# Patient Record
Sex: FEMALE | Race: WHITE | NOT HISPANIC OR LATINO
[De-identification: names, ages, dates, MRNs, and addresses within clinical notes are randomized per-mention and may not be internally consistent; named-entity substitution may affect disease eponyms.]

---

## 2018-01-15 ENCOUNTER — APPOINTMENT (OUTPATIENT)
Dept: CARDIOLOGY | Facility: CLINIC | Age: 83
End: 2018-01-15
Payer: MEDICARE

## 2018-01-15 PROCEDURE — 99214 OFFICE O/P EST MOD 30 MIN: CPT

## 2018-01-15 PROCEDURE — 93280 PM DEVICE PROGR EVAL DUAL: CPT

## 2018-01-15 PROCEDURE — ZZZZZ: CPT

## 2018-01-15 PROCEDURE — 93000 ELECTROCARDIOGRAM COMPLETE: CPT | Mod: 59

## 2018-03-09 ENCOUNTER — APPOINTMENT (OUTPATIENT)
Dept: CARDIOLOGY | Facility: CLINIC | Age: 83
End: 2018-03-09
Payer: MEDICARE

## 2018-03-09 PROCEDURE — 93880 EXTRACRANIAL BILAT STUDY: CPT

## 2018-06-18 ENCOUNTER — APPOINTMENT (OUTPATIENT)
Dept: CARDIOLOGY | Facility: CLINIC | Age: 83
End: 2018-06-18

## 2018-06-26 ENCOUNTER — APPOINTMENT (OUTPATIENT)
Dept: CARDIOLOGY | Facility: CLINIC | Age: 83
End: 2018-06-26
Payer: MEDICARE

## 2018-06-26 PROCEDURE — 93280 PM DEVICE PROGR EVAL DUAL: CPT

## 2018-07-05 ENCOUNTER — RECORD ABSTRACTING (OUTPATIENT)
Age: 83
End: 2018-07-05

## 2018-07-17 ENCOUNTER — APPOINTMENT (OUTPATIENT)
Dept: CARDIOLOGY | Facility: CLINIC | Age: 83
End: 2018-07-17
Payer: MEDICARE

## 2018-07-17 VITALS
RESPIRATION RATE: 14 BRPM | DIASTOLIC BLOOD PRESSURE: 80 MMHG | HEART RATE: 63 BPM | BODY MASS INDEX: 25.11 KG/M2 | WEIGHT: 133 LBS | HEIGHT: 61 IN | SYSTOLIC BLOOD PRESSURE: 154 MMHG

## 2018-07-17 VITALS
HEART RATE: 63 BPM | DIASTOLIC BLOOD PRESSURE: 80 MMHG | WEIGHT: 133 LBS | SYSTOLIC BLOOD PRESSURE: 154 MMHG | BODY MASS INDEX: 25.11 KG/M2 | HEIGHT: 61 IN | RESPIRATION RATE: 12 BRPM

## 2018-07-17 DIAGNOSIS — Z78.9 OTHER SPECIFIED HEALTH STATUS: ICD-10-CM

## 2018-07-17 PROCEDURE — 99214 OFFICE O/P EST MOD 30 MIN: CPT

## 2018-07-17 PROCEDURE — 93000 ELECTROCARDIOGRAM COMPLETE: CPT

## 2018-09-21 ENCOUNTER — APPOINTMENT (OUTPATIENT)
Dept: CARDIOLOGY | Facility: CLINIC | Age: 83
End: 2018-09-21
Payer: MEDICARE

## 2018-09-21 PROCEDURE — 93306 TTE W/DOPPLER COMPLETE: CPT

## 2018-10-30 ENCOUNTER — APPOINTMENT (OUTPATIENT)
Dept: CARDIOLOGY | Facility: CLINIC | Age: 83
End: 2018-10-30
Payer: MEDICARE

## 2018-10-30 VITALS
DIASTOLIC BLOOD PRESSURE: 80 MMHG | HEIGHT: 61 IN | BODY MASS INDEX: 25.68 KG/M2 | HEART RATE: 62 BPM | OXYGEN SATURATION: 96 % | WEIGHT: 136 LBS | SYSTOLIC BLOOD PRESSURE: 142 MMHG | RESPIRATION RATE: 14 BRPM

## 2018-10-30 VITALS
DIASTOLIC BLOOD PRESSURE: 80 MMHG | SYSTOLIC BLOOD PRESSURE: 142 MMHG | WEIGHT: 136 LBS | RESPIRATION RATE: 12 BRPM | HEART RATE: 62 BPM | HEIGHT: 61 IN | BODY MASS INDEX: 25.68 KG/M2 | OXYGEN SATURATION: 96 %

## 2018-10-30 PROCEDURE — 93000 ELECTROCARDIOGRAM COMPLETE: CPT

## 2018-10-30 PROCEDURE — 99214 OFFICE O/P EST MOD 30 MIN: CPT

## 2018-10-30 RX ORDER — ATORVASTATIN CALCIUM 80 MG/1
TABLET, FILM COATED ORAL DAILY
Refills: 0 | Status: DISCONTINUED | COMMUNITY
End: 2018-10-30

## 2018-12-11 ENCOUNTER — APPOINTMENT (OUTPATIENT)
Dept: CARDIOLOGY | Facility: CLINIC | Age: 83
End: 2018-12-11
Payer: MEDICARE

## 2018-12-11 PROCEDURE — 93280 PM DEVICE PROGR EVAL DUAL: CPT

## 2019-06-06 ENCOUNTER — MEDICATION RENEWAL (OUTPATIENT)
Age: 84
End: 2019-06-06

## 2019-06-06 ENCOUNTER — RX RENEWAL (OUTPATIENT)
Age: 84
End: 2019-06-06

## 2019-06-11 ENCOUNTER — APPOINTMENT (OUTPATIENT)
Dept: CARDIOLOGY | Facility: CLINIC | Age: 84
End: 2019-06-11
Payer: MEDICARE

## 2019-06-11 PROCEDURE — 93288 INTERROG EVL PM/LDLS PM IP: CPT

## 2019-07-18 ENCOUNTER — APPOINTMENT (OUTPATIENT)
Dept: CARDIOLOGY | Facility: CLINIC | Age: 84
End: 2019-07-18
Payer: MEDICARE

## 2019-07-18 ENCOUNTER — NON-APPOINTMENT (OUTPATIENT)
Age: 84
End: 2019-07-18

## 2019-07-18 VITALS
HEIGHT: 61 IN | WEIGHT: 136 LBS | OXYGEN SATURATION: 97 % | HEART RATE: 68 BPM | BODY MASS INDEX: 25.68 KG/M2 | DIASTOLIC BLOOD PRESSURE: 89 MMHG | SYSTOLIC BLOOD PRESSURE: 173 MMHG | RESPIRATION RATE: 14 BRPM

## 2019-07-18 PROCEDURE — 99214 OFFICE O/P EST MOD 30 MIN: CPT

## 2019-07-18 PROCEDURE — 93000 ELECTROCARDIOGRAM COMPLETE: CPT

## 2019-07-18 RX ORDER — LITHIUM CARBONATE 300 MG/1
300 CAPSULE ORAL
Qty: 28 | Refills: 0 | Status: DISCONTINUED | COMMUNITY
Start: 2017-10-06 | End: 2019-07-18

## 2019-07-18 RX ORDER — POTASSIUM 75 MG
TABLET ORAL
Refills: 0 | Status: DISCONTINUED | COMMUNITY
End: 2019-07-18

## 2019-07-18 RX ORDER — DOCUSATE SODIUM 100 MG/1
CAPSULE ORAL
Refills: 0 | Status: DISCONTINUED | COMMUNITY
End: 2019-07-18

## 2019-07-18 RX ORDER — QUETIAPINE FUMARATE 50 MG/1
50 TABLET ORAL
Qty: 28 | Refills: 0 | Status: DISCONTINUED | COMMUNITY
Start: 2018-03-15 | End: 2019-07-18

## 2019-07-18 RX ORDER — DIVALPROEX SODIUM 125 MG/1
125 TABLET, DELAYED RELEASE ORAL
Qty: 168 | Refills: 0 | Status: DISCONTINUED | COMMUNITY
Start: 2018-05-22 | End: 2019-07-18

## 2019-07-18 RX ORDER — LEVOTHYROXINE SODIUM 0.1 MG/1
100 TABLET ORAL
Refills: 0 | Status: DISCONTINUED | COMMUNITY
End: 2019-07-18

## 2019-07-18 RX ORDER — DIVALPROEX SODIUM 125 MG/1
125 CAPSULE, COATED PELLETS ORAL
Qty: 224 | Refills: 0 | Status: DISCONTINUED | COMMUNITY
Start: 2017-12-26 | End: 2019-07-18

## 2019-07-18 RX ORDER — CARBIDOPA AND LEVODOPA 25; 100 MG/1; MG/1
TABLET ORAL
Refills: 0 | Status: DISCONTINUED | COMMUNITY
End: 2019-07-18

## 2019-07-18 NOTE — ASSESSMENT
[FreeTextEntry1] : ECG: Atrial paced, v-sensed, borderline LVH\par \par PPM INTERROGATION 6/2019: 71% atrial paced, episode brief PAT normal function\par \par CAROTID DUPLEX 3/2018:\par 1. All arteries were clearly visualized.\par 2. There is 1-49% stenosis of the left proximal ICA.\par 3. There is 1-49% stenosis of the right proximal ICA.\par 4. The left and right carotid arteries are tortuous.\par 5. There is antegrade flow in the right and left vertebral arteries.\par 6. Recommend clinical correlation with the above findings.\par \par ECHO 9/2018:\par 1. Left ventricular ejection fraction, by visual estimation, is 65 to 70%.\par 2. Normal global left ventricular systolic function.\par 3. Impaired relaxation pattern of LV diastolic filling.\par 4. Normal left ventricular internal cavity size.\par 5. Mild concentric left ventricular hypertrophy.\par 6. Normal right ventricular size and systolic function.\par 7. Moderately dilated left atrium.\par 8. Mildly dilated right atrium.\par 9. Moderate mitral annular calcification.\par 10. Moderate thickening and calcification of the anterior and posterior mitral valve leaflets.\par 11. Mild mitral valve regurgitation.\par 12. Mild to moderate aortic valve stenosis.\par 13. Mild aortic regurgitation.\par 14. Mild tricuspid regurgitation.\par 15. There is moderate to severe dilatation of the ascending aorta (5cm).\par 16. Severely dilated pulmonary artery.\par 17. There is no evidence of pericardial effusion.\par 18. In comparison to the previous echocardiogram mitral and aortic regurgitation appear less severe.\par 19. Recommend clinical correlation with the above findings.

## 2019-07-18 NOTE — PHYSICAL EXAM
[Normal Conjunctiva] : the conjunctiva exhibited no abnormalities [Normal Jugular Venous V Waves Present] : normal jugular venous V waves present [] : no respiratory distress [Respiration, Rhythm And Depth] : normal respiratory rhythm and effort [Exaggerated Use Of Accessory Muscles For Inspiration] : no accessory muscle use [Heart Rate And Rhythm] : heart rate and rhythm were normal [Heart Sounds] : normal S1 and S2 [Bowel Sounds] : normal bowel sounds [Abdomen Soft] : soft [Abdomen Tenderness] : non-tender [Nail Clubbing] : no clubbing of the fingernails [Cyanosis, Localized] : no localized cyanosis [Skin Color & Pigmentation] : normal skin color and pigmentation [Affect] : the affect was normal [FreeTextEntry1] : oriented to person and place

## 2019-07-18 NOTE — HISTORY OF PRESENT ILLNESS
[FreeTextEntry1] : Patient is a 89yo F with HTN, TAA, PPM, dementia AS/AI, moderate MR, HLD, DM here for cardiac follow up. No CP/SOB, no pnd/orthopnea/palps/sycnope/edema. No complaints today. Spends a lot of time coloring and drawing at home. Per son, no recent complaints and she has been well. \par \par ROS: GI and  negative

## 2019-07-18 NOTE — DISCUSSION/SUMMARY
[FreeTextEntry1] : Patient is a 88yo F with HTN, TAA, PPM, dementia AS/AI, moderate MR, HLD, DM here for cardiac follow up. Doing fairly well without anginal symptoms and no signs CHF.    Also remains poor surgical candidate for TAA repair and family has not wanted done either. Echo last year with stable findings, no significant progression of AS, LV function remains preserved. 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 \par Brief PAT on recent PPM check, not clinically significant. BP high, ? white coat and advised to monitor at home bid for next week\par \par 1. Continue antihypertensives, family to monitor BP bid for next week and call with results. May need to increase hydralazine \par 2. ASA and statin \par 3. ACtivity as tolerated\par 4. PPM check in 6 months\par 5. Follow up 6 months \par 6. Surveillance of aortic and mitral valve, consider repeat echo 1 year\par 7. Follow up 6 months\par \par

## 2019-08-28 ENCOUNTER — RX RENEWAL (OUTPATIENT)
Age: 84
End: 2019-08-28

## 2019-11-14 ENCOUNTER — APPOINTMENT (OUTPATIENT)
Dept: INTERNAL MEDICINE | Facility: CLINIC | Age: 84
End: 2019-11-14
Payer: MEDICARE

## 2019-11-14 VITALS
OXYGEN SATURATION: 97 % | TEMPERATURE: 98.6 F | HEART RATE: 71 BPM | WEIGHT: 137.25 LBS | DIASTOLIC BLOOD PRESSURE: 84 MMHG | SYSTOLIC BLOOD PRESSURE: 160 MMHG | BODY MASS INDEX: 25.91 KG/M2 | HEIGHT: 61 IN

## 2019-11-14 DIAGNOSIS — Z00.00 ENCOUNTER FOR GENERAL ADULT MEDICAL EXAMINATION W/OUT ABNORMAL FINDINGS: ICD-10-CM

## 2019-11-14 PROCEDURE — G0439: CPT

## 2019-11-14 PROCEDURE — G0442 ANNUAL ALCOHOL SCREEN 15 MIN: CPT | Mod: 59

## 2019-11-14 PROCEDURE — 36415 COLL VENOUS BLD VENIPUNCTURE: CPT

## 2019-11-14 NOTE — PHYSICAL EXAM
[No Acute Distress] : no acute distress [Well Nourished] : well nourished [Well Developed] : well developed [Well-Appearing] : well-appearing [Normal Sclera/Conjunctiva] : normal sclera/conjunctiva [PERRL] : pupils equal round and reactive to light [EOMI] : extraocular movements intact [Normal Outer Ear/Nose] : the outer ears and nose were normal in appearance [No JVD] : no jugular venous distention [Normal Oropharynx] : the oropharynx was normal [Supple] : supple [No Lymphadenopathy] : no lymphadenopathy [No Respiratory Distress] : no respiratory distress  [Thyroid Normal, No Nodules] : the thyroid was normal and there were no nodules present [No Accessory Muscle Use] : no accessory muscle use [Clear to Auscultation] : lungs were clear to auscultation bilaterally [Normal Rate] : normal rate  [Regular Rhythm] : with a regular rhythm [Normal S1, S2] : normal S1 and S2 [No Murmur] : no murmur heard [No Carotid Bruits] : no carotid bruits [No Varicosities] : no varicosities [No Abdominal Bruit] : a ~M bruit was not heard ~T in the abdomen [Pedal Pulses Present] : the pedal pulses are present [No Edema] : there was no peripheral edema [No Palpable Aorta] : no palpable aorta [No Extremity Clubbing/Cyanosis] : no extremity clubbing/cyanosis [Soft] : abdomen soft [Non Tender] : non-tender [Non-distended] : non-distended [No Masses] : no abdominal mass palpated [No HSM] : no HSM [Normal Bowel Sounds] : normal bowel sounds [Normal Posterior Cervical Nodes] : no posterior cervical lymphadenopathy [Normal Anterior Cervical Nodes] : no anterior cervical lymphadenopathy [No Spinal Tenderness] : no spinal tenderness [No CVA Tenderness] : no CVA  tenderness [No Joint Swelling] : no joint swelling [Grossly Normal Strength/Tone] : grossly normal strength/tone [No Rash] : no rash [No Focal Deficits] : no focal deficits [Coordination Grossly Intact] : coordination grossly intact [Deep Tendon Reflexes (DTR)] : deep tendon reflexes were 2+ and symmetric [Normal Affect] : the affect was normal [Normal Insight/Judgement] : insight and judgment were intact [de-identified] : burning pain in fingers and toes

## 2019-11-14 NOTE — HISTORY OF PRESENT ILLNESS
[FreeTextEntry1] : np est [de-identified] : Ms. RAGHAVENDRA WALLACE is a 89 year female with a PMH of Glaucoma,  Bipolar 2, HTN, Dementia,  comes to the office for physical exam.

## 2019-11-14 NOTE — HEALTH RISK ASSESSMENT
[Good] : ~his/her~  mood as  good [] : No [1 or 2 (0 pts)] : 1 or 2 (0 points) [Never (0 pts)] : Never (0 points) [No] : In the past 12 months have you used drugs other than those required for medical reasons? No [No falls in past year] : Patient reported no falls in the past year [0] : 2) Feeling down, depressed, or hopeless: Not at all (0) [Audit-CScore] : 0 [de-identified] : Psychiatrist, Opthalmology.  [[id number]] : 0 [Patient declined mammogram] : Patient declined mammogram [Patient declined PAP Smear] : Patient declined PAP Smear [Patient declined bone density test] : Patient declined bone density test [Patient declined colonoscopy] : Patient declined colonoscopy [HIV test declined] : HIV test declined [Hepatitis C test declined] : Hepatitis C test declined

## 2019-11-15 LAB
ALBUMIN SERPL ELPH-MCNC: 4.6 G/DL
ALP BLD-CCNC: 74 U/L
ALT SERPL-CCNC: 10 U/L
ANION GAP SERPL CALC-SCNC: 16 MMOL/L
AST SERPL-CCNC: 16 U/L
BASOPHILS # BLD AUTO: 0.01 K/UL
BASOPHILS NFR BLD AUTO: 0.2 %
BILIRUB SERPL-MCNC: 0.2 MG/DL
BUN SERPL-MCNC: 23 MG/DL
CALCIUM SERPL-MCNC: 9.9 MG/DL
CHLORIDE SERPL-SCNC: 104 MMOL/L
CHOLEST SERPL-MCNC: 217 MG/DL
CHOLEST/HDLC SERPL: 4.2 RATIO
CO2 SERPL-SCNC: 23 MMOL/L
CREAT SERPL-MCNC: 1.1 MG/DL
EOSINOPHIL # BLD AUTO: 0.13 K/UL
EOSINOPHIL NFR BLD AUTO: 2.9 %
ESTIMATED AVERAGE GLUCOSE: 131 MG/DL
GLUCOSE SERPL-MCNC: 110 MG/DL
HBA1C MFR BLD HPLC: 6.2 %
HCT VFR BLD CALC: 37.4 %
HDLC SERPL-MCNC: 52 MG/DL
HGB BLD-MCNC: 11.6 G/DL
IMM GRANULOCYTES NFR BLD AUTO: 0.7 %
LDLC SERPL CALC-MCNC: 119 MG/DL
LYMPHOCYTES # BLD AUTO: 0.96 K/UL
LYMPHOCYTES NFR BLD AUTO: 21.2 %
MAN DIFF?: NORMAL
MCHC RBC-ENTMCNC: 31 GM/DL
MCHC RBC-ENTMCNC: 31.5 PG
MCV RBC AUTO: 101.6 FL
MONOCYTES # BLD AUTO: 0.65 K/UL
MONOCYTES NFR BLD AUTO: 14.4 %
NEUTROPHILS # BLD AUTO: 2.74 K/UL
NEUTROPHILS NFR BLD AUTO: 60.6 %
PLATELET # BLD AUTO: 154 K/UL
POTASSIUM SERPL-SCNC: 5.1 MMOL/L
PROT SERPL-MCNC: 8.3 G/DL
RBC # BLD: 3.68 M/UL
RBC # FLD: 13.3 %
SODIUM SERPL-SCNC: 143 MMOL/L
TRIGL SERPL-MCNC: 231 MG/DL
TSH SERPL-ACNC: 0.41 UIU/ML
WBC # FLD AUTO: 4.52 K/UL

## 2019-12-10 ENCOUNTER — APPOINTMENT (OUTPATIENT)
Dept: CARDIOLOGY | Facility: CLINIC | Age: 84
End: 2019-12-10
Payer: MEDICARE

## 2019-12-10 ENCOUNTER — NON-APPOINTMENT (OUTPATIENT)
Age: 84
End: 2019-12-10

## 2019-12-10 VITALS
DIASTOLIC BLOOD PRESSURE: 83 MMHG | SYSTOLIC BLOOD PRESSURE: 184 MMHG | BODY MASS INDEX: 25.49 KG/M2 | HEIGHT: 61 IN | RESPIRATION RATE: 14 BRPM | HEART RATE: 70 BPM | WEIGHT: 135 LBS

## 2019-12-10 PROCEDURE — 93000 ELECTROCARDIOGRAM COMPLETE: CPT | Mod: 59

## 2019-12-10 PROCEDURE — 93288 INTERROG EVL PM/LDLS PM IP: CPT

## 2019-12-10 PROCEDURE — 99214 OFFICE O/P EST MOD 30 MIN: CPT

## 2019-12-10 RX ORDER — ATORVASTATIN CALCIUM 20 MG/1
20 TABLET, FILM COATED ORAL
Qty: 28 | Refills: 0 | Status: DISCONTINUED | COMMUNITY
Start: 2017-12-26 | End: 2019-12-10

## 2019-12-10 NOTE — ASSESSMENT
[FreeTextEntry1] : ECG: Atrial paced, v-sensed, leftward axis, NSST \par \par PPM INTERROGATION 12/2019: 70% atrial paced, no events\par PPM INTERROGATION 6/2019: 71% atrial paced, episode brief PAT normal function\par \par CAROTID DUPLEX 3/2018:\par 1. All arteries were clearly visualized.\par 2. There is 1-49% stenosis of the left proximal ICA.\par 3. There is 1-49% stenosis of the right proximal ICA.\par 4. The left and right carotid arteries are tortuous.\par 5. There is antegrade flow in the right and left vertebral arteries.\par 6. Recommend clinical correlation with the above findings.\par \par ECHO 9/2018:\par 1. Left ventricular ejection fraction, by visual estimation, is 65 to 70%.\par 2. Normal global left ventricular systolic function.\par 3. Impaired relaxation pattern of LV diastolic filling.\par 4. Normal left ventricular internal cavity size.\par 5. Mild concentric left ventricular hypertrophy.\par 6. Normal right ventricular size and systolic function.\par 7. Moderately dilated left atrium.\par 8. Mildly dilated right atrium.\par 9. Moderate mitral annular calcification.\par 10. Moderate thickening and calcification of the anterior and posterior mitral valve leaflets.\par 11. Mild mitral valve regurgitation.\par 12. Mild to moderate aortic valve stenosis.\par 13. Mild aortic regurgitation.\par 14. Mild tricuspid regurgitation.\par 15. There is moderate to severe dilatation of the ascending aorta (5cm).\par 16. Severely dilated pulmonary artery.\par 17. There is no evidence of pericardial effusion.\par 18. In comparison to the previous echocardiogram mitral and aortic regurgitation appear less severe.\par 19. Recommend clinical correlation with the above findings.

## 2019-12-10 NOTE — PHYSICAL EXAM
[Normal Conjunctiva] : the conjunctiva exhibited no abnormalities [] : no respiratory distress [Normal Jugular Venous V Waves Present] : normal jugular venous V waves present [Respiration, Rhythm And Depth] : normal respiratory rhythm and effort [Heart Rate And Rhythm] : heart rate and rhythm were normal [Exaggerated Use Of Accessory Muscles For Inspiration] : no accessory muscle use [Bowel Sounds] : normal bowel sounds [Heart Sounds] : normal S1 and S2 [Abdomen Soft] : soft [Abdomen Tenderness] : non-tender [Nail Clubbing] : no clubbing of the fingernails [Cyanosis, Localized] : no localized cyanosis [Affect] : the affect was normal [Skin Color & Pigmentation] : normal skin color and pigmentation [FreeTextEntry1] : no edema

## 2019-12-10 NOTE — HISTORY OF PRESENT ILLNESS
[FreeTextEntry1] : Patient is a 89yo F with HTN, TAA, PPM, dementia AS/AI, moderate MR, HLD, DM here for cardiac follow up. No CP/SOB, no pnd/orthopnea/palps/sycnope/edema. Uncomfortable today, son states more confused today which tends to fluctuate.  Spends a lot of time coloring and drawing at home, limited recently from hand pain. Per son, no recent cardiac complaints and she has been well. BP remains in 140-150s systolic. \par \par ROS: GI and  negative

## 2019-12-10 NOTE — DISCUSSION/SUMMARY
[FreeTextEntry1] : Patient is a 89yo F with HTN, TAA, PPM, dementia AS/AI, moderate MR, HLD, DM here for cardiac follow up. Doing fairly well without anginal symptoms and no signs CHF.    Also remains poor surgical candidate for TAA repair and family has not wanted done either. Echo last year with stable findings, no significant progression of AS, LV function remains preserved. 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 \par BP high but agitated, home BP well enough controlled. Will avoid pushin gBP too low to avoid falling/orthostasis and dizziness. Will also dc statin, limited benefit at this time given her age. Will help reduce polypharmacy\par \par 1. Continue antihypertensives, son to continue to monitor at home\par 2. ASA to be continued, discontinue statin \par 3. ACtivity as tolerated\par 4. PPM check shows normal function and no events, recheck in 6 months\par 5. Follow up 6 months with echo same day to evaluate AS/MR\par \par \par

## 2020-01-21 ENCOUNTER — APPOINTMENT (OUTPATIENT)
Dept: INTERNAL MEDICINE | Facility: CLINIC | Age: 85
End: 2020-01-21
Payer: MEDICARE

## 2020-01-21 VITALS
HEART RATE: 75 BPM | HEIGHT: 61 IN | DIASTOLIC BLOOD PRESSURE: 94 MMHG | BODY MASS INDEX: 25.49 KG/M2 | SYSTOLIC BLOOD PRESSURE: 172 MMHG | WEIGHT: 135 LBS

## 2020-01-21 DIAGNOSIS — H61.22 IMPACTED CERUMEN, LEFT EAR: ICD-10-CM

## 2020-01-21 DIAGNOSIS — R21 RASH AND OTHER NONSPECIFIC SKIN ERUPTION: ICD-10-CM

## 2020-01-21 PROCEDURE — 99214 OFFICE O/P EST MOD 30 MIN: CPT | Mod: 25

## 2020-01-21 PROCEDURE — 69210 REMOVE IMPACTED EAR WAX UNI: CPT

## 2020-01-21 NOTE — PLAN
[FreeTextEntry1] : Patient cerumen removed from left ear with irrigation and manual removal of wax with plastic ear curette when visible from outside. Patient able to hear more clearly after procedure. \par \par Patient likely with candida infection secondary to having frequent wet depends and the abrasion on buttock is from patient scratching the area. \par \par In regards to had pain, patient was referred to occupational therapy.\par \par Counseling included abnormal lab results, differential diagnoses, treatment options, risks and benefits, lifestyle changes, prognosis, current condition, medications, and dose adjustments. \par The patient was interactive, attentive, asked questions, and verbalized understanding

## 2020-01-21 NOTE — HISTORY OF PRESENT ILLNESS
[de-identified] : Ms. RAGHAVENDRA WALLACE is a 89 year female with a PMH of Glaucoma,  Bipolar 2, HTN, Dementia,  comes to the office c/o left ear discomfort, and sore on lower back/ buttock area x 1 week. Patient is accompanied by her son. No other complaints at this time.  [FreeTextEntry1] : ear pain / sore on lower back

## 2020-01-21 NOTE — PHYSICAL EXAM
[No Acute Distress] : no acute distress [Well Developed] : well developed [Well Nourished] : well nourished [Well-Appearing] : well-appearing [Normal Sclera/Conjunctiva] : normal sclera/conjunctiva [EOMI] : extraocular movements intact [PERRL] : pupils equal round and reactive to light [Normal Outer Ear/Nose] : the outer ears and nose were normal in appearance [Normal Oropharynx] : the oropharynx was normal [No Lymphadenopathy] : no lymphadenopathy [No JVD] : no jugular venous distention [Supple] : supple [No Respiratory Distress] : no respiratory distress  [Thyroid Normal, No Nodules] : the thyroid was normal and there were no nodules present [No Accessory Muscle Use] : no accessory muscle use [Normal Rate] : normal rate  [Clear to Auscultation] : lungs were clear to auscultation bilaterally [Regular Rhythm] : with a regular rhythm [Normal S1, S2] : normal S1 and S2 [No Carotid Bruits] : no carotid bruits [No Murmur] : no murmur heard [No Varicosities] : no varicosities [No Abdominal Bruit] : a ~M bruit was not heard ~T in the abdomen [Pedal Pulses Present] : the pedal pulses are present [No Edema] : there was no peripheral edema [No Palpable Aorta] : no palpable aorta [Soft] : abdomen soft [No Extremity Clubbing/Cyanosis] : no extremity clubbing/cyanosis [Non-distended] : non-distended [Non Tender] : non-tender [No Masses] : no abdominal mass palpated [Normal Posterior Cervical Nodes] : no posterior cervical lymphadenopathy [Normal Bowel Sounds] : normal bowel sounds [No HSM] : no HSM [Normal Anterior Cervical Nodes] : no anterior cervical lymphadenopathy [No Spinal Tenderness] : no spinal tenderness [No CVA Tenderness] : no CVA  tenderness [Grossly Normal Strength/Tone] : grossly normal strength/tone [No Joint Swelling] : no joint swelling [Coordination Grossly Intact] : coordination grossly intact [Deep Tendon Reflexes (DTR)] : deep tendon reflexes were 2+ and symmetric [Normal Gait] : normal gait [No Focal Deficits] : no focal deficits [Normal Affect] : the affect was normal [de-identified] : cerumen impaction in left ear, TM was WNL after removal of cerumen. [Normal Insight/Judgement] : insight and judgment were intact [de-identified] : 2 cm x 2 cm abrasion noted on right buttock, no active bleeding or pustular discharge. Erythematous rash noted in groin bilaterally.

## 2020-02-05 ENCOUNTER — APPOINTMENT (OUTPATIENT)
Dept: INTERNAL MEDICINE | Facility: CLINIC | Age: 85
End: 2020-02-05
Payer: MEDICARE

## 2020-02-05 VITALS
HEIGHT: 61 IN | SYSTOLIC BLOOD PRESSURE: 158 MMHG | BODY MASS INDEX: 24.07 KG/M2 | DIASTOLIC BLOOD PRESSURE: 80 MMHG | WEIGHT: 127.5 LBS | TEMPERATURE: 100.5 F

## 2020-02-05 DIAGNOSIS — J31.0 CHRONIC RHINITIS: ICD-10-CM

## 2020-02-05 DIAGNOSIS — M25.542 PAIN IN JOINTS OF LEFT HAND: ICD-10-CM

## 2020-02-05 DIAGNOSIS — M25.541 PAIN IN JOINTS OF RIGHT HAND: ICD-10-CM

## 2020-02-05 DIAGNOSIS — J06.9 ACUTE UPPER RESPIRATORY INFECTION, UNSPECIFIED: ICD-10-CM

## 2020-02-05 PROCEDURE — 99214 OFFICE O/P EST MOD 30 MIN: CPT

## 2020-02-05 NOTE — PHYSICAL EXAM
[No Acute Distress] : no acute distress [Well Nourished] : well nourished [Well Developed] : well developed [Well-Appearing] : well-appearing [Normal Sclera/Conjunctiva] : normal sclera/conjunctiva [PERRL] : pupils equal round and reactive to light [EOMI] : extraocular movements intact [Normal Outer Ear/Nose] : the outer ears and nose were normal in appearance [No JVD] : no jugular venous distention [No Lymphadenopathy] : no lymphadenopathy [Supple] : supple [Thyroid Normal, No Nodules] : the thyroid was normal and there were no nodules present [No Respiratory Distress] : no respiratory distress  [No Accessory Muscle Use] : no accessory muscle use [Normal Rate] : normal rate  [Regular Rhythm] : with a regular rhythm [Normal S1, S2] : normal S1 and S2 [No Murmur] : no murmur heard [No Carotid Bruits] : no carotid bruits [No Abdominal Bruit] : a ~M bruit was not heard ~T in the abdomen [No Varicosities] : no varicosities [Pedal Pulses Present] : the pedal pulses are present [No Edema] : there was no peripheral edema [No Palpable Aorta] : no palpable aorta [No Extremity Clubbing/Cyanosis] : no extremity clubbing/cyanosis [Soft] : abdomen soft [Non Tender] : non-tender [Non-distended] : non-distended [No Masses] : no abdominal mass palpated [No HSM] : no HSM [Normal Bowel Sounds] : normal bowel sounds [Normal Posterior Cervical Nodes] : no posterior cervical lymphadenopathy [Normal Anterior Cervical Nodes] : no anterior cervical lymphadenopathy [No CVA Tenderness] : no CVA  tenderness [No Spinal Tenderness] : no spinal tenderness [No Joint Swelling] : no joint swelling [Grossly Normal Strength/Tone] : grossly normal strength/tone [No Rash] : no rash [Coordination Grossly Intact] : coordination grossly intact [No Focal Deficits] : no focal deficits [Normal Gait] : normal gait [Deep Tendon Reflexes (DTR)] : deep tendon reflexes were 2+ and symmetric [Normal Affect] : the affect was normal [Normal Insight/Judgement] : insight and judgment were intact [de-identified] : fullness of TM bilaterally, cobblestoning of pharynx

## 2020-02-05 NOTE — HISTORY OF PRESENT ILLNESS
[Family Member] : family member [FreeTextEntry1] : cough [de-identified] : Ms. RAGHAVENDRA WALLACE is a 89 year female with a PMH of Glaucoma,  Bipolar 2, HTN, Dementia,  comes to the office  with her son c/o dry cough and post nasal drip x 3 days. No other complaints at this time.

## 2020-02-05 NOTE — PLAN
[FreeTextEntry1] : Patient's signs and symptoms consistent with URI. Patient educated about signs and symptoms of URI, they typically last up to 14 days but may last longer. Patient advised to continue OTC medication for symptomatic relief. \par \par Patient was referred to Occupational therapy for further evaluation and management.\par \par Counseling included abnormal lab results, differential diagnoses, treatment options, risks and benefits, lifestyle changes, prognosis, current condition, medications, and dose adjustments. \par The patient was interactive, attentive, asked questions, and verbalized understanding

## 2020-02-13 ENCOUNTER — APPOINTMENT (OUTPATIENT)
Dept: INTERNAL MEDICINE | Facility: CLINIC | Age: 85
End: 2020-02-13
Payer: MEDICARE

## 2020-02-13 VITALS
HEART RATE: 82 BPM | SYSTOLIC BLOOD PRESSURE: 132 MMHG | OXYGEN SATURATION: 97 % | TEMPERATURE: 97.8 F | WEIGHT: 127 LBS | DIASTOLIC BLOOD PRESSURE: 83 MMHG | BODY MASS INDEX: 23.98 KG/M2 | HEIGHT: 61 IN

## 2020-02-13 DIAGNOSIS — Z87.09 PERSONAL HISTORY OF OTHER DISEASES OF THE RESPIRATORY SYSTEM: ICD-10-CM

## 2020-02-13 DIAGNOSIS — H66.91 OTITIS MEDIA, UNSPECIFIED, RIGHT EAR: ICD-10-CM

## 2020-02-13 PROCEDURE — 99214 OFFICE O/P EST MOD 30 MIN: CPT

## 2020-02-13 NOTE — REVIEW OF SYSTEMS
[Sore Throat] : sore throat [Earache] : earache [Postnasal Drip] : postnasal drip [Negative] : Heme/Lymph

## 2020-02-13 NOTE — HISTORY OF PRESENT ILLNESS
[Family Member] : family member [FreeTextEntry1] : cough [de-identified] : Ms. RAGHAVENDRA WALLACE is a 89 year female with a PMH of Glaucoma,  Bipolar 2, HTN, Dementia,  comes to the office  with her son c/o sore throat and ear pain x 3 days.

## 2020-02-13 NOTE — PLAN
[FreeTextEntry1] : In regards to patients otitis media, patient started on antibiotics.\par \par Patient was referred to Occupational therapy for further evaluation and management.\par \par patient's BP well controlled with current medication. will continue current  regimen \par \par Counseling included abnormal lab results, differential diagnoses, treatment options, risks and benefits, lifestyle changes, prognosis, current condition, medications, and dose adjustments. \par The patient was interactive, attentive, asked questions, and verbalized understanding

## 2020-02-13 NOTE — PHYSICAL EXAM
[Well Developed] : well developed [Well Nourished] : well nourished [No Acute Distress] : no acute distress [Normal Sclera/Conjunctiva] : normal sclera/conjunctiva [Well-Appearing] : well-appearing [PERRL] : pupils equal round and reactive to light [Normal Outer Ear/Nose] : the outer ears and nose were normal in appearance [EOMI] : extraocular movements intact [No Lymphadenopathy] : no lymphadenopathy [Supple] : supple [No JVD] : no jugular venous distention [Thyroid Normal, No Nodules] : the thyroid was normal and there were no nodules present [No Accessory Muscle Use] : no accessory muscle use [No Respiratory Distress] : no respiratory distress  [Normal S1, S2] : normal S1 and S2 [Regular Rhythm] : with a regular rhythm [Normal Rate] : normal rate  [No Murmur] : no murmur heard [No Carotid Bruits] : no carotid bruits [No Varicosities] : no varicosities [Pedal Pulses Present] : the pedal pulses are present [No Abdominal Bruit] : a ~M bruit was not heard ~T in the abdomen [No Edema] : there was no peripheral edema [No Extremity Clubbing/Cyanosis] : no extremity clubbing/cyanosis [No Palpable Aorta] : no palpable aorta [Non Tender] : non-tender [Soft] : abdomen soft [Non-distended] : non-distended [No HSM] : no HSM [No Masses] : no abdominal mass palpated [Normal Bowel Sounds] : normal bowel sounds [No CVA Tenderness] : no CVA  tenderness [Normal Anterior Cervical Nodes] : no anterior cervical lymphadenopathy [Normal Posterior Cervical Nodes] : no posterior cervical lymphadenopathy [Grossly Normal Strength/Tone] : grossly normal strength/tone [No Joint Swelling] : no joint swelling [No Spinal Tenderness] : no spinal tenderness [No Focal Deficits] : no focal deficits [No Rash] : no rash [Coordination Grossly Intact] : coordination grossly intact [Normal Gait] : normal gait [Deep Tendon Reflexes (DTR)] : deep tendon reflexes were 2+ and symmetric [Normal Affect] : the affect was normal [de-identified] : right TM red, swollen, pain with palpation of tragus, cobblestoning of pharynx  [Normal Insight/Judgement] : insight and judgment were intact

## 2020-02-14 LAB — RAPID RVP RESULT: NOT DETECTED

## 2020-02-17 LAB — BACTERIA THROAT CULT: NORMAL

## 2020-03-04 DIAGNOSIS — Z00.00 ENCOUNTER FOR GENERAL ADULT MEDICAL EXAMINATION W/OUT ABNORMAL FINDINGS: ICD-10-CM

## 2020-04-13 ENCOUNTER — RX RENEWAL (OUTPATIENT)
Age: 85
End: 2020-04-13

## 2020-06-11 ENCOUNTER — APPOINTMENT (OUTPATIENT)
Dept: CARDIOLOGY | Facility: CLINIC | Age: 85
End: 2020-06-11
Payer: MEDICARE

## 2020-06-11 ENCOUNTER — NON-APPOINTMENT (OUTPATIENT)
Age: 85
End: 2020-06-11

## 2020-06-11 VITALS — SYSTOLIC BLOOD PRESSURE: 144 MMHG | DIASTOLIC BLOOD PRESSURE: 84 MMHG

## 2020-06-11 VITALS
BODY MASS INDEX: 19.46 KG/M2 | SYSTOLIC BLOOD PRESSURE: 160 MMHG | WEIGHT: 114 LBS | HEIGHT: 64 IN | HEART RATE: 64 BPM | RESPIRATION RATE: 14 BRPM | DIASTOLIC BLOOD PRESSURE: 88 MMHG | OXYGEN SATURATION: 99 %

## 2020-06-11 PROCEDURE — 93000 ELECTROCARDIOGRAM COMPLETE: CPT | Mod: 59

## 2020-06-11 PROCEDURE — 99214 OFFICE O/P EST MOD 30 MIN: CPT

## 2020-06-11 PROCEDURE — 93288 INTERROG EVL PM/LDLS PM IP: CPT

## 2020-06-11 NOTE — DISCUSSION/SUMMARY
[FreeTextEntry1] : Patient is a 89yo F with HTN, TAA, PPM, dementia AS/AI, moderate MR, HLD, DM here for cardiac follow up. Doing fairly well without anginal symptoms and no signs CHF.  Has advanced dementia.    Also remains poor surgical candidate for TAA repair and family has not wanted done either. Poor candidate for A/C as well, PAF burden low and risk benefit favors no A/C at this time. \par \par Echo 2018 with stable findings, no significant progression of AS, LV function remains preserved. 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 \par \par \par \par 1. Continue antihypertensives, son to continue to monitor at home. BP well enough controlled and wouldn’t push lower in 89yo frail patient \par 2. ASA to be continued, no A/C at this time with low PAF burden\par 3. ACtivity as tolerated\par 4. PPM check shows normal function and no events, recheck in 6 months\par 5. Keep off statin\par 6. Follow up 6 months \par \par \par

## 2020-06-11 NOTE — ASSESSMENT
[FreeTextEntry1] : ECG: Atrial paced, v-sensed, leftward axis, NSST \par \par PPM INTERROGATION 6/2020: 67% atrial paced, 16 episodes PAF, longest for 2 minutes 45 seconds\par PPM INTERROGATION 12/2019: 70% atrial paced, no events\par PPM INTERROGATION 6/2019: 71% atrial paced, episode brief PAT normal function\par \par CAROTID DUPLEX 3/2018:\par 1. All arteries were clearly visualized.\par 2. There is 1-49% stenosis of the left proximal ICA.\par 3. There is 1-49% stenosis of the right proximal ICA.\par 4. The left and right carotid arteries are tortuous.\par 5. There is antegrade flow in the right and left vertebral arteries.\par 6. Recommend clinical correlation with the above findings.\par \par ECHO 9/2018:\par 1. Left ventricular ejection fraction, by visual estimation, is 65 to 70%.\par 2. Normal global left ventricular systolic function.\par 3. Impaired relaxation pattern of LV diastolic filling.\par 4. Normal left ventricular internal cavity size.\par 5. Mild concentric left ventricular hypertrophy.\par 6. Normal right ventricular size and systolic function.\par 7. Moderately dilated left atrium.\par 8. Mildly dilated right atrium.\par 9. Moderate mitral annular calcification.\par 10. Moderate thickening and calcification of the anterior and posterior mitral valve leaflets.\par 11. Mild mitral valve regurgitation.\par 12. Mild to moderate aortic valve stenosis.\par 13. Mild aortic regurgitation.\par 14. Mild tricuspid regurgitation.\par 15. There is moderate to severe dilatation of the ascending aorta (5cm).\par 16. Severely dilated pulmonary artery.\par 17. There is no evidence of pericardial effusion.\par 18. In comparison to the previous echocardiogram mitral and aortic regurgitation appear less severe.\par 19. Recommend clinical correlation with the above findings.

## 2020-06-11 NOTE — HISTORY OF PRESENT ILLNESS
[FreeTextEntry1] : Patient is a 89yo F with HTN, TAA, PPM, dementia AS/AI, moderate MR, HLD, DM here for cardiac follow up. No CP/SOB, no pnd/orthopnea/palps/sycnope/edema.  Very limited activity, watches TV and sleeps during day. Contiunes with chronic pain in hands, limites her as she used to color more.  Per son, no recent cardiac complaints. Dementia is fairly advanced and memory worse. Statin discontinued at last OV. \par \par ROS: GI and  negative

## 2020-08-07 ENCOUNTER — RX RENEWAL (OUTPATIENT)
Age: 85
End: 2020-08-07

## 2020-09-01 ENCOUNTER — LABORATORY RESULT (OUTPATIENT)
Age: 85
End: 2020-09-01

## 2020-09-01 ENCOUNTER — APPOINTMENT (OUTPATIENT)
Dept: INTERNAL MEDICINE | Facility: CLINIC | Age: 85
End: 2020-09-01
Payer: MEDICARE

## 2020-09-01 VITALS
BODY MASS INDEX: 19.46 KG/M2 | WEIGHT: 114 LBS | SYSTOLIC BLOOD PRESSURE: 156 MMHG | TEMPERATURE: 98.1 F | HEIGHT: 64 IN | HEART RATE: 71 BPM | OXYGEN SATURATION: 98 % | DIASTOLIC BLOOD PRESSURE: 88 MMHG

## 2020-09-01 DIAGNOSIS — Z20.828 CONTACT WITH AND (SUSPECTED) EXPOSURE TO OTHER VIRAL COMMUNICABLE DISEASES: ICD-10-CM

## 2020-09-01 DIAGNOSIS — Z86.79 PERSONAL HISTORY OF OTHER DISEASES OF THE CIRCULATORY SYSTEM: ICD-10-CM

## 2020-09-01 DIAGNOSIS — Z23 ENCOUNTER FOR IMMUNIZATION: ICD-10-CM

## 2020-09-01 DIAGNOSIS — Z02.89 ENCOUNTER FOR OTHER ADMINISTRATIVE EXAMINATIONS: ICD-10-CM

## 2020-09-01 PROCEDURE — 99214 OFFICE O/P EST MOD 30 MIN: CPT | Mod: CS,25

## 2020-09-01 PROCEDURE — 36415 COLL VENOUS BLD VENIPUNCTURE: CPT | Mod: CS

## 2020-09-01 PROCEDURE — 90662 IIV NO PRSV INCREASED AG IM: CPT

## 2020-09-01 PROCEDURE — G0008: CPT

## 2020-09-01 NOTE — PLAN
[FreeTextEntry1] : In regards to chronic medical conditions, will test blood work today.\par \par patient's BP well controlled with current medication. will continue current  regimen \par \par Patient received flu vaccine today. Patient advised of adverse effects of medication including but not limited to muscle soreness at site of injection and general malaise \par \par Counseling included abnormal lab results, differential diagnoses, treatment options, risks and benefits, lifestyle changes, prognosis, current condition, medications, and dose adjustments. \par The patient was interactive, attentive, asked questions, and verbalized understanding

## 2020-09-01 NOTE — HISTORY OF PRESENT ILLNESS
[FreeTextEntry1] : check up / paper work filled put [de-identified] : Ms. RAGHAVENDRA WALLACE is a 89 year female with a PMH of Glaucoma,  Bipolar 2, HTN, Dementia,  comes to the office  with her son for follow up of chronic medical conditions.

## 2020-09-01 NOTE — PHYSICAL EXAM
[No Acute Distress] : no acute distress [Well Nourished] : well nourished [Well Developed] : well developed [Well-Appearing] : well-appearing [Normal Sclera/Conjunctiva] : normal sclera/conjunctiva [PERRL] : pupils equal round and reactive to light [EOMI] : extraocular movements intact [Normal Outer Ear/Nose] : the outer ears and nose were normal in appearance [No JVD] : no jugular venous distention [No Lymphadenopathy] : no lymphadenopathy [Supple] : supple [Thyroid Normal, No Nodules] : the thyroid was normal and there were no nodules present [No Respiratory Distress] : no respiratory distress  [No Accessory Muscle Use] : no accessory muscle use [Normal Rate] : normal rate  [Regular Rhythm] : with a regular rhythm [Normal S1, S2] : normal S1 and S2 [No Murmur] : no murmur heard [No Carotid Bruits] : no carotid bruits [No Abdominal Bruit] : a ~M bruit was not heard ~T in the abdomen [No Varicosities] : no varicosities [Pedal Pulses Present] : the pedal pulses are present [No Edema] : there was no peripheral edema [No Palpable Aorta] : no palpable aorta [No Extremity Clubbing/Cyanosis] : no extremity clubbing/cyanosis [Soft] : abdomen soft [Non Tender] : non-tender [Non-distended] : non-distended [No Masses] : no abdominal mass palpated [No HSM] : no HSM [Normal Bowel Sounds] : normal bowel sounds [Normal Posterior Cervical Nodes] : no posterior cervical lymphadenopathy [Normal Anterior Cervical Nodes] : no anterior cervical lymphadenopathy [No CVA Tenderness] : no CVA  tenderness [No Spinal Tenderness] : no spinal tenderness [No Joint Swelling] : no joint swelling [Grossly Normal Strength/Tone] : grossly normal strength/tone [No Rash] : no rash [Coordination Grossly Intact] : coordination grossly intact [No Focal Deficits] : no focal deficits [Normal Affect] : the affect was normal [Normal Insight/Judgement] : insight and judgment were intact [de-identified] : sheree ambulates with walker.

## 2020-09-02 LAB
ALBUMIN SERPL ELPH-MCNC: 4.9 G/DL
ALP BLD-CCNC: 83 U/L
ALT SERPL-CCNC: 12 U/L
ANION GAP SERPL CALC-SCNC: 14 MMOL/L
AST SERPL-CCNC: 22 U/L
BASOPHILS # BLD AUTO: 0.02 K/UL
BASOPHILS NFR BLD AUTO: 0.4 %
BILIRUB SERPL-MCNC: 0.4 MG/DL
BUN SERPL-MCNC: 27 MG/DL
CALCIUM SERPL-MCNC: 10.1 MG/DL
CHLORIDE SERPL-SCNC: 104 MMOL/L
CHOLEST SERPL-MCNC: 230 MG/DL
CHOLEST/HDLC SERPL: 4.1 RATIO
CO2 SERPL-SCNC: 24 MMOL/L
CREAT SERPL-MCNC: 1.17 MG/DL
EOSINOPHIL # BLD AUTO: 0.12 K/UL
EOSINOPHIL NFR BLD AUTO: 2.3 %
ESTIMATED AVERAGE GLUCOSE: 123 MG/DL
GLUCOSE SERPL-MCNC: 76 MG/DL
HBA1C MFR BLD HPLC: 5.9 %
HCT VFR BLD CALC: 36.6 %
HDLC SERPL-MCNC: 56 MG/DL
HGB BLD-MCNC: 11.3 G/DL
IMM GRANULOCYTES NFR BLD AUTO: 0.2 %
LDLC SERPL CALC-MCNC: 142 MG/DL
LYMPHOCYTES # BLD AUTO: 1.52 K/UL
LYMPHOCYTES NFR BLD AUTO: 28.9 %
MAN DIFF?: NORMAL
MCHC RBC-ENTMCNC: 30.5 PG
MCHC RBC-ENTMCNC: 30.9 GM/DL
MCV RBC AUTO: 98.7 FL
MONOCYTES # BLD AUTO: 0.67 K/UL
MONOCYTES NFR BLD AUTO: 12.7 %
NEUTROPHILS # BLD AUTO: 2.92 K/UL
NEUTROPHILS NFR BLD AUTO: 55.5 %
PLATELET # BLD AUTO: 158 K/UL
POTASSIUM SERPL-SCNC: 5 MMOL/L
PROT SERPL-MCNC: 8 G/DL
RBC # BLD: 3.71 M/UL
RBC # FLD: 12.9 %
SARS-COV-2 IGG SERPL IA-ACNC: 0.12 INDEX
SARS-COV-2 IGG SERPL QL IA: NEGATIVE
SODIUM SERPL-SCNC: 142 MMOL/L
TRIGL SERPL-MCNC: 159 MG/DL
TSH SERPL-ACNC: 0.08 UIU/ML
WBC # FLD AUTO: 5.26 K/UL

## 2020-11-06 ENCOUNTER — RX RENEWAL (OUTPATIENT)
Age: 85
End: 2020-11-06

## 2020-11-19 ENCOUNTER — APPOINTMENT (OUTPATIENT)
Dept: INTERNAL MEDICINE | Facility: CLINIC | Age: 85
End: 2020-11-19
Payer: MEDICARE

## 2020-11-19 VITALS
BODY MASS INDEX: 19.46 KG/M2 | OXYGEN SATURATION: 98 % | WEIGHT: 114 LBS | SYSTOLIC BLOOD PRESSURE: 146 MMHG | TEMPERATURE: 97.2 F | HEIGHT: 64 IN | DIASTOLIC BLOOD PRESSURE: 87 MMHG | HEART RATE: 59 BPM

## 2020-11-19 DIAGNOSIS — N39.0 URINARY TRACT INFECTION, SITE NOT SPECIFIED: ICD-10-CM

## 2020-11-19 DIAGNOSIS — Z86.19 PERSONAL HISTORY OF OTHER INFECTIOUS AND PARASITIC DISEASES: ICD-10-CM

## 2020-11-19 PROCEDURE — 99214 OFFICE O/P EST MOD 30 MIN: CPT

## 2020-11-19 NOTE — PLAN
[FreeTextEntry1] : During conversation with patient extensive medical records were reviewed including but not limited to, Hospital records records, out patient records, laboratory data and microbiology data \par In addition extensive time was also spent in reviewing diagnostic studies.\par \par Total encounter total time 25 mins\par >50% of time spent counseling/coordinating care \par \par patient likely has UTI, will start antibiotics prophylactically and will adjust as needed when urine culture results return \par \par patient's BP well controlled with current medication. will continue current  regimen \par \par \par Counseling included abnormal lab results, differential diagnoses, treatment options, risks and benefits, lifestyle changes, prognosis, current condition, medications, and dose adjustments. \par The patient was interactive, attentive, asked questions, and verbalized understanding

## 2020-11-19 NOTE — HISTORY OF PRESENT ILLNESS
[FreeTextEntry1] : hospital discharge follow up, Burning with urination. [de-identified] : Ms. RAGHAVENDRA WALLACE is a 89 year female with a PMH of Glaucoma,  Bipolar 2, HTN, Dementia,  comes to the office  with her son for follow up of chronic medical conditions, hospital discharge follow up and burning with urination x 2 days. Patient was seen at [facility] with fecal impaction, patient was disimpacted ans discharge same day. Patient denies fever, cough SOB. No other complaints at this time.

## 2020-11-20 LAB
APPEARANCE: CLEAR
BACTERIA: NEGATIVE
BILIRUBIN URINE: NEGATIVE
BLOOD URINE: NEGATIVE
COLOR: NORMAL
GLUCOSE QUALITATIVE U: NEGATIVE
HYALINE CASTS: 1 /LPF
KETONES URINE: NEGATIVE
LEUKOCYTE ESTERASE URINE: NEGATIVE
MICROSCOPIC-UA: NORMAL
NITRITE URINE: NEGATIVE
PH URINE: 6.5
PROTEIN URINE: NORMAL
RED BLOOD CELLS URINE: 11 /HPF
SPECIFIC GRAVITY URINE: 1.01
SQUAMOUS EPITHELIAL CELLS: 1 /HPF
UROBILINOGEN URINE: NORMAL
WHITE BLOOD CELLS URINE: 1 /HPF

## 2020-11-23 LAB — BACTERIA UR CULT: NORMAL

## 2020-12-04 ENCOUNTER — RX RENEWAL (OUTPATIENT)
Age: 85
End: 2020-12-04

## 2020-12-23 PROBLEM — Z86.19 HISTORY OF CANDIDIASIS OF VAGINA: Status: RESOLVED | Noted: 2020-11-19 | Resolved: 2020-12-23

## 2020-12-23 PROBLEM — J06.9 ACUTE URI: Status: RESOLVED | Noted: 2020-02-05 | Resolved: 2020-12-23

## 2020-12-23 PROBLEM — H66.91 OTITIS MEDIA, RIGHT: Status: RESOLVED | Noted: 2020-02-13 | Resolved: 2020-12-23

## 2020-12-23 PROBLEM — Z87.09 HISTORY OF ACUTE PHARYNGITIS: Status: RESOLVED | Noted: 2020-02-05 | Resolved: 2020-12-23

## 2020-12-23 PROBLEM — N39.0 ACUTE UTI (URINARY TRACT INFECTION): Status: RESOLVED | Noted: 2020-11-19 | Resolved: 2020-12-23

## 2020-12-29 ENCOUNTER — APPOINTMENT (OUTPATIENT)
Dept: CARDIOLOGY | Facility: CLINIC | Age: 85
End: 2020-12-29
Payer: MEDICARE

## 2020-12-29 ENCOUNTER — NON-APPOINTMENT (OUTPATIENT)
Age: 85
End: 2020-12-29

## 2020-12-29 VITALS
SYSTOLIC BLOOD PRESSURE: 132 MMHG | RESPIRATION RATE: 16 BRPM | BODY MASS INDEX: 20.83 KG/M2 | OXYGEN SATURATION: 97 % | HEIGHT: 64 IN | DIASTOLIC BLOOD PRESSURE: 76 MMHG | HEART RATE: 64 BPM | WEIGHT: 122 LBS | TEMPERATURE: 97.6 F

## 2020-12-29 DIAGNOSIS — I34.0 NONRHEUMATIC MITRAL (VALVE) INSUFFICIENCY: ICD-10-CM

## 2020-12-29 PROCEDURE — 93288 INTERROG EVL PM/LDLS PM IP: CPT

## 2020-12-29 PROCEDURE — 93000 ELECTROCARDIOGRAM COMPLETE: CPT | Mod: 59

## 2020-12-29 PROCEDURE — 99214 OFFICE O/P EST MOD 30 MIN: CPT

## 2020-12-29 NOTE — HISTORY OF PRESENT ILLNESS
[FreeTextEntry1] : Patient is a 89yo F with HTN, TAA, PPM, dementia AS/AI, moderate MR, HLD, DM here for cardiac follow up. No CP/SOB, no pnd/orthopnea/palps/sycnope/edema.  Very limited activity, watches TV and sleeps during day. Contiunes with chronic pain in hands, limites her as she used to color more.  Per son, no recent cardiac complaints. Dementia is fairly advanced and memory worse. Statin discontinued at last OV. \par \par ROS: GI and  negative

## 2020-12-29 NOTE — PHYSICAL EXAM
[Heart Sounds] : normal S1 and S2 [Normal Conjunctiva] : the conjunctiva exhibited no abnormalities [Normal Jugular Venous V Waves Present] : normal jugular venous V waves present [] : no respiratory distress [Respiration, Rhythm And Depth] : normal respiratory rhythm and effort [Exaggerated Use Of Accessory Muscles For Inspiration] : no accessory muscle use [Heart Rate And Rhythm] : heart rate and rhythm were normal [Bowel Sounds] : normal bowel sounds [Abdomen Soft] : soft [Abdomen Tenderness] : non-tender [Nail Clubbing] : no clubbing of the fingernails [Cyanosis, Localized] : no localized cyanosis [Skin Color & Pigmentation] : normal skin color and pigmentation [Affect] : the affect was normal [FreeTextEntry1] : oriented to person and place

## 2020-12-29 NOTE — DISCUSSION/SUMMARY
[FreeTextEntry1] : Patient is a 89yo F with HTN, TAA, PPM, dementia AS/AI, moderate MR, HLD, DM here for cardiac follow up. Doing fairly well without anginal symptoms and no signs CHF.  Has advanced dementia.    Also remains poor surgical candidate for TAA repair and family has not wanted done either. Poor candidate for A/C as well, PAF burden low and risk benefit favors no A/C at this time. \par \par Echo 2018 with stable findings, no significant progression of AS, LV function remains preserved. 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 \par \par \par \par 1. Continue antihypertensives, son to continue to monitor at home. BP well enough controlled and wouldn’t push lower in 89yo frail patient \par 2. ASA to be continued, no A/C at this time with low PAF burden\par 3. ACtivity as tolerated\par 4. PPM check shows normal function and no events, recheck in 6 months\par 5. Keep off statin\par 6. Follow up 6 months \par \par \par

## 2021-01-29 ENCOUNTER — RX RENEWAL (OUTPATIENT)
Age: 86
End: 2021-01-29

## 2021-02-05 ENCOUNTER — LABORATORY RESULT (OUTPATIENT)
Age: 86
End: 2021-02-05

## 2021-02-05 ENCOUNTER — APPOINTMENT (OUTPATIENT)
Dept: INTERNAL MEDICINE | Facility: CLINIC | Age: 86
End: 2021-02-05
Payer: MEDICAID

## 2021-02-05 VITALS
DIASTOLIC BLOOD PRESSURE: 86 MMHG | SYSTOLIC BLOOD PRESSURE: 128 MMHG | BODY MASS INDEX: 20.83 KG/M2 | TEMPERATURE: 97.6 F | HEIGHT: 64 IN | HEART RATE: 79 BPM | WEIGHT: 122 LBS

## 2021-02-05 DIAGNOSIS — E03.9 HYPOTHYROIDISM, UNSPECIFIED: ICD-10-CM

## 2021-02-05 DIAGNOSIS — Z71.89 OTHER SPECIFIED COUNSELING: ICD-10-CM

## 2021-02-05 PROCEDURE — 36415 COLL VENOUS BLD VENIPUNCTURE: CPT

## 2021-02-05 PROCEDURE — 99497 ADVNCD CARE PLAN 30 MIN: CPT

## 2021-02-05 PROCEDURE — 99214 OFFICE O/P EST MOD 30 MIN: CPT | Mod: 25

## 2021-02-05 RX ORDER — ASPIRIN ENTERIC COATED TABLETS 81 MG 81 MG/1
81 TABLET, DELAYED RELEASE ORAL
Qty: 90 | Refills: 1 | Status: COMPLETED | COMMUNITY
End: 2021-02-05

## 2021-02-05 RX ORDER — PROPRANOLOL HYDROCHLORIDE 40 MG/1
40 TABLET ORAL
Refills: 0 | Status: DISCONTINUED | COMMUNITY
End: 2021-02-05

## 2021-02-05 RX ORDER — BENZOCAINE/MENTH/CETYLPYRD CL 15 MG-2 MG
10-2.1 LOZENGE MUCOUS MEMBRANE
Qty: 30 | Refills: 0 | Status: COMPLETED | COMMUNITY
Start: 2020-02-05 | End: 2021-02-05

## 2021-02-05 RX ORDER — FLUTICASONE PROPIONATE 50 UG/1
50 SPRAY, METERED NASAL TWICE DAILY
Qty: 16 | Refills: 4 | Status: COMPLETED | COMMUNITY
Start: 2020-01-21 | End: 2021-02-05

## 2021-02-05 RX ORDER — PHENAZOPYRIDINE HYDROCHLORIDE 100 MG/1
100 TABLET ORAL 3 TIMES DAILY
Qty: 6 | Refills: 1 | Status: COMPLETED | COMMUNITY
Start: 2020-11-19 | End: 2021-02-05

## 2021-02-05 RX ORDER — FLUTICASONE PROPIONATE 50 UG/1
50 SPRAY, METERED NASAL TWICE DAILY
Qty: 1 | Refills: 0 | Status: COMPLETED | COMMUNITY
Start: 2017-10-17 | End: 2021-02-05

## 2021-02-05 RX ORDER — NITROFURANTOIN (MONOHYDRATE/MACROCRYSTALS) 25; 75 MG/1; MG/1
100 CAPSULE ORAL TWICE DAILY
Qty: 10 | Refills: 0 | Status: DISCONTINUED | COMMUNITY
Start: 2020-11-19 | End: 2021-02-05

## 2021-02-05 RX ORDER — AMOXICILLIN AND CLAVULANATE POTASSIUM 250; 62.5 MG/5ML; MG/5ML
250-62.5 FOR SUSPENSION ORAL TWICE DAILY
Qty: 3 | Refills: 0 | Status: DISCONTINUED | COMMUNITY
Start: 2020-02-13 | End: 2021-02-05

## 2021-02-05 NOTE — HISTORY OF PRESENT ILLNESS
[FreeTextEntry1] : check up / paper work filled put [de-identified] : Ms. RAGHAVENDRA WALLACE is a 89 year female with a PMH of Glaucoma,  Bipolar 2, HTN, Dementia,  comes to the office  with her son for follow up of chronic medical conditions.

## 2021-02-05 NOTE — PHYSICAL EXAM
[Well Nourished] : well nourished [Well Developed] : well developed [Well-Appearing] : well-appearing [Normal Sclera/Conjunctiva] : normal sclera/conjunctiva [PERRL] : pupils equal round and reactive to light [EOMI] : extraocular movements intact [Normal Outer Ear/Nose] : the outer ears and nose were normal in appearance [No JVD] : no jugular venous distention [No Lymphadenopathy] : no lymphadenopathy [Supple] : supple [Thyroid Normal, No Nodules] : the thyroid was normal and there were no nodules present [No Respiratory Distress] : no respiratory distress  [No Accessory Muscle Use] : no accessory muscle use [Normal Rate] : normal rate  [Regular Rhythm] : with a regular rhythm [Normal S1, S2] : normal S1 and S2 [No Murmur] : no murmur heard [No Carotid Bruits] : no carotid bruits [No Abdominal Bruit] : a ~M bruit was not heard ~T in the abdomen [No Varicosities] : no varicosities [Pedal Pulses Present] : the pedal pulses are present [No Edema] : there was no peripheral edema [No Palpable Aorta] : no palpable aorta [No Extremity Clubbing/Cyanosis] : no extremity clubbing/cyanosis [Soft] : abdomen soft [Non Tender] : non-tender [Non-distended] : non-distended [No Masses] : no abdominal mass palpated [No HSM] : no HSM [Normal Bowel Sounds] : normal bowel sounds [Normal Posterior Cervical Nodes] : no posterior cervical lymphadenopathy [Normal Anterior Cervical Nodes] : no anterior cervical lymphadenopathy [No CVA Tenderness] : no CVA  tenderness [No Spinal Tenderness] : no spinal tenderness [No Joint Swelling] : no joint swelling [Grossly Normal Strength/Tone] : grossly normal strength/tone [No Rash] : no rash [Coordination Grossly Intact] : coordination grossly intact [No Focal Deficits] : no focal deficits [Normal Affect] : the affect was normal [Normal Insight/Judgement] : insight and judgment were intact [de-identified] : sheree ambulates with walker.

## 2021-02-05 NOTE — COUNSELING
[Discussed at today's visit] : Advance directives discussed at today's visit [Advanced Directives discussed: ____] : Advanced directives discussed: [unfilled] [Patient's Preferences Updated] : Patient's preferences updated [DNR] : Code Status: DNR [[facility]: _____[facility]/last [facility] date: [unfilled] [Designated Health Care Proxy] : Patient has a designated Health Care Proxy [Name: ___] : Health Care Proxy's Name: [unfilled] [Relationship: ___] : Relationship: [unfilled]

## 2021-02-05 NOTE — PLAN
[FreeTextEntry1] : In regards to chronic medical conditions, will test blood work today.\par \par patient's BP well controlled with current medication. will continue current  regimen \par \par Alzheimer's- patient will continue to take medication as prescribed and follow Dr. Bales Neurologist\par \par Bipolar disorder- patient will continue to take medication as prescribed and follow with psychiatrist-  Dr. Morales\par \par \par Peripheral neuropathy- will continue gabapentin 100 mg po BID\par \par Discussed Advanced care planning, including the explanation and discussion of advance directives with patient\par MOLST form completed at time of visit with Her HCP, her son Theo\par Total time 17 minutes \par \par During conversation with patient extensive medical records were reviewed including but not limited to, Hospital records records, out patient records, laboratory data and microbiology data \par In addition extensive time was also spent in reviewing diagnostic studies.\par \par Total encounter total time 30 mins\par >50% of time spent counseling/coordinating care \par \par Counseling included abnormal lab results, differential diagnoses, treatment options, risks and benefits, lifestyle changes, prognosis, current condition, medications, and dose adjustments. \par The patient was interactive, attentive, asked questions, and verbalized understanding

## 2021-02-08 LAB
25(OH)D3 SERPL-MCNC: 55.6 NG/ML
ALBUMIN SERPL ELPH-MCNC: 4.2 G/DL
ALP BLD-CCNC: 100 U/L
ALT SERPL-CCNC: 10 U/L
ANION GAP SERPL CALC-SCNC: 17 MMOL/L
AST SERPL-CCNC: 16 U/L
BASOPHILS # BLD AUTO: 0.02 K/UL
BASOPHILS NFR BLD AUTO: 0.2 %
BILIRUB SERPL-MCNC: 0.2 MG/DL
BUN SERPL-MCNC: 47 MG/DL
CALCIUM SERPL-MCNC: 10.1 MG/DL
CHLORIDE SERPL-SCNC: 101 MMOL/L
CHOLEST SERPL-MCNC: 199 MG/DL
CO2 SERPL-SCNC: 22 MMOL/L
CREAT SERPL-MCNC: 1.49 MG/DL
EOSINOPHIL # BLD AUTO: 0.22 K/UL
EOSINOPHIL NFR BLD AUTO: 2.2 %
ESTIMATED AVERAGE GLUCOSE: 128 MG/DL
GLUCOSE SERPL-MCNC: 161 MG/DL
HBA1C MFR BLD HPLC: 6.1 %
HCT VFR BLD CALC: 36.3 %
HDLC SERPL-MCNC: 52 MG/DL
HGB BLD-MCNC: 11.2 G/DL
IMM GRANULOCYTES NFR BLD AUTO: 0.8 %
LDLC SERPL CALC-MCNC: 116 MG/DL
LYMPHOCYTES # BLD AUTO: 1.19 K/UL
LYMPHOCYTES NFR BLD AUTO: 11.7 %
MAN DIFF?: NORMAL
MCHC RBC-ENTMCNC: 29.9 PG
MCHC RBC-ENTMCNC: 30.9 GM/DL
MCV RBC AUTO: 97.1 FL
MONOCYTES # BLD AUTO: 0.89 K/UL
MONOCYTES NFR BLD AUTO: 8.7 %
NEUTROPHILS # BLD AUTO: 7.78 K/UL
NEUTROPHILS NFR BLD AUTO: 76.4 %
NONHDLC SERPL-MCNC: 147 MG/DL
PLATELET # BLD AUTO: 207 K/UL
POTASSIUM SERPL-SCNC: 4.7 MMOL/L
PROT SERPL-MCNC: 8 G/DL
RBC # BLD: 3.74 M/UL
RBC # FLD: 13.2 %
SODIUM SERPL-SCNC: 140 MMOL/L
TRIGL SERPL-MCNC: 153 MG/DL
TSH SERPL-ACNC: 0.07 UIU/ML
VIT B12 SERPL-MCNC: 864 PG/ML
WBC # FLD AUTO: 10.18 K/UL

## 2021-03-05 ENCOUNTER — RX RENEWAL (OUTPATIENT)
Age: 86
End: 2021-03-05

## 2021-03-23 ENCOUNTER — APPOINTMENT (OUTPATIENT)
Dept: NEPHROLOGY | Facility: CLINIC | Age: 86
End: 2021-03-23
Payer: MEDICARE

## 2021-03-23 VITALS
TEMPERATURE: 97.6 F | HEIGHT: 64 IN | DIASTOLIC BLOOD PRESSURE: 74 MMHG | HEART RATE: 66 BPM | BODY MASS INDEX: 21.34 KG/M2 | WEIGHT: 125 LBS | SYSTOLIC BLOOD PRESSURE: 136 MMHG

## 2021-03-23 PROCEDURE — 99205 OFFICE O/P NEW HI 60 MIN: CPT

## 2021-04-15 RX ORDER — OMEPRAZOLE 20 MG/1
20 CAPSULE, DELAYED RELEASE ORAL
Qty: 28 | Refills: 4 | Status: DISCONTINUED | COMMUNITY
Start: 2020-11-06 | End: 2021-04-15

## 2021-04-21 ENCOUNTER — APPOINTMENT (OUTPATIENT)
Dept: INTERNAL MEDICINE | Facility: CLINIC | Age: 86
End: 2021-04-21
Payer: MEDICARE

## 2021-04-21 DIAGNOSIS — A08.4 VIRAL INTESTINAL INFECTION, UNSPECIFIED: ICD-10-CM

## 2021-04-21 PROCEDURE — 99442: CPT | Mod: 95

## 2021-04-23 ENCOUNTER — APPOINTMENT (OUTPATIENT)
Dept: ULTRASOUND IMAGING | Facility: CLINIC | Age: 86
End: 2021-04-23
Payer: MEDICARE

## 2021-04-23 ENCOUNTER — OUTPATIENT (OUTPATIENT)
Dept: OUTPATIENT SERVICES | Facility: HOSPITAL | Age: 86
LOS: 1 days | End: 2021-04-23

## 2021-04-23 DIAGNOSIS — I10 ESSENTIAL (PRIMARY) HYPERTENSION: ICD-10-CM

## 2021-04-23 PROCEDURE — 76775 US EXAM ABDO BACK WALL LIM: CPT | Mod: 26

## 2021-05-03 ENCOUNTER — APPOINTMENT (OUTPATIENT)
Dept: INTERNAL MEDICINE | Facility: CLINIC | Age: 86
End: 2021-05-03
Payer: MEDICARE

## 2021-05-03 VITALS
HEART RATE: 61 BPM | TEMPERATURE: 97 F | BODY MASS INDEX: 21.34 KG/M2 | WEIGHT: 125 LBS | SYSTOLIC BLOOD PRESSURE: 145 MMHG | DIASTOLIC BLOOD PRESSURE: 88 MMHG | HEIGHT: 64 IN

## 2021-05-03 DIAGNOSIS — K25.9 GASTRIC ULCER, UNSPECIFIED AS ACUTE OR CHRONIC, W/OUT HEMORRHAGE OR PERFORATION: ICD-10-CM

## 2021-05-03 PROCEDURE — 99215 OFFICE O/P EST HI 40 MIN: CPT

## 2021-05-03 RX ORDER — QUETIAPINE FUMARATE 100 MG/1
100 TABLET ORAL
Qty: 28 | Refills: 0 | Status: COMPLETED | COMMUNITY
Start: 2018-06-21 | End: 2021-05-03

## 2021-05-03 NOTE — PHYSICAL EXAM
[Well Nourished] : well nourished [Well Developed] : well developed [Well-Appearing] : well-appearing [Normal Sclera/Conjunctiva] : normal sclera/conjunctiva [PERRL] : pupils equal round and reactive to light [EOMI] : extraocular movements intact [Normal Outer Ear/Nose] : the outer ears and nose were normal in appearance [No JVD] : no jugular venous distention [No Lymphadenopathy] : no lymphadenopathy [Supple] : supple [Thyroid Normal, No Nodules] : the thyroid was normal and there were no nodules present [No Respiratory Distress] : no respiratory distress  [No Accessory Muscle Use] : no accessory muscle use [Normal Rate] : normal rate  [Regular Rhythm] : with a regular rhythm [Normal S1, S2] : normal S1 and S2 [No Murmur] : no murmur heard [No Carotid Bruits] : no carotid bruits [No Abdominal Bruit] : a ~M bruit was not heard ~T in the abdomen [No Varicosities] : no varicosities [Pedal Pulses Present] : the pedal pulses are present [No Edema] : there was no peripheral edema [No Palpable Aorta] : no palpable aorta [No Extremity Clubbing/Cyanosis] : no extremity clubbing/cyanosis [Soft] : abdomen soft [Non Tender] : non-tender [Non-distended] : non-distended [No Masses] : no abdominal mass palpated [No HSM] : no HSM [Normal Bowel Sounds] : normal bowel sounds [Normal Posterior Cervical Nodes] : no posterior cervical lymphadenopathy [Normal Anterior Cervical Nodes] : no anterior cervical lymphadenopathy [No CVA Tenderness] : no CVA  tenderness [No Spinal Tenderness] : no spinal tenderness [No Joint Swelling] : no joint swelling [Grossly Normal Strength/Tone] : grossly normal strength/tone [No Rash] : no rash [Coordination Grossly Intact] : coordination grossly intact [No Focal Deficits] : no focal deficits [Normal Affect] : the affect was normal [Normal Insight/Judgement] : insight and judgment were intact [de-identified] : patient has sotelo catheter 600 CC of rico colored Urine. noted.  [de-identified] : patient is in wheel chair

## 2021-05-03 NOTE — HISTORY OF PRESENT ILLNESS
[FreeTextEntry1] : hospital follow up [de-identified] : Ms. RAGHAVENDRA WALLACE is a 89 year female with a PMH of Glaucoma,  Bipolar 2, HTN, Dementia,  comes to the office  with her son for follow up of chronic medical conditions and for follow up after hospital discharge. \par \par On 04/26/21 patient was admitted to [facility] with abdominal pain and uncontrollable vomiting, patient had Endoscopy and colonoscopy, was found to have gastric ulcer, started on sucralfate and continued on Pantoprazole. \par \par Patient was also was found to have urinary retention, and now has Fleming catheter. \par \par Patient denies fever, cough SOB. No other complaints at this time.

## 2021-05-03 NOTE — PLAN
[FreeTextEntry1] : Prior to appointment and during encounter with patient extensive medical records were reviewed including but not limited to, Hospital records records, out patient records, laboratory data and microbiology data \par In addition extensive time was also spent in reviewing diagnostic studies.\par \par Total encounter total time 40 mins\par >50% of time spent counseling/coordinating care\par \par \par patient's BP well controlled with current medication. will continue current  regimen \par \par Alzheimer's- patient will continue to take medication as prescribed and follow Dr. Bales Neurologist\par \par Bipolar disorder- patient will continue to take medication as prescribed and follow with psychiatrist-  Dr. Morales\par \par \par Discussed Advanced care planning, including the explanation and discussion of advance directives with patient\par MOLST form completed at time of visit with Her HCP, her son Theo\par Total time 17 minutes \par \par During conversation with patient extensive medical records were reviewed including but not limited to, Hospital records records, out patient records, laboratory data and microbiology data \par In addition extensive time was also spent in reviewing diagnostic studies.\par \par Total encounter total time 40 mins\par >50% of time spent counseling/coordinating care \par \par Counseling included abnormal lab results, differential diagnoses, treatment options, risks and benefits, lifestyle changes, prognosis, current condition, medications, and dose adjustments. \par The patient was interactive, attentive, asked questions, and verbalized understanding

## 2021-05-12 ENCOUNTER — NON-APPOINTMENT (OUTPATIENT)
Age: 86
End: 2021-05-12

## 2021-05-12 ENCOUNTER — APPOINTMENT (OUTPATIENT)
Dept: CARDIOLOGY | Facility: CLINIC | Age: 86
End: 2021-05-12
Payer: MEDICARE

## 2021-05-12 VITALS
HEART RATE: 72 BPM | SYSTOLIC BLOOD PRESSURE: 128 MMHG | BODY MASS INDEX: 20.83 KG/M2 | HEIGHT: 64 IN | WEIGHT: 122 LBS | DIASTOLIC BLOOD PRESSURE: 64 MMHG | RESPIRATION RATE: 16 BRPM | TEMPERATURE: 97.7 F

## 2021-05-12 PROCEDURE — 99214 OFFICE O/P EST MOD 30 MIN: CPT

## 2021-05-12 PROCEDURE — 93000 ELECTROCARDIOGRAM COMPLETE: CPT

## 2021-05-12 RX ORDER — OMEPRAZOLE 20 MG/1
20 TABLET, DELAYED RELEASE ORAL
Qty: 90 | Refills: 0 | Status: DISCONTINUED | COMMUNITY
End: 2021-05-12

## 2021-05-12 RX ORDER — CYCLOSPORINE 0.5 MG/ML
0.05 EMULSION OPHTHALMIC
Refills: 0 | Status: DISCONTINUED | COMMUNITY
End: 2021-05-12

## 2021-05-12 RX ORDER — ONDANSETRON 4 MG/1
4 TABLET ORAL EVERY 8 HOURS
Qty: 30 | Refills: 0 | Status: DISCONTINUED | COMMUNITY
Start: 2021-04-21 | End: 2021-05-12

## 2021-05-12 RX ORDER — BRIMONIDINE TARTRATE 1 MG/ML
0.1 SOLUTION/ DROPS OPHTHALMIC
Qty: 5 | Refills: 0 | Status: DISCONTINUED | COMMUNITY
Start: 2018-05-31 | End: 2021-05-12

## 2021-05-12 RX ORDER — LATANOPROST/PF 0.005 %
0.01 DROPS OPHTHALMIC (EYE)
Qty: 2 | Refills: 0 | Status: DISCONTINUED | COMMUNITY
Start: 2018-03-12 | End: 2021-05-12

## 2021-05-12 RX ORDER — CLOTRIMAZOLE AND BETAMETHASONE DIPROPIONATE 10; .5 MG/G; MG/G
1-0.05 CREAM TOPICAL TWICE DAILY
Qty: 45 | Refills: 4 | Status: DISCONTINUED | COMMUNITY
Start: 2020-01-21 | End: 2021-05-12

## 2021-05-12 RX ORDER — CLOTRIMAZOLE 10 MG/G
1 CREAM VAGINAL
Qty: 45 | Refills: 0 | Status: DISCONTINUED | COMMUNITY
Start: 2020-11-19 | End: 2021-05-12

## 2021-05-12 RX ORDER — DORZOLAMIDE HYDROCHLORIDE 20 MG/ML
2 SOLUTION OPHTHALMIC
Qty: 10 | Refills: 0 | Status: DISCONTINUED | COMMUNITY
Start: 2018-05-31 | End: 2021-05-12

## 2021-05-12 NOTE — ASSESSMENT
[FreeTextEntry1] : ECG: Atrial paced, APCs,  NSST \par \par PPM INTERROGATION 12/2020: 72.9% atrial paced, 1 mode switch for 38 seconds PAF, 4 episodes PAT\par PPM INTERROGATION 6/2020: 67% atrial paced, 16 episodes PAF, longest for 2 minutes 45 seconds\par PPM INTERROGATION 12/2019: 70% atrial paced, no events\par PPM INTERROGATION 6/2019: 71% atrial paced, episode brief PAT normal function\par \par CAROTID DUPLEX 3/2018:\par 1. All arteries were clearly visualized.\par 2. There is 1-49% stenosis of the left proximal ICA.\par 3. There is 1-49% stenosis of the right proximal ICA.\par 4. The left and right carotid arteries are tortuous.\par 5. There is antegrade flow in the right and left vertebral arteries.\par 6. Recommend clinical correlation with the above findings.\par \par ECHO 9/2018:\par 1. Left ventricular ejection fraction, by visual estimation, is 65 to 70%.\par 2. Normal global left ventricular systolic function.\par 3. Impaired relaxation pattern of LV diastolic filling.\par 4. Normal left ventricular internal cavity size.\par 5. Mild concentric left ventricular hypertrophy.\par 6. Normal right ventricular size and systolic function.\par 7. Moderately dilated left atrium.\par 8. Mildly dilated right atrium.\par 9. Moderate mitral annular calcification.\par 10. Moderate thickening and calcification of the anterior and posterior mitral valve leaflets.\par 11. Mild mitral valve regurgitation.\par 12. Mild to moderate aortic valve stenosis.\par 13. Mild aortic regurgitation.\par 14. Mild tricuspid regurgitation.\par 15. There is moderate to severe dilatation of the ascending aorta (5cm).\par 16. Severely dilated pulmonary artery.\par 17. There is no evidence of pericardial effusion.\par 18. In comparison to the previous echocardiogram mitral and aortic regurgitation appear less severe.\par 19. Recommend clinical correlation with the above findings.

## 2021-05-12 NOTE — HISTORY OF PRESENT ILLNESS
[FreeTextEntry1] : Patient is a 89yo F with HTN, TAA, PPM, dementia AS/AI, moderate MR, HLD, DM here for cardiac follow up. Recently admitted to [facility] with abdominal pain/vomiting. Had EGD/colonoscopy and found to have severe esophagitis/ulceration, gastritis, duodenal ulcerations and rectal ulcer. Started on sucralfate and pantoprazole. Fleming placed for urinary retention as well. Seen by Dr. Vivar Cardiology due to episode rapid AF. Cardizem added. \par \par No SOB, no pnd/orthopnea/palps/sycnope/edema.  Very limited activity, watches TV and sleeps during day Continues with chronic pain in hands, limites her as she used to color more.   Dementia is fairly advanced and memory continues to worsen. Getting some continued abdominal pain and sharp left sided chest pain. \par \par ROS: GI and  negative

## 2021-05-12 NOTE — PHYSICAL EXAM
[Normal Conjunctiva] : the conjunctiva exhibited no abnormalities [Normal Jugular Venous V Waves Present] : normal jugular venous V waves present [] : no respiratory distress [Respiration, Rhythm And Depth] : normal respiratory rhythm and effort [Exaggerated Use Of Accessory Muscles For Inspiration] : no accessory muscle use [Heart Rate And Rhythm] : heart rate and rhythm were normal [Bowel Sounds] : normal bowel sounds [Abdomen Soft] : soft [Abdomen Tenderness] : non-tender [Nail Clubbing] : no clubbing of the fingernails [Cyanosis, Localized] : no localized cyanosis [Skin Color & Pigmentation] : normal skin color and pigmentation [FreeTextEntry1] : oriented to person and place

## 2021-05-12 NOTE — DISCUSSION/SUMMARY
[FreeTextEntry1] : Patient is a 89yo F with HTN, TAA, PPM, dementia AS/AI, moderate MR, HLD, DM here for cardiac follow up. Doing fairly well without anginal symptoms and no signs CHF.  \par -Has advanced dementia.    \par -Also remains poor surgical candidate for TAA repair and family has not wanted done either. \par -Poor candidate for A/C as well, PAF burden low and risk benefit favors no A/C at this time.  \par -AS likely severe on exam but no symptoms or signs CHF. Family not amenable to intervention regardless. \par \par \par \par \par 1. Continue antihypertensives, BP well controlled at this time. Continue with diltiazem for rate control as well. No ASA due to ulcerations and esophagitis. Poor A/C candidate. Can consider low dose eliquis\par 2. ACtivity as tolerated\par 3. Will continue to keep off statin as limited benefit. Will dc gemfibrozil as limited benefit and can worse GI symptoms. Recheck lipids \par 4. Follow up 1-2 months with  check to eval if significant recurrent PAF and how she does on dilt\par 5. Regular PMD/GI follow up \par \par \par

## 2021-05-14 ENCOUNTER — APPOINTMENT (OUTPATIENT)
Dept: INTERNAL MEDICINE | Facility: CLINIC | Age: 86
End: 2021-05-14

## 2021-05-18 ENCOUNTER — APPOINTMENT (OUTPATIENT)
Dept: NEPHROLOGY | Facility: CLINIC | Age: 86
End: 2021-05-18
Payer: MEDICARE

## 2021-05-18 PROCEDURE — 99215 OFFICE O/P EST HI 40 MIN: CPT | Mod: 95

## 2021-05-18 NOTE — ASSESSMENT
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

## 2021-05-18 NOTE — PHYSICAL EXAM
[General Appearance - Alert] : alert [General Appearance - In No Acute Distress] : in no acute distress [General Appearance - Well Nourished] : well nourished [General Appearance - Well Developed] : well developed [Sclera] : the sclera and conjunctiva were normal [Neck Appearance] : the appearance of the neck was normal [Neck Cervical Mass (___cm)] : no neck mass was observed [] : no respiratory distress [Respiration, Rhythm And Depth] : normal respiratory rhythm and effort [Auscultation Breath Sounds / Voice Sounds] : lungs were clear to auscultation bilaterally [Heart Rate And Rhythm] : heart rate was normal and rhythm regular [Heart Sounds] : normal S1 and S2 [Bowel Sounds] : normal bowel sounds [Abdomen Soft] : soft [Cervical Lymph Nodes Enlarged Posterior Bilaterally] : posterior cervical [No CVA Tenderness] : no ~M costovertebral angle tenderness [Skin Color & Pigmentation] : normal skin color and pigmentation [Skin Turgor] : normal skin turgor [Cranial Nerves] : cranial nerves 2-12 were intact [FreeTextEntry1] : abnormal affect; dementia;

## 2021-05-18 NOTE — REVIEW OF SYSTEMS
[Fever] : no fever [Chills] : no chills [Eye Pain] : no eye pain [Red Eyes] : eyes not red [Earache] : no earache [Loss Of Hearing] : no hearing loss [Heart Rate Is Slow] : the heart rate was not slow [Heart Rate Is Fast] : the heart rate was not fast [Shortness Of Breath] : no shortness of breath [Wheezing] : no wheezing [Abdominal Pain] : no abdominal pain [Vomiting] : no vomiting [Dysuria] : no dysuria [Incontinence] : no incontinence [Arthralgias] : no arthralgias [Joint Pain] : no joint pain [Skin Lesions] : no skin lesions [Confused] : confusion [Easy Bleeding] : no tendency for easy bleeding [Easy Bruising] : no tendency for easy bruising [Negative] : Heme/Lymph

## 2021-05-18 NOTE — REASON FOR VISIT
[Home] : at home, [unfilled] , at the time of the visit. [Medical Office: ([facility])___] : at the medical office located in  [Family Member] : family member [Verbal consent obtained from patient] : the patient, [unfilled] [FreeTextEntry3] : Son [FreeTextEntry4] :  from [facility] [Follow-Up] : a follow-up visit [Formal Caregiver] : formal caregiver

## 2021-05-18 NOTE — HISTORY OF PRESENT ILLNESS
[FreeTextEntry1] : Patient is a 89 year old female with history of glaucoma, bipolar diisease, HTN, dementia, pre-diabetes, here for initial evaluation of CKD. Pt has no complaints; accompanied by her son. Last Cr 1.49; progressively worsening over past two years. Discussed CKD; age related CKD; HTN; etc with son. Answered all questions.\par \par Current: Pt seen on televisit via audio/video. Son present as is  from [facility]. No complaints; Labs stable from May 1.

## 2021-06-22 ENCOUNTER — APPOINTMENT (OUTPATIENT)
Dept: CARDIOLOGY | Facility: CLINIC | Age: 86
End: 2021-06-22

## 2021-06-25 ENCOUNTER — APPOINTMENT (OUTPATIENT)
Dept: CARDIOLOGY | Facility: CLINIC | Age: 86
End: 2021-06-25
Payer: MEDICARE

## 2021-06-25 ENCOUNTER — NON-APPOINTMENT (OUTPATIENT)
Age: 86
End: 2021-06-25

## 2021-06-25 VITALS
DIASTOLIC BLOOD PRESSURE: 71 MMHG | RESPIRATION RATE: 16 BRPM | SYSTOLIC BLOOD PRESSURE: 124 MMHG | HEIGHT: 64 IN | OXYGEN SATURATION: 96 % | HEART RATE: 62 BPM

## 2021-06-25 PROCEDURE — 99214 OFFICE O/P EST MOD 30 MIN: CPT

## 2021-06-25 PROCEDURE — 93288 INTERROG EVL PM/LDLS PM IP: CPT

## 2021-06-25 PROCEDURE — 93000 ELECTROCARDIOGRAM COMPLETE: CPT | Mod: 59

## 2021-06-25 RX ORDER — DILTIAZEM HYDROCHLORIDE 30 MG/1
30 TABLET ORAL
Qty: 90 | Refills: 5 | Status: DISCONTINUED | COMMUNITY
End: 2021-06-25

## 2021-06-25 RX ORDER — TOPIRAMATE 50 MG/1
50 TABLET, FILM COATED ORAL DAILY
Refills: 0 | Status: DISCONTINUED | COMMUNITY
End: 2021-06-25

## 2021-06-25 NOTE — HISTORY OF PRESENT ILLNESS
[FreeTextEntry1] : Patient is a 89yo F with HTN, TAA, PPM, dementia AS/AI, moderate MR, HLD, DM here for cardiac follow up. Recently admitted to [facility] with abdominal pain/vomiting. Had EGD/colonoscopy and found to have severe esophagitis/ulceration, gastritis, duodenal ulcerations and rectal ulcer. Started on sucralfate and pantoprazole. Fleming placed for urinary retention as well. Seen by Dr. Vivar Cardiology due to episode rapid AF. Cardizem added. \par \par No SOB, no pnd/orthopnea/palps/syncope/edema.  Very limited activity, watches TV and sleeps during day Continues with chronic pain in hands, limites her as she used to color more.   Dementia is fairly advanced and memory continues to worsen. Getting some continued abdominal pain and sharp left sided chest pain. No clear symptoms related to PAF that has been seen on interrogation today. GI symptoms better, no other change. \par \par ROS: GI and  negative

## 2021-06-25 NOTE — ASSESSMENT
[FreeTextEntry1] : ECG: SR, PRWP, NSST, inferior Q waves \par \par PPM INTERROGATION 6/2021: 57% AP, 30 mode switches for PAF, longest 1hr 55 min, mostly rate controlled\par PPM INTERROGATION 12/2020: 72.9% atrial paced, 1 mode switch for 38 seconds PAF, 4 episodes PAT\par PPM INTERROGATION 6/2020: 67% atrial paced, 16 episodes PAF, longest for 2 minutes 45 seconds\par PPM INTERROGATION 12/2019: 70% atrial paced, no events\par PPM INTERROGATION 6/2019: 71% atrial paced, episode brief PAT normal function\par \par CAROTID DUPLEX 3/2018:\par 1. All arteries were clearly visualized.\par 2. There is 1-49% stenosis of the left proximal ICA.\par 3. There is 1-49% stenosis of the right proximal ICA.\par 4. The left and right carotid arteries are tortuous.\par 5. There is antegrade flow in the right and left vertebral arteries.\par 6. Recommend clinical correlation with the above findings.\par \par ECHO 9/2018:\par 1. Left ventricular ejection fraction, by visual estimation, is 65 to 70%.\par 2. Normal global left ventricular systolic function.\par 3. Impaired relaxation pattern of LV diastolic filling.\par 4. Normal left ventricular internal cavity size.\par 5. Mild concentric left ventricular hypertrophy.\par 6. Normal right ventricular size and systolic function.\par 7. Moderately dilated left atrium.\par 8. Mildly dilated right atrium.\par 9. Moderate mitral annular calcification.\par 10. Moderate thickening and calcification of the anterior and posterior mitral valve leaflets.\par 11. Mild mitral valve regurgitation.\par 12. Mild to moderate aortic valve stenosis.\par 13. Mild aortic regurgitation.\par 14. Mild tricuspid regurgitation.\par 15. There is moderate to severe dilatation of the ascending aorta (5cm).\par 16. Severely dilated pulmonary artery.\par 17. There is no evidence of pericardial effusion.\par 18. In comparison to the previous echocardiogram mitral and aortic regurgitation appear less severe.\par 19. Recommend clinical correlation with the above findings.

## 2021-06-25 NOTE — DISCUSSION/SUMMARY
[FreeTextEntry1] : Patient is a 89yo F with HTN, TAA, PPM, dementia AS/AI, moderate MR, HLD, DM here for cardiac follow up. Doing fairly well without anginal symptoms and no signs CHF.  \par -Has advanced dementia.    \par -Also remains poor surgical candidate for TAA repair and family has not wanted done either. \par -Poor candidate for A/C as well, PAF burden has increased but risk/benefit favors no A/C at this time.  \par -AS likely severe on exam but no symptoms or signs CHF. Family not amenable to intervention regardless. \par \par \par \par 1. Continue antihypertensives, BP well controlled at this time. Continue with diltiazem for rate control as well. No ASA due to ulcerations and esophagitis. Poor A/C candidate. \par 2. ACtivity as tolerated\par 3. Will continue to keep off statin/gemfibrozil as limited benefit. GI symptoms seem better\par 4. Follow up 6 months and PPM check same day\par 5. Regular PMD/GI follow up \par 6. Change diltiazem to long acting 120mg daily\par \par

## 2021-07-02 ENCOUNTER — APPOINTMENT (OUTPATIENT)
Dept: INTERNAL MEDICINE | Facility: CLINIC | Age: 86
End: 2021-07-02
Payer: MEDICARE

## 2021-07-02 VITALS
SYSTOLIC BLOOD PRESSURE: 108 MMHG | WEIGHT: 122 LBS | HEART RATE: 67 BPM | DIASTOLIC BLOOD PRESSURE: 57 MMHG | HEIGHT: 64 IN | BODY MASS INDEX: 20.83 KG/M2

## 2021-07-02 DIAGNOSIS — Z86.79 PERSONAL HISTORY OF OTHER DISEASES OF THE CIRCULATORY SYSTEM: ICD-10-CM

## 2021-07-02 PROCEDURE — 81003 URINALYSIS AUTO W/O SCOPE: CPT | Mod: QW

## 2021-07-02 PROCEDURE — 99214 OFFICE O/P EST MOD 30 MIN: CPT | Mod: 25

## 2021-07-02 NOTE — PLAN
[FreeTextEntry1] : patient likely has UTI, will start antibiotics prophylactically and will adjust as needed when urine culture results return \par \par HTN- patient's BP well controlled with current medication. will continue current  regimen \par \par Prior to appointment and during encounter with patient extensive medical records were reviewed including but not limited to, Hospital records records, out patient records, laboratory data and microbiology data \par In addition extensive time was also spent in reviewing diagnostic studies.\par \par Total encounter total time 30 mins\par >50% of time spent counseling/coordinating care\par \par Counseling included abnormal lab results, differential diagnoses, treatment options, risks and benefits, lifestyle changes, current condition, medications, and dose adjustments. \par The patient was interactive, attentive, asked questions, and verbalized understanding

## 2021-07-02 NOTE — HISTORY OF PRESENT ILLNESS
[FreeTextEntry1] : UTI [de-identified] : Ms. RAGHAVENDRA WALLACE is a 89 year female with a PMH of Glaucoma,  Bipolar 2, HTN, Dementia,  comes to the office  with her son for follow up of chronic medical conditions and c/o burning with urination and increased frequency of urination x 4 days. Patient denies fever, cough SOB. No other complaints at this time.

## 2021-07-02 NOTE — HEALTH RISK ASSESSMENT
[0] : 2) Feeling down, depressed, or hopeless: Not at all (0) [PHQ-2 Negative - No further assessment needed] : PHQ-2 Negative - No further assessment needed [[id number]] : 0

## 2021-07-06 LAB
APPEARANCE: ABNORMAL
BACTERIA UR CULT: ABNORMAL
BACTERIA: ABNORMAL
BILIRUBIN URINE: NEGATIVE
BLOOD URINE: ABNORMAL
COLOR: YELLOW
GLUCOSE QUALITATIVE U: NEGATIVE
HYALINE CASTS: 0 /LPF
KETONES URINE: NEGATIVE
LEUKOCYTE ESTERASE URINE: ABNORMAL
MICROSCOPIC-UA: NORMAL
NITRITE URINE: NEGATIVE
PH URINE: 5.5
PROTEIN URINE: ABNORMAL
RED BLOOD CELLS URINE: 2 /HPF
SPECIFIC GRAVITY URINE: 1.02
SQUAMOUS EPITHELIAL CELLS: 3 /HPF
UROBILINOGEN URINE: NORMAL
WHITE BLOOD CELLS URINE: >720 /HPF

## 2021-07-15 ENCOUNTER — APPOINTMENT (OUTPATIENT)
Dept: INTERNAL MEDICINE | Facility: CLINIC | Age: 86
End: 2021-07-15
Payer: MEDICARE

## 2021-07-15 VITALS
SYSTOLIC BLOOD PRESSURE: 131 MMHG | HEART RATE: 62 BPM | WEIGHT: 122.06 LBS | DIASTOLIC BLOOD PRESSURE: 84 MMHG | TEMPERATURE: 97.4 F | HEIGHT: 64 IN | BODY MASS INDEX: 20.84 KG/M2

## 2021-07-15 DIAGNOSIS — K29.80 DUODENITIS W/OUT BLEEDING: ICD-10-CM

## 2021-07-15 PROCEDURE — 99215 OFFICE O/P EST HI 40 MIN: CPT

## 2021-07-15 NOTE — PLAN
[FreeTextEntry1] : Duodenitis- Patient will follow with GI Dr. Mullins\par \par HTN- patient's BP well controlled with current medication. will continue current  regimen \par \par Prior to appointment and during encounter with patient extensive medical records were reviewed including but not limited to, Hospital records records, out patient records, laboratory data and microbiology data \par In addition extensive time was also spent in reviewing diagnostic studies.\par \par Total encounter total time 40 mins\par >50% of time spent counseling/coordinating care\par \par Counseling included abnormal lab results, differential diagnoses, treatment options, risks and benefits, lifestyle changes, current condition, medications, and dose adjustments. \par The patient was interactive, attentive, asked questions, and verbalized understanding

## 2021-07-15 NOTE — PHYSICAL EXAM
[No Acute Distress] : no acute distress [Well Nourished] : well nourished [Well Developed] : well developed [Well-Appearing] : well-appearing [Normal Sclera/Conjunctiva] : normal sclera/conjunctiva [PERRL] : pupils equal round and reactive to light [EOMI] : extraocular movements intact [Normal Outer Ear/Nose] : the outer ears and nose were normal in appearance [Normal Oropharynx] : the oropharynx was normal [No JVD] : no jugular venous distention [No Lymphadenopathy] : no lymphadenopathy [Supple] : supple [Thyroid Normal, No Nodules] : the thyroid was normal and there were no nodules present [No Respiratory Distress] : no respiratory distress  [No Accessory Muscle Use] : no accessory muscle use [Clear to Auscultation] : lungs were clear to auscultation bilaterally [Normal Rate] : normal rate  [Regular Rhythm] : with a regular rhythm [Normal S1, S2] : normal S1 and S2 [No Murmur] : no murmur heard [No Carotid Bruits] : no carotid bruits [No Abdominal Bruit] : a ~M bruit was not heard ~T in the abdomen [No Varicosities] : no varicosities [Pedal Pulses Present] : the pedal pulses are present [No Edema] : there was no peripheral edema [No Palpable Aorta] : no palpable aorta [No Extremity Clubbing/Cyanosis] : no extremity clubbing/cyanosis [Soft] : abdomen soft [Non Tender] : non-tender [Non-distended] : non-distended [No Masses] : no abdominal mass palpated [No HSM] : no HSM [Normal Bowel Sounds] : normal bowel sounds [Normal Posterior Cervical Nodes] : no posterior cervical lymphadenopathy [Normal Anterior Cervical Nodes] : no anterior cervical lymphadenopathy [No CVA Tenderness] : no CVA  tenderness [No Spinal Tenderness] : no spinal tenderness [No Joint Swelling] : no joint swelling [Grossly Normal Strength/Tone] : grossly normal strength/tone [No Rash] : no rash [Coordination Grossly Intact] : coordination grossly intact [No Focal Deficits] : no focal deficits [Deep Tendon Reflexes (DTR)] : deep tendon reflexes were 2+ and symmetric [Normal Affect] : the affect was normal [Normal Insight/Judgement] : insight and judgment were intact [de-identified] : patient ambulates with wheelchair and walker.

## 2021-07-15 NOTE — HISTORY OF PRESENT ILLNESS
[FreeTextEntry1] : hospital discharge follow up.  [de-identified] : Ms. RAGHAVENDRA WALLACE is a 89 year female with a PMH of Glaucoma,  Bipolar 2, HTN, Dementia,  comes to the office  with her son for follow up of chronic medical conditions and follow up from hospital. On 07/08/21 patient was admitted to [facility] with abdominal pain, EGD, found to treated with NPO, IVF, PPI, Carafate, Patient improved, tolerated PO and was discharged on 07/13/21. Patient denies fever, cough SOB. No other complaints at this time.

## 2021-07-15 NOTE — HEALTH RISK ASSESSMENT
[0] : 2) Feeling down, depressed, or hopeless: Not at all (0) [PHQ-2 Negative - No further assessment needed] : PHQ-2 Negative - No further assessment needed [[id number]] : 0

## 2021-07-22 ENCOUNTER — RX RENEWAL (OUTPATIENT)
Age: 86
End: 2021-07-22

## 2021-08-05 ENCOUNTER — RX RENEWAL (OUTPATIENT)
Age: 86
End: 2021-08-05

## 2021-08-17 ENCOUNTER — APPOINTMENT (OUTPATIENT)
Dept: INTERNAL MEDICINE | Facility: CLINIC | Age: 86
End: 2021-08-17
Payer: MEDICARE

## 2021-08-17 VITALS
DIASTOLIC BLOOD PRESSURE: 80 MMHG | HEIGHT: 64 IN | RESPIRATION RATE: 16 BRPM | BODY MASS INDEX: 20.83 KG/M2 | WEIGHT: 122 LBS | TEMPERATURE: 97 F | HEART RATE: 68 BPM | SYSTOLIC BLOOD PRESSURE: 128 MMHG

## 2021-08-17 DIAGNOSIS — Z86.39 PERSONAL HISTORY OF OTHER ENDOCRINE, NUTRITIONAL AND METABOLIC DISEASE: ICD-10-CM

## 2021-08-17 PROCEDURE — 99215 OFFICE O/P EST HI 40 MIN: CPT

## 2021-08-17 RX ORDER — CEPHALEXIN 500 MG/1
500 TABLET ORAL 3 TIMES DAILY
Qty: 21 | Refills: 0 | Status: DISCONTINUED | COMMUNITY
Start: 2021-07-02 | End: 2021-08-17

## 2021-08-17 RX ORDER — PROPRANOLOL HYDROCHLORIDE 40 MG/1
40 TABLET ORAL
Refills: 0 | Status: DISCONTINUED | COMMUNITY
End: 2021-08-17

## 2021-08-17 RX ORDER — CEPHALEXIN 500 MG/1
500 CAPSULE ORAL
Qty: 14 | Refills: 0 | Status: COMPLETED | COMMUNITY
Start: 2021-05-14 | End: 2021-08-17

## 2021-08-17 NOTE — PLAN
[FreeTextEntry1] : history of GI bleed- patient has been asymptomatic since discharge, patient will follow up with GI Dr. Almaguer\par \par PAF- patient will continue to take medication as prescribed and will follow up with Cardiologist. \par \par HTN- patient's BP well controlled with current medication. will continue current  regimen \par \par All medications reviewed. \par \par Prior to appointment and during encounter with patient extensive medical records were reviewed including but not limited to, Hospital records records, out patient records, laboratory data and microbiology data \par In addition extensive time was also spent in reviewing diagnostic studies.\par \par Total encounter total time 40 mins\par >50% of time spent counseling/coordinating care\par \par Counseling included abnormal lab results, differential diagnoses, treatment options, risks and benefits, lifestyle changes, current condition, medications, and dose adjustments. \par The patient was interactive, attentive, asked questions, and verbalized understanding

## 2021-08-17 NOTE — HISTORY OF PRESENT ILLNESS
[FreeTextEntry1] : FOLLOW UP AFTER BEING IN [facility] [de-identified] : Ms. RAGHAVENDRA WALLACE is a 89 year female with a PMH of Glaucoma,  Bipolar 2, HTN, Dementia,  comes to the office  with her son for follow up of chronic medical conditions and follow up from hospital. On 07/16/21 patient was admitted to [facility]  diagnosed with GI bleed and UTI, patient received 3 units PRBC and antibiotics. Patient was discharged to [facility] on 07/28/21 and then discharged home on 08/15/21\par Patient is asymptomatic at time of visit.

## 2021-08-17 NOTE — HEALTH RISK ASSESSMENT
[0] : 2) Feeling down, depressed, or hopeless: Not at all (0) [PHQ-2 Negative - No further assessment needed] : PHQ-2 Negative - No further assessment needed [[id number]] : 0

## 2021-09-03 ENCOUNTER — APPOINTMENT (OUTPATIENT)
Dept: INTERNAL MEDICINE | Facility: CLINIC | Age: 86
End: 2021-09-03
Payer: MEDICARE

## 2021-09-03 VITALS
TEMPERATURE: 97.1 F | BODY MASS INDEX: 20.83 KG/M2 | HEIGHT: 64 IN | HEART RATE: 59 BPM | DIASTOLIC BLOOD PRESSURE: 81 MMHG | SYSTOLIC BLOOD PRESSURE: 131 MMHG | WEIGHT: 122 LBS

## 2021-09-03 PROCEDURE — 99215 OFFICE O/P EST HI 40 MIN: CPT

## 2021-09-03 NOTE — PHYSICAL EXAM
[No Acute Distress] : no acute distress [Well Nourished] : well nourished [Well Developed] : well developed [Well-Appearing] : well-appearing [Normal Sclera/Conjunctiva] : normal sclera/conjunctiva [PERRL] : pupils equal round and reactive to light [EOMI] : extraocular movements intact [Normal Outer Ear/Nose] : the outer ears and nose were normal in appearance [Normal Oropharynx] : the oropharynx was normal [No JVD] : no jugular venous distention [No Lymphadenopathy] : no lymphadenopathy [Supple] : supple [Thyroid Normal, No Nodules] : the thyroid was normal and there were no nodules present [No Respiratory Distress] : no respiratory distress  [No Accessory Muscle Use] : no accessory muscle use [Clear to Auscultation] : lungs were clear to auscultation bilaterally [Normal Rate] : normal rate  [Regular Rhythm] : with a regular rhythm [Normal S1, S2] : normal S1 and S2 [No Murmur] : no murmur heard [No Carotid Bruits] : no carotid bruits [No Abdominal Bruit] : a ~M bruit was not heard ~T in the abdomen [No Varicosities] : no varicosities [Pedal Pulses Present] : the pedal pulses are present [No Edema] : there was no peripheral edema [No Palpable Aorta] : no palpable aorta [No Extremity Clubbing/Cyanosis] : no extremity clubbing/cyanosis [Soft] : abdomen soft [Non Tender] : non-tender [Non-distended] : non-distended [No Masses] : no abdominal mass palpated [No HSM] : no HSM [Normal Bowel Sounds] : normal bowel sounds [Normal Posterior Cervical Nodes] : no posterior cervical lymphadenopathy [Normal Anterior Cervical Nodes] : no anterior cervical lymphadenopathy [No CVA Tenderness] : no CVA  tenderness [No Spinal Tenderness] : no spinal tenderness [No Joint Swelling] : no joint swelling [Grossly Normal Strength/Tone] : grossly normal strength/tone [No Rash] : no rash [Coordination Grossly Intact] : coordination grossly intact [No Focal Deficits] : no focal deficits [Deep Tendon Reflexes (DTR)] : deep tendon reflexes were 2+ and symmetric [Normal Affect] : the affect was normal [Normal Insight/Judgement] : insight and judgment were intact [de-identified] : patient ambulates with wheelchair and walker.

## 2021-09-03 NOTE — HEALTH RISK ASSESSMENT
[0] : 2) Feeling down, depressed, or hopeless: Not at all (0) [PHQ-2 Negative - No further assessment needed] : PHQ-2 Negative - No further assessment needed [[id number]] : 0

## 2021-09-03 NOTE — HISTORY OF PRESENT ILLNESS
[FreeTextEntry1] : hospital follow up [de-identified] : Ms. RAGHAVENDRA WALLACE is a 89 year female with a PMH of Glaucoma,  Bipolar 2, HTN, Dementia,  comes to the office  with her son for follow up of chronic medical conditions and follow up from hospital. On 08/25/21 patient was having Abdominal pain and vomiting with hemoptysis  patient was admitted to [facility]  CT abdomen showed duodenitis patient was given PPI IV BID, Carafate. Patient was discharged home on 08/28/21.\par Patient is asymptomatic at time of visit.

## 2021-09-03 NOTE — PLAN
[FreeTextEntry1] : Hospital discharge follow up\par \par history of GI bleed and Duodenitis- patient will continue medication as prescribed  patient will follow up with GI Dr. Almaguer\par \par PAF- patient will continue to take medication as prescribed and will follow up with Cardiologist. \par \par HTN- patient's BP well controlled with current medication. will continue current  regimen \par \par All medications reviewed. \par \par Prior to appointment and during encounter with patient extensive medical records were reviewed including but not limited to, Hospital records records, out patient records, laboratory data and microbiology data \par In addition extensive time was also spent in reviewing diagnostic studies.\par \par Total encounter total time 40 mins\par >50% of time spent counseling/coordinating care\par \par Counseling included abnormal lab results, differential diagnoses, treatment options, risks and benefits, lifestyle changes, current condition, medications, and dose adjustments. \par The patient was interactive, attentive, asked questions, and verbalized understanding

## 2021-10-07 RX ORDER — HYDRALAZINE HYDROCHLORIDE 50 MG/1
50 TABLET ORAL
Qty: 270 | Refills: 1 | Status: ACTIVE | COMMUNITY
Start: 2019-08-28 | End: 1900-01-01

## 2021-10-11 DIAGNOSIS — G47.00 INSOMNIA, UNSPECIFIED: ICD-10-CM

## 2021-11-22 ENCOUNTER — APPOINTMENT (OUTPATIENT)
Dept: INTERNAL MEDICINE | Facility: CLINIC | Age: 86
End: 2021-11-22
Payer: MEDICARE

## 2021-11-22 PROCEDURE — G0008: CPT

## 2021-11-22 PROCEDURE — 90662 IIV NO PRSV INCREASED AG IM: CPT

## 2021-11-24 ENCOUNTER — NON-APPOINTMENT (OUTPATIENT)
Age: 86
End: 2021-11-24

## 2021-11-24 ENCOUNTER — APPOINTMENT (OUTPATIENT)
Dept: CARDIOLOGY | Facility: CLINIC | Age: 86
End: 2021-11-24
Payer: MEDICARE

## 2021-11-24 VITALS
WEIGHT: 118 LBS | BODY MASS INDEX: 20.14 KG/M2 | HEART RATE: 110 BPM | DIASTOLIC BLOOD PRESSURE: 67 MMHG | RESPIRATION RATE: 16 BRPM | HEIGHT: 64 IN | SYSTOLIC BLOOD PRESSURE: 114 MMHG

## 2021-11-24 DIAGNOSIS — Z87.898 PERSONAL HISTORY OF OTHER SPECIFIED CONDITIONS: ICD-10-CM

## 2021-11-24 PROCEDURE — 93288 INTERROG EVL PM/LDLS PM IP: CPT

## 2021-11-24 PROCEDURE — 99214 OFFICE O/P EST MOD 30 MIN: CPT

## 2021-11-24 PROCEDURE — 93000 ELECTROCARDIOGRAM COMPLETE: CPT | Mod: 59

## 2021-11-24 RX ORDER — GABAPENTIN 100 MG
100 TABLET ORAL
Refills: 0 | Status: DISCONTINUED | COMMUNITY
End: 2021-11-24

## 2021-11-24 NOTE — DISCUSSION/SUMMARY
[FreeTextEntry1] : Patient is a 89yo F with HTN, TAA, PPM, dementia AS/AI, moderate MR, HLD, DM here for cardiac follow up. Doing fairly well without anginal symptoms and no signs CHF. \par -Has advanced dementia. \par -Also remains poor surgical candidate for TAA repair and family has not wanted done either. \par -Poor candidate for A/C as well, PAF burden has increased with elevated heart rates but risk/benefit favors no A/C at this time. \par -AS likely severe on exam but no symptoms or signs CHF. Family not amenable to intervention regardless. \par \par \par \par 1. Continue antihypertensives, BP well controlled at this time. Currently in a-fib with RVR in low 100's, will increase diltiazem from 120 to 180 mg for more optimal rate control as well. No ASA due to ulcerations and esophagitis / unsteady gait. Poor A/C candidate. \par 2. ACtivity as tolerated\par 3. Will continue to keep off statin/gemfibrozil as limited benefit. GI symptoms seem better\par 4. Follow up 3 months and PPM check same day with Dr. Love\par 5. Regular PMD/GI follow up \par 6. OTC pain meds as tolerated (Tylenol)

## 2021-11-24 NOTE — HISTORY OF PRESENT ILLNESS
[FreeTextEntry1] : Patient is a 89yo F with HTN, TAA, PPM, dementia AS/AI, moderate MR, HLD, DM here for cardiac follow up. Recently admitted to [facility] (11/18/2021) epigastric/CP that worsens with breathing. Had CTA of chest and abdomen/pelvis that demonstrated aneurysmal dilation of ascending aorta measuring up to 5 cm, NO dissection (chronic) and NO evidence of pulmonary embolus. Blood work negative for Troponins and EKG with NSR and stable. CV stable and discharged home, advised OTC pain meds. \par \par Patient presents today with her Son who still reports intermittent epigastric / chest discomfort that appears to worsen with breathing. PPM interrogation today shows atrial fibrillation that has been going on for approximately 40 hours. EKG in-office with a-fib with heart rate in low 100's.  Patient had recently started Diltiazem 120 mg daily earlier this summer that she had been tolerating with good rate control. No SOB, no pnd/orthopnea/palps/near syncope/syncope/edema. Very limited activity, watches TV and sleeps during day Continues with chronic pain in hands, limites her as she used to color more. Dementia is fairly advanced and memory continues to worsen.\par \par \par ROS: GI and  negative

## 2021-11-24 NOTE — PHYSICAL EXAM
[Normal Conjunctiva] : normal conjunctiva [Normal Venous Pressure] : normal venous pressure [No Carotid Bruit] : no carotid bruit [Normal S1, S2] : normal S1, S2 [No Rub] : no rub [No Gallop] : no gallop [Murmur] : murmur [Clear Lung Fields] : clear lung fields [Good Air Entry] : good air entry [No Respiratory Distress] : no respiratory distress  [Soft] : abdomen soft [Non Tender] : non-tender [No Masses/organomegaly] : no masses/organomegaly [No Edema] : no edema [No Cyanosis] : no cyanosis [No Clubbing] : no clubbing [No Rash] : no rash [No Skin Lesions] : no skin lesions [Moves all extremities] : moves all extremities [No Focal Deficits] : no focal deficits [Normal Speech] : normal speech [Alert and Oriented] : alert and oriented [de-identified] : elderly F in NAD [de-identified] : Grade II/VI harsh systolic murmur at left sternal border [de-identified] : wheelchair

## 2021-11-24 NOTE — REVIEW OF SYSTEMS
[Chest Discomfort] : chest discomfort [Abdominal Pain] : abdominal pain [Negative] : Heme/Lymph [FreeTextEntry7] : epigastric discomfort

## 2021-11-24 NOTE — ASSESSMENT
[FreeTextEntry1] : EKG 11/24/2021:  A-FIB with RVR in low 100's, PRWP, NSST, inferior Q waves\par  \par PPM INTERROGATION 11/2021: 53% AP, >99 mode switches for PAF, currently in a-fib for approx 40 hours\par PPM INTERROGATION 6/2021: 57% AP, 30 mode switches for PAF, longest 1hr 55 min, mostly rate controlled\par PPM INTERROGATION 12/2020: 72.9% atrial paced, 1 mode switch for 38 seconds PAF, 4 episodes PAT\par PPM INTERROGATION 6/2020: 67% atrial paced, 16 episodes PAF, longest for 2 minutes 45 seconds\par PPM INTERROGATION 12/2019: 70% atrial paced, no events\par PPM INTERROGATION 6/2019: 71% atrial paced, episode brief PAT normal function\par \par CAROTID DUPLEX 3/2018:\par 1. All arteries were clearly visualized.\par 2. There is 1-49% stenosis of the left proximal ICA.\par 3. There is 1-49% stenosis of the right proximal ICA.\par 4. The left and right carotid arteries are tortuous.\par 5. There is antegrade flow in the right and left vertebral arteries.\par 6. Recommend clinical correlation with the above findings.\par \par ECHO 9/2018:\par 1. Left ventricular ejection fraction, by visual estimation, is 65 to 70%.\par 2. Normal global left ventricular systolic function.\par 3. Impaired relaxation pattern of LV diastolic filling.\par 4. Normal left ventricular internal cavity size.\par 5. Mild concentric left ventricular hypertrophy.\par 6. Normal right ventricular size and systolic function.\par 7. Moderately dilated left atrium.\par 8. Mildly dilated right atrium.\par 9. Moderate mitral annular calcification.\par 10. Moderate thickening and calcification of the anterior and posterior mitral valve leaflets.\par 11. Mild mitral valve regurgitation.\par 12. Mild to moderate aortic valve stenosis.\par 13. Mild aortic regurgitation.\par 14. Mild tricuspid regurgitation.\par 15. There is moderate to severe dilatation of the ascending aorta (5cm).\par 16. Severely dilated pulmonary artery.\par 17. There is no evidence of pericardial effusion.\par 18. In comparison to the previous echocardiogram mitral and aortic regurgitation appear less severe.\par 19. Recommend clinical correlation with the above findings.\par

## 2021-12-02 ENCOUNTER — APPOINTMENT (OUTPATIENT)
Dept: INTERNAL MEDICINE | Facility: CLINIC | Age: 86
End: 2021-12-02
Payer: MEDICARE

## 2021-12-02 VITALS
TEMPERATURE: 97.4 F | HEART RATE: 62 BPM | DIASTOLIC BLOOD PRESSURE: 71 MMHG | SYSTOLIC BLOOD PRESSURE: 128 MMHG | HEIGHT: 64 IN | WEIGHT: 118 LBS | BODY MASS INDEX: 20.14 KG/M2

## 2021-12-02 DIAGNOSIS — Z87.19 PERSONAL HISTORY OF OTHER DISEASES OF THE DIGESTIVE SYSTEM: ICD-10-CM

## 2021-12-02 DIAGNOSIS — K21.9 GASTRO-ESOPHAGEAL REFLUX DISEASE W/OUT ESOPHAGITIS: ICD-10-CM

## 2021-12-02 PROCEDURE — 99215 OFFICE O/P EST HI 40 MIN: CPT

## 2021-12-02 RX ORDER — ASPIRIN 81 MG/1
81 TABLET, COATED ORAL
Qty: 28 | Refills: 4 | Status: COMPLETED | COMMUNITY
Start: 2021-01-29 | End: 2021-12-02

## 2021-12-02 NOTE — HEALTH RISK ASSESSMENT
[0] : 2) Feeling down, depressed, or hopeless: Not at all (0) [PHQ-2 Negative - No further assessment needed] : PHQ-2 Negative - No further assessment needed [[id number]] : 0

## 2021-12-02 NOTE — PHYSICAL EXAM
[No Acute Distress] : no acute distress [Well Nourished] : well nourished [Well Developed] : well developed [Well-Appearing] : well-appearing [Normal Sclera/Conjunctiva] : normal sclera/conjunctiva [PERRL] : pupils equal round and reactive to light [EOMI] : extraocular movements intact [Normal Outer Ear/Nose] : the outer ears and nose were normal in appearance [Normal Oropharynx] : the oropharynx was normal [No JVD] : no jugular venous distention [No Lymphadenopathy] : no lymphadenopathy [Supple] : supple [Thyroid Normal, No Nodules] : the thyroid was normal and there were no nodules present [No Respiratory Distress] : no respiratory distress  [No Accessory Muscle Use] : no accessory muscle use [Clear to Auscultation] : lungs were clear to auscultation bilaterally [Normal Rate] : normal rate  [Regular Rhythm] : with a regular rhythm [Normal S1, S2] : normal S1 and S2 [No Murmur] : no murmur heard [No Carotid Bruits] : no carotid bruits [No Abdominal Bruit] : a ~M bruit was not heard ~T in the abdomen [No Varicosities] : no varicosities [Pedal Pulses Present] : the pedal pulses are present [No Edema] : there was no peripheral edema [No Palpable Aorta] : no palpable aorta [No Extremity Clubbing/Cyanosis] : no extremity clubbing/cyanosis [Soft] : abdomen soft [Non Tender] : non-tender [Non-distended] : non-distended [No Masses] : no abdominal mass palpated [No HSM] : no HSM [Normal Bowel Sounds] : normal bowel sounds [Normal Posterior Cervical Nodes] : no posterior cervical lymphadenopathy [Normal Anterior Cervical Nodes] : no anterior cervical lymphadenopathy [No CVA Tenderness] : no CVA  tenderness [No Spinal Tenderness] : no spinal tenderness [No Joint Swelling] : no joint swelling [Grossly Normal Strength/Tone] : grossly normal strength/tone [No Rash] : no rash [Coordination Grossly Intact] : coordination grossly intact [No Focal Deficits] : no focal deficits [Deep Tendon Reflexes (DTR)] : deep tendon reflexes were 2+ and symmetric [Normal Affect] : the affect was normal [Normal Insight/Judgement] : insight and judgment were intact [de-identified] : patient ambulates with wheelchair and walker.

## 2021-12-02 NOTE — HISTORY OF PRESENT ILLNESS
[FreeTextEntry1] : hospital follow up [de-identified] : Ms. RAGHAVENDRA WALLACE is a 89 year female with a PMH of Glaucoma,  Bipolar 2, HTN, Dementia,  comes to the office  with her son for follow up of chronic medical conditions and follow up from hospital. On 11/18/21 patient was having Abdominal pain and chest pain. Blood work unremarkable, EKG unremarkable  CT chest was negative for PE, CT abdomen/pelvis with contrast  was positive for Unchanged 1.1 cm hypodense lesion in pancreatic head.   Patient was discharged home same day\par \par Patient returned to [facility] ER on 11/24/21 for chest pain, blood work unremarkable, covid test negative, Troponin negative x 2  EKG unremarkable except for afib with rate of 116 and CXR unremarkable for any acute findings patient was given morphine and Ativan, patient was discharged Home same day.\par \par \par Patient returned to [facility] ER on 11/27/21 epigastric abdominal pain. Blood work unremarkable, [atoemt was given maalox, zofran, protonix sucrafate, and IV fluids. patient was discharged same day.\par \par Patient is asymptomatic at time of visit today

## 2021-12-02 NOTE — PLAN
[FreeTextEntry1] : Hospital discharge follow up\par \par history of GI bleed and Duodenitis- patient will continue medication as prescribed  patient will follow up with GI Dr. Almaguer\par \par PAF- patient will continue to take medication as prescribed and will follow up with Cardiologist. \par \par HTN- patient's BP well controlled with current medication. will continue current  regimen \par \par All medications reviewed. \par \par Prior to appointment and during encounter with patient extensive medical records were reviewed including but not limited to, Hospital records records, out patient records, laboratory data and microbiology data \par In addition extensive time was also spent in reviewing diagnostic studies.\par \par Total encounter total time 40 mins\par >50% of time spent counseling/coordinating care\par \par I had long discussion with patient's son who was present at time of visit, Given the multiple visits to the ER over the past 1 month, we discussed that the patient's (his mother's) health is declining, and that she has a poor prognosis. The son understands and appreciated the discussion. He is not ready to discuss palliative measures at this time.  \par \par Counseling included abnormal lab results, differential diagnoses, treatment options, risks and benefits, lifestyle changes, current condition, medications, and dose adjustments. \par The patient was interactive, attentive, asked questions, and verbalized understanding

## 2022-03-18 ENCOUNTER — APPOINTMENT (OUTPATIENT)
Dept: CARDIOLOGY | Facility: CLINIC | Age: 87
End: 2022-03-18

## 2022-04-07 ENCOUNTER — APPOINTMENT (OUTPATIENT)
Dept: INTERNAL MEDICINE | Facility: CLINIC | Age: 87
End: 2022-04-07
Payer: MEDICARE

## 2022-04-07 VITALS
DIASTOLIC BLOOD PRESSURE: 60 MMHG | SYSTOLIC BLOOD PRESSURE: 118 MMHG | WEIGHT: 100 LBS | HEART RATE: 66 BPM | HEIGHT: 64 IN | BODY MASS INDEX: 17.07 KG/M2

## 2022-04-07 DIAGNOSIS — I71.4 ABDOMINAL AORTIC ANEURYSM, W/OUT RUPTURE: ICD-10-CM

## 2022-04-07 DIAGNOSIS — D64.9 ANEMIA, UNSPECIFIED: ICD-10-CM

## 2022-04-07 DIAGNOSIS — Z09 ENCOUNTER FOR FOLLOW-UP EXAMINATION AFTER COMPLETED TREATMENT FOR CONDITIONS OTHER THAN MALIGNANT NEOPLASM: ICD-10-CM

## 2022-04-07 PROCEDURE — 36415 COLL VENOUS BLD VENIPUNCTURE: CPT

## 2022-04-07 PROCEDURE — 99215 OFFICE O/P EST HI 40 MIN: CPT | Mod: 25

## 2022-04-07 RX ORDER — DONEPEZIL HYDROCHLORIDE 10 MG/1
10 TABLET ORAL
Qty: 90 | Refills: 1 | Status: ACTIVE | COMMUNITY
Start: 2017-12-26 | End: 1900-01-01

## 2022-04-07 RX ORDER — PANTOPRAZOLE 40 MG/1
40 TABLET, DELAYED RELEASE ORAL
Qty: 180 | Refills: 1 | Status: ACTIVE | COMMUNITY
Start: 1900-01-01 | End: 1900-01-01

## 2022-04-07 NOTE — PLAN
[FreeTextEntry1] : Hospital discharge follow up\par \par Hospital discharge follow up\par \par C.DIff- Patient will complete medication as prescribed. Patient will follow up with GI Doctor Harpreet\par \par Anemia- will retest H/H today and call patient with results\par  \par PAF- patient will continue to take medication as prescribed and will follow up with Cardiologist. \par \par HTN- patient's BP well controlled with current medication. will continue current  regimen \par \par All medications reviewed. \par \par Prior to appointment and during encounter with patient extensive medical records were reviewed including but not limited to, Hospital records records, out patient records, laboratory data and microbiology data \par In addition extensive time was also spent in reviewing diagnostic studies.\par \par Total encounter total time 40 mins\par >50% of time spent counseling/coordinating care\par \par I had long discussion with patient's son who was present at time of visit, Given the multiple visits to the ER over the past 1 month, we discussed that the patient's (his mother's) health is declining, and that she has a poor prognosis. The son understands and appreciated the discussion. He is not ready to discuss palliative measures at this time.  \par \par Counseling included abnormal lab results, differential diagnoses, treatment options, risks and benefits, lifestyle changes, current condition, medications, and dose adjustments. \par The patient was interactive, attentive, asked questions, and verbalized understanding

## 2022-04-07 NOTE — PHYSICAL EXAM
[No Acute Distress] : no acute distress [Well Nourished] : well nourished [Well Developed] : well developed [Well-Appearing] : well-appearing [Normal Sclera/Conjunctiva] : normal sclera/conjunctiva [PERRL] : pupils equal round and reactive to light [EOMI] : extraocular movements intact [Normal Outer Ear/Nose] : the outer ears and nose were normal in appearance [Normal Oropharynx] : the oropharynx was normal [No JVD] : no jugular venous distention [No Lymphadenopathy] : no lymphadenopathy [Supple] : supple [No Respiratory Distress] : no respiratory distress  [No Accessory Muscle Use] : no accessory muscle use [Clear to Auscultation] : lungs were clear to auscultation bilaterally [Normal Rate] : normal rate  [Regular Rhythm] : with a regular rhythm [Normal S1, S2] : normal S1 and S2 [No Carotid Bruits] : no carotid bruits [No Abdominal Bruit] : a ~M bruit was not heard ~T in the abdomen [No Varicosities] : no varicosities [No Edema] : there was no peripheral edema [No Palpable Aorta] : no palpable aorta [No Extremity Clubbing/Cyanosis] : no extremity clubbing/cyanosis [Soft] : abdomen soft [Non Tender] : non-tender [Non-distended] : non-distended [No HSM] : no HSM [Normal Bowel Sounds] : normal bowel sounds [Normal Posterior Cervical Nodes] : no posterior cervical lymphadenopathy [Normal Anterior Cervical Nodes] : no anterior cervical lymphadenopathy [No CVA Tenderness] : no CVA  tenderness [No Spinal Tenderness] : no spinal tenderness [No Joint Swelling] : no joint swelling [Grossly Normal Strength/Tone] : grossly normal strength/tone [No Rash] : no rash [No Focal Deficits] : no focal deficits [Coordination Grossly Intact] : coordination grossly intact [Normal Affect] : the affect was normal [Normal Insight/Judgement] : insight and judgment were intact [de-identified] : patient ambulates with wheelchair and walker.  no fever and no chills.

## 2022-04-07 NOTE — HISTORY OF PRESENT ILLNESS
[de-identified] : Ms. RAGHAVENDRA WALLACE is a 89 year female with a PMH of Glaucoma,  Bipolar 2, HTN, Dementia,  comes to the office  with her son for follow up of chronic medical conditions and follow up from hospital. \par \par On 04/04/22 patient was admitted to [facility], CT showed rectal impaction and stercoral colitis. Patient found to have H/H of 7.6, and C.dif treatment was started on Metronidazole and cefpodoxime, she was given 1 unit PRBC and discharged to her Sons house on 04/06/22. In office today patient is asymptomatic at time of visit.

## 2022-04-07 NOTE — HEALTH RISK ASSESSMENT
[0] : 2) Feeling down, depressed, or hopeless: Not at all (0) [PHQ-2 Negative - No further assessment needed] : PHQ-2 Negative - No further assessment needed [INZ1Eeved] : 0

## 2022-04-08 LAB
HCT VFR BLD CALC: 35.9 %
HGB BLD-MCNC: 10.7 G/DL

## 2022-05-11 ENCOUNTER — INPATIENT (INPATIENT)
Facility: HOSPITAL | Age: 87
LOS: 19 days | Discharge: HOME | DRG: 313 | End: 2022-05-31
Attending: INTERNAL MEDICINE | Admitting: HOSPITALIST
Payer: MEDICARE

## 2022-05-11 VITALS
TEMPERATURE: 100 F | WEIGHT: 110.01 LBS | HEIGHT: 62 IN | OXYGEN SATURATION: 100 % | RESPIRATION RATE: 18 BRPM | DIASTOLIC BLOOD PRESSURE: 94 MMHG | SYSTOLIC BLOOD PRESSURE: 149 MMHG | HEART RATE: 103 BPM

## 2022-05-11 DIAGNOSIS — R00.0 TACHYCARDIA, UNSPECIFIED: ICD-10-CM

## 2022-05-11 DIAGNOSIS — R07.9 CHEST PAIN, UNSPECIFIED: ICD-10-CM

## 2022-05-11 DIAGNOSIS — Z96.649 PRESENCE OF UNSPECIFIED ARTIFICIAL HIP JOINT: Chronic | ICD-10-CM

## 2022-05-11 DIAGNOSIS — Z90.710 ACQUIRED ABSENCE OF BOTH CERVIX AND UTERUS: Chronic | ICD-10-CM

## 2022-05-11 DIAGNOSIS — Z95.0 PRESENCE OF CARDIAC PACEMAKER: Chronic | ICD-10-CM

## 2022-05-11 LAB
ALBUMIN SERPL ELPH-MCNC: 3.8 G/DL — SIGNIFICANT CHANGE UP (ref 3.3–5.2)
ALP SERPL-CCNC: 80 U/L — SIGNIFICANT CHANGE UP (ref 40–120)
ALT FLD-CCNC: 6 U/L — SIGNIFICANT CHANGE UP
ANION GAP SERPL CALC-SCNC: 14 MMOL/L — SIGNIFICANT CHANGE UP (ref 5–17)
APPEARANCE UR: CLEAR — SIGNIFICANT CHANGE UP
APTT BLD: 31.8 SEC — SIGNIFICANT CHANGE UP (ref 27.5–35.5)
AST SERPL-CCNC: 14 U/L — SIGNIFICANT CHANGE UP
BASOPHILS # BLD AUTO: 0.02 K/UL — SIGNIFICANT CHANGE UP (ref 0–0.2)
BASOPHILS NFR BLD AUTO: 0.2 % — SIGNIFICANT CHANGE UP (ref 0–2)
BILIRUB SERPL-MCNC: 0.3 MG/DL — LOW (ref 0.4–2)
BILIRUB UR-MCNC: NEGATIVE — SIGNIFICANT CHANGE UP
BUN SERPL-MCNC: 24.9 MG/DL — HIGH (ref 8–20)
CALCIUM SERPL-MCNC: 9.7 MG/DL — SIGNIFICANT CHANGE UP (ref 8.6–10.2)
CHLORIDE SERPL-SCNC: 99 MMOL/L — SIGNIFICANT CHANGE UP (ref 98–107)
CO2 SERPL-SCNC: 18 MMOL/L — LOW (ref 22–29)
COLOR SPEC: YELLOW — SIGNIFICANT CHANGE UP
CREAT SERPL-MCNC: 1.18 MG/DL — SIGNIFICANT CHANGE UP (ref 0.5–1.3)
DIFF PNL FLD: NEGATIVE — SIGNIFICANT CHANGE UP
EGFR: 43 ML/MIN/1.73M2 — LOW
EOSINOPHIL # BLD AUTO: 0.02 K/UL — SIGNIFICANT CHANGE UP (ref 0–0.5)
EOSINOPHIL NFR BLD AUTO: 0.2 % — SIGNIFICANT CHANGE UP (ref 0–6)
FLUAV AG NPH QL: SIGNIFICANT CHANGE UP
FLUBV AG NPH QL: SIGNIFICANT CHANGE UP
GAS PNL BLDV: SIGNIFICANT CHANGE UP
GLUCOSE SERPL-MCNC: 134 MG/DL — HIGH (ref 70–99)
GLUCOSE UR QL: NEGATIVE MG/DL — SIGNIFICANT CHANGE UP
HCT VFR BLD CALC: 33.2 % — LOW (ref 34.5–45)
HGB BLD-MCNC: 10.2 G/DL — LOW (ref 11.5–15.5)
IMM GRANULOCYTES NFR BLD AUTO: 0.6 % — SIGNIFICANT CHANGE UP (ref 0–1.5)
INR BLD: 1.53 RATIO — HIGH (ref 0.88–1.16)
KETONES UR-MCNC: NEGATIVE — SIGNIFICANT CHANGE UP
LEUKOCYTE ESTERASE UR-ACNC: ABNORMAL
LIDOCAIN IGE QN: 20 U/L — LOW (ref 22–51)
LYMPHOCYTES # BLD AUTO: 0.63 K/UL — LOW (ref 1–3.3)
LYMPHOCYTES # BLD AUTO: 5.8 % — LOW (ref 13–44)
MAGNESIUM SERPL-MCNC: 2.1 MG/DL — SIGNIFICANT CHANGE UP (ref 1.6–2.6)
MCHC RBC-ENTMCNC: 27.3 PG — SIGNIFICANT CHANGE UP (ref 27–34)
MCHC RBC-ENTMCNC: 30.7 GM/DL — LOW (ref 32–36)
MCV RBC AUTO: 89 FL — SIGNIFICANT CHANGE UP (ref 80–100)
MONOCYTES # BLD AUTO: 1.25 K/UL — HIGH (ref 0–0.9)
MONOCYTES NFR BLD AUTO: 11.6 % — SIGNIFICANT CHANGE UP (ref 2–14)
NEUTROPHILS # BLD AUTO: 8.8 K/UL — HIGH (ref 1.8–7.4)
NEUTROPHILS NFR BLD AUTO: 81.6 % — HIGH (ref 43–77)
NITRITE UR-MCNC: NEGATIVE — SIGNIFICANT CHANGE UP
NT-PROBNP SERPL-SCNC: 2237 PG/ML — HIGH (ref 0–300)
PH UR: 6 — SIGNIFICANT CHANGE UP (ref 5–8)
PLATELET # BLD AUTO: 229 K/UL — SIGNIFICANT CHANGE UP (ref 150–400)
POTASSIUM SERPL-MCNC: 4.5 MMOL/L — SIGNIFICANT CHANGE UP (ref 3.5–5.3)
POTASSIUM SERPL-SCNC: 4.5 MMOL/L — SIGNIFICANT CHANGE UP (ref 3.5–5.3)
PROT SERPL-MCNC: 8.7 G/DL — SIGNIFICANT CHANGE UP (ref 6.6–8.7)
PROT UR-MCNC: 100 MG/DL
PROTHROM AB SERPL-ACNC: 17.8 SEC — HIGH (ref 10.5–13.4)
RBC # BLD: 3.73 M/UL — LOW (ref 3.8–5.2)
RBC # FLD: 20.5 % — HIGH (ref 10.3–14.5)
RSV RNA NPH QL NAA+NON-PROBE: SIGNIFICANT CHANGE UP
SARS-COV-2 RNA SPEC QL NAA+PROBE: SIGNIFICANT CHANGE UP
SODIUM SERPL-SCNC: 131 MMOL/L — LOW (ref 135–145)
SP GR SPEC: 1.01 — SIGNIFICANT CHANGE UP (ref 1.01–1.02)
TROPONIN T SERPL-MCNC: <0.01 NG/ML — SIGNIFICANT CHANGE UP (ref 0–0.06)
TROPONIN T SERPL-MCNC: <0.01 NG/ML — SIGNIFICANT CHANGE UP (ref 0–0.06)
TSH SERPL-MCNC: 2 UIU/ML — SIGNIFICANT CHANGE UP (ref 0.27–4.2)
UROBILINOGEN FLD QL: 1 MG/DL
WBC # BLD: 10.78 K/UL — HIGH (ref 3.8–10.5)
WBC # FLD AUTO: 10.78 K/UL — HIGH (ref 3.8–10.5)

## 2022-05-11 PROCEDURE — 99284 EMERGENCY DEPT VISIT MOD MDM: CPT | Mod: CS

## 2022-05-11 PROCEDURE — 71045 X-RAY EXAM CHEST 1 VIEW: CPT | Mod: 26

## 2022-05-11 PROCEDURE — 75635 CT ANGIO ABDOMINAL ARTERIES: CPT | Mod: 26,MA

## 2022-05-11 PROCEDURE — 71275 CT ANGIOGRAPHY CHEST: CPT | Mod: 26,MA

## 2022-05-11 PROCEDURE — 99223 1ST HOSP IP/OBS HIGH 75: CPT

## 2022-05-11 PROCEDURE — 70450 CT HEAD/BRAIN W/O DYE: CPT | Mod: 26,MA

## 2022-05-11 PROCEDURE — 93010 ELECTROCARDIOGRAM REPORT: CPT

## 2022-05-11 RX ORDER — GEMFIBROZIL 600 MG
600 TABLET ORAL
Refills: 0 | Status: DISCONTINUED | OUTPATIENT
Start: 2022-05-11 | End: 2022-05-31

## 2022-05-11 RX ORDER — ONDANSETRON 8 MG/1
4 TABLET, FILM COATED ORAL EVERY 8 HOURS
Refills: 0 | Status: DISCONTINUED | OUTPATIENT
Start: 2022-05-11 | End: 2022-05-31

## 2022-05-11 RX ORDER — FLUTICASONE PROPIONATE 50 MCG
1 SPRAY, SUSPENSION NASAL
Refills: 0 | Status: DISCONTINUED | OUTPATIENT
Start: 2022-05-11 | End: 2022-05-31

## 2022-05-11 RX ORDER — VANCOMYCIN HCL 1 G
1000 VIAL (EA) INTRAVENOUS ONCE
Refills: 0 | Status: COMPLETED | OUTPATIENT
Start: 2022-05-11 | End: 2022-05-11

## 2022-05-11 RX ORDER — FERROUS SULFATE 325(65) MG
325 TABLET ORAL DAILY
Refills: 0 | Status: DISCONTINUED | OUTPATIENT
Start: 2022-05-11 | End: 2022-05-31

## 2022-05-11 RX ORDER — DORZOLAMIDE HYDROCHLORIDE 20 MG/ML
1 SOLUTION/ DROPS OPHTHALMIC
Refills: 0 | Status: DISCONTINUED | OUTPATIENT
Start: 2022-05-11 | End: 2022-05-31

## 2022-05-11 RX ORDER — DONEPEZIL HYDROCHLORIDE 10 MG/1
10 TABLET, FILM COATED ORAL AT BEDTIME
Refills: 0 | Status: DISCONTINUED | OUTPATIENT
Start: 2022-05-11 | End: 2022-05-31

## 2022-05-11 RX ORDER — FOLIC ACID 0.8 MG
1 TABLET ORAL DAILY
Refills: 0 | Status: DISCONTINUED | OUTPATIENT
Start: 2022-05-11 | End: 2022-05-31

## 2022-05-11 RX ORDER — PIPERACILLIN AND TAZOBACTAM 4; .5 G/20ML; G/20ML
3.38 INJECTION, POWDER, LYOPHILIZED, FOR SOLUTION INTRAVENOUS ONCE
Refills: 0 | Status: COMPLETED | OUTPATIENT
Start: 2022-05-11 | End: 2022-05-11

## 2022-05-11 RX ORDER — LEVOTHYROXINE SODIUM 125 MCG
150 TABLET ORAL DAILY
Refills: 0 | Status: DISCONTINUED | OUTPATIENT
Start: 2022-05-11 | End: 2022-05-31

## 2022-05-11 RX ORDER — ASPIRIN/CALCIUM CARB/MAGNESIUM 324 MG
81 TABLET ORAL DAILY
Refills: 0 | Status: DISCONTINUED | OUTPATIENT
Start: 2022-05-11 | End: 2022-05-31

## 2022-05-11 RX ORDER — SODIUM CHLORIDE 9 MG/ML
1000 INJECTION INTRAMUSCULAR; INTRAVENOUS; SUBCUTANEOUS ONCE
Refills: 0 | Status: COMPLETED | OUTPATIENT
Start: 2022-05-11 | End: 2022-05-11

## 2022-05-11 RX ORDER — ACETAMINOPHEN 500 MG
650 TABLET ORAL ONCE
Refills: 0 | Status: COMPLETED | OUTPATIENT
Start: 2022-05-11 | End: 2022-05-11

## 2022-05-11 RX ORDER — HEPARIN SODIUM 5000 [USP'U]/ML
5000 INJECTION INTRAVENOUS; SUBCUTANEOUS EVERY 12 HOURS
Refills: 0 | Status: DISCONTINUED | OUTPATIENT
Start: 2022-05-11 | End: 2022-05-31

## 2022-05-11 RX ORDER — ACETAMINOPHEN 500 MG
650 TABLET ORAL EVERY 6 HOURS
Refills: 0 | Status: DISCONTINUED | OUTPATIENT
Start: 2022-05-11 | End: 2022-05-31

## 2022-05-11 RX ORDER — LANOLIN ALCOHOL/MO/W.PET/CERES
3 CREAM (GRAM) TOPICAL AT BEDTIME
Refills: 0 | Status: DISCONTINUED | OUTPATIENT
Start: 2022-05-11 | End: 2022-05-31

## 2022-05-11 RX ORDER — QUETIAPINE FUMARATE 200 MG/1
100 TABLET, FILM COATED ORAL AT BEDTIME
Refills: 0 | Status: DISCONTINUED | OUTPATIENT
Start: 2022-05-11 | End: 2022-05-24

## 2022-05-11 RX ORDER — PANTOPRAZOLE SODIUM 20 MG/1
40 TABLET, DELAYED RELEASE ORAL
Refills: 0 | Status: DISCONTINUED | OUTPATIENT
Start: 2022-05-11 | End: 2022-05-28

## 2022-05-11 RX ORDER — SODIUM CHLORIDE 9 MG/ML
1000 INJECTION INTRAMUSCULAR; INTRAVENOUS; SUBCUTANEOUS
Refills: 0 | Status: DISCONTINUED | OUTPATIENT
Start: 2022-05-11 | End: 2022-05-12

## 2022-05-11 RX ORDER — TOPIRAMATE 25 MG
50 TABLET ORAL DAILY
Refills: 0 | Status: DISCONTINUED | OUTPATIENT
Start: 2022-05-11 | End: 2022-05-31

## 2022-05-11 RX ORDER — GABAPENTIN 400 MG/1
100 CAPSULE ORAL
Refills: 0 | Status: DISCONTINUED | OUTPATIENT
Start: 2022-05-11 | End: 2022-05-24

## 2022-05-11 RX ADMIN — HEPARIN SODIUM 5000 UNIT(S): 5000 INJECTION INTRAVENOUS; SUBCUTANEOUS at 18:40

## 2022-05-11 RX ADMIN — Medication 81 MILLIGRAM(S): at 18:40

## 2022-05-11 RX ADMIN — Medication 650 MILLIGRAM(S): at 17:22

## 2022-05-11 RX ADMIN — Medication 50 MILLIGRAM(S): at 18:53

## 2022-05-11 RX ADMIN — Medication 325 MILLIGRAM(S): at 18:40

## 2022-05-11 RX ADMIN — DONEPEZIL HYDROCHLORIDE 10 MILLIGRAM(S): 10 TABLET, FILM COATED ORAL at 23:05

## 2022-05-11 RX ADMIN — Medication 650 MILLIGRAM(S): at 12:31

## 2022-05-11 RX ADMIN — Medication 600 MILLIGRAM(S): at 18:54

## 2022-05-11 RX ADMIN — Medication 250 MILLIGRAM(S): at 18:40

## 2022-05-11 RX ADMIN — Medication 1 SPRAY(S): at 18:52

## 2022-05-11 RX ADMIN — GABAPENTIN 100 MILLIGRAM(S): 400 CAPSULE ORAL at 18:40

## 2022-05-11 RX ADMIN — PIPERACILLIN AND TAZOBACTAM 200 GRAM(S): 4; .5 INJECTION, POWDER, LYOPHILIZED, FOR SOLUTION INTRAVENOUS at 17:13

## 2022-05-11 RX ADMIN — SODIUM CHLORIDE 250 MILLILITER(S): 9 INJECTION INTRAMUSCULAR; INTRAVENOUS; SUBCUTANEOUS at 15:08

## 2022-05-11 RX ADMIN — DORZOLAMIDE HYDROCHLORIDE 1 DROP(S): 20 SOLUTION/ DROPS OPHTHALMIC at 18:52

## 2022-05-11 NOTE — H&P ADULT - ASSESSMENT
89 year old female with pmh of dementia (aaox1), anemia, neuropathy, gerd, depression, seizures, recent egd for gi bleed requiring gi intervention 1 month prior coming to hospital with complaints of chest pain. per patient currently asymptomatic however stating had chest pain this am was non radiating burning in nature. 5/10 intensity. denies sob nausea vomiting diarrhea at this time.    #chest pain   - admit to medicine tele   - probable 2/2 nstemi (doubt) vs other etiology (gi)  - start aspirin   - cardio consult in am   - trend trop  - check echo     #gerd w/ anemia  - w/ recent egd one month prior at [facility] per son also with intervention   - protonix   - ferrous sulfate  - folic acid    #dementia   - aricept    #neuropathy  - gabapentin    #HLD  - gemfibrozil    #DVT Prophylaxis  - heparin Sc     dispo probable d/c to [facility] in 24 hours     spoke to nursing supervisor at [facility] to obtain medication list     spoke to patient son Theo on phone [phone] hospital course to date reviewed

## 2022-05-11 NOTE — ED ADULT NURSE NOTE - NSIMPLEMENTINTERV_GEN_ALL_ED
Implemented All Fall with Harm Risk Interventions:  [place] to call system. Call bell, personal items and telephone within reach. Instruct patient to call for assistance. Room bathroom lighting operational. Non-slip footwear when patient is off stretcher. Physically safe environment: no spills, clutter or unnecessary equipment. Stretcher in lowest position, wheels locked, appropriate side rails in place. Provide visual cue, wrist band, yellow gown, etc. Monitor gait and stability. Monitor for mental status changes and reorient to person, place, and time. Review medications for side effects contributing to fall risk. Reinforce activity limits and safety measures with patient and family. Provide visual clues: red socks.

## 2022-05-11 NOTE — ED PROVIDER NOTE - OBJECTIVE STATEMENT
89 yr old f p/w chest pressure, thinks it began before lunch, lives in a rehab facility.  called [facility] and left message for nurse manager to obtain more information.  per records patient on amoxicillin for uti, no further information sent regarding reason for transfer  patient febrile

## 2022-05-11 NOTE — H&P ADULT - HISTORY OF PRESENT ILLNESS
89 year old female with pmh of dementia (aaox1), anemia, neuropathy, gerd, depression, seizures, recent egd for gi bleed requiring gi intervention 1 month prior coming to hospital with complaints of chest pain. per patient currently asymptomatic however stating had chest pain this am was non radiating burning in nature. 5/10 intensity. denies sob nausea vomiting diarrhea at this time.

## 2022-05-11 NOTE — H&P ADULT - NSHPREVIEWOFSYSTEMS_GEN_ALL_CORE
Review of Systems:  CONSTITUTIONAL: No weakness, fevers or chills  EYES/ENT: No visual changes;  No vertigo or throat pain   NECK: No pain or stiffness  RESPIRATORY: No cough, wheezing, hemoptysis; No shortness of breath,   CARDIOVASCULAR: +chest pain  GASTROINTESTINAL: No abdominal or epigastric pain. No nausea, vomiting, or hematemesis; No diarrhea or constipation.   GENITOURINARY: No dysuria, frequency or hematuria  NEUROLOGICAL: No numbness or weakness  SKIN: No itching, burning, rashes, or lesions

## 2022-05-11 NOTE — CONSULT NOTE ADULT - NSCONSULTADDITIONALINFOA_GEN_ALL_CORE
[facility] attending    89y/o  female with dementia/  seizures   abnormal EKG  admitted with fever and   tachycardia       ctpa  no PE   Left effusion  ? infiltrate  foreign  body      patient seen and examined with PA     general  frail f no distress on ewa za sats 95%  heent sclera anicteric no lesions  neck supple  lung bilateral air entry no w/r/  heart s1 s2 rrr  abd + bowel sounds soft nt nd  extremities  mild edema    assesment and plan  1- recent UTI  on amoxicillin  now with fever tachycardia    ? sepsis   2- abnormal  ekg  sinus tachycardia   ?  cardiac   issue      3- dementia      -  emperic antibiotics  - monitor  I/O    and   IVF  - bladder scan  and sensitivities  from NH  -  cardiology input     troponin   tft  - LE duplex  - ? review meds--  withdraw     -GI and dvt prophylaxis    case was discussed with  MICU  PA- student above

## 2022-05-11 NOTE — ED PROVIDER NOTE - PHYSICAL EXAMINATION
patient very [place]  lcta  tachycardic systolic murmur present  abdomen soft nt nd no masses  ext no edema 2+ radial and dp's   skin intact small healing wounds to 1st and 2nd toes of left foot no erythema or infection present  perrl eomi mmm neck supple

## 2022-05-11 NOTE — ED ADULT TRIAGE NOTE - CHIEF COMPLAINT QUOTE
pt BIBA from [facility] for chest pain x 1 hour. pt a&ox1 @ baseline per paperwork. given 324mg po ASA by EMS en route. pt noted to be diaphoretic, ekg obtained on arrival

## 2022-05-11 NOTE — CONSULT NOTE ADULT - ASSESSMENT
89 y/o female with PMHx of HTN, DM and hypothyroidism BIBA from [facility] complaining of chest pain x 1 hour. Per SNF documentation patient A&Ox1 at baseline and is currently on Amoxicillin for a UTI.     Neuro - Per SNF documentation patient is mentating at her baseline, AAOx1  CV -  89 y/o female with PMHx of HTN, DM and hypothyroidism BIBA from [facility] complaining of chest pain x 1 hour. Per SNF documentation patient A&Ox1 at baseline and is currently on Amoxicillin for a UTI.     Neuro - Per SNF documentation patient is mentating at her baseline, AAOx1, multimodal pain control   CV - Rate control, assess volume status, obtain updated cardiac history and current medication regimen, r/o acute on chronic arrhythmia, check TSH level, trend BNP and troponin, TTE for baseline/comparison, cardiology consult    Pulm - SPO2 98% on RA, R/O PNA, sputum cultures  GI - Remain NPO for further evaluation   Renal - Fleming catheter for strict I/Os, assess volume status and replete as needed  ID - Patient febrile on arrival 101.8, prn Tylenol and continue sepsis protocol, continue to monitor, Vancomycin and Zosyn started, f/u blood culture and urine cultures, modify ABX regimen once sensitivities obtained and consult ID as needed  Heme - Chemical DVT PPx per primary team, medication reconciliation to determine AC status    Patient does not require ICU level of care at this time. Please re-consult if patient's status changes.

## 2022-05-11 NOTE — ED PROVIDER NOTE - PROGRESS NOTE DETAILS
patient with chest pain . reports vision loss left eye worsening today, tachycardic 138bpm nsr will obtain stat ct chest dissection study  patient unable to consent will provide emergency consent given benefit of life saving diagnosis outweighs risk of toni.

## 2022-05-11 NOTE — CHART NOTE - NSCHARTNOTEFT_GEN_A_CORE
Cardiology consult called on patient.  Patient follows with [facility] (Dr. Love)  Dr. Mckay made aware

## 2022-05-11 NOTE — ED ADULT NURSE NOTE - OBJECTIVE STATEMENT
Pt brought in from rehab. Pt brought in from rehab c/o chest pain.  Pt states that she woke up this morning feeling chest pressure.  Pt is A&Ox2.  Disoriented to time.  Able to state place and year.  Pt is poor historian. Pt brought in from rehab c/o chest pain.  Pt states that she woke up this morning feeling chest pressure.  Pt is A&Ox2.  Disoriented to time.  Able to state place and year.  Pt is poor historian.  Currently tachycardic.

## 2022-05-12 LAB
A1C WITH ESTIMATED AVERAGE GLUCOSE RESULT: 5.1 % — SIGNIFICANT CHANGE UP (ref 4–5.6)
ANION GAP SERPL CALC-SCNC: 12 MMOL/L — SIGNIFICANT CHANGE UP (ref 5–17)
BUN SERPL-MCNC: 23.3 MG/DL — HIGH (ref 8–20)
CALCIUM SERPL-MCNC: 9 MG/DL — SIGNIFICANT CHANGE UP (ref 8.6–10.2)
CHLORIDE SERPL-SCNC: 103 MMOL/L — SIGNIFICANT CHANGE UP (ref 98–107)
CHOLEST SERPL-MCNC: 128 MG/DL — SIGNIFICANT CHANGE UP
CO2 SERPL-SCNC: 18 MMOL/L — LOW (ref 22–29)
CREAT SERPL-MCNC: 1.1 MG/DL — SIGNIFICANT CHANGE UP (ref 0.5–1.3)
CULTURE RESULTS: NO GROWTH — SIGNIFICANT CHANGE UP
EGFR: 47 ML/MIN/1.73M2 — LOW
ESTIMATED AVERAGE GLUCOSE: 100 MG/DL — SIGNIFICANT CHANGE UP (ref 68–114)
GLUCOSE SERPL-MCNC: 89 MG/DL — SIGNIFICANT CHANGE UP (ref 70–99)
HCT VFR BLD CALC: 29.1 % — LOW (ref 34.5–45)
HCT VFR BLD CALC: 30 % — LOW (ref 34.5–45)
HDLC SERPL-MCNC: 48 MG/DL — LOW
HGB BLD-MCNC: 8.8 G/DL — LOW (ref 11.5–15.5)
HGB BLD-MCNC: 9.1 G/DL — LOW (ref 11.5–15.5)
LIPID PNL WITH DIRECT LDL SERPL: 60 MG/DL — SIGNIFICANT CHANGE UP
MCHC RBC-ENTMCNC: 26.8 PG — LOW (ref 27–34)
MCHC RBC-ENTMCNC: 30.3 GM/DL — LOW (ref 32–36)
MCV RBC AUTO: 88.5 FL — SIGNIFICANT CHANGE UP (ref 80–100)
NON HDL CHOLESTEROL: 80 MG/DL — SIGNIFICANT CHANGE UP
PLATELET # BLD AUTO: 214 K/UL — SIGNIFICANT CHANGE UP (ref 150–400)
POTASSIUM SERPL-MCNC: 4.7 MMOL/L — SIGNIFICANT CHANGE UP (ref 3.5–5.3)
POTASSIUM SERPL-SCNC: 4.7 MMOL/L — SIGNIFICANT CHANGE UP (ref 3.5–5.3)
RBC # BLD: 3.39 M/UL — LOW (ref 3.8–5.2)
RBC # FLD: 20.5 % — HIGH (ref 10.3–14.5)
SODIUM SERPL-SCNC: 133 MMOL/L — LOW (ref 135–145)
SPECIMEN SOURCE: SIGNIFICANT CHANGE UP
T3FREE SERPL-MCNC: 0.82 PG/ML — LOW (ref 1.8–4.6)
TRIGL SERPL-MCNC: 100 MG/DL — SIGNIFICANT CHANGE UP
TROPONIN T SERPL-MCNC: <0.01 NG/ML — SIGNIFICANT CHANGE UP (ref 0–0.06)
TSH SERPL-MCNC: 2.44 UIU/ML — SIGNIFICANT CHANGE UP (ref 0.27–4.2)
WBC # BLD: 8.17 K/UL — SIGNIFICANT CHANGE UP (ref 3.8–10.5)
WBC # FLD AUTO: 8.17 K/UL — SIGNIFICANT CHANGE UP (ref 3.8–10.5)

## 2022-05-12 PROCEDURE — 93306 TTE W/DOPPLER COMPLETE: CPT | Mod: 26

## 2022-05-12 PROCEDURE — 99497 ADVNCD CARE PLAN 30 MIN: CPT

## 2022-05-12 PROCEDURE — 99233 SBSQ HOSP IP/OBS HIGH 50: CPT

## 2022-05-12 PROCEDURE — 93010 ELECTROCARDIOGRAM REPORT: CPT

## 2022-05-12 RX ORDER — PIPERACILLIN AND TAZOBACTAM 4; .5 G/20ML; G/20ML
3.38 INJECTION, POWDER, LYOPHILIZED, FOR SOLUTION INTRAVENOUS EVERY 12 HOURS
Refills: 0 | Status: DISCONTINUED | OUTPATIENT
Start: 2022-05-12 | End: 2022-05-13

## 2022-05-12 RX ORDER — METOPROLOL TARTRATE 50 MG
25 TABLET ORAL
Refills: 0 | Status: DISCONTINUED | OUTPATIENT
Start: 2022-05-12 | End: 2022-05-31

## 2022-05-12 RX ORDER — PIPERACILLIN AND TAZOBACTAM 4; .5 G/20ML; G/20ML
3.38 INJECTION, POWDER, LYOPHILIZED, FOR SOLUTION INTRAVENOUS EVERY 8 HOURS
Refills: 0 | Status: DISCONTINUED | OUTPATIENT
Start: 2022-05-12 | End: 2022-05-12

## 2022-05-12 RX ORDER — METOPROLOL TARTRATE 50 MG
5 TABLET ORAL ONCE
Refills: 0 | Status: COMPLETED | OUTPATIENT
Start: 2022-05-12 | End: 2022-05-12

## 2022-05-12 RX ADMIN — PIPERACILLIN AND TAZOBACTAM 25 GRAM(S): 4; .5 INJECTION, POWDER, LYOPHILIZED, FOR SOLUTION INTRAVENOUS at 17:54

## 2022-05-12 RX ADMIN — Medication 81 MILLIGRAM(S): at 08:46

## 2022-05-12 RX ADMIN — Medication 600 MILLIGRAM(S): at 06:54

## 2022-05-12 RX ADMIN — Medication 600 MILLIGRAM(S): at 22:29

## 2022-05-12 RX ADMIN — Medication 25 MILLIGRAM(S): at 17:54

## 2022-05-12 RX ADMIN — QUETIAPINE FUMARATE 100 MILLIGRAM(S): 200 TABLET, FILM COATED ORAL at 22:29

## 2022-05-12 RX ADMIN — GABAPENTIN 100 MILLIGRAM(S): 400 CAPSULE ORAL at 17:54

## 2022-05-12 RX ADMIN — DORZOLAMIDE HYDROCHLORIDE 1 DROP(S): 20 SOLUTION/ DROPS OPHTHALMIC at 17:53

## 2022-05-12 RX ADMIN — Medication 1 SPRAY(S): at 17:53

## 2022-05-12 RX ADMIN — GABAPENTIN 100 MILLIGRAM(S): 400 CAPSULE ORAL at 06:54

## 2022-05-12 RX ADMIN — Medication 50 MILLIGRAM(S): at 17:54

## 2022-05-12 RX ADMIN — HEPARIN SODIUM 5000 UNIT(S): 5000 INJECTION INTRAVENOUS; SUBCUTANEOUS at 06:55

## 2022-05-12 RX ADMIN — Medication 325 MILLIGRAM(S): at 08:46

## 2022-05-12 RX ADMIN — DONEPEZIL HYDROCHLORIDE 10 MILLIGRAM(S): 10 TABLET, FILM COATED ORAL at 22:29

## 2022-05-12 RX ADMIN — Medication 1 MILLIGRAM(S): at 08:46

## 2022-05-12 RX ADMIN — HEPARIN SODIUM 5000 UNIT(S): 5000 INJECTION INTRAVENOUS; SUBCUTANEOUS at 17:54

## 2022-05-12 RX ADMIN — Medication 150 MICROGRAM(S): at 06:55

## 2022-05-12 RX ADMIN — Medication 5 MILLIGRAM(S): at 13:57

## 2022-05-12 NOTE — PROGRESS NOTE ADULT - CONVERSATION DETAILS
GOC discussed with Son at bedside. states that she should want to be DNR/DNI. Information explained about [facility]. [facility] then filled out.

## 2022-05-12 NOTE — CONSULT NOTE ADULT - ASSESSMENT
89F with hx of advanced dementia, PAF not on AC due to increase risk of falls, stable ascending aorta aneurysm ~5cm, PPM, HTN, HLD, admitted with chest pain, Cardiology service consulted for recommendations. Patient is demented, unable to obtain information regarding chest pain. On exam patient noted to be in rapid afib with evident distress.     Active issues   Rapid afib   chest pain?    Plan   metoprolol 25mg BId, HR goal ~110bpm  not on AC due to increased risk of falls and major bleeding   unclear nature of chest pain, overall not concern with CAD, assuming worse case scenario, medical treatment would be the best option at this point, continue with asa, add metoprolol and statin   no additional cardiac workup indicated at this time   will continue to follow while admitted.

## 2022-05-12 NOTE — CHART NOTE - NSCHARTNOTEFT_GEN_A_CORE
Alerted by RN patient with sustaining HR ~ 130 bpm, a-fib on monitor. Patient seen and assessed at bedside, without acute complaint. Cardiology MD at bedside. Per RN, patient at her baseline mental status. On exam, patient in NAD, resting comfortably in bed. Appears pale. Respirations nonlabored, lungs clear to auscultation bilaterally. Clear S1/S2, rapid rate. Abdomen soft and nontender x4.     Vital Signs Last 24 Hrs  T(C): 36.8 (12 May 2022 12:32), Max: 38 (11 May 2022 20:16)  T(F): 98.2 (12 May 2022 12:32), Max: 100.4 (11 May 2022 20:16)  HR: 138 (12 May 2022 13:33) (64 - 138)  BP: 102/67 (12 May 2022 13:33) (100/65 - 146/72)  RR: 16 (12 May 2022 12:32) (16 - 20)  SpO2: 95% (12 May 2022 12:32) (93% - 98%)    A/P  - STAT EKG with A-Fib with RVR, rate 138. Per Cardiologist at bedside, patient with known history of paroxysmal a-fib. Per med review, patient does not appear to be on any rate control agents outpatient.    - 5 mg metoprolol IVP administered for rate control.   - RN to continue to monitor, will alert provider w/ any change in mental status. Alerted by RN patient with sustaining HR ~ 130 bpm, a-fib on monitor. Patient seen and assessed at bedside, without acute complaint. Cardiology MD at bedside. Per RN, patient at her baseline mental status. On exam, patient in NAD, resting comfortably in bed. Appears pale. Respirations nonlabored, lungs clear to auscultation bilaterally. Clear S1/S2, rapid rate. Abdomen soft and nontender x4.     Vital Signs Last 24 Hrs  T(C): 36.8 (12 May 2022 12:32), Max: 38 (11 May 2022 20:16)  T(F): 98.2 (12 May 2022 12:32), Max: 100.4 (11 May 2022 20:16)  HR: 138 (12 May 2022 13:33) (64 - 138)  BP: 102/67 (12 May 2022 13:33) (100/65 - 146/72)  RR: 16 (12 May 2022 12:32) (16 - 20)  SpO2: 95% (12 May 2022 12:32) (93% - 98%)    A/P  - STAT EKG with A-Fib with RVR, rate 138. Per Cardiologist at bedside, patient with known history of paroxysmal a-fib.  - 5 mg metoprolol IVP administered for rate control. To start metoprolol 25 mg PO BID  - RN to continue to monitor, will alert provider w/ any change in mental status.

## 2022-05-12 NOTE — PROGRESS NOTE ADULT - ASSESSMENT
89 year old female with pmh of dementia (aaox1), anemia, neuropathy, gerd, depression, seizures, recent egd for gi bleed requiring gi intervention 1 month prior coming to hospital with complaints of chest pain. per patient currently asymptomatic however stating had chest pain this am was non radiating burning in nature. 5/10 intensity. denies sob nausea vomiting diarrhea at this time.    #chest pain   - admit to medicine tele   - probable 2/2 nstemi (doubt) vs other etiology (gi)  - start aspirin   - cardio recs appreciated  - trend trop  - tte reviewed - Severe AS     #fever  - Noted to have fever on admission  - Will continue with ABX until all cx are negative  - f/u CX     #gerd w/ anemia  - w/ recent egd one [facility] prior [facility] son also with intervention   - protonix   - ferrous sulfate  - folic acid    #dementia   - aricept    #neuropathy  - gabapentin    #HLD  - gemfibrozil    #DVT Prophylaxis  - heparin Sc     dispo probable d/c to [facility] IN am

## 2022-05-12 NOTE — CONSULT NOTE ADULT - SUBJECTIVE AND OBJECTIVE BOX
CHIEF COMPLAINT: chest pain    HPI: 89F with hx of advanced dementia, PAF not on AC due to increase risk of falls, stable ascending aorta aneurysm ~5cm, PPM, HTN, HLD, admitted with chest pain, Cardiology service consulted for recommendations. Patient is demented, unable to obtain information regarding chest pain. On exam patient noted to be in rapid afib with evident distress.     PAST MEDICAL & SURGICAL HISTORY:  HTN (hypertension)      Diabetes mellitus      Hypothyroid      S/P hysterectomy      S/P hip replacement      Pacemaker          Allergies    No Known Allergies    Intolerances        MEDICATIONS  (STANDING):  aspirin  chewable 81 milliGRAM(s) Oral daily  donepezil 10 milliGRAM(s) Oral at bedtime  dorzolamide 2% Ophthalmic Solution 1 Drop(s) Both EYES <User Schedule>  ferrous    sulfate 325 milliGRAM(s) Oral daily  fluticasone propionate 50 MICROgram(s)/spray Nasal Spray 1 Spray(s) Both Nostrils two times a day  folic acid 1 milliGRAM(s) Oral daily  gabapentin 100 milliGRAM(s) Oral two times a day  gemfibrozil 600 milliGRAM(s) Oral two times a day  heparin   Injectable 5000 Unit(s) SubCutaneous every 12 hours  levothyroxine 150 MICROGram(s) Oral daily  metoprolol tartrate Injectable 5 milliGRAM(s) IV Push once  pantoprazole    Tablet 40 milliGRAM(s) Oral before breakfast  piperacillin/tazobactam IVPB.. 3.375 Gram(s) IV Intermittent every 12 hours  QUEtiapine 100 milliGRAM(s) Oral at bedtime  topiramate 50 milliGRAM(s) Oral daily    MEDICATIONS  (PRN):  acetaminophen     Tablet .. 650 milliGRAM(s) Oral every 6 hours PRN Temp greater or equal to 38C (100.4F), Mild Pain (1 - 3)  aluminum hydroxide/magnesium hydroxide/simethicone Suspension 30 milliLiter(s) Oral every 4 hours PRN Dyspepsia  melatonin 3 milliGRAM(s) Oral at bedtime PRN Insomnia  ondansetron Injectable 4 milliGRAM(s) IV Push every 8 hours PRN Nausea and/or Vomiting      FAMILY HISTORY:  FHx: diabetes mellitus    No family history of premature coronary artery disease or sudden cardiac death    SOCIAL HISTORY:  Smoking-  Alcohol-  Illicit Drug use-    REVIEW OF SYSTEMS:  Constitutional: [ ] fever, [ ]weight loss,  [ ]fatigue  Eyes: [ ] visual changes  Respiratory: [ ]shortness of breath;  [ ] cough, [ ]wheezing, [ ]chills, [ ]hemoptysis  Cardiovascular: [ ] chest pain, [ ]palpitations, [ ]dizziness,  [ ]leg swelling [ ]syncope  Gastrointestinal: [ ] abdominal pain, [ ]nausea, [ ]vomiting,  [ ]diarrhea   Genitourinary: [ ] dysuria, [ ] hematuria  Neurologic: [ ] headaches [ ] tremors  [ ] weakness [ ] lightheadedness  Skin: [ ] itching, [ ]burning, [ ] rashes  Endocrine: [ ] heat or cold intolerance  Musculoskeletal: [ ] joint pain or swelling; [ ] muscle, back, or extremity pain  Psychiatric: [ ] depression, [ ]anxiety, [ ]mood swings, or [ ]difficulty sleeping  Hematologic: [ ] easy bruising, [ ] bleeding gums     [ ] All others negative	  [ x] Unable to obtain patient is demented    Vital Signs Last 24 Hrs  T(C): 36.8 (12 May 2022 12:32), Max: 38 (11 May 2022 20:16)  T(F): 98.2 (12 May 2022 12:32), Max: 100.4 (11 May 2022 20:16)  HR: 138 (12 May 2022 13:33) (64 - 138)  BP: 102/67 (12 May 2022 13:33) (100/65 - 146/72)  BP(mean): --  RR: 16 (12 May 2022 12:32) (16 - 20)  SpO2: 95% (12 May 2022 12:32) (93% - 98%)  I&O's Summary    11 May 2022 07:01  -  12 May 2022 07:00  --------------------------------------------------------  IN: 150 mL / OUT: 0 mL / NET: 150 mL    PHYSICAL EXAM:  General: No acute distress  HEENT: EOMI  Neck:  No JVD  Lungs: Clear to auscultation bilaterally; No rales or wheezing  Heart: irregular rate and rhythm; No murmurs, rubs, or gallops  Abdomen: soft, non tender, non distended   Extremities: warm, no edema   Nervous system:  Alert & Oriented X1-2  Psychiatric:  flat affect  Skin: No rashes or lesions    LABS:  05-12    133<L>  |  103  |  23.3<H>  ----------------------------<  89  4.7   |  18.0<L>  |  1.10    Ca    9.0      12 May 2022 05:38  Mg     2.1     05-11    TPro  8.7  /  Alb  3.8  /  TBili  0.3<L>  /  DBili  x   /  AST  14  /  ALT  6   /  AlkPhos  80  05-11    Creatinine Trend: 1.10<--, 1.18<--                        9.1    8.17  )-----------( 214      ( 12 May 2022 05:42 )             30.0     PT/INR - ( 11 May 2022 15:24 )   PT: 17.8 sec;   INR: 1.53 ratio         PTT - ( 11 May 2022 15:24 )  PTT:31.8 sec    Lipid Panel: Cholesterol, Serum 128  Direct LDL --  HDL Cholesterol, Serum 48  Triglycerides, Serum 100    Cardiac Enzymes: CARDIAC MARKERS ( 12 May 2022 05:38 )  x     / <0.01 ng/mL / x     / x     / x      CARDIAC MARKERS ( 11 May 2022 17:07 )  x     / <0.01 ng/mL / x     / x     / x      CARDIAC MARKERS ( 11 May 2022 11:52 )  x     / <0.01 ng/mL / x     / x     / x          Serum Pro-Brain Natriuretic Peptide: 2237 pg/mL (05-11-22 @ 11:52)        RADIOLOGY:    ECG [my interpretation]:    TELEMETRY:    ECHO: 05/12/2022  Summary:   1. Normal global left ventricular systolic function.   2. LV Ejection Fraction by Roa's Method with a biplane EF of 65 %.   3. Spectral Doppler shows impaired relaxation pattern of left   ventricular myocardial filling (Grade I diastolic dysfunction).   4. Mildly enlarged left atrium.   5. Mild mitral valve regurgitation.   6. Mild thickening and calcification of the anterior and posterior   mitral valve leaflets.   7. Moderate mitral annular calcification.   8. Mild tricuspid regurgitation.   9. Moderate aortic regurgitation.  10. Peak transaortic gradient equals 35.0 mmHg, mean transaortic gradient   equals 17.8 mmHg, the calculated aortic valve area equals 0.85 cm² by the   continuity equation consistent with severe aortic stenosis.  11. Paradoxical low gradient (LVEF >50%) low stroke vol (SVi 31.29cc/m2)   severe aortic stenosis.  12. The Dimesionless Index value is 0.25.  13. Dilatation of the ascending aorta. 3.92cm      STRESS TEST:    CATHETERIZATION:
REASON FOR CONSULT: Tachycardia    CONSULT REQUESTED BY: Dr Odell    Patient is a 89y old  Female who presents with a chief complaint of chest pain    BRIEF HOSPITAL COURSE: 89 y/o female with PMHx of HTN, DM and hypothyroidism BIBA from [facility] complaining of chest pain x 1 hour. Per [facility] documentation patient A&Ox1 at baseline and is currently on Amoxicillin for a UTI. Upon arrival  patient found to be diaphoretic.    Events last 24 hours: Patient found to be in sinus tachycardia @ 138 on monitor and febrile with rectal temp of 101.8. Patient started on Vancomycin and Zosyn empirically and IVF in the ED.     PAST MEDICAL & SURGICAL HISTORY:  HTN (hypertension)      Diabetes mellitus      Hypothyroid        Allergies    No Known Allergies    Intolerances      FAMILY HISTORY:      Review of Systems:  Unable to obtain given patient's mental status      Medications:  piperacillin/tazobactam IVPB... 3.375 Gram(s) IV Intermittent once  vancomycin  IVPB. 1000 milliGRAM(s) IV Intermittent once      ICU Vital Signs Last 24 Hrs  T(C): 37.1 (11 May 2022 13:58), Max: 38.6 (11 May 2022 11:24)  T(F): 98.7 (11 May 2022 13:58), Max: 101.4 (11 May 2022 11:24)  HR: 131 (11 May 2022 15:07) (103 - 137)  BP: 106/71 (11 May 2022 15:07) (106/71 - 149/94)  BP(mean): --  ABP: --  ABP(mean): --  RR: 18 (11 May 2022 15:07) (18 - 20)  SpO2: 98% (11 May 2022 15:07) (96% - 100%)    Vital Signs Last 24 Hrs  T(C): 37.1 (11 May 2022 13:58), Max: 38.6 (11 May 2022 11:24)  T(F): 98.7 (11 May 2022 13:58), Max: 101.4 (11 May 2022 11:24)  HR: 131 (11 May 2022 15:07) (103 - 137)  BP: 106/71 (11 May 2022 15:07) (106/71 - 149/94)  BP(mean): --  RR: 18 (11 May 2022 15:07) (18 - 20)  SpO2: 98% (11 May 2022 15:07) (96% - 100%)        I&O's Detail        LABS:                        10.2   10.78 )-----------( 229      ( 11 May 2022 11:52 )             33.2     05-11    131<L>  |  99  |  24.9<H>  ----------------------------<  134<H>  4.5   |  18.0<L>  |  1.18    Ca    9.7      11 May 2022 11:52  Mg     2.1     -11    TPro  8.7  /  Alb  3.8  /  TBili  0.3<L>  /  DBili  x   /  AST  14  /  ALT  6   /  AlkPhos  80  05-11      CARDIAC MARKERS ( 11 May 2022 11:52 )  x     / <0.01 ng/mL / x     / x     / x          CAPILLARY BLOOD GLUCOSE        PT/INR - ( 11 May 2022 15:24 )   PT: 17.8 sec;   INR: 1.53 ratio         PTT - ( 11 May 2022 15:24 )  PTT:31.8 sec  Urinalysis Basic - ( 11 May 2022 12:01 )    Color: Yellow / Appearance: Clear / S.010 / pH: x  Gluc: x / Ketone: Negative  / Bili: Negative / Urobili: 1 mg/dL   Blood: x / Protein: 100 mg/dL / Nitrite: Negative   Leuk Esterase: Small / RBC: 3-5 /HPF / WBC 0-2 /HPF   Sq Epi: x / Non Sq Epi: Occasional / Bacteria: Few      CULTURES: Blood cultures pending       Physical Examination:    General: No acute distress.  Responds to stimuli, does not follow commands, AAOX1 at baseline, nonfocal    HEENT: Pupils equal, reactive to light.    PULM: Clear to auscultation bilaterally    CVS: sinus tachycardia 138 on monitor, rubs, or gallops    ABD: Soft, nondistended, nontender, normoactive bowel sounds, no masses    EXT: No edema, nontender, distal pulses intact    SKIN: Warm and dry, diffuse ecchymosis on B/L extremities, healing wounds on left foot    RADIOLOGY:   < from: CT Head No Cont (22 @ 15:04) >  INTERPRETATION:  CT OF THE HEAD WITHOUT CONTRAST    CLINICAL INDICATION: Vision loss    TECHNIQUE: Volumetric CT acquisition was performed through the brainand   reviewed using brain and bone window technique. Dose optimization   techniques were utilized including kVp/mA modulation along with iterative   reconstructions.      COMPARISON: CT head 2008    FINDINGS:    The ventricular and sulcal size and configuration is age appropriate.     There is no acute loss of gray-white differentiation. There are moderate   patchy areas of hypodensity in the periventricular and subcortical white   matter which are likely related to chronic microangiopathicchanges.    Right caudate and right thalamic age indeterminate lacunar infarcts.    There is a partially calcified extra-axial lesion measuring 1.0 cm along   the posterior left temporal convexity which marginates the distal left   transverse dural venous sinus. There is no significant associated mass   effect or underlying parenchymal vasogenic edema.    There is no evidence of mass effect, midline shift, acute intracranial   hemorrhage, or extra-axial collections.     The calvarium is intact. Mild mucosal thickening in the left maxillary   sinus.The mastoid air cells are predominantly clear. There has been prior   bilateral cataract surgery.      IMPRESSION:  No acute intracranial hemorrhage or acute territorial infarction.   Moderate chronic microvascular ischemic changes. Right caudate and right   thalamic age indeterminate lacunar infarcts. There is a probable 1.0 cm   meningioma along the left temporal convexity which marginates the distal   left transverse dural venous sinus. No significant associated mass effect.      < from: CT Angio Chest Aorta w/wo IV Cont (22 @ 15:05) >  CC: [id number] EXAM:  CT ANGIO ABD AOR W RUN(W)AW                         ACC: [id number] EXAM:  CT ANGIO CHEST AORTA [facility]                          PROCEDURE DATE:  2022      INTERPRETATION:  INDICATION: Chest pain, rule out dissection    Technique: Angiogram of the chest, abdomen and pelvis before and after   the intravenous administration of 93 mL of Omnipaque 350.   Three-dimensional images were analyzed.    COMPARISON: None    FINDINGS:    ANGIOGRAM: No dissection or penetrating atherosclerotic ulcer. Patent   arch vessels and aortic branches in the abdomen and pelvis. 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 Overall mild calcified and noncalcified plaque.   Enlarged thoracic aorta up to 5.0 cm in the mid ascending segment.   Fusiform dilatation of the infrarenal aorta to 2.8 cm.    Normal heart size. Pacemaker leads in the right atrium and right   ventricle. Small pericardial effusion. Coronary artery calcifications.   Enlarged main pulmonary artery.    CHEST:    LUNGS/AIRWAYS/PLEURA: Small left and trace right pleural effusions. Mild   passive atelectasis in the lower lobes and left upper lobe. Patent   trachea and bronchi.    LYMPH NODES/MEDIASTINUM: No enlarged lymph nodes.      ABDOMEN/PELVIS:    LIVER: Unremarkable.    BILIARY SYSTEM: Unremarkable.    SPLEEN:Unremarkable.    PANCREAS: 1.4 cm pancreatic head cyst.    ADRENALS: Unremarkable.    KIDNEYS/URINARY TRACT: Multiple cysts on the right and subcentimeter   hypodensity on the left, too small to characterize. No hydronephrosis.    GASTROINTESTINAL TRACT: No dilatation or wall thickening. Thin   hyperattenuating foreign body, approximately 8 mm in length, in the mid   duodenum.    REPRODUCTIVE ORGANS: No dilatation or wall thickening. Normal appendix.    LYMPH NODES/PERITONEUM: Unremarkable.    BONES/SOFT TISSUES: Left hip replacement. Degenerative changes of the   spine. Remote right rib fractures.    IMPRESSION:    No dissection.    5.0 cm ascending aortic aneurysm.    Small left and trace right pleural effusions.    Small pericardial effusion.    Thin small foreign body in the proximal duodenum. Correlate with recent   ingestion or prior procedure.      CRITICAL CARE TIME SPENT: 30 minutes

## 2022-05-13 ENCOUNTER — TRANSCRIPTION ENCOUNTER (OUTPATIENT)
Age: 87
End: 2022-05-13

## 2022-05-13 LAB
ALBUMIN SERPL ELPH-MCNC: 3.2 G/DL — LOW (ref 3.3–5.2)
ALP SERPL-CCNC: 65 U/L — SIGNIFICANT CHANGE UP (ref 40–120)
ALT FLD-CCNC: 7 U/L — SIGNIFICANT CHANGE UP
ANION GAP SERPL CALC-SCNC: 16 MMOL/L — SIGNIFICANT CHANGE UP (ref 5–17)
AST SERPL-CCNC: 14 U/L — SIGNIFICANT CHANGE UP
BASOPHILS # BLD AUTO: 0.02 K/UL — SIGNIFICANT CHANGE UP (ref 0–0.2)
BASOPHILS NFR BLD AUTO: 0.4 % — SIGNIFICANT CHANGE UP (ref 0–2)
BILIRUB SERPL-MCNC: 0.2 MG/DL — LOW (ref 0.4–2)
BUN SERPL-MCNC: 26.8 MG/DL — HIGH (ref 8–20)
CALCIUM SERPL-MCNC: 9.3 MG/DL — SIGNIFICANT CHANGE UP (ref 8.6–10.2)
CHLORIDE SERPL-SCNC: 102 MMOL/L — SIGNIFICANT CHANGE UP (ref 98–107)
CO2 SERPL-SCNC: 18 MMOL/L — LOW (ref 22–29)
CREAT SERPL-MCNC: 1.19 MG/DL — SIGNIFICANT CHANGE UP (ref 0.5–1.3)
EGFR: 43 ML/MIN/1.73M2 — LOW
EOSINOPHIL # BLD AUTO: 0.16 K/UL — SIGNIFICANT CHANGE UP (ref 0–0.5)
EOSINOPHIL NFR BLD AUTO: 3.1 % — SIGNIFICANT CHANGE UP (ref 0–6)
GLUCOSE SERPL-MCNC: 103 MG/DL — HIGH (ref 70–99)
HCT VFR BLD CALC: 27 % — LOW (ref 34.5–45)
HGB BLD-MCNC: 8.2 G/DL — LOW (ref 11.5–15.5)
IMM GRANULOCYTES NFR BLD AUTO: 0.4 % — SIGNIFICANT CHANGE UP (ref 0–1.5)
LYMPHOCYTES # BLD AUTO: 0.6 K/UL — LOW (ref 1–3.3)
LYMPHOCYTES # BLD AUTO: 11.5 % — LOW (ref 13–44)
MCHC RBC-ENTMCNC: 26.7 PG — LOW (ref 27–34)
MCHC RBC-ENTMCNC: 30.4 GM/DL — LOW (ref 32–36)
MCV RBC AUTO: 87.9 FL — SIGNIFICANT CHANGE UP (ref 80–100)
MONOCYTES # BLD AUTO: 0.79 K/UL — SIGNIFICANT CHANGE UP (ref 0–0.9)
MONOCYTES NFR BLD AUTO: 15.2 % — HIGH (ref 2–14)
NEUTROPHILS # BLD AUTO: 3.61 K/UL — SIGNIFICANT CHANGE UP (ref 1.8–7.4)
NEUTROPHILS NFR BLD AUTO: 69.4 % — SIGNIFICANT CHANGE UP (ref 43–77)
PLATELET # BLD AUTO: 228 K/UL — SIGNIFICANT CHANGE UP (ref 150–400)
POTASSIUM SERPL-MCNC: 4.8 MMOL/L — SIGNIFICANT CHANGE UP (ref 3.5–5.3)
POTASSIUM SERPL-SCNC: 4.8 MMOL/L — SIGNIFICANT CHANGE UP (ref 3.5–5.3)
PROT SERPL-MCNC: 7.3 G/DL — SIGNIFICANT CHANGE UP (ref 6.6–8.7)
RBC # BLD: 3.07 M/UL — LOW (ref 3.8–5.2)
RBC # FLD: 20.6 % — HIGH (ref 10.3–14.5)
SODIUM SERPL-SCNC: 136 MMOL/L — SIGNIFICANT CHANGE UP (ref 135–145)
TROPONIN T SERPL-MCNC: <0.01 NG/ML — SIGNIFICANT CHANGE UP (ref 0–0.06)
WBC # BLD: 5.2 K/UL — SIGNIFICANT CHANGE UP (ref 3.8–10.5)
WBC # FLD AUTO: 5.2 K/UL — SIGNIFICANT CHANGE UP (ref 3.8–10.5)

## 2022-05-13 PROCEDURE — 99232 SBSQ HOSP IP/OBS MODERATE 35: CPT

## 2022-05-13 RX ORDER — METOPROLOL TARTRATE 50 MG
1 TABLET ORAL
Qty: 0 | Refills: 0 | DISCHARGE
Start: 2022-05-13

## 2022-05-13 RX ADMIN — Medication 650 MILLIGRAM(S): at 09:50

## 2022-05-13 RX ADMIN — Medication 150 MICROGRAM(S): at 05:31

## 2022-05-13 RX ADMIN — HEPARIN SODIUM 5000 UNIT(S): 5000 INJECTION INTRAVENOUS; SUBCUTANEOUS at 05:30

## 2022-05-13 RX ADMIN — HEPARIN SODIUM 5000 UNIT(S): 5000 INJECTION INTRAVENOUS; SUBCUTANEOUS at 17:41

## 2022-05-13 RX ADMIN — QUETIAPINE FUMARATE 100 MILLIGRAM(S): 200 TABLET, FILM COATED ORAL at 22:17

## 2022-05-13 RX ADMIN — Medication 25 MILLIGRAM(S): at 17:41

## 2022-05-13 RX ADMIN — Medication 600 MILLIGRAM(S): at 05:31

## 2022-05-13 RX ADMIN — PANTOPRAZOLE SODIUM 40 MILLIGRAM(S): 20 TABLET, DELAYED RELEASE ORAL at 05:30

## 2022-05-13 RX ADMIN — Medication 600 MILLIGRAM(S): at 19:09

## 2022-05-13 RX ADMIN — PIPERACILLIN AND TAZOBACTAM 25 GRAM(S): 4; .5 INJECTION, POWDER, LYOPHILIZED, FOR SOLUTION INTRAVENOUS at 07:34

## 2022-05-13 RX ADMIN — GABAPENTIN 100 MILLIGRAM(S): 400 CAPSULE ORAL at 05:30

## 2022-05-13 RX ADMIN — GABAPENTIN 100 MILLIGRAM(S): 400 CAPSULE ORAL at 17:41

## 2022-05-13 RX ADMIN — Medication 50 MILLIGRAM(S): at 17:41

## 2022-05-13 RX ADMIN — Medication 650 MILLIGRAM(S): at 08:50

## 2022-05-13 RX ADMIN — Medication 25 MILLIGRAM(S): at 05:30

## 2022-05-13 RX ADMIN — DORZOLAMIDE HYDROCHLORIDE 1 DROP(S): 20 SOLUTION/ DROPS OPHTHALMIC at 07:34

## 2022-05-13 RX ADMIN — DONEPEZIL HYDROCHLORIDE 10 MILLIGRAM(S): 10 TABLET, FILM COATED ORAL at 22:17

## 2022-05-13 RX ADMIN — Medication 1 MILLIGRAM(S): at 08:50

## 2022-05-13 RX ADMIN — Medication 325 MILLIGRAM(S): at 08:50

## 2022-05-13 RX ADMIN — Medication 81 MILLIGRAM(S): at 08:50

## 2022-05-13 NOTE — DISCHARGE NOTE PROVIDER - NSDCMRMEDTOKEN_GEN_ALL_CORE_FT
Aricept 10 mg oral tablet: 1 tab(s) orally once a day (at bedtime)  dorzolamide 2% ophthalmic solution: 1 drop(s) to each affected eye 2 times a day  ferrous sulfate 324 mg (65 mg elemental iron) oral delayed release tablet: 1 tab(s) orally once a day  Flonase 50 mcg/inh nasal spray: 1 spray(s) nasal once a day  folic acid 1 mg oral tablet: 1 tab(s) orally once a day  gabapentin 100 mg oral capsule: 1 cap(s) orally 2 times a day  gemfibrozil 600 mg oral tablet: 1 tab(s) orally 2 times a day  pantoprazole 40 mg oral delayed release tablet: 1 tab(s) orally 2 times a day  SEROquel 100 mg oral tablet: 1 tab(s) orally once a day (at bedtime)  Synthroid 150 mcg (0.15 mg) oral tablet: 1 tab(s) orally once a day  topiramate 50 mg oral tablet: 1 tab(s) orally once a day  Zofran 4 mg oral tablet: 1 tab(s) orally every 8 hours, As Needed   Aricept 10 mg oral tablet: 1 tab(s) orally once a day (at bedtime)  dorzolamide 2% ophthalmic solution: 1 drop(s) to each affected eye 2 times a day  ferrous sulfate 324 mg (65 mg elemental iron) oral delayed release tablet: 1 tab(s) orally once a day  Flonase 50 mcg/inh nasal spray: 1 spray(s) nasal once a day  folic acid 1 mg oral tablet: 1 tab(s) orally once a day  gabapentin 100 mg oral capsule: 1 cap(s) orally 2 times a day  gemfibrozil 600 mg oral tablet: 1 tab(s) orally 2 times a day  metoprolol tartrate 25 mg oral tablet: 1 tab(s) orally 2 times a day  pantoprazole 40 mg oral delayed release tablet: 1 tab(s) orally 2 times a day  SEROquel 100 mg oral tablet: 1 tab(s) orally once a day (at bedtime)  Synthroid 150 mcg (0.15 mg) oral tablet: 1 tab(s) orally once a day  topiramate 50 mg oral tablet: 1 tab(s) orally once a day  Zofran 4 mg oral tablet: 1 tab(s) orally every 8 hours, As Needed   Aricept 10 mg oral tablet: 1 tab(s) orally once a day (at bedtime)  cyanocobalamin 1000 mcg oral tablet: 1 tab(s) orally once a day  dorzolamide 2% ophthalmic solution: 1 drop(s) to each affected eye 2 times a day  ferrous sulfate 324 mg (65 mg elemental iron) oral delayed release tablet: 1 tab(s) orally once a day  Flonase 50 mcg/inh nasal spray: 1 spray(s) nasal once a day, As Needed  folic acid 1 mg oral tablet: 1 tab(s) orally once a day  gabapentin 100 mg oral capsule: 1 cap(s) orally once a day  gemfibrozil 600 mg oral tablet: 1 tab(s) orally 2 times a day  metoprolol tartrate 25 mg oral tablet: 1 tab(s) orally 2 times a day  Multiple Vitamins oral tablet: 1 tab(s) orally once a day  pantoprazole 40 mg oral delayed release tablet: 1 tab(s) orally 2 times a day  SEROquel 100 mg oral tablet: 1 tab(s) orally once a day (at bedtime)  Synthroid 150 mcg (0.15 mg) oral tablet: 1 tab(s) orally once a day  topiramate 50 mg oral tablet: 1 tab(s) orally once a day  Zofran 4 mg oral tablet: 1 tab(s) orally every 8 hours, As Needed

## 2022-05-13 NOTE — PHYSICAL THERAPY INITIAL EVALUATION ADULT - ADDITIONAL COMMENTS
as per pt's Son at the bedside: pt is assist of 1 from the bed to the W/C, uses W/C as a primary mode of locomotion, ambulates with assistance and use of the RW, unknown level of assistance to complete functional mobility as noted

## 2022-05-13 NOTE — DISCHARGE NOTE PROVIDER - CARE PROVIDER_API CALL
Tomy Love (MD)  Cardiovascular Disease  [address] [place] [address]  Phone: [phone]  Fax: [phone]  Follow Up Time: 1 week    PCP,   Phone: (   )    -  Fax: (   )    -  Follow Up Time: 1-3 days

## 2022-05-13 NOTE — SWALLOW BEDSIDE ASSESSMENT ADULT - SWALLOW EVAL: RECOMMENDED FEEDING/EATING TECHNIQUES
allow for swallow between intakes/crush medication (when feasible)/no straws/oral hygiene/position upright (90 degrees)/small sips/bites

## 2022-05-13 NOTE — DISCHARGE NOTE PROVIDER - PROVIDER TOKENS
(0) independent PROVIDER:[TOKEN:[[id number]],FOLLOWUP:[1 week]],FREE:[LAST:[PCP],PHONE:[(   )    -],FAX:[(   )    -],FOLLOWUP:[1-3 days]] (2) assistive person

## 2022-05-13 NOTE — PROGRESS NOTE ADULT - ASSESSMENT
89 year old female with pmh of dementia (aaox1), anemia, neuropathy, gerd, depression, seizures, recent egd for gi bleed requiring gi intervention 1 month prior coming to hospital with complaints of chest pain. per patient currently asymptomatic however stating had chest pain this am was non radiating burning in nature. 5/10 intensity. denies sob nausea vomiting diarrhea at this time.    #chest pain   - admit to medicine tele   - probable 2/2 nstemi (doubt) vs other etiology (gi)  - c/w aspirin   - cardio recs appreciated  - No furhter cardiac work up is necessary  - tte reviewed - Severe AS     #fever  - Noted to have fever on admission  - Will continue with ABX until all cx are negative  - f/u CX     #gerd w/ anemia  - w/ recent egd one month prior at good tru per son also with intervention   - protonix   - ferrous sulfate  - folic acid    #dementia   - aricept    #neuropathy  - gabapentin    #HLD  - gemfibrozil    #DVT Prophylaxis  - heparin Sc     dispo PENDING AUTH   89 year old female with pmh of dementia (aaox1), anemia, neuropathy, gerd, depression, seizures, recent egd for gi bleed requiring gi intervention 1 month prior coming to hospital with complaints of chest pain. per patient currently asymptomatic however stating had chest pain this am was non radiating burning in nature. 5/10 intensity. denies sob nausea vomiting diarrhea at this time.    #chest pain   - admit to medicine tele   - probable 2/2 nstemi (doubt) vs other etiology (gi)  - c/w aspirin   - cardio recs appreciated  - No furhter cardiac work up is necessary  - tte reviewed - Severe AS     #fever  - Noted to have fever on admission  - All cx are neg  - Monitor off abx    #gerd w/ anemia  - w/ recent egd one month prior at good tru per son also with intervention   - protonix   - ferrous sulfate  - folic acid    #dementia   - aricept    #neuropathy  - gabapentin    #HLD  - gemfibrozil    #DVT Prophylaxis  - heparin Sc     dispo PENDING AUTH

## 2022-05-13 NOTE — SWALLOW BEDSIDE ASSESSMENT ADULT - ORAL PHASE
Within functional limits pt self expectorated stating: "I don't like that, I don't want to chew" pt self expectorated stating: "I don't like that, I don't want to chew"/Decreased anterior-posterior movement of the bolus/Delayed oral transit time

## 2022-05-13 NOTE — SWALLOW BEDSIDE ASSESSMENT ADULT - SLP GENERAL OBSERVATIONS
Pt received & seen seated upright in bed, awake/alert, decreased hearing acuity, baseline mildly hoarse vocal quality, increased pitch, reduced cognition, 0/10 pain

## 2022-05-13 NOTE — DISCHARGE NOTE PROVIDER - CARE PROVIDERS DIRECT ADDRESSES
,[email or web address],DirectAddress_Unknown

## 2022-05-13 NOTE — SWALLOW BEDSIDE ASSESSMENT ADULT - SWALLOW EVAL: DIAGNOSIS
Oral dysphagia for minced/moist, soft & bite-sized solids, otherwise WFL for puree, thin fluids. Pharyngeal stage of swallow clinically unremarkable for puree & thin fluids via cup sips. Pharyngeal stage of swallow not assessed due to inability to orally manage solids

## 2022-05-13 NOTE — PHYSICAL THERAPY INITIAL EVALUATION ADULT - LEVEL OF INDEPENDENCE: STAND/SIT, REHAB EVAL
You can access the FollowMyHealth Patient Portal offered by [facility] by registering at the following website: [email or web address]. By joining [facility]’s FollowMyHealth portal, you will also be able to view your health information using other applications (apps) compatible with our system.
unable to perform

## 2022-05-13 NOTE — DISCHARGE NOTE PROVIDER - HOSPITAL COURSE
89 y/o female with PMHx of HTN, A-Fib not on AC, DM, hypothyroidism, being treated for UTI, who presented to [facility] ED with c/o chest pain x 1 hour. In the ED, patient found to be in sinus tachycardia with rectal temp of 101.8. No evidence of acute ischemia on EKG. Patient received 1 dose of vanco, IVF, and was continued on Zosyn empirically. Trops remained serially negative. TTE obtained, revealed preserved EF w/ severe AS. Evaluated by cardiology, who recommended conservative medical management. During hospitalization, patient had an episode of A-Fib with RVR. Patient was started on metoprolol with improvement in HR. Cultures pending 89 y/o female with PMHx of HTN, A-Fib not on AC, DM, hypothyroidism, being treated for UTI, who presented to [facility] ED with c/o chest pain x 1 hour. In the ED, patient found to be in sinus tachycardia with rectal temp of 101.8. No evidence of acute ischemia on EKG. Patient received 1 dose of vanco, IVF, and was continued on Zosyn empirically. Trops remained serially negative. TTE obtained, revealed preserved EF w/ severe AS. Evaluated by cardiology, who recommended conservative medical management. During hospitalization, patient had an episode of A-Fib with RVR. Patient was started on metoprolol with improvement in HR. Blood cx with no growth. Empiric abx stopped. Now stable for DC. 89 y/o female with PMHx of HTN, A-Fib not on AC, DM, hypothyroidism, being treated for UTI, who presented to [facility] ED with c/o chest pain x 1 hour. In the ED, patient found to be in sinus tachycardia with rectal temp of 101.8. No evidence of acute ischemia on EKG. Patient received 1 dose of vanco, IVF, and was continued on Zosyn empirically. Trops remained serially negative. TTE obtained, revealed preserved EF w/ severe AS. Evaluated by cardiology, who recommended conservative medical management. During hospitalization, patient had an episode of A-Fib with RVR. Patient was started on metoprolol with improvement in HR. Blood cx with no growth. Empiric abx stopped. Now stable for DC to [facility]. while waiting for auth, patient developed RHONDA and UTI, improved post fluids and antibiotics    stable for dc to [facility]

## 2022-05-13 NOTE — DISCHARGE NOTE PROVIDER - NSDCCPCAREPLAN_GEN_ALL_CORE_FT
PRINCIPAL DISCHARGE DIAGNOSIS  Diagnosis: Acute chest pain  Assessment and Plan of Treatment: - No evidence of active ischemia. Your cardiac enzymes were negative.   - TTE revealed aortic stenosis and you were evaluated by cardiology, who recommend conservative management.   - Please follow up outpatient for monitoring and medication optimization.      SECONDARY DISCHARGE DIAGNOSES  Diagnosis: Paroxysmal atrial fibrillation  Assessment and Plan of Treatment: - Noted on telemetry. You were started on metoprolol for heart rate control.  - Deemed not a good candidate for anticoagulation given fall risk & risk of bleeding.  - Please follow up outpatient for monitoring and medication optimization.    Diagnosis: Fever  Assessment and Plan of Treatment: - You were noted to have a fever on admission, and were empirically treated with antibiotics. No evidence of infection on UA or on Chest imaging.   - Fevers resolved.     PRINCIPAL DISCHARGE DIAGNOSIS  Diagnosis: Acute chest pain  Assessment and Plan of Treatment: - No evidence of active ischemia. Your cardiac enzymes were negative.   - TTE revealed aortic stenosis and you were evaluated by cardiology, who recommend conservative management.   - Please follow up outpatient for monitoring and medication optimization.      SECONDARY DISCHARGE DIAGNOSES  Diagnosis: Paroxysmal atrial fibrillation  Assessment and Plan of Treatment: - Noted on telemetry. You were started on metoprolol for heart rate control.  - Deemed not a good candidate for anticoagulation given fall risk & risk of bleeding.  - Please follow up outpatient for monitoring and medication optimization.    Diagnosis: Fever  Assessment and Plan of Treatment: - You were noted to have a fever on admission, and were empirically treated with antibiotics. No evidence of infection on UA or on Chest imaging.   - Fevers resolved.    Diagnosis: Acute UTI  Assessment and Plan of Treatment: complete course of ceftriaxone

## 2022-05-13 NOTE — PHYSICAL THERAPY INITIAL EVALUATION ADULT - ASR WT BEARING STATUS EVAL
Subjective     America Woo is a 56 year old female who presents to clinic today for the following health issues:    HPI       Heel Pain    Onset: 1 week    Description:   Location: Right heel  Character: Dull ache    Intensity: mild    Progression of Symptoms: same    Accompanying Signs & Symptoms:  Other symptoms: lump on back of heel    History:   Previous similar pain: no       Precipitating factors:   Trauma or overuse: no     Alleviating factors:  Improved by: nothing    Therapies Tried and outcome: None      -------------------------------------    BP Readings from Last 3 Encounters:   07/28/20 104/72   10/21/19 102/62   09/17/19 106/70    Wt Readings from Last 3 Encounters:   07/28/20 70.8 kg (156 lb)   10/21/19 70.3 kg (155 lb)   09/19/19 68.9 kg (152 lb)                    Reviewed and updated as needed this visit by Provider  Allergies  Meds         Review of Systems   Constitutional, HEENT, cardiovascular, pulmonary, gi and gu systems are negative, except as otherwise noted.      Objective    /72 (BP Location: Left arm, Patient Position: Chair, Cuff Size: Adult Regular)   Pulse 71   Temp 97.9  F (36.6  C) (Temporal)   Resp 16   Wt 70.8 kg (156 lb)   SpO2 99%   BMI 24.43 kg/m    Body mass index is 24.43 kg/m .  Physical Exam   GENERAL: healthy, alert and no distress  RESP: lungs clear to auscultation - no rales, rhonchi or wheezes  CV: regular rates and rhythm and normal S1 S2, no S3 or S4  MS: right foot   Medial side of calcaneous area   Firm nodule noted- ? calcification near ligaments/tendon attachment. Or swelling of tendon sheath area.  SKIN: no suspicious lesions or rashes    Diagnostic Test Results:  Pending         Assessment & Plan     1. Mass of left foot    - XR Foot Right G/E 3 Views; Future    2. Right foot pain    - XR Foot Right G/E 3 Views; Future       Patient Instructions   Icing  Ibuprofen 600 mg three times with food for one week.         Referral to podiatry if not  improving - may request without another appt    Return in about 2 weeks (around 8/11/2020) for referral to podiatry if not improving .    Nasrin Guzmán PA-C  [facility]   no weight-bearing restrictions

## 2022-05-13 NOTE — SWALLOW BEDSIDE ASSESSMENT ADULT - COMMENTS
As per MD note: "89 year old female with pmh of dementia (aaox1), anemia, neuropathy, gerd, depression, seizures, recent egd for gi bleed requiring gi intervention 1 month prior coming to hospital with complaints of chest pain. per patient currently asymptomatic however stating had chest pain this am was non radiating burning in nature. 5/10 intensity. denies sob nausea vomiting diarrhea at this time."

## 2022-05-13 NOTE — DISCHARGE NOTE PROVIDER - NSDCACTIVITY_GEN_ALL_CORE
As tolerated As tolerated/Walking - Indoors allowed As tolerated/Do not drive or operate machinery/Do not make important decisions/Walking - Indoors allowed/No heavy lifting/straining

## 2022-05-13 NOTE — DISCHARGE NOTE PROVIDER - ATTENDING DISCHARGE PHYSICAL EXAMINATION:
PHYSICAL EXAM:  Vital Signs Last 24 Hrs  T(F): 97.9 (13 May 2022 08:32), Max: 97.9 (13 May 2022 08:32)  HR: 69 (13 May 2022 08:32) (69 - 138)  BP: 130/68 (13 May 2022 08:32) (102/67 - 153/88)  RR: 18 (13 May 2022 08:32) (18 - 18)  SpO2: 95% (13 May 2022 08:32) (94% - 95%)    GENERAL: NAD, Thin, frail  Eyes: EOMI, PERRLA  ENMT: Conjunctiva and sclera clear; supple neck, No JVD  Cardiovascular: S1,S2, RRR, No murmur  Respiratory: CTA B/L, Non-labored breathing  GI: Bowel sounds present; Soft, Nontender, Nondistended. No hepatomegaly  Genitourinary: Deferred  Skin:  no breakdowns, ulcers or discharge, No rashes or lesions  Neurology: Alert & Oriented to name, unaware of rest, moves all extremities  Psych: Appropriate mood and affect, calm, pleasant, Responds appropriately to questions   GENERAL: NAD, Thin, frail  Eyes: EOMI  ENMT:  No JVD  Cardiovascular: S1,S2, RRR, No murmur  Respiratory: CTA B/L, Non-labored breathing  GI: Bowel sounds present; Soft, Nontender, Nondistended.  Neurology: Alert & Oriented to name, unaware of rest, moves all extremities  Psych: Appropriate mood and affect, calm, pleasant, Responds appropriately to questions

## 2022-05-13 NOTE — PHYSICAL THERAPY INITIAL EVALUATION ADULT - FOLLOWS COMMANDS/ANSWERS QUESTIONS, REHAB EVAL
increased time to respond to all the questions/able to follow single-step instructions/unable to follow multi-step instructions

## 2022-05-13 NOTE — DISCHARGE NOTE PROVIDER - DETAILS OF MALNUTRITION DIAGNOSIS/DIAGNOSES
This patient has been assessed with a concern for Malnutrition and was treated during this hospitalization for the following Nutrition diagnosis/diagnoses:     -  05/16/2022: Severe protein-calorie malnutrition

## 2022-05-14 LAB
ANION GAP SERPL CALC-SCNC: 15 MMOL/L — SIGNIFICANT CHANGE UP (ref 5–17)
BASOPHILS # BLD AUTO: 0.01 K/UL — SIGNIFICANT CHANGE UP (ref 0–0.2)
BASOPHILS NFR BLD AUTO: 0.1 % — SIGNIFICANT CHANGE UP (ref 0–2)
BUN SERPL-MCNC: 29.5 MG/DL — HIGH (ref 8–20)
CALCIUM SERPL-MCNC: 9.7 MG/DL — SIGNIFICANT CHANGE UP (ref 8.6–10.2)
CHLORIDE SERPL-SCNC: 105 MMOL/L — SIGNIFICANT CHANGE UP (ref 98–107)
CO2 SERPL-SCNC: 19 MMOL/L — LOW (ref 22–29)
CREAT SERPL-MCNC: 1.23 MG/DL — SIGNIFICANT CHANGE UP (ref 0.5–1.3)
EGFR: 41 ML/MIN/1.73M2 — LOW
EOSINOPHIL # BLD AUTO: 0.17 K/UL — SIGNIFICANT CHANGE UP (ref 0–0.5)
EOSINOPHIL NFR BLD AUTO: 2.4 % — SIGNIFICANT CHANGE UP (ref 0–6)
GLUCOSE SERPL-MCNC: 101 MG/DL — HIGH (ref 70–99)
HCT VFR BLD CALC: 27.3 % — LOW (ref 34.5–45)
HGB BLD-MCNC: 8.4 G/DL — LOW (ref 11.5–15.5)
IMM GRANULOCYTES NFR BLD AUTO: 0.3 % — SIGNIFICANT CHANGE UP (ref 0–1.5)
LYMPHOCYTES # BLD AUTO: 0.6 K/UL — LOW (ref 1–3.3)
LYMPHOCYTES # BLD AUTO: 8.6 % — LOW (ref 13–44)
MCHC RBC-ENTMCNC: 27.1 PG — SIGNIFICANT CHANGE UP (ref 27–34)
MCHC RBC-ENTMCNC: 30.8 GM/DL — LOW (ref 32–36)
MCV RBC AUTO: 88.1 FL — SIGNIFICANT CHANGE UP (ref 80–100)
MONOCYTES # BLD AUTO: 0.84 K/UL — SIGNIFICANT CHANGE UP (ref 0–0.9)
MONOCYTES NFR BLD AUTO: 12.1 % — SIGNIFICANT CHANGE UP (ref 2–14)
NEUTROPHILS # BLD AUTO: 5.33 K/UL — SIGNIFICANT CHANGE UP (ref 1.8–7.4)
NEUTROPHILS NFR BLD AUTO: 76.5 % — SIGNIFICANT CHANGE UP (ref 43–77)
PLATELET # BLD AUTO: 267 K/UL — SIGNIFICANT CHANGE UP (ref 150–400)
POTASSIUM SERPL-MCNC: 4.7 MMOL/L — SIGNIFICANT CHANGE UP (ref 3.5–5.3)
POTASSIUM SERPL-SCNC: 4.7 MMOL/L — SIGNIFICANT CHANGE UP (ref 3.5–5.3)
RBC # BLD: 3.1 M/UL — LOW (ref 3.8–5.2)
RBC # FLD: 20.5 % — HIGH (ref 10.3–14.5)
SODIUM SERPL-SCNC: 139 MMOL/L — SIGNIFICANT CHANGE UP (ref 135–145)
WBC # BLD: 6.97 K/UL — SIGNIFICANT CHANGE UP (ref 3.8–10.5)
WBC # FLD AUTO: 6.97 K/UL — SIGNIFICANT CHANGE UP (ref 3.8–10.5)

## 2022-05-14 PROCEDURE — 99232 SBSQ HOSP IP/OBS MODERATE 35: CPT

## 2022-05-14 RX ADMIN — GABAPENTIN 100 MILLIGRAM(S): 400 CAPSULE ORAL at 05:54

## 2022-05-14 RX ADMIN — Medication 1 SPRAY(S): at 05:53

## 2022-05-14 RX ADMIN — Medication 600 MILLIGRAM(S): at 17:58

## 2022-05-14 RX ADMIN — QUETIAPINE FUMARATE 100 MILLIGRAM(S): 200 TABLET, FILM COATED ORAL at 22:45

## 2022-05-14 RX ADMIN — Medication 650 MILLIGRAM(S): at 23:45

## 2022-05-14 RX ADMIN — Medication 1 MILLIGRAM(S): at 13:27

## 2022-05-14 RX ADMIN — Medication 1 SPRAY(S): at 17:58

## 2022-05-14 RX ADMIN — Medication 81 MILLIGRAM(S): at 13:27

## 2022-05-14 RX ADMIN — Medication 50 MILLIGRAM(S): at 13:27

## 2022-05-14 RX ADMIN — HEPARIN SODIUM 5000 UNIT(S): 5000 INJECTION INTRAVENOUS; SUBCUTANEOUS at 17:58

## 2022-05-14 RX ADMIN — Medication 325 MILLIGRAM(S): at 13:27

## 2022-05-14 RX ADMIN — Medication 600 MILLIGRAM(S): at 05:54

## 2022-05-14 RX ADMIN — Medication 25 MILLIGRAM(S): at 05:55

## 2022-05-14 RX ADMIN — Medication 25 MILLIGRAM(S): at 17:59

## 2022-05-14 RX ADMIN — DONEPEZIL HYDROCHLORIDE 10 MILLIGRAM(S): 10 TABLET, FILM COATED ORAL at 22:45

## 2022-05-14 RX ADMIN — Medication 150 MICROGRAM(S): at 05:54

## 2022-05-14 RX ADMIN — PANTOPRAZOLE SODIUM 40 MILLIGRAM(S): 20 TABLET, DELAYED RELEASE ORAL at 05:55

## 2022-05-14 RX ADMIN — DORZOLAMIDE HYDROCHLORIDE 1 DROP(S): 20 SOLUTION/ DROPS OPHTHALMIC at 17:57

## 2022-05-14 RX ADMIN — GABAPENTIN 100 MILLIGRAM(S): 400 CAPSULE ORAL at 17:59

## 2022-05-14 RX ADMIN — Medication 650 MILLIGRAM(S): at 22:45

## 2022-05-14 RX ADMIN — HEPARIN SODIUM 5000 UNIT(S): 5000 INJECTION INTRAVENOUS; SUBCUTANEOUS at 05:54

## 2022-05-14 NOTE — PROGRESS NOTE ADULT - ASSESSMENT
89 year old female with pmh of dementia (aaox1), anemia, neuropathy, gerd, depression, seizures, recent egd for gi bleed requiring gi intervention 1 month prior coming to hospital with complaints of chest pain. per patient currently asymptomatic however stating had chest pain this am was non radiating burning in nature. 5/10 intensity.    #chest pain    acs ruled out   - c/w aspirin   - cardio recs appreciated  - No furhter cardiac work up is necessary  - tte reviewed - Severe AS     #fever  - Noted to have fever on admission  - All cx are neg  - Monitor off abx    #gerd w/ anemia  - w/ recent egd one month prior at [facility] per son also with intervention   - protonix   - ferrous sulfate  - folic acid    #dementia   - aricept    #neuropathy  - gabapentin    #HLD  - gemfibrozil    # blurred vision: has outpatient optho appt in june (per son)    #DVT Prophylaxis  - heparin Sc     dispo PENDING AUTH to wendy

## 2022-05-15 LAB — GLUCOSE BLDC GLUCOMTR-MCNC: 139 MG/DL — HIGH (ref 70–99)

## 2022-05-15 PROCEDURE — 99232 SBSQ HOSP IP/OBS MODERATE 35: CPT

## 2022-05-15 RX ADMIN — Medication 150 MICROGRAM(S): at 05:25

## 2022-05-15 RX ADMIN — Medication 1 MILLIGRAM(S): at 07:54

## 2022-05-15 RX ADMIN — GABAPENTIN 100 MILLIGRAM(S): 400 CAPSULE ORAL at 16:17

## 2022-05-15 RX ADMIN — DORZOLAMIDE HYDROCHLORIDE 1 DROP(S): 20 SOLUTION/ DROPS OPHTHALMIC at 05:27

## 2022-05-15 RX ADMIN — Medication 600 MILLIGRAM(S): at 05:24

## 2022-05-15 RX ADMIN — PANTOPRAZOLE SODIUM 40 MILLIGRAM(S): 20 TABLET, DELAYED RELEASE ORAL at 05:24

## 2022-05-15 RX ADMIN — QUETIAPINE FUMARATE 100 MILLIGRAM(S): 200 TABLET, FILM COATED ORAL at 22:24

## 2022-05-15 RX ADMIN — HEPARIN SODIUM 5000 UNIT(S): 5000 INJECTION INTRAVENOUS; SUBCUTANEOUS at 05:23

## 2022-05-15 RX ADMIN — Medication 1 SPRAY(S): at 16:16

## 2022-05-15 RX ADMIN — Medication 1 SPRAY(S): at 05:22

## 2022-05-15 RX ADMIN — HEPARIN SODIUM 5000 UNIT(S): 5000 INJECTION INTRAVENOUS; SUBCUTANEOUS at 16:16

## 2022-05-15 RX ADMIN — DONEPEZIL HYDROCHLORIDE 10 MILLIGRAM(S): 10 TABLET, FILM COATED ORAL at 22:24

## 2022-05-15 RX ADMIN — Medication 325 MILLIGRAM(S): at 07:54

## 2022-05-15 RX ADMIN — Medication 25 MILLIGRAM(S): at 05:25

## 2022-05-15 RX ADMIN — Medication 50 MILLIGRAM(S): at 07:54

## 2022-05-15 RX ADMIN — DORZOLAMIDE HYDROCHLORIDE 1 DROP(S): 20 SOLUTION/ DROPS OPHTHALMIC at 22:25

## 2022-05-15 RX ADMIN — Medication 25 MILLIGRAM(S): at 16:17

## 2022-05-15 RX ADMIN — GABAPENTIN 100 MILLIGRAM(S): 400 CAPSULE ORAL at 05:24

## 2022-05-15 RX ADMIN — Medication 81 MILLIGRAM(S): at 07:54

## 2022-05-15 RX ADMIN — Medication 600 MILLIGRAM(S): at 16:17

## 2022-05-15 RX ADMIN — Medication 650 MILLIGRAM(S): at 23:52

## 2022-05-15 NOTE — PROGRESS NOTE ADULT - ASSESSMENT
89 year old female with pmh of dementia (aaox1), anemia, neuropathy, gerd, depression, seizures, recent egd for gi bleed requiring gi intervention 1 month prior coming to hospital with complaints of chest pain. per patient currently asymptomatic however stating had chest pain this am was non radiating burning in nature. 5/10 intensity.    #chest pain    acs ruled out   - c/w aspirin   - cardio recs appreciated  - No furhter cardiac work up is necessary  - tte reviewed - Severe AS     #fever  - Noted to have fever on admission  - All cx are neg  - Monitor off abx    #gerd w/ anemia  - w/ recent egd one month prior at [facility] per son also with intervention   - protonix   - ferrous sulfate  - folic acid    #dementia   - aricept    #neuropathy  - gabapentin    #HLD  - gemfibrozil    # blurred vision: has outpatient optho appt in june (per son)    #DVT Prophylaxis  - heparin Sc     dispo PENDING AUTH to wendy

## 2022-05-16 LAB
CULTURE RESULTS: SIGNIFICANT CHANGE UP
CULTURE RESULTS: SIGNIFICANT CHANGE UP
SARS-COV-2 RNA SPEC QL NAA+PROBE: SIGNIFICANT CHANGE UP
SPECIMEN SOURCE: SIGNIFICANT CHANGE UP
SPECIMEN SOURCE: SIGNIFICANT CHANGE UP

## 2022-05-16 PROCEDURE — 99232 SBSQ HOSP IP/OBS MODERATE 35: CPT

## 2022-05-16 RX ADMIN — GABAPENTIN 100 MILLIGRAM(S): 400 CAPSULE ORAL at 17:47

## 2022-05-16 RX ADMIN — DONEPEZIL HYDROCHLORIDE 10 MILLIGRAM(S): 10 TABLET, FILM COATED ORAL at 22:10

## 2022-05-16 RX ADMIN — Medication 150 MICROGRAM(S): at 06:19

## 2022-05-16 RX ADMIN — Medication 600 MILLIGRAM(S): at 17:47

## 2022-05-16 RX ADMIN — PANTOPRAZOLE SODIUM 40 MILLIGRAM(S): 20 TABLET, DELAYED RELEASE ORAL at 06:18

## 2022-05-16 RX ADMIN — Medication 600 MILLIGRAM(S): at 06:19

## 2022-05-16 RX ADMIN — QUETIAPINE FUMARATE 100 MILLIGRAM(S): 200 TABLET, FILM COATED ORAL at 22:10

## 2022-05-16 RX ADMIN — Medication 1 SPRAY(S): at 17:48

## 2022-05-16 RX ADMIN — Medication 325 MILLIGRAM(S): at 08:22

## 2022-05-16 RX ADMIN — Medication 50 MILLIGRAM(S): at 08:22

## 2022-05-16 RX ADMIN — Medication 650 MILLIGRAM(S): at 00:38

## 2022-05-16 RX ADMIN — Medication 1 SPRAY(S): at 06:19

## 2022-05-16 RX ADMIN — GABAPENTIN 100 MILLIGRAM(S): 400 CAPSULE ORAL at 06:19

## 2022-05-16 RX ADMIN — Medication 25 MILLIGRAM(S): at 06:19

## 2022-05-16 RX ADMIN — DORZOLAMIDE HYDROCHLORIDE 1 DROP(S): 20 SOLUTION/ DROPS OPHTHALMIC at 06:19

## 2022-05-16 RX ADMIN — Medication 1 MILLIGRAM(S): at 08:22

## 2022-05-16 RX ADMIN — Medication 81 MILLIGRAM(S): at 08:22

## 2022-05-16 RX ADMIN — Medication 25 MILLIGRAM(S): at 17:47

## 2022-05-16 RX ADMIN — HEPARIN SODIUM 5000 UNIT(S): 5000 INJECTION INTRAVENOUS; SUBCUTANEOUS at 22:10

## 2022-05-16 RX ADMIN — HEPARIN SODIUM 5000 UNIT(S): 5000 INJECTION INTRAVENOUS; SUBCUTANEOUS at 08:26

## 2022-05-16 NOTE — DIETITIAN INITIAL EVALUATION ADULT - OTHER INFO
Pt with h/o dementia (aaox1), anemia, neuropathy, gerd, depression, seizures, recent egd for gi bleed requiring gi intervention 1 month prior coming to hospital with complaints of chest pain. per patient currently asymptomatic however stating had chest pain this am was non radiating burning in nature

## 2022-05-16 NOTE — DIETITIAN INITIAL EVALUATION ADULT - SIGNS/SYMPTOMS
as evidenced by pt with severe muscle wasting/fat loss and meeting <50% estimated energy intake >5 d

## 2022-05-16 NOTE — PROGRESS NOTE ADULT - ASSESSMENT
89 year old female with pmh of dementia (aaox1), anemia, neuropathy, gerd, depression, seizures, recent egd for gi bleed requiring gi intervention 1 month prior coming to hospital with complaints of chest pain. per patient currently asymptomatic however stating had chest pain this am was non radiating burning in nature. 5/10 intensity.    #chest pain    acs ruled out   - c/w aspirin   - cardio recs appreciated  - No furhter cardiac work up is necessary  - tte reviewed - Severe AS     #fever  - Noted to have fever on admission  - All cx are neg  - Monitor off abx    #gerd w/ anemia  - w/ recent egd one month prior at [facility] per son also with intervention   - protonix   - ferrous sulfate  - folic acid    #dementia   - aricept    #neuropathy  - gabapentin    #HLD  - gemfibrozil    # blurred vision: has outpatient optho appt in june (per son)    #DVT Prophylaxis  - heparin Sc     dispo PENDING AUTH to wendy

## 2022-05-16 NOTE — DIETITIAN NUTRITION RISK NOTIFICATION - TREATMENT: THE FOLLOWING DIET HAS BEEN RECOMMENDED
Diet, Pureed:   Supplement Feeding Modality:  Oral  Ensure Enlive Cans or Servings Per Day:  3       Frequency:  Three Times a day (05-13-22 @ 10:34) [Active]

## 2022-05-16 NOTE — DIETITIAN INITIAL EVALUATION ADULT - PERTINENT MEDS FT
MEDICATIONS  (STANDING):  aspirin  chewable 81 milliGRAM(s) Oral daily  donepezil 10 milliGRAM(s) Oral at bedtime  dorzolamide 2% Ophthalmic Solution 1 Drop(s) Both EYES <User Schedule>  ferrous    sulfate 325 milliGRAM(s) Oral daily  fluticasone propionate 50 MICROgram(s)/spray Nasal Spray 1 Spray(s) Both Nostrils two times a day  folic acid 1 milliGRAM(s) Oral daily  gabapentin 100 milliGRAM(s) Oral two times a day  gemfibrozil 600 milliGRAM(s) Oral two times a day  heparin   Injectable 5000 Unit(s) SubCutaneous every 12 hours  levothyroxine 150 MICROGram(s) Oral daily  metoprolol tartrate 25 milliGRAM(s) Oral two times a day  pantoprazole    Tablet 40 milliGRAM(s) Oral before breakfast  QUEtiapine 100 milliGRAM(s) Oral at bedtime  topiramate 50 milliGRAM(s) Oral daily    MEDICATIONS  (PRN):  acetaminophen     Tablet .. 650 milliGRAM(s) Oral every 6 hours PRN Temp greater or equal to 38C (100.4F), Mild Pain (1 - 3)  aluminum hydroxide/magnesium hydroxide/simethicone Suspension 30 milliLiter(s) Oral every 4 hours PRN Dyspepsia  melatonin 3 milliGRAM(s) Oral at bedtime PRN Insomnia  ondansetron Injectable 4 milliGRAM(s) IV Push every 8 hours PRN Nausea and/or Vomiting

## 2022-05-16 NOTE — DIETITIAN INITIAL EVALUATION ADULT - NS FNS DIET ORDER
Diet, Pureed:   Supplement Feeding Modality:  Oral  Ensure Enlive Cans or Servings Per Day:  3       Frequency:  Three Times a day (05-13-22 @ 10:34)

## 2022-05-16 NOTE — DIETITIAN INITIAL EVALUATION ADULT - ORAL INTAKE PTA/DIET HISTORY
Pt sleeping at time of interview; aware confusion.  Pt with poor po intake at meals per nursing assistant; spits out food.  Pt consumes liquids more often and sips Ensure supplements.

## 2022-05-17 PROCEDURE — 99232 SBSQ HOSP IP/OBS MODERATE 35: CPT

## 2022-05-17 RX ADMIN — Medication 600 MILLIGRAM(S): at 06:51

## 2022-05-17 RX ADMIN — Medication 600 MILLIGRAM(S): at 17:22

## 2022-05-17 RX ADMIN — HEPARIN SODIUM 5000 UNIT(S): 5000 INJECTION INTRAVENOUS; SUBCUTANEOUS at 09:46

## 2022-05-17 RX ADMIN — QUETIAPINE FUMARATE 100 MILLIGRAM(S): 200 TABLET, FILM COATED ORAL at 22:48

## 2022-05-17 RX ADMIN — DORZOLAMIDE HYDROCHLORIDE 1 DROP(S): 20 SOLUTION/ DROPS OPHTHALMIC at 06:50

## 2022-05-17 RX ADMIN — GABAPENTIN 100 MILLIGRAM(S): 400 CAPSULE ORAL at 17:22

## 2022-05-17 RX ADMIN — Medication 1 MILLIGRAM(S): at 09:47

## 2022-05-17 RX ADMIN — Medication 25 MILLIGRAM(S): at 17:22

## 2022-05-17 RX ADMIN — Medication 150 MICROGRAM(S): at 06:51

## 2022-05-17 RX ADMIN — DONEPEZIL HYDROCHLORIDE 10 MILLIGRAM(S): 10 TABLET, FILM COATED ORAL at 22:48

## 2022-05-17 RX ADMIN — Medication 81 MILLIGRAM(S): at 09:47

## 2022-05-17 RX ADMIN — DORZOLAMIDE HYDROCHLORIDE 1 DROP(S): 20 SOLUTION/ DROPS OPHTHALMIC at 22:48

## 2022-05-17 RX ADMIN — HEPARIN SODIUM 5000 UNIT(S): 5000 INJECTION INTRAVENOUS; SUBCUTANEOUS at 22:54

## 2022-05-17 RX ADMIN — Medication 1 SPRAY(S): at 06:50

## 2022-05-17 RX ADMIN — Medication 50 MILLIGRAM(S): at 09:47

## 2022-05-17 RX ADMIN — Medication 325 MILLIGRAM(S): at 09:47

## 2022-05-17 RX ADMIN — PANTOPRAZOLE SODIUM 40 MILLIGRAM(S): 20 TABLET, DELAYED RELEASE ORAL at 06:51

## 2022-05-17 RX ADMIN — GABAPENTIN 100 MILLIGRAM(S): 400 CAPSULE ORAL at 06:51

## 2022-05-17 RX ADMIN — Medication 25 MILLIGRAM(S): at 06:51

## 2022-05-17 RX ADMIN — Medication 1 SPRAY(S): at 17:22

## 2022-05-17 NOTE — PROGRESS NOTE ADULT - ASSESSMENT
89 year old female with pmh of dementia (aaox1), anemia, neuropathy, gerd, depression, seizures, recent egd for gi bleed requiring gi intervention 1 month prior coming to hospital with complaints of chest pain. per patient currently asymptomatic however stating had chest pain this am was non radiating burning in nature. 5/10 intensity.    #chest pain    acs ruled out   - c/w aspirin   - cardio recs appreciated  - No furhter cardiac work up is necessary  - tte reviewed - Severe AS     #fever  - Noted to have fever on admission  - All cx are neg  - Monitor off abx    #gerd w/ anemia  - w/ recent egd one month prior at [facility] per son also with intervention   - protonix   - ferrous sulfate  - folic acid    #dementia   - aricept    #neuropathy  - gabapentin    #HLD  - gemfibrozil    # blurred vision: has outpatient optho appt in june (per son)    #DVT Prophylaxis  - heparin Sc     dispo PENDING AUTH to wendy

## 2022-05-18 LAB
APPEARANCE UR: CLEAR — SIGNIFICANT CHANGE UP
BACTERIA # UR AUTO: ABNORMAL
BILIRUB UR-MCNC: NEGATIVE — SIGNIFICANT CHANGE UP
COLOR SPEC: YELLOW — SIGNIFICANT CHANGE UP
DIFF PNL FLD: NEGATIVE — SIGNIFICANT CHANGE UP
EPI CELLS # UR: SIGNIFICANT CHANGE UP
GLUCOSE UR QL: NEGATIVE MG/DL — SIGNIFICANT CHANGE UP
KETONES UR-MCNC: NEGATIVE — SIGNIFICANT CHANGE UP
LEUKOCYTE ESTERASE UR-ACNC: ABNORMAL
NITRITE UR-MCNC: NEGATIVE — SIGNIFICANT CHANGE UP
PH UR: 7 — SIGNIFICANT CHANGE UP (ref 5–8)
PROT UR-MCNC: 30 MG/DL
RBC CASTS # UR COMP ASSIST: SIGNIFICANT CHANGE UP /HPF (ref 0–4)
SP GR SPEC: 1.01 — SIGNIFICANT CHANGE UP (ref 1.01–1.02)
UROBILINOGEN FLD QL: NEGATIVE MG/DL — SIGNIFICANT CHANGE UP
WBC UR QL: SIGNIFICANT CHANGE UP /HPF (ref 0–5)

## 2022-05-18 PROCEDURE — 99232 SBSQ HOSP IP/OBS MODERATE 35: CPT

## 2022-05-18 RX ORDER — IBUPROFEN 200 MG
400 TABLET ORAL ONCE
Refills: 0 | Status: COMPLETED | OUTPATIENT
Start: 2022-05-18 | End: 2022-05-18

## 2022-05-18 RX ADMIN — Medication 81 MILLIGRAM(S): at 15:24

## 2022-05-18 RX ADMIN — Medication 1 SPRAY(S): at 06:26

## 2022-05-18 RX ADMIN — Medication 400 MILLIGRAM(S): at 06:45

## 2022-05-18 RX ADMIN — Medication 3 MILLIGRAM(S): at 21:34

## 2022-05-18 RX ADMIN — Medication 25 MILLIGRAM(S): at 18:23

## 2022-05-18 RX ADMIN — PANTOPRAZOLE SODIUM 40 MILLIGRAM(S): 20 TABLET, DELAYED RELEASE ORAL at 06:16

## 2022-05-18 RX ADMIN — Medication 1 MILLIGRAM(S): at 15:25

## 2022-05-18 RX ADMIN — Medication 400 MILLIGRAM(S): at 06:15

## 2022-05-18 RX ADMIN — HEPARIN SODIUM 5000 UNIT(S): 5000 INJECTION INTRAVENOUS; SUBCUTANEOUS at 21:35

## 2022-05-18 RX ADMIN — Medication 25 MILLIGRAM(S): at 06:16

## 2022-05-18 RX ADMIN — DORZOLAMIDE HYDROCHLORIDE 1 DROP(S): 20 SOLUTION/ DROPS OPHTHALMIC at 06:26

## 2022-05-18 RX ADMIN — Medication 325 MILLIGRAM(S): at 15:25

## 2022-05-18 RX ADMIN — DONEPEZIL HYDROCHLORIDE 10 MILLIGRAM(S): 10 TABLET, FILM COATED ORAL at 21:34

## 2022-05-18 RX ADMIN — DORZOLAMIDE HYDROCHLORIDE 1 DROP(S): 20 SOLUTION/ DROPS OPHTHALMIC at 18:23

## 2022-05-18 RX ADMIN — QUETIAPINE FUMARATE 100 MILLIGRAM(S): 200 TABLET, FILM COATED ORAL at 21:34

## 2022-05-18 RX ADMIN — Medication 150 MICROGRAM(S): at 06:16

## 2022-05-18 RX ADMIN — HEPARIN SODIUM 5000 UNIT(S): 5000 INJECTION INTRAVENOUS; SUBCUTANEOUS at 12:17

## 2022-05-18 RX ADMIN — Medication 600 MILLIGRAM(S): at 06:16

## 2022-05-18 RX ADMIN — Medication 50 MILLIGRAM(S): at 15:25

## 2022-05-18 NOTE — PROGRESS NOTE ADULT - ASSESSMENT
89 year old female with pmh of dementia (aaox1), anemia, neuropathy, gerd, depression, seizures, recent egd for gi bleed requiring gi intervention 1 month prior coming to hospital with complaints of chest pain. per patient currently asymptomatic however stating had chest pain this am was non radiating burning in nature. 5/10 intensity.    #chest pain    acs ruled out   - c/w aspirin   - cardio recs appreciated  - No furhter cardiac work up is necessary  - tte reviewed - Severe AS     #fever  - Noted to have fever on admission  - All cx are neg  - Monitor off abx    #gerd w/ anemia  - w/ recent egd one month prior at [facility] per son also with intervention   - protonix   - ferrous sulfate  - folic acid    #dementia   - aricept    #neuropathy  - gabapentin    #HLD  - gemfibrozil    # blurred vision: has outpatient optho appt in june (per son)    #DVT Prophylaxis  - heparin Sc     check ua/ucx, am labs     dispo PENDING AUTH to wendy

## 2022-05-19 LAB
ANION GAP SERPL CALC-SCNC: 15 MMOL/L — SIGNIFICANT CHANGE UP (ref 5–17)
BUN SERPL-MCNC: 34.2 MG/DL — HIGH (ref 8–20)
CALCIUM SERPL-MCNC: 10.4 MG/DL — HIGH (ref 8.6–10.2)
CHLORIDE SERPL-SCNC: 102 MMOL/L — SIGNIFICANT CHANGE UP (ref 98–107)
CO2 SERPL-SCNC: 22 MMOL/L — SIGNIFICANT CHANGE UP (ref 22–29)
CREAT SERPL-MCNC: 1.16 MG/DL — SIGNIFICANT CHANGE UP (ref 0.5–1.3)
CULTURE RESULTS: NO GROWTH — SIGNIFICANT CHANGE UP
EGFR: 44 ML/MIN/1.73M2 — LOW
GLUCOSE SERPL-MCNC: 107 MG/DL — HIGH (ref 70–99)
HCT VFR BLD CALC: 30.7 % — LOW (ref 34.5–45)
HGB BLD-MCNC: 9.4 G/DL — LOW (ref 11.5–15.5)
MAGNESIUM SERPL-MCNC: 2.1 MG/DL — SIGNIFICANT CHANGE UP (ref 1.6–2.6)
MCHC RBC-ENTMCNC: 26.9 PG — LOW (ref 27–34)
MCHC RBC-ENTMCNC: 30.6 GM/DL — LOW (ref 32–36)
MCV RBC AUTO: 87.7 FL — SIGNIFICANT CHANGE UP (ref 80–100)
MRSA PCR RESULT.: SIGNIFICANT CHANGE UP
PLATELET # BLD AUTO: 271 K/UL — SIGNIFICANT CHANGE UP (ref 150–400)
POTASSIUM SERPL-MCNC: 4.6 MMOL/L — SIGNIFICANT CHANGE UP (ref 3.5–5.3)
POTASSIUM SERPL-SCNC: 4.6 MMOL/L — SIGNIFICANT CHANGE UP (ref 3.5–5.3)
RBC # BLD: 3.5 M/UL — LOW (ref 3.8–5.2)
RBC # FLD: 19.8 % — HIGH (ref 10.3–14.5)
S AUREUS DNA NOSE QL NAA+PROBE: DETECTED
SODIUM SERPL-SCNC: 139 MMOL/L — SIGNIFICANT CHANGE UP (ref 135–145)
SPECIMEN SOURCE: SIGNIFICANT CHANGE UP
T4 FREE SERPL-MCNC: 0.85 NG/DL — SIGNIFICANT CHANGE UP
WBC # BLD: 5.47 K/UL — SIGNIFICANT CHANGE UP (ref 3.8–10.5)
WBC # FLD AUTO: 5.47 K/UL — SIGNIFICANT CHANGE UP (ref 3.8–10.5)

## 2022-05-19 PROCEDURE — 99232 SBSQ HOSP IP/OBS MODERATE 35: CPT

## 2022-05-19 RX ORDER — CHLORHEXIDINE GLUCONATE 213 G/1000ML
1 SOLUTION TOPICAL
Refills: 0 | Status: DISCONTINUED | OUTPATIENT
Start: 2022-05-19 | End: 2022-05-31

## 2022-05-19 RX ADMIN — DONEPEZIL HYDROCHLORIDE 10 MILLIGRAM(S): 10 TABLET, FILM COATED ORAL at 21:25

## 2022-05-19 RX ADMIN — Medication 325 MILLIGRAM(S): at 09:02

## 2022-05-19 RX ADMIN — HEPARIN SODIUM 5000 UNIT(S): 5000 INJECTION INTRAVENOUS; SUBCUTANEOUS at 21:24

## 2022-05-19 RX ADMIN — GABAPENTIN 100 MILLIGRAM(S): 400 CAPSULE ORAL at 17:12

## 2022-05-19 RX ADMIN — Medication 1 SPRAY(S): at 17:12

## 2022-05-19 RX ADMIN — Medication 650 MILLIGRAM(S): at 10:42

## 2022-05-19 RX ADMIN — Medication 50 MILLIGRAM(S): at 09:02

## 2022-05-19 RX ADMIN — Medication 1 MILLIGRAM(S): at 09:02

## 2022-05-19 RX ADMIN — CHLORHEXIDINE GLUCONATE 1 APPLICATION(S): 213 SOLUTION TOPICAL at 17:13

## 2022-05-19 RX ADMIN — Medication 1 SPRAY(S): at 05:26

## 2022-05-19 RX ADMIN — Medication 25 MILLIGRAM(S): at 06:00

## 2022-05-19 RX ADMIN — Medication 650 MILLIGRAM(S): at 11:30

## 2022-05-19 RX ADMIN — Medication 600 MILLIGRAM(S): at 17:13

## 2022-05-19 RX ADMIN — Medication 81 MILLIGRAM(S): at 09:02

## 2022-05-19 RX ADMIN — Medication 25 MILLIGRAM(S): at 17:13

## 2022-05-19 RX ADMIN — GABAPENTIN 100 MILLIGRAM(S): 400 CAPSULE ORAL at 06:01

## 2022-05-19 RX ADMIN — Medication 150 MICROGRAM(S): at 06:01

## 2022-05-19 RX ADMIN — DORZOLAMIDE HYDROCHLORIDE 1 DROP(S): 20 SOLUTION/ DROPS OPHTHALMIC at 17:12

## 2022-05-19 RX ADMIN — Medication 600 MILLIGRAM(S): at 06:00

## 2022-05-19 RX ADMIN — Medication 1 TABLET(S): at 17:12

## 2022-05-19 RX ADMIN — PANTOPRAZOLE SODIUM 40 MILLIGRAM(S): 20 TABLET, DELAYED RELEASE ORAL at 06:01

## 2022-05-19 RX ADMIN — DORZOLAMIDE HYDROCHLORIDE 1 DROP(S): 20 SOLUTION/ DROPS OPHTHALMIC at 05:27

## 2022-05-19 RX ADMIN — HEPARIN SODIUM 5000 UNIT(S): 5000 INJECTION INTRAVENOUS; SUBCUTANEOUS at 09:02

## 2022-05-19 RX ADMIN — QUETIAPINE FUMARATE 100 MILLIGRAM(S): 200 TABLET, FILM COATED ORAL at 22:17

## 2022-05-19 NOTE — CHART NOTE - NSCHARTNOTEFT_GEN_A_CORE
Source: Patient [ ]  Family [ ]   other [ x] nursing assistant    Current Diet: Diet, Pureed:   Supplement Feeding Modality:  Oral  Ensure Enlive Cans or Servings Per Day:  3       Frequency:  Three Times a day (05-13-22 @ 10:34)    PO intake:  < 50% [ ]   50-75%  [x ]   %  [ ]  other :    Current Weight:   (5/19) 108.8 lbs  (5/11) 110 lbs    % Weight Change - minimal wt loss noted since admission, will continue to monitor for accuracy.  No edema noted.     Pertinent Medications: MEDICATIONS  (STANDING):  aspirin  chewable 81 milliGRAM(s) Oral daily  chlorhexidine 2% Cloths 1 Application(s) Topical <User Schedule>  donepezil 10 milliGRAM(s) Oral at bedtime  dorzolamide 2% Ophthalmic Solution 1 Drop(s) Both EYES <User Schedule>  ferrous    sulfate 325 milliGRAM(s) Oral daily  fluticasone propionate 50 MICROgram(s)/spray Nasal Spray 1 Spray(s) Both Nostrils two times a day  folic acid 1 milliGRAM(s) Oral daily  gabapentin 100 milliGRAM(s) Oral two times a day  gemfibrozil 600 milliGRAM(s) Oral two times a day  heparin   Injectable 5000 Unit(s) SubCutaneous every 12 hours  levothyroxine 150 MICROGram(s) Oral daily  metoprolol tartrate 25 milliGRAM(s) Oral two times a day  pantoprazole    Tablet 40 milliGRAM(s) Oral before breakfast  QUEtiapine 100 milliGRAM(s) Oral at bedtime  topiramate 50 milliGRAM(s) Oral daily    MEDICATIONS  (PRN):  acetaminophen     Tablet .. 650 milliGRAM(s) Oral every 6 hours PRN Temp greater or equal to 38C (100.4F), Mild Pain (1 - 3)  aluminum hydroxide/magnesium hydroxide/simethicone Suspension 30 milliLiter(s) Oral every 4 hours PRN Dyspepsia  melatonin 3 milliGRAM(s) Oral at bedtime PRN Insomnia  ondansetron Injectable 4 milliGRAM(s) IV Push every 8 hours PRN Nausea and/or Vomiting    Pertinent Labs: CBC Full  -  ( 19 May 2022 05:52 )  WBC Count : 5.47 K/uL  RBC Count : 3.50 M/uL  Hemoglobin : 9.4 g/dL  Hematocrit : 30.7 %  Platelet Count - Automated : 271 K/uL  Mean Cell Volume : 87.7 fl  Mean Cell Hemoglobin : 26.9 pg  Mean Cell Hemoglobin Concentration : 30.6 gm/dL  05-19 Na139 mmol/L Glu 107 mg/dL<H> K+ 4.6 mmol/L Cr  1.16 mg/dL BUN 34.2 mg/dL<H> Phos n/a   Alb n/a   PAB n/a       Skin: no skin breakdown noted     Nutrition focused physical exam previously conducted - found signs of malnutrition [ ]absent [x ]present    Subcutaneous fat loss: [ x] Orbital fat pads region, [ x]Buccal fat region, [ ]Triceps region,  [ ]Ribs region    Muscle wasting: [x ]Temples region, [x ]Clavicle region, [x ]Shoulder region, [ ]Scapula region, [ ]Interosseous region,  [ ]thigh region, [ ]Calf region    Current Nutrition Diagnosis: Pt remains at nutrition risk secondary to severe protein calorie malnutrition (acute on chronic) related to inability to consume sufficient protein energy intake in setting of dementia as evidenced by pt with severe muscle wasting/fat loss and meeting <50% estimated energy intake >5 days.  Pt with improving po intake at meals per nursing assistant; consumed ~75% at breakfast this morning with assistance.  Pt also receiving Ensure supplements.  RD to remain available.     Recommendations:   1. Continue with Ensure TID  2. RX: MVI daily and continue with folic acid and feso4 daily  3. Obtain daily weight and monitor trend     Monitoring and Evaluation:   [x ] PO intake [x ] Tolerance to diet prescription [X] Weights  [X] Follow up per protocol [X] Labs:

## 2022-05-19 NOTE — PROGRESS NOTE ADULT - ASSESSMENT
89 year old female with pmh of dementia (aaox1), anemia, neuropathy, gerd, depression, seizures, recent egd for gi bleed requiring gi intervention 1 month prior coming to hospital with complaints of chest pain. per patient currently asymptomatic however stating had chest pain this am was non radiating burning in nature. 5/10 intensity.    #chest pain    acs ruled out   - c/w aspirin   - cardio recs appreciated  - No further cardiac work up is necessary  - tte reviewed - Severe AS     #fever  - Noted to have fever on admission  - All cx are neg  - Monitor off abx    #gerd w/ anemia  - w/ recent egd one month prior at [facility] per son also with intervention   - protonix   - ferrous sulfate  - folic acid    #dementia   - aricept    #neuropathy  - gabapentin    #HLD  - gemfibrozil    # blurred vision: has outpatient optho appt in june (per son)    #DVT Prophylaxis  - heparin Sc         dispo PENDING AUTH to wendy

## 2022-05-20 LAB — SARS-COV-2 RNA SPEC QL NAA+PROBE: SIGNIFICANT CHANGE UP

## 2022-05-20 PROCEDURE — 99232 SBSQ HOSP IP/OBS MODERATE 35: CPT

## 2022-05-20 RX ADMIN — Medication 1 MILLIGRAM(S): at 10:19

## 2022-05-20 RX ADMIN — Medication 25 MILLIGRAM(S): at 17:14

## 2022-05-20 RX ADMIN — DONEPEZIL HYDROCHLORIDE 10 MILLIGRAM(S): 10 TABLET, FILM COATED ORAL at 21:51

## 2022-05-20 RX ADMIN — Medication 1 SPRAY(S): at 17:14

## 2022-05-20 RX ADMIN — HEPARIN SODIUM 5000 UNIT(S): 5000 INJECTION INTRAVENOUS; SUBCUTANEOUS at 21:51

## 2022-05-20 RX ADMIN — PANTOPRAZOLE SODIUM 40 MILLIGRAM(S): 20 TABLET, DELAYED RELEASE ORAL at 06:28

## 2022-05-20 RX ADMIN — DORZOLAMIDE HYDROCHLORIDE 1 DROP(S): 20 SOLUTION/ DROPS OPHTHALMIC at 21:50

## 2022-05-20 RX ADMIN — CHLORHEXIDINE GLUCONATE 1 APPLICATION(S): 213 SOLUTION TOPICAL at 06:29

## 2022-05-20 RX ADMIN — Medication 25 MILLIGRAM(S): at 06:28

## 2022-05-20 RX ADMIN — QUETIAPINE FUMARATE 100 MILLIGRAM(S): 200 TABLET, FILM COATED ORAL at 21:51

## 2022-05-20 RX ADMIN — GABAPENTIN 100 MILLIGRAM(S): 400 CAPSULE ORAL at 06:28

## 2022-05-20 RX ADMIN — Medication 600 MILLIGRAM(S): at 17:14

## 2022-05-20 RX ADMIN — Medication 1 TABLET(S): at 10:20

## 2022-05-20 RX ADMIN — HEPARIN SODIUM 5000 UNIT(S): 5000 INJECTION INTRAVENOUS; SUBCUTANEOUS at 10:19

## 2022-05-20 RX ADMIN — Medication 50 MILLIGRAM(S): at 17:14

## 2022-05-20 RX ADMIN — DORZOLAMIDE HYDROCHLORIDE 1 DROP(S): 20 SOLUTION/ DROPS OPHTHALMIC at 06:29

## 2022-05-20 RX ADMIN — Medication 600 MILLIGRAM(S): at 06:28

## 2022-05-20 RX ADMIN — GABAPENTIN 100 MILLIGRAM(S): 400 CAPSULE ORAL at 17:15

## 2022-05-20 RX ADMIN — Medication 1 SPRAY(S): at 06:28

## 2022-05-20 RX ADMIN — Medication 81 MILLIGRAM(S): at 10:19

## 2022-05-20 RX ADMIN — Medication 325 MILLIGRAM(S): at 10:19

## 2022-05-20 RX ADMIN — Medication 150 MICROGRAM(S): at 06:28

## 2022-05-20 NOTE — PROGRESS NOTE ADULT - ASSESSMENT
89 year old female with pmh of dementia (aaox1), anemia, neuropathy, gerd, depression, seizures, recent egd for gi bleed requiring gi intervention 1 month prior coming to hospital with complaints of chest pain. per patient currently asymptomatic however stating had chest pain this am was non radiating burning in nature. 5/10 intensity.   ACS ruled out, negative cultures. awaiting WENDY.     #chest pain    acs ruled out   - c/w aspirin   - cardio recs appreciated  - No further cardiac work up is necessary  - tte reviewed - Severe AS     #fever  - Noted to have fever on admission  - All cx are neg  - Monitor off abx    #gerd w/ anemia  - w/ recent egd one month prior [facility] son also with intervention   - protonix   - ferrous sulfate  - folic acid    #dementia   - aricept    #neuropathy  - gabapentin    #HLD  - gemfibrozil    # blurred vision: has outpatient optho appt in june (per son)    #DVT Prophylaxis  - heparin Sc         dispo PENDING AUTH to wendy

## 2022-05-21 LAB — SARS-COV-2 RNA SPEC QL NAA+PROBE: SIGNIFICANT CHANGE UP

## 2022-05-21 PROCEDURE — 99232 SBSQ HOSP IP/OBS MODERATE 35: CPT

## 2022-05-21 RX ADMIN — Medication 1 SPRAY(S): at 18:22

## 2022-05-21 RX ADMIN — Medication 1 SPRAY(S): at 06:04

## 2022-05-21 RX ADMIN — Medication 600 MILLIGRAM(S): at 06:03

## 2022-05-21 RX ADMIN — Medication 150 MICROGRAM(S): at 06:03

## 2022-05-21 RX ADMIN — Medication 50 MILLIGRAM(S): at 18:20

## 2022-05-21 RX ADMIN — DORZOLAMIDE HYDROCHLORIDE 1 DROP(S): 20 SOLUTION/ DROPS OPHTHALMIC at 18:22

## 2022-05-21 RX ADMIN — Medication 650 MILLIGRAM(S): at 18:20

## 2022-05-21 RX ADMIN — Medication 81 MILLIGRAM(S): at 09:14

## 2022-05-21 RX ADMIN — Medication 325 MILLIGRAM(S): at 09:14

## 2022-05-21 RX ADMIN — Medication 600 MILLIGRAM(S): at 18:18

## 2022-05-21 RX ADMIN — GABAPENTIN 100 MILLIGRAM(S): 400 CAPSULE ORAL at 18:20

## 2022-05-21 RX ADMIN — HEPARIN SODIUM 5000 UNIT(S): 5000 INJECTION INTRAVENOUS; SUBCUTANEOUS at 22:23

## 2022-05-21 RX ADMIN — DORZOLAMIDE HYDROCHLORIDE 1 DROP(S): 20 SOLUTION/ DROPS OPHTHALMIC at 06:04

## 2022-05-21 RX ADMIN — PANTOPRAZOLE SODIUM 40 MILLIGRAM(S): 20 TABLET, DELAYED RELEASE ORAL at 06:03

## 2022-05-21 RX ADMIN — QUETIAPINE FUMARATE 100 MILLIGRAM(S): 200 TABLET, FILM COATED ORAL at 22:23

## 2022-05-21 RX ADMIN — Medication 25 MILLIGRAM(S): at 18:21

## 2022-05-21 RX ADMIN — CHLORHEXIDINE GLUCONATE 1 APPLICATION(S): 213 SOLUTION TOPICAL at 06:03

## 2022-05-21 RX ADMIN — Medication 1 MILLIGRAM(S): at 09:14

## 2022-05-21 RX ADMIN — HEPARIN SODIUM 5000 UNIT(S): 5000 INJECTION INTRAVENOUS; SUBCUTANEOUS at 09:15

## 2022-05-21 RX ADMIN — Medication 1 TABLET(S): at 09:14

## 2022-05-21 RX ADMIN — DONEPEZIL HYDROCHLORIDE 10 MILLIGRAM(S): 10 TABLET, FILM COATED ORAL at 22:23

## 2022-05-21 RX ADMIN — GABAPENTIN 100 MILLIGRAM(S): 400 CAPSULE ORAL at 06:04

## 2022-05-21 RX ADMIN — Medication 25 MILLIGRAM(S): at 06:03

## 2022-05-21 NOTE — PROGRESS NOTE ADULT - ASSESSMENT
89 year old female with pmh of dementia (aaox1), anemia, neuropathy, gerd, depression, seizures, recent egd for gi bleed requiring gi intervention 1 month prior coming to hospital with complaints of chest pain. per patient currently asymptomatic however stating had chest pain this am was non radiating burning in nature. 5/10 intensity.   ACS ruled out, negative cultures. awaiting [facility].     1-chest pain    acs ruled out , cardiology consult noted   - c/w aspirin   - No further cardiac work up is necessary  - tte reviewed - Severe AS     2- Severe AS   medical managament     3-Febrile illness   fever on admission resolved   cx are neg     4-gerd    cont PPI     5- anemia  - w/ recent egd one month prior [facility] son also with intervention   - ferrous sulfate  - folic acid    6-dementia   on  aricept    7-neuropathy  - gabapentin    8- blurred vision: has outpatient optho appt in june (per son)    9- Severe protein rodolfo malnutrition cont enlive with meals   MVI     discharge to [facility]     #DVT Prophylaxis  - heparin Sc

## 2022-05-22 LAB
ANION GAP SERPL CALC-SCNC: 16 MMOL/L — SIGNIFICANT CHANGE UP (ref 5–17)
BUN SERPL-MCNC: 51 MG/DL — HIGH (ref 8–20)
CALCIUM SERPL-MCNC: 10.4 MG/DL — HIGH (ref 8.6–10.2)
CHLORIDE SERPL-SCNC: 98 MMOL/L — SIGNIFICANT CHANGE UP (ref 98–107)
CO2 SERPL-SCNC: 25 MMOL/L — SIGNIFICANT CHANGE UP (ref 22–29)
CREAT SERPL-MCNC: 1.06 MG/DL — SIGNIFICANT CHANGE UP (ref 0.5–1.3)
EGFR: 49 ML/MIN/1.73M2 — LOW
FOLATE SERPL-MCNC: >20 NG/ML — SIGNIFICANT CHANGE UP
GLUCOSE SERPL-MCNC: 122 MG/DL — HIGH (ref 70–99)
IRON SATN MFR SERPL: 10 % — LOW (ref 14–50)
IRON SATN MFR SERPL: 37 UG/DL — SIGNIFICANT CHANGE UP (ref 37–145)
IRON SATN MFR SERPL: 9 % — LOW (ref 14–50)
PHOSPHATE SERPL-MCNC: 3.6 MG/DL — SIGNIFICANT CHANGE UP (ref 2.4–4.7)
POTASSIUM SERPL-MCNC: 5.1 MMOL/L — SIGNIFICANT CHANGE UP (ref 3.5–5.3)
POTASSIUM SERPL-SCNC: 5.1 MMOL/L — SIGNIFICANT CHANGE UP (ref 3.5–5.3)
SODIUM SERPL-SCNC: 139 MMOL/L — SIGNIFICANT CHANGE UP (ref 135–145)
TIBC SERPL-MCNC: 378 UG/DL — SIGNIFICANT CHANGE UP (ref 220–430)
TIBC SERPL-MCNC: 396 UG/DL — SIGNIFICANT CHANGE UP (ref 220–430)
TRANSFERRIN SERPL-MCNC: 264 MG/DL — SIGNIFICANT CHANGE UP (ref 192–382)
TRANSFERRIN SERPL-MCNC: 277 MG/DL — SIGNIFICANT CHANGE UP (ref 192–382)
TSH SERPL-MCNC: 5.49 UIU/ML — HIGH (ref 0.27–4.2)
VIT B12 SERPL-MCNC: 576 PG/ML — SIGNIFICANT CHANGE UP (ref 232–1245)

## 2022-05-22 PROCEDURE — 99232 SBSQ HOSP IP/OBS MODERATE 35: CPT

## 2022-05-22 RX ADMIN — GABAPENTIN 100 MILLIGRAM(S): 400 CAPSULE ORAL at 17:47

## 2022-05-22 RX ADMIN — Medication 81 MILLIGRAM(S): at 17:47

## 2022-05-22 RX ADMIN — DORZOLAMIDE HYDROCHLORIDE 1 DROP(S): 20 SOLUTION/ DROPS OPHTHALMIC at 05:11

## 2022-05-22 RX ADMIN — Medication 1 TABLET(S): at 17:47

## 2022-05-22 RX ADMIN — Medication 600 MILLIGRAM(S): at 17:47

## 2022-05-22 RX ADMIN — Medication 325 MILLIGRAM(S): at 17:47

## 2022-05-22 RX ADMIN — CHLORHEXIDINE GLUCONATE 1 APPLICATION(S): 213 SOLUTION TOPICAL at 05:12

## 2022-05-22 RX ADMIN — GABAPENTIN 100 MILLIGRAM(S): 400 CAPSULE ORAL at 05:10

## 2022-05-22 RX ADMIN — Medication 1 SPRAY(S): at 17:48

## 2022-05-22 RX ADMIN — Medication 1 SPRAY(S): at 05:11

## 2022-05-22 RX ADMIN — DORZOLAMIDE HYDROCHLORIDE 1 DROP(S): 20 SOLUTION/ DROPS OPHTHALMIC at 17:48

## 2022-05-22 RX ADMIN — Medication 25 MILLIGRAM(S): at 17:47

## 2022-05-22 RX ADMIN — PANTOPRAZOLE SODIUM 40 MILLIGRAM(S): 20 TABLET, DELAYED RELEASE ORAL at 05:12

## 2022-05-22 RX ADMIN — Medication 150 MICROGRAM(S): at 05:10

## 2022-05-22 RX ADMIN — Medication 1 MILLIGRAM(S): at 17:47

## 2022-05-22 RX ADMIN — Medication 600 MILLIGRAM(S): at 05:10

## 2022-05-22 RX ADMIN — HEPARIN SODIUM 5000 UNIT(S): 5000 INJECTION INTRAVENOUS; SUBCUTANEOUS at 09:56

## 2022-05-22 RX ADMIN — Medication 50 MILLIGRAM(S): at 17:47

## 2022-05-22 RX ADMIN — Medication 25 MILLIGRAM(S): at 05:10

## 2022-05-22 NOTE — PROGRESS NOTE ADULT - ASSESSMENT
89 year old female with pmh of dementia (aaox1), anemia, neuropathy, gerd, depression, seizures, recent egd for gi bleed requiring gi intervention 1 month prior coming to hospital with complaints of chest pain. per patient currently asymptomatic however stating had chest pain this am was non radiating burning in nature. 5/10 intensity.   ACS ruled out, negative cultures. awaiting [facility].     1-Chest pain    acs ruled out ,cardilogy consulted   no further work up   - c/w aspirin     2- Severe AS   TTE result reviewed   medical managament     3-Febrile illness   fever on admission resolved   work up negative     4- anemia  - w/ recent egd one month prior at [facility] per son also with intervention   on  ferrous sulfate and folic acid   check vit b12 , folate , iron studies     6-dementia with no behavioral issues   on  aricept  check vitamin , TSH     7-neuropathy  - gabapentin    8- blurred vision: has outpatient optho appt in june (per son)    9- Severe protein rodolfo malnutrition cont enlive with meals   MVI     discharge to [facility] on ce auth obtained     #DVT Prophylaxis  - heparin Sc

## 2022-05-23 PROCEDURE — 99232 SBSQ HOSP IP/OBS MODERATE 35: CPT

## 2022-05-23 RX ORDER — PREGABALIN 225 MG/1
1000 CAPSULE ORAL DAILY
Refills: 0 | Status: DISCONTINUED | OUTPATIENT
Start: 2022-05-23 | End: 2022-05-31

## 2022-05-23 RX ADMIN — Medication 81 MILLIGRAM(S): at 11:20

## 2022-05-23 RX ADMIN — GABAPENTIN 100 MILLIGRAM(S): 400 CAPSULE ORAL at 17:08

## 2022-05-23 RX ADMIN — Medication 1 TABLET(S): at 11:20

## 2022-05-23 RX ADMIN — Medication 50 MILLIGRAM(S): at 11:20

## 2022-05-23 RX ADMIN — PREGABALIN 1000 MICROGRAM(S): 225 CAPSULE ORAL at 18:43

## 2022-05-23 RX ADMIN — HEPARIN SODIUM 5000 UNIT(S): 5000 INJECTION INTRAVENOUS; SUBCUTANEOUS at 11:19

## 2022-05-23 RX ADMIN — DONEPEZIL HYDROCHLORIDE 10 MILLIGRAM(S): 10 TABLET, FILM COATED ORAL at 22:48

## 2022-05-23 RX ADMIN — Medication 25 MILLIGRAM(S): at 17:08

## 2022-05-23 RX ADMIN — Medication 600 MILLIGRAM(S): at 17:08

## 2022-05-23 RX ADMIN — CHLORHEXIDINE GLUCONATE 1 APPLICATION(S): 213 SOLUTION TOPICAL at 06:12

## 2022-05-23 RX ADMIN — Medication 1 SPRAY(S): at 17:09

## 2022-05-23 RX ADMIN — QUETIAPINE FUMARATE 100 MILLIGRAM(S): 200 TABLET, FILM COATED ORAL at 22:47

## 2022-05-23 RX ADMIN — HEPARIN SODIUM 5000 UNIT(S): 5000 INJECTION INTRAVENOUS; SUBCUTANEOUS at 22:48

## 2022-05-23 RX ADMIN — Medication 1 MILLIGRAM(S): at 11:19

## 2022-05-23 RX ADMIN — Medication 325 MILLIGRAM(S): at 11:20

## 2022-05-23 NOTE — PROGRESS NOTE ADULT - ASSESSMENT
89 year old female with pmh of dementia (aaox1), anemia, neuropathy, gerd, depression, seizures, recent egd for gi bleed requiring gi intervention 1 month prior coming to hospital with complaints of chest pain. per patient currently asymptomatic however stating had chest pain this am was non radiating burning in nature. 5/10 intensity.   ACS ruled out, negative cultures. awaiting [facility].     1-Chest pain    acs ruled out   seen by cardiology on admission ;    no further work up   - c/w aspirin     2- Severe AS   medical managament     3-Febrile illness   fever on admission resolved   work up negative     4- anemia  - w/ recent egd one month prior at [facility] [place] per son also with intervention   on  ferrous sulfate and folic acid   check vit b12 , folate , iron studies     6-dementia with no behavioral issues   on  aricept  tsh and vit levels reviewed 'add oral vit b12 supplement     7-neuropathy  - gabapentin    8- blurred vision: has outpatient optho appt in june (per son)    9- Severe protein rodolfo malnutrition cont enlive with meals   MVI     discharge to [facility]   pending placement     #DVT Prophylaxis  - heparin Sc

## 2022-05-24 LAB
ANION GAP SERPL CALC-SCNC: 16 MMOL/L — SIGNIFICANT CHANGE UP (ref 5–17)
APPEARANCE UR: ABNORMAL
BACTERIA # UR AUTO: ABNORMAL
BILIRUB UR-MCNC: NEGATIVE — SIGNIFICANT CHANGE UP
BUN SERPL-MCNC: 68.7 MG/DL — HIGH (ref 8–20)
CALCIUM SERPL-MCNC: 10.2 MG/DL — SIGNIFICANT CHANGE UP (ref 8.6–10.2)
CHLORIDE SERPL-SCNC: 102 MMOL/L — SIGNIFICANT CHANGE UP (ref 98–107)
CO2 SERPL-SCNC: 23 MMOL/L — SIGNIFICANT CHANGE UP (ref 22–29)
COLOR SPEC: YELLOW — SIGNIFICANT CHANGE UP
CREAT SERPL-MCNC: 1.4 MG/DL — HIGH (ref 0.5–1.3)
DIFF PNL FLD: ABNORMAL
EGFR: 35 ML/MIN/1.73M2 — LOW
EPI CELLS # UR: SIGNIFICANT CHANGE UP
GLUCOSE SERPL-MCNC: 119 MG/DL — HIGH (ref 70–99)
GLUCOSE UR QL: NEGATIVE MG/DL — SIGNIFICANT CHANGE UP
HCT VFR BLD CALC: 32.9 % — LOW (ref 34.5–45)
HGB BLD-MCNC: 9.7 G/DL — LOW (ref 11.5–15.5)
KETONES UR-MCNC: NEGATIVE — SIGNIFICANT CHANGE UP
LEUKOCYTE ESTERASE UR-ACNC: ABNORMAL
MCHC RBC-ENTMCNC: 26.3 PG — LOW (ref 27–34)
MCHC RBC-ENTMCNC: 29.5 GM/DL — LOW (ref 32–36)
MCV RBC AUTO: 89.2 FL — SIGNIFICANT CHANGE UP (ref 80–100)
NITRITE UR-MCNC: NEGATIVE — SIGNIFICANT CHANGE UP
PH UR: 6 — SIGNIFICANT CHANGE UP (ref 5–8)
PLATELET # BLD AUTO: 294 K/UL — SIGNIFICANT CHANGE UP (ref 150–400)
POTASSIUM SERPL-MCNC: 5.6 MMOL/L — HIGH (ref 3.5–5.3)
POTASSIUM SERPL-SCNC: 5.6 MMOL/L — HIGH (ref 3.5–5.3)
PROT UR-MCNC: 100 MG/DL
RBC # BLD: 3.69 M/UL — LOW (ref 3.8–5.2)
RBC # FLD: 20.4 % — HIGH (ref 10.3–14.5)
RBC CASTS # UR COMP ASSIST: ABNORMAL /HPF (ref 0–4)
SODIUM SERPL-SCNC: 140 MMOL/L — SIGNIFICANT CHANGE UP (ref 135–145)
SP GR SPEC: 1.01 — SIGNIFICANT CHANGE UP (ref 1.01–1.02)
UROBILINOGEN FLD QL: NEGATIVE MG/DL — SIGNIFICANT CHANGE UP
WBC # BLD: 6.43 K/UL — SIGNIFICANT CHANGE UP (ref 3.8–10.5)
WBC # FLD AUTO: 6.43 K/UL — SIGNIFICANT CHANGE UP (ref 3.8–10.5)
WBC UR QL: >50 /HPF (ref 0–5)

## 2022-05-24 PROCEDURE — 99233 SBSQ HOSP IP/OBS HIGH 50: CPT

## 2022-05-24 PROCEDURE — 70450 CT HEAD/BRAIN W/O DYE: CPT | Mod: 26

## 2022-05-24 RX ORDER — CALCIUM GLUCONATE 100 MG/ML
2 VIAL (ML) INTRAVENOUS ONCE
Refills: 0 | Status: COMPLETED | OUTPATIENT
Start: 2022-05-24 | End: 2022-05-24

## 2022-05-24 RX ORDER — PIPERACILLIN AND TAZOBACTAM 4; .5 G/20ML; G/20ML
3.38 INJECTION, POWDER, LYOPHILIZED, FOR SOLUTION INTRAVENOUS ONCE
Refills: 0 | Status: COMPLETED | OUTPATIENT
Start: 2022-05-24 | End: 2022-05-24

## 2022-05-24 RX ORDER — SODIUM ZIRCONIUM CYCLOSILICATE 10 G/10G
5 POWDER, FOR SUSPENSION ORAL ONCE
Refills: 0 | Status: COMPLETED | OUTPATIENT
Start: 2022-05-24 | End: 2022-05-24

## 2022-05-24 RX ORDER — PIPERACILLIN AND TAZOBACTAM 4; .5 G/20ML; G/20ML
3.38 INJECTION, POWDER, LYOPHILIZED, FOR SOLUTION INTRAVENOUS EVERY 8 HOURS
Refills: 0 | Status: DISCONTINUED | OUTPATIENT
Start: 2022-05-24 | End: 2022-05-25

## 2022-05-24 RX ORDER — QUETIAPINE FUMARATE 200 MG/1
75 TABLET, FILM COATED ORAL AT BEDTIME
Refills: 0 | Status: DISCONTINUED | OUTPATIENT
Start: 2022-05-24 | End: 2022-05-31

## 2022-05-24 RX ORDER — GABAPENTIN 400 MG/1
100 CAPSULE ORAL DAILY
Refills: 0 | Status: DISCONTINUED | OUTPATIENT
Start: 2022-05-24 | End: 2022-05-31

## 2022-05-24 RX ORDER — SODIUM CHLORIDE 9 MG/ML
1000 INJECTION, SOLUTION INTRAVENOUS
Refills: 0 | Status: DISCONTINUED | OUTPATIENT
Start: 2022-05-24 | End: 2022-05-25

## 2022-05-24 RX ADMIN — PIPERACILLIN AND TAZOBACTAM 200 GRAM(S): 4; .5 INJECTION, POWDER, LYOPHILIZED, FOR SOLUTION INTRAVENOUS at 18:29

## 2022-05-24 RX ADMIN — Medication 600 MILLIGRAM(S): at 18:01

## 2022-05-24 RX ADMIN — Medication 650 MILLIGRAM(S): at 08:06

## 2022-05-24 RX ADMIN — Medication 150 MICROGRAM(S): at 05:56

## 2022-05-24 RX ADMIN — SODIUM CHLORIDE 60 MILLILITER(S): 9 INJECTION, SOLUTION INTRAVENOUS at 16:16

## 2022-05-24 RX ADMIN — Medication 200 GRAM(S): at 22:32

## 2022-05-24 RX ADMIN — Medication 50 MILLIGRAM(S): at 08:07

## 2022-05-24 RX ADMIN — Medication 25 MILLIGRAM(S): at 05:57

## 2022-05-24 RX ADMIN — Medication 600 MILLIGRAM(S): at 05:54

## 2022-05-24 RX ADMIN — Medication 1 SPRAY(S): at 18:01

## 2022-05-24 RX ADMIN — Medication 81 MILLIGRAM(S): at 08:07

## 2022-05-24 RX ADMIN — PREGABALIN 1000 MICROGRAM(S): 225 CAPSULE ORAL at 08:07

## 2022-05-24 RX ADMIN — Medication 25 MILLIGRAM(S): at 18:01

## 2022-05-24 RX ADMIN — Medication 1 MILLIGRAM(S): at 08:07

## 2022-05-24 RX ADMIN — PIPERACILLIN AND TAZOBACTAM 25 GRAM(S): 4; .5 INJECTION, POWDER, LYOPHILIZED, FOR SOLUTION INTRAVENOUS at 22:33

## 2022-05-24 RX ADMIN — SODIUM ZIRCONIUM CYCLOSILICATE 5 GRAM(S): 10 POWDER, FOR SUSPENSION ORAL at 22:33

## 2022-05-24 RX ADMIN — PANTOPRAZOLE SODIUM 40 MILLIGRAM(S): 20 TABLET, DELAYED RELEASE ORAL at 05:52

## 2022-05-24 RX ADMIN — CHLORHEXIDINE GLUCONATE 1 APPLICATION(S): 213 SOLUTION TOPICAL at 05:58

## 2022-05-24 RX ADMIN — HEPARIN SODIUM 5000 UNIT(S): 5000 INJECTION INTRAVENOUS; SUBCUTANEOUS at 08:07

## 2022-05-24 RX ADMIN — Medication 325 MILLIGRAM(S): at 08:07

## 2022-05-24 RX ADMIN — DORZOLAMIDE HYDROCHLORIDE 1 DROP(S): 20 SOLUTION/ DROPS OPHTHALMIC at 18:01

## 2022-05-24 RX ADMIN — Medication 1 TABLET(S): at 08:07

## 2022-05-24 RX ADMIN — Medication 1 SPRAY(S): at 05:58

## 2022-05-24 RX ADMIN — DONEPEZIL HYDROCHLORIDE 10 MILLIGRAM(S): 10 TABLET, FILM COATED ORAL at 22:33

## 2022-05-24 RX ADMIN — HEPARIN SODIUM 5000 UNIT(S): 5000 INJECTION INTRAVENOUS; SUBCUTANEOUS at 22:33

## 2022-05-24 RX ADMIN — Medication 650 MILLIGRAM(S): at 09:10

## 2022-05-24 NOTE — PROGRESS NOTE ADULT - ASSESSMENT
89 year old female with pmh of dementia (aaox1), anemia, neuropathy, gerd, depression, seizures, recent egd for gi bleed requiring gi intervention 1 month prior coming to hospital with complaints of chest pain. per patient currently asymptomatic however stating had chest pain this am was non radiating burning in nature. 5/10 intensity.   ACS ruled out, negative cultures. awaiting [facility].       1- AMS - decreased alertness   will get CBC , BMP   CT of brain   r/o infection   monitor closely clinical worsening   d/w nurse and NP     2- Prerenal azotemia / BUN is up 5/22   repeat BMP stat   add iv fluid dextrose with NS     3- severe AS / CP on admission   seen by cardiology team   medical management      4-Febrile illness , resolved   infectious work up negative     5- anemia  - w/ recent egd one month prior at [facility] per son also with intervention   cont  ferrous sulfate and folic acid     6-dementia with no behavioral issues   on  aricept    7-neuropathy  on gabapentin  will hold decrease  dose due to decreased alertness     8- blurred vision: has outpatient optho appt in june (per son)    9- Severe protein rodolfo malnutrition cont enlive with meals   MVI daily     discharge to [facility] in 1-2 days if mental status back to baseline   labs , CT are P     pending placement to NH   DNR/ DNI   son is aware of plan     #DVT Prophylaxis  - heparin Sc

## 2022-05-24 NOTE — CHART NOTE - NSCHARTNOTEFT_GEN_A_CORE
Received handoff from NP to followup on CT of head and labs per patient increasing lethargy and AMS.     CT of head negitive for acute findings  Urinalysis + Microscopic Examination (05.24.22 @ 17:35)   Protein, Urine: 100 mg/dL   Specific Gravity: 1.010   Urine Appearance: very cloudy   Urobilinogen: Negative mg/dL   pH Urine: 6.0   Leukocyte Esterase Concentration: Moderate   Nitrite: Negative   Ketone - Urine: Negative   Bilirubin: Negative   Color: Yellow   Glucose Qualitative, Urine: Negative mg/dL   Blood, Urine: Moderate   Red Blood Cell - Urine: 6-10 /HPF   White Blood Cell - Urine: >50 /HPF   Epithelial Cells: Occasional   Bacteria: Many   \  Discussed case with primary:  urine cx ordered  Patient started on epimeric ABO.

## 2022-05-24 NOTE — CHART NOTE - NSCHARTNOTEFT_GEN_A_CORE
Pt with hyperkalemia, 5.6. Pt a 89 year old female with pmh of dementia (aaox1), anemia, neuropathy, gerd, depression, seizures, recent egd for gi bleed requiring gi intervention 1 month prior coming to hospital with complaints of chest pain. per patient currently asymptomatic however stating had chest pain this am was non radiating burning in nature. 5/10 intensity.   ACS ruled out, negative cultures. awaiting [facility].     -Lokelma 5mg x1  -Calcium gluconate  -repeat BMP     Continue to monitor Pt with hyperkalemia, 5.6. Pt a 89 year old female with pmh of dementia (aaox1), anemia, neuropathy, gerd, depression, seizures, recent egd for gi bleed requiring gi intervention 1 month prior coming to hospital with complaints of chest pain. per patient currently asymptomatic however stating had chest pain this am was non radiating burning in nature. 5/10 intensity.   ACS ruled out, negative cultures. awaiting [facility].     -Lokelma 5mg x1  -Calcium gluconate  -repeat BMP     Continue to monitor    Addendum: Repeat K+ 4.8 Patient requests all Lab and Radiology Results on their Discharge Instructions

## 2022-05-25 LAB
ALBUMIN SERPL ELPH-MCNC: 3.6 G/DL — SIGNIFICANT CHANGE UP (ref 3.3–5.2)
ALP SERPL-CCNC: 73 U/L — SIGNIFICANT CHANGE UP (ref 40–120)
ALT FLD-CCNC: <5 U/L — SIGNIFICANT CHANGE UP
ANION GAP SERPL CALC-SCNC: 15 MMOL/L — SIGNIFICANT CHANGE UP (ref 5–17)
ANION GAP SERPL CALC-SCNC: 16 MMOL/L — SIGNIFICANT CHANGE UP (ref 5–17)
AST SERPL-CCNC: 11 U/L — SIGNIFICANT CHANGE UP
BILIRUB SERPL-MCNC: <0.2 MG/DL — LOW (ref 0.4–2)
BUN SERPL-MCNC: 66.7 MG/DL — HIGH (ref 8–20)
BUN SERPL-MCNC: 68.6 MG/DL — HIGH (ref 8–20)
CALCIUM SERPL-MCNC: 10.8 MG/DL — HIGH (ref 8.6–10.2)
CALCIUM SERPL-MCNC: 11.1 MG/DL — HIGH (ref 8.6–10.2)
CHLORIDE SERPL-SCNC: 104 MMOL/L — SIGNIFICANT CHANGE UP (ref 98–107)
CHLORIDE SERPL-SCNC: 105 MMOL/L — SIGNIFICANT CHANGE UP (ref 98–107)
CO2 SERPL-SCNC: 23 MMOL/L — SIGNIFICANT CHANGE UP (ref 22–29)
CO2 SERPL-SCNC: 24 MMOL/L — SIGNIFICANT CHANGE UP (ref 22–29)
CREAT SERPL-MCNC: 1.47 MG/DL — HIGH (ref 0.5–1.3)
CREAT SERPL-MCNC: 1.48 MG/DL — HIGH (ref 0.5–1.3)
EGFR: 33 ML/MIN/1.73M2 — LOW
EGFR: 33 ML/MIN/1.73M2 — LOW
GLUCOSE SERPL-MCNC: 110 MG/DL — HIGH (ref 70–99)
GLUCOSE SERPL-MCNC: 130 MG/DL — HIGH (ref 70–99)
HCT VFR BLD CALC: 30.5 % — LOW (ref 34.5–45)
HGB BLD-MCNC: 9.3 G/DL — LOW (ref 11.5–15.5)
MCHC RBC-ENTMCNC: 26.6 PG — LOW (ref 27–34)
MCHC RBC-ENTMCNC: 30.5 GM/DL — LOW (ref 32–36)
MCV RBC AUTO: 87.1 FL — SIGNIFICANT CHANGE UP (ref 80–100)
PLATELET # BLD AUTO: 253 K/UL — SIGNIFICANT CHANGE UP (ref 150–400)
POTASSIUM SERPL-MCNC: 4.6 MMOL/L — SIGNIFICANT CHANGE UP (ref 3.5–5.3)
POTASSIUM SERPL-MCNC: 4.8 MMOL/L — SIGNIFICANT CHANGE UP (ref 3.5–5.3)
POTASSIUM SERPL-SCNC: 4.6 MMOL/L — SIGNIFICANT CHANGE UP (ref 3.5–5.3)
POTASSIUM SERPL-SCNC: 4.8 MMOL/L — SIGNIFICANT CHANGE UP (ref 3.5–5.3)
PROT SERPL-MCNC: 8.3 G/DL — SIGNIFICANT CHANGE UP (ref 6.6–8.7)
RBC # BLD: 3.5 M/UL — LOW (ref 3.8–5.2)
RBC # FLD: 20.3 % — HIGH (ref 10.3–14.5)
SODIUM SERPL-SCNC: 143 MMOL/L — SIGNIFICANT CHANGE UP (ref 135–145)
SODIUM SERPL-SCNC: 144 MMOL/L — SIGNIFICANT CHANGE UP (ref 135–145)
WBC # BLD: 6.39 K/UL — SIGNIFICANT CHANGE UP (ref 3.8–10.5)
WBC # FLD AUTO: 6.39 K/UL — SIGNIFICANT CHANGE UP (ref 3.8–10.5)

## 2022-05-25 PROCEDURE — 99233 SBSQ HOSP IP/OBS HIGH 50: CPT

## 2022-05-25 PROCEDURE — 76770 US EXAM ABDO BACK WALL COMP: CPT | Mod: 26

## 2022-05-25 RX ORDER — SODIUM CHLORIDE 9 MG/ML
1000 INJECTION, SOLUTION INTRAVENOUS
Refills: 0 | Status: DISCONTINUED | OUTPATIENT
Start: 2022-05-25 | End: 2022-05-26

## 2022-05-25 RX ORDER — CEFTRIAXONE 500 MG/1
1000 INJECTION, POWDER, FOR SOLUTION INTRAMUSCULAR; INTRAVENOUS EVERY 24 HOURS
Refills: 0 | Status: COMPLETED | OUTPATIENT
Start: 2022-05-25 | End: 2022-05-27

## 2022-05-25 RX ADMIN — HEPARIN SODIUM 5000 UNIT(S): 5000 INJECTION INTRAVENOUS; SUBCUTANEOUS at 22:44

## 2022-05-25 RX ADMIN — DORZOLAMIDE HYDROCHLORIDE 1 DROP(S): 20 SOLUTION/ DROPS OPHTHALMIC at 08:13

## 2022-05-25 RX ADMIN — SODIUM CHLORIDE 60 MILLILITER(S): 9 INJECTION, SOLUTION INTRAVENOUS at 10:27

## 2022-05-25 RX ADMIN — Medication 81 MILLIGRAM(S): at 08:16

## 2022-05-25 RX ADMIN — Medication 600 MILLIGRAM(S): at 05:13

## 2022-05-25 RX ADMIN — Medication 1 SPRAY(S): at 05:11

## 2022-05-25 RX ADMIN — Medication 600 MILLIGRAM(S): at 18:15

## 2022-05-25 RX ADMIN — Medication 1 SPRAY(S): at 18:14

## 2022-05-25 RX ADMIN — Medication 25 MILLIGRAM(S): at 05:12

## 2022-05-25 RX ADMIN — Medication 1 TABLET(S): at 08:13

## 2022-05-25 RX ADMIN — PIPERACILLIN AND TAZOBACTAM 25 GRAM(S): 4; .5 INJECTION, POWDER, LYOPHILIZED, FOR SOLUTION INTRAVENOUS at 05:12

## 2022-05-25 RX ADMIN — Medication 25 MILLIGRAM(S): at 18:15

## 2022-05-25 RX ADMIN — DORZOLAMIDE HYDROCHLORIDE 1 DROP(S): 20 SOLUTION/ DROPS OPHTHALMIC at 18:15

## 2022-05-25 RX ADMIN — SODIUM CHLORIDE 85 MILLILITER(S): 9 INJECTION, SOLUTION INTRAVENOUS at 16:07

## 2022-05-25 RX ADMIN — PANTOPRAZOLE SODIUM 40 MILLIGRAM(S): 20 TABLET, DELAYED RELEASE ORAL at 05:12

## 2022-05-25 RX ADMIN — QUETIAPINE FUMARATE 75 MILLIGRAM(S): 200 TABLET, FILM COATED ORAL at 22:44

## 2022-05-25 RX ADMIN — Medication 50 MILLIGRAM(S): at 08:13

## 2022-05-25 RX ADMIN — HEPARIN SODIUM 5000 UNIT(S): 5000 INJECTION INTRAVENOUS; SUBCUTANEOUS at 10:27

## 2022-05-25 RX ADMIN — Medication 150 MICROGRAM(S): at 05:12

## 2022-05-25 RX ADMIN — PREGABALIN 1000 MICROGRAM(S): 225 CAPSULE ORAL at 08:13

## 2022-05-25 RX ADMIN — DONEPEZIL HYDROCHLORIDE 10 MILLIGRAM(S): 10 TABLET, FILM COATED ORAL at 22:44

## 2022-05-25 RX ADMIN — Medication 1 MILLIGRAM(S): at 08:13

## 2022-05-25 RX ADMIN — CEFTRIAXONE 100 MILLIGRAM(S): 500 INJECTION, POWDER, FOR SOLUTION INTRAMUSCULAR; INTRAVENOUS at 11:50

## 2022-05-25 RX ADMIN — Medication 325 MILLIGRAM(S): at 08:13

## 2022-05-25 NOTE — PROGRESS NOTE ADULT - ASSESSMENT
89 year old female with pmh of dementia (aaox1), anemia, neuropathy, gerd, depression, seizures, recent egd for gi bleed requiring gi intervention 1 month prior coming to hospital with complaints of chest pain. per patient currently asymptomatic however stating had chest pain this am was non radiating burning in nature. 5/10 intensity.   ACS ruled out, negative cultures. awaiting [facility].       1- AMS - decreased alertness suspected UTI   on iv rocephin   urine cx send   cont to monitor labs , US of kidney ordered   CT of brain neg for acute infarction or bleed     2- Prerenal azotemia / RHONDA   added iv fluid   US of kidney r/o obstruction   repeat BMP in am     3- severe AS / CP on admission   seen by cardiology team   medical management      4-Febrile illness , resolved   infectious work up negative     5- anemia  - w/ recent egd one month prior at [facility] per son also with intervention   cont  ferrous sulfate and folic acid     6-dementia with no behavioral issues   on  aricept    7-neuropathy  on gabapentin  will hold decrease  dose due to decreased alertness     8- blurred vision: has outpatient optho appt in june (per son)    9- Severe protein rodolfo malnutrition cont enlive with meals   MVI daily     discharge to [facility] on hold now due to AMS and possible UTI , RHONDA       DNR/ DNI   son is aware of plan   spoke to him on the phone   #DVT Prophylaxis  - heparin Sc

## 2022-05-26 LAB — SARS-COV-2 RNA SPEC QL NAA+PROBE: SIGNIFICANT CHANGE UP

## 2022-05-26 PROCEDURE — 99233 SBSQ HOSP IP/OBS HIGH 50: CPT

## 2022-05-26 PROCEDURE — 36000 PLACE NEEDLE IN VEIN: CPT

## 2022-05-26 RX ORDER — IRON SUCROSE 20 MG/ML
100 INJECTION, SOLUTION INTRAVENOUS EVERY 24 HOURS
Refills: 0 | Status: COMPLETED | OUTPATIENT
Start: 2022-05-26 | End: 2022-05-27

## 2022-05-26 RX ORDER — SODIUM CHLORIDE 9 MG/ML
1000 INJECTION, SOLUTION INTRAVENOUS
Refills: 0 | Status: COMPLETED | OUTPATIENT
Start: 2022-05-26 | End: 2022-05-26

## 2022-05-26 RX ORDER — SODIUM CHLORIDE 9 MG/ML
1000 INJECTION INTRAMUSCULAR; INTRAVENOUS; SUBCUTANEOUS
Refills: 0 | Status: COMPLETED | OUTPATIENT
Start: 2022-05-26 | End: 2022-05-26

## 2022-05-26 RX ADMIN — Medication 50 MILLIGRAM(S): at 12:54

## 2022-05-26 RX ADMIN — GABAPENTIN 100 MILLIGRAM(S): 400 CAPSULE ORAL at 13:10

## 2022-05-26 RX ADMIN — PREGABALIN 1000 MICROGRAM(S): 225 CAPSULE ORAL at 12:54

## 2022-05-26 RX ADMIN — Medication 25 MILLIGRAM(S): at 18:32

## 2022-05-26 RX ADMIN — Medication 1 MILLIGRAM(S): at 12:54

## 2022-05-26 RX ADMIN — Medication 1 SPRAY(S): at 06:02

## 2022-05-26 RX ADMIN — Medication 600 MILLIGRAM(S): at 18:31

## 2022-05-26 RX ADMIN — Medication 1 SPRAY(S): at 18:31

## 2022-05-26 RX ADMIN — PANTOPRAZOLE SODIUM 40 MILLIGRAM(S): 20 TABLET, DELAYED RELEASE ORAL at 06:02

## 2022-05-26 RX ADMIN — HEPARIN SODIUM 5000 UNIT(S): 5000 INJECTION INTRAVENOUS; SUBCUTANEOUS at 22:14

## 2022-05-26 RX ADMIN — SODIUM CHLORIDE 75 MILLILITER(S): 9 INJECTION, SOLUTION INTRAVENOUS at 22:07

## 2022-05-26 RX ADMIN — DONEPEZIL HYDROCHLORIDE 10 MILLIGRAM(S): 10 TABLET, FILM COATED ORAL at 22:11

## 2022-05-26 RX ADMIN — Medication 150 MICROGRAM(S): at 06:02

## 2022-05-26 RX ADMIN — IRON SUCROSE 210 MILLIGRAM(S): 20 INJECTION, SOLUTION INTRAVENOUS at 16:33

## 2022-05-26 RX ADMIN — Medication 600 MILLIGRAM(S): at 06:01

## 2022-05-26 RX ADMIN — Medication 325 MILLIGRAM(S): at 12:54

## 2022-05-26 RX ADMIN — Medication 81 MILLIGRAM(S): at 13:10

## 2022-05-26 RX ADMIN — Medication 25 MILLIGRAM(S): at 06:02

## 2022-05-26 RX ADMIN — CEFTRIAXONE 100 MILLIGRAM(S): 500 INJECTION, POWDER, FOR SOLUTION INTRAMUSCULAR; INTRAVENOUS at 12:11

## 2022-05-26 RX ADMIN — QUETIAPINE FUMARATE 75 MILLIGRAM(S): 200 TABLET, FILM COATED ORAL at 22:12

## 2022-05-26 RX ADMIN — Medication 1 TABLET(S): at 12:54

## 2022-05-26 RX ADMIN — HEPARIN SODIUM 5000 UNIT(S): 5000 INJECTION INTRAVENOUS; SUBCUTANEOUS at 12:58

## 2022-05-26 RX ADMIN — DORZOLAMIDE HYDROCHLORIDE 1 DROP(S): 20 SOLUTION/ DROPS OPHTHALMIC at 18:32

## 2022-05-26 RX ADMIN — CHLORHEXIDINE GLUCONATE 1 APPLICATION(S): 213 SOLUTION TOPICAL at 06:02

## 2022-05-26 NOTE — PROCEDURE NOTE - NSPROCDETAILS_GEN_ALL_CORE
Yes location identified, draped/prepped, sterile technique used/blood seen on insertion/dressing applied/flushes easily/secured in place/sterile technique, catheter placed

## 2022-05-26 NOTE — PROGRESS NOTE ADULT - ASSESSMENT
89 year old female with pmh of dementia (aaox1), anemia, neuropathy, gerd, depression, seizures, recent egd for gi bleed requiring gi intervention 1 month prior coming to hospital with complaints of chest pain. per patient currently asymptomatic however stating had chest pain this am was non radiating burning in nature. 5/10 intensity.   ACS ruled out, negative cultures. awaiting [facility].       1- AMS with UTI   gr neg rods in urine cx   cont rocephin   final cx result pending   check sensitivity     2- Prerenal acute renal failure with decreased GFR   likely due to infection poor oral intake   cont iv fluid ; change to saline   US of kidney neg for obstruction  repeat BMP in am   trend  cr     3- severe AS / CP on admission   pain resolved   seen by cardiology team   medical management      4- anemia  - w/ recent egd one month prior at [facility] per son also with intervention   cont  iron and folic acid   vit b12 supplement     5-dementia with no behavioral issues   on  aricept  stable     6-neuropathy  on gabapentin resume lethargy improved     7- blurred vision: has outpatient optho appt in june (per son)    8- Severe protein rodolfo malnutrition cont enlive with meals   MVI daily    insurance auth is pending per SW since last week for NH / [facility]   discharge in 1-2 days of kidney function improving and cx result P         DNR/ DNI       #DVT Prophylaxis  - heparin Sc

## 2022-05-26 NOTE — CHART NOTE - NSCHARTNOTEFT_GEN_A_CORE
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  No edema noted.     Pertinent Medications: MEDICATIONS  (STANDING):  aspirin  chewable 81 milliGRAM(s) Oral daily  cefTRIAXone   IVPB 1000 milliGRAM(s) IV Intermittent every 24 hours  chlorhexidine 2% Cloths 1 Application(s) Topical <User Schedule>  cyanocobalamin 1000 MICROGram(s) Oral daily  dextrose 5% + sodium chloride 0.9%. 1000 milliLiter(s) (85 mL/Hr) IV Continuous <Continuous>  donepezil 10 milliGRAM(s) Oral at bedtime  dorzolamide 2% Ophthalmic Solution 1 Drop(s) Both EYES <User Schedule>  ferrous    sulfate 325 milliGRAM(s) Oral daily  fluticasone propionate 50 MICROgram(s)/spray Nasal Spray 1 Spray(s) Both Nostrils two times a day  folic acid 1 milliGRAM(s) Oral daily  gabapentin 100 milliGRAM(s) Oral daily  gemfibrozil 600 milliGRAM(s) Oral two times a day  heparin   Injectable 5000 Unit(s) SubCutaneous every 12 hours  levothyroxine 150 MICROGram(s) Oral daily  metoprolol tartrate 25 milliGRAM(s) Oral two times a day  multivitamin 1 Tablet(s) Oral daily  pantoprazole    Tablet 40 milliGRAM(s) Oral before breakfast  QUEtiapine 75 milliGRAM(s) Oral at bedtime  topiramate 50 milliGRAM(s) Oral daily    MEDICATIONS  (PRN):  acetaminophen     Tablet .. 650 milliGRAM(s) Oral every 6 hours PRN Temp greater or equal to 38C (100.4F), Mild Pain (1 - 3)  aluminum hydroxide/magnesium hydroxide/simethicone Suspension 30 milliLiter(s) Oral every 4 hours PRN Dyspepsia  melatonin 3 milliGRAM(s) Oral at bedtime PRN Insomnia  ondansetron Injectable 4 milliGRAM(s) IV Push every 8 hours PRN Nausea and/or Vomiting    Pertinent Labs: CBC Full  -  ( 25 May 2022 05:23 )  WBC Count : 6.39 K/uL  RBC Count : 3.50 M/uL  Hemoglobin : 9.3 g/dL  Hematocrit : 30.5 %  Platelet Count - Automated : 253 K/uL  Mean Cell Volume : 87.1 fl  Mean Cell Hemoglobin : 26.6 pg  Mean Cell Hemoglobin Concentration : 30.5 gm/dL  05-25 Na143 mmol/L Glu 110 mg/dL<H> K+ 4.6 mmol/L Cr  1.48 mg/dL<H> BUN 66.7 mg/dL<H> Phos n/a   Alb 3.6 g/dL PAB n/a       Skin: no skin breakdown noted     Nutrition focused physical exam previously conducted - found signs of malnutrition [ ]absent [x ]present    Subcutaneous fat loss: [x ] Orbital fat pads region, [x ]Buccal fat region, [ ]Triceps region,  [ ]Ribs region    Muscle wasting: [ x]Temples region, [x ]Clavicle region, [x ]Shoulder region, [ ]Scapula region, [ ]Interosseous region,  [ ]thigh region, [ ]Calf region    Current Nutrition Diagnosis: Pt remains at nutrition risk secondary to severe protein calorie malnutrition (acute on chronic) related to inability to consume sufficient protein energy intake in setting of dementia as evidenced by pt with severe muscle wasting/fat loss and meeting <50% estimated energy intake >5 days.  Pt with decreased po intake at meals per nursing assistant; pt tends to consume a few bites, but then spit out food.  Pt does consume Ensure supplement 3x/day and requires assistance with meals.  RD to remain available.     Recommendations:   1. Continue with Ensure TID  2. Continue with MVI, B12, folic acid and feso4 daily  3. Obtain daily weight and monitor trend     Monitoring and Evaluation:   [x ] PO intake [x ] Tolerance to diet prescription [X] Weights  [X] Follow up per protocol [X] Labs:

## 2022-05-27 LAB
ANION GAP SERPL CALC-SCNC: 12 MMOL/L — SIGNIFICANT CHANGE UP (ref 5–17)
BASOPHILS # BLD AUTO: 0.01 K/UL — SIGNIFICANT CHANGE UP (ref 0–0.2)
BASOPHILS NFR BLD AUTO: 0.2 % — SIGNIFICANT CHANGE UP (ref 0–2)
BUN SERPL-MCNC: 39.1 MG/DL — HIGH (ref 8–20)
CALCIUM SERPL-MCNC: 9.7 MG/DL — SIGNIFICANT CHANGE UP (ref 8.6–10.2)
CHLORIDE SERPL-SCNC: 107 MMOL/L — SIGNIFICANT CHANGE UP (ref 98–107)
CO2 SERPL-SCNC: 21 MMOL/L — LOW (ref 22–29)
CREAT SERPL-MCNC: 0.91 MG/DL — SIGNIFICANT CHANGE UP (ref 0.5–1.3)
EGFR: 59 ML/MIN/1.73M2 — LOW
EOSINOPHIL # BLD AUTO: 0.17 K/UL — SIGNIFICANT CHANGE UP (ref 0–0.5)
EOSINOPHIL NFR BLD AUTO: 3.7 % — SIGNIFICANT CHANGE UP (ref 0–6)
GLUCOSE SERPL-MCNC: 84 MG/DL — SIGNIFICANT CHANGE UP (ref 70–99)
HCT VFR BLD CALC: 29.5 % — LOW (ref 34.5–45)
HGB BLD-MCNC: 8.7 G/DL — LOW (ref 11.5–15.5)
IMM GRANULOCYTES NFR BLD AUTO: 0.9 % — SIGNIFICANT CHANGE UP (ref 0–1.5)
LYMPHOCYTES # BLD AUTO: 0.78 K/UL — LOW (ref 1–3.3)
LYMPHOCYTES # BLD AUTO: 16.9 % — SIGNIFICANT CHANGE UP (ref 13–44)
MCHC RBC-ENTMCNC: 26.8 PG — LOW (ref 27–34)
MCHC RBC-ENTMCNC: 29.5 GM/DL — LOW (ref 32–36)
MCV RBC AUTO: 90.8 FL — SIGNIFICANT CHANGE UP (ref 80–100)
MONOCYTES # BLD AUTO: 0.62 K/UL — SIGNIFICANT CHANGE UP (ref 0–0.9)
MONOCYTES NFR BLD AUTO: 13.4 % — SIGNIFICANT CHANGE UP (ref 2–14)
NEUTROPHILS # BLD AUTO: 2.99 K/UL — SIGNIFICANT CHANGE UP (ref 1.8–7.4)
NEUTROPHILS NFR BLD AUTO: 64.9 % — SIGNIFICANT CHANGE UP (ref 43–77)
PLATELET # BLD AUTO: 201 K/UL — SIGNIFICANT CHANGE UP (ref 150–400)
POTASSIUM SERPL-MCNC: 4.9 MMOL/L — SIGNIFICANT CHANGE UP (ref 3.5–5.3)
POTASSIUM SERPL-SCNC: 4.9 MMOL/L — SIGNIFICANT CHANGE UP (ref 3.5–5.3)
RBC # BLD: 3.25 M/UL — LOW (ref 3.8–5.2)
RBC # FLD: 19.9 % — HIGH (ref 10.3–14.5)
SODIUM SERPL-SCNC: 139 MMOL/L — SIGNIFICANT CHANGE UP (ref 135–145)
WBC # BLD: 4.61 K/UL — SIGNIFICANT CHANGE UP (ref 3.8–10.5)
WBC # FLD AUTO: 4.61 K/UL — SIGNIFICANT CHANGE UP (ref 3.8–10.5)

## 2022-05-27 PROCEDURE — 99232 SBSQ HOSP IP/OBS MODERATE 35: CPT

## 2022-05-27 RX ADMIN — DORZOLAMIDE HYDROCHLORIDE 1 DROP(S): 20 SOLUTION/ DROPS OPHTHALMIC at 21:40

## 2022-05-27 RX ADMIN — PREGABALIN 1000 MICROGRAM(S): 225 CAPSULE ORAL at 12:22

## 2022-05-27 RX ADMIN — HEPARIN SODIUM 5000 UNIT(S): 5000 INJECTION INTRAVENOUS; SUBCUTANEOUS at 21:41

## 2022-05-27 RX ADMIN — Medication 3 MILLIGRAM(S): at 21:41

## 2022-05-27 RX ADMIN — Medication 325 MILLIGRAM(S): at 12:23

## 2022-05-27 RX ADMIN — QUETIAPINE FUMARATE 75 MILLIGRAM(S): 200 TABLET, FILM COATED ORAL at 21:42

## 2022-05-27 RX ADMIN — Medication 600 MILLIGRAM(S): at 05:04

## 2022-05-27 RX ADMIN — Medication 1 MILLIGRAM(S): at 12:23

## 2022-05-27 RX ADMIN — Medication 25 MILLIGRAM(S): at 18:11

## 2022-05-27 RX ADMIN — CHLORHEXIDINE GLUCONATE 1 APPLICATION(S): 213 SOLUTION TOPICAL at 05:08

## 2022-05-27 RX ADMIN — GABAPENTIN 100 MILLIGRAM(S): 400 CAPSULE ORAL at 12:23

## 2022-05-27 RX ADMIN — Medication 600 MILLIGRAM(S): at 18:11

## 2022-05-27 RX ADMIN — DONEPEZIL HYDROCHLORIDE 10 MILLIGRAM(S): 10 TABLET, FILM COATED ORAL at 21:42

## 2022-05-27 RX ADMIN — CEFTRIAXONE 100 MILLIGRAM(S): 500 INJECTION, POWDER, FOR SOLUTION INTRAMUSCULAR; INTRAVENOUS at 12:22

## 2022-05-27 RX ADMIN — Medication 25 MILLIGRAM(S): at 05:04

## 2022-05-27 RX ADMIN — Medication 81 MILLIGRAM(S): at 12:22

## 2022-05-27 RX ADMIN — HEPARIN SODIUM 5000 UNIT(S): 5000 INJECTION INTRAVENOUS; SUBCUTANEOUS at 12:21

## 2022-05-27 RX ADMIN — Medication 1 TABLET(S): at 12:23

## 2022-05-27 RX ADMIN — IRON SUCROSE 210 MILLIGRAM(S): 20 INJECTION, SOLUTION INTRAVENOUS at 15:43

## 2022-05-27 RX ADMIN — Medication 1 SPRAY(S): at 05:07

## 2022-05-27 RX ADMIN — DORZOLAMIDE HYDROCHLORIDE 1 DROP(S): 20 SOLUTION/ DROPS OPHTHALMIC at 12:21

## 2022-05-27 RX ADMIN — Medication 150 MICROGRAM(S): at 05:04

## 2022-05-27 RX ADMIN — Medication 1 SPRAY(S): at 18:11

## 2022-05-27 RX ADMIN — Medication 50 MILLIGRAM(S): at 12:22

## 2022-05-27 NOTE — PROGRESS NOTE ADULT - ASSESSMENT
89 year old female with pmh of dementia (aaox1), anemia, neuropathy, gerd, depression, seizures, recent egd for gi bleed requiring gi intervention 1 month prior coming to hospital with complaints of chest pain. per patient currently asymptomatic however stating had chest pain this am was non radiating burning in nature. 5/10 intensity.   ACS ruled out, negative cultures. awaiting [facility].       1- acute encephalopathy due to UTI   gr neg rods in urine cx   cont rocephin   final cx result pending       2- Prerenal acute renal failure with decreased GFR   likely due to infection poor oral intake   resolved post IV fluids  US of kidney neg for obstruction    3- severe AS / CP on admission   pain resolved   seen by cardiology team   medical management      4- anemia  - w/ recent egd one month prior at [facility] per son also with intervention   cont  iron and folic acid   vit b12 supplement     5-dementia with no behavioral issues   on  aricept  stable     6-neuropathy  on gabapentin resume lethargy improved     7- blurred vision: has outpatient optho appt in june (per son)    8- Severe protein rodolfo malnutrition cont enlive with meals   MVI daily     DNR/ DNI       #DVT Prophylaxis  - heparin Sc     dc in am to [facility] pending urine cx sensitivities

## 2022-05-28 PROCEDURE — 99239 HOSP IP/OBS DSCHRG MGMT >30: CPT

## 2022-05-28 RX ORDER — FLUTICASONE PROPIONATE 50 MCG
1 SPRAY, SUSPENSION NASAL
Qty: 0 | Refills: 0 | DISCHARGE

## 2022-05-28 RX ORDER — GABAPENTIN 400 MG/1
1 CAPSULE ORAL
Qty: 0 | Refills: 0 | DISCHARGE
Start: 2022-05-28

## 2022-05-28 RX ORDER — PANTOPRAZOLE SODIUM 20 MG/1
40 TABLET, DELAYED RELEASE ORAL
Refills: 0 | Status: DISCONTINUED | OUTPATIENT
Start: 2022-05-28 | End: 2022-05-31

## 2022-05-28 RX ORDER — PREGABALIN 225 MG/1
1 CAPSULE ORAL
Qty: 0 | Refills: 0 | DISCHARGE
Start: 2022-05-28

## 2022-05-28 RX ORDER — GABAPENTIN 400 MG/1
1 CAPSULE ORAL
Qty: 0 | Refills: 0 | DISCHARGE

## 2022-05-28 RX ADMIN — GABAPENTIN 100 MILLIGRAM(S): 400 CAPSULE ORAL at 10:12

## 2022-05-28 RX ADMIN — CHLORHEXIDINE GLUCONATE 1 APPLICATION(S): 213 SOLUTION TOPICAL at 06:42

## 2022-05-28 RX ADMIN — Medication 25 MILLIGRAM(S): at 06:42

## 2022-05-28 RX ADMIN — Medication 1 SPRAY(S): at 17:31

## 2022-05-28 RX ADMIN — PANTOPRAZOLE SODIUM 40 MILLIGRAM(S): 20 TABLET, DELAYED RELEASE ORAL at 06:41

## 2022-05-28 RX ADMIN — Medication 600 MILLIGRAM(S): at 17:30

## 2022-05-28 RX ADMIN — QUETIAPINE FUMARATE 75 MILLIGRAM(S): 200 TABLET, FILM COATED ORAL at 22:52

## 2022-05-28 RX ADMIN — DONEPEZIL HYDROCHLORIDE 10 MILLIGRAM(S): 10 TABLET, FILM COATED ORAL at 22:52

## 2022-05-28 RX ADMIN — DORZOLAMIDE HYDROCHLORIDE 1 DROP(S): 20 SOLUTION/ DROPS OPHTHALMIC at 06:42

## 2022-05-28 RX ADMIN — HEPARIN SODIUM 5000 UNIT(S): 5000 INJECTION INTRAVENOUS; SUBCUTANEOUS at 22:51

## 2022-05-28 RX ADMIN — Medication 150 MICROGRAM(S): at 06:42

## 2022-05-28 RX ADMIN — PREGABALIN 1000 MICROGRAM(S): 225 CAPSULE ORAL at 10:12

## 2022-05-28 RX ADMIN — Medication 1 TABLET(S): at 10:13

## 2022-05-28 RX ADMIN — Medication 81 MILLIGRAM(S): at 10:13

## 2022-05-28 RX ADMIN — Medication 1 SPRAY(S): at 06:41

## 2022-05-28 RX ADMIN — Medication 325 MILLIGRAM(S): at 10:12

## 2022-05-28 RX ADMIN — Medication 600 MILLIGRAM(S): at 06:42

## 2022-05-28 RX ADMIN — Medication 50 MILLIGRAM(S): at 10:12

## 2022-05-28 RX ADMIN — Medication 1 MILLIGRAM(S): at 10:12

## 2022-05-28 RX ADMIN — HEPARIN SODIUM 5000 UNIT(S): 5000 INJECTION INTRAVENOUS; SUBCUTANEOUS at 10:12

## 2022-05-28 RX ADMIN — Medication 25 MILLIGRAM(S): at 17:31

## 2022-05-28 RX ADMIN — DORZOLAMIDE HYDROCHLORIDE 1 DROP(S): 20 SOLUTION/ DROPS OPHTHALMIC at 19:03

## 2022-05-28 NOTE — PROGRESS NOTE ADULT - ASSESSMENT
89 year old female with pmh of dementia (aaox1), anemia, neuropathy, gerd, depression, seizures, recent egd for gi bleed requiring gi intervention 1 month prior coming to hospital with complaints of chest pain. per patient currently asymptomatic however stating had chest pain this am was non radiating burning in nature. 5/10 intensity.   ACS ruled out, negative cultures. awaiting [facility].       1- acute encephalopathy due to UTI   resolved, completed 3 days of rocephin      2- Prerenal acute renal failure with decreased GFR   likely due to infection poor oral intake   resolved post IV fluids  US of kidney neg for obstruction    3- severe AS / CP on admission   pain resolved   seen by cardiology team   medical management      4- anemia  - w/ recent egd one month prior at [facility] per son also with intervention   cont  iron and folic acid   vit b12 supplement     5-dementia with no behavioral issues   on  aricept  stable     6-neuropathy  on gabapentin resume lethargy improved     7- blurred vision: has outpatient optho appt in june (per son)    8- Severe protein rodolfo malnutrition cont enlive with meals   MVI daily     DNR/ DNI       #DVT Prophylaxis  - heparin Sc     still pending dc to [facility]

## 2022-05-29 PROCEDURE — 99232 SBSQ HOSP IP/OBS MODERATE 35: CPT

## 2022-05-29 RX ADMIN — GABAPENTIN 100 MILLIGRAM(S): 400 CAPSULE ORAL at 17:16

## 2022-05-29 RX ADMIN — Medication 325 MILLIGRAM(S): at 13:18

## 2022-05-29 RX ADMIN — Medication 25 MILLIGRAM(S): at 17:17

## 2022-05-29 RX ADMIN — PANTOPRAZOLE SODIUM 40 MILLIGRAM(S): 20 TABLET, DELAYED RELEASE ORAL at 05:43

## 2022-05-29 RX ADMIN — QUETIAPINE FUMARATE 75 MILLIGRAM(S): 200 TABLET, FILM COATED ORAL at 22:17

## 2022-05-29 RX ADMIN — Medication 1 TABLET(S): at 13:19

## 2022-05-29 RX ADMIN — Medication 81 MILLIGRAM(S): at 13:23

## 2022-05-29 RX ADMIN — Medication 150 MICROGRAM(S): at 05:41

## 2022-05-29 RX ADMIN — DORZOLAMIDE HYDROCHLORIDE 1 DROP(S): 20 SOLUTION/ DROPS OPHTHALMIC at 17:17

## 2022-05-29 RX ADMIN — CHLORHEXIDINE GLUCONATE 1 APPLICATION(S): 213 SOLUTION TOPICAL at 05:41

## 2022-05-29 RX ADMIN — HEPARIN SODIUM 5000 UNIT(S): 5000 INJECTION INTRAVENOUS; SUBCUTANEOUS at 13:17

## 2022-05-29 RX ADMIN — PREGABALIN 1000 MICROGRAM(S): 225 CAPSULE ORAL at 13:18

## 2022-05-29 RX ADMIN — Medication 600 MILLIGRAM(S): at 05:42

## 2022-05-29 RX ADMIN — Medication 50 MILLIGRAM(S): at 13:18

## 2022-05-29 RX ADMIN — Medication 1 SPRAY(S): at 05:41

## 2022-05-29 RX ADMIN — DORZOLAMIDE HYDROCHLORIDE 1 DROP(S): 20 SOLUTION/ DROPS OPHTHALMIC at 06:43

## 2022-05-29 RX ADMIN — DONEPEZIL HYDROCHLORIDE 10 MILLIGRAM(S): 10 TABLET, FILM COATED ORAL at 22:17

## 2022-05-29 RX ADMIN — Medication 1 MILLIGRAM(S): at 13:19

## 2022-05-29 RX ADMIN — Medication 1 SPRAY(S): at 17:17

## 2022-05-29 RX ADMIN — HEPARIN SODIUM 5000 UNIT(S): 5000 INJECTION INTRAVENOUS; SUBCUTANEOUS at 22:17

## 2022-05-29 RX ADMIN — Medication 25 MILLIGRAM(S): at 05:42

## 2022-05-29 RX ADMIN — Medication 600 MILLIGRAM(S): at 17:17

## 2022-05-29 NOTE — PROGRESS NOTE ADULT - ASSESSMENT
89 year old female with pmh of dementia (aaox1), anemia, neuropathy, gerd, depression, seizures, recent egd for gi bleed requiring gi intervention 1 month prior coming to hospital with complaints of chest pain. per patient currently asymptomatic however stating had chest pain this am was non radiating burning in nature. 5/10 intensity.   ACS ruled out, negative cultures. awaiting [facility].       1- acute encephalopathy due to UTI   resolved, completed 3 days of rocephin      2- Prerenal acute renal failure with decreased GFR   likely due to infection poor oral intake   resolved post IV fluids  US of kidney neg for obstruction    3- severe AS / CP on admission   pain resolved   seen by cardiology team   medical management      4- anemia  - w/ recent egd one month prior at [facility] per son also with intervention   cont  iron and folic acid   vit b12 supplement     5-dementia with no behavioral issues   on  aricept  stable     6-neuropathy  on gabapentin resume lethargy improved     7- blurred vision: has outpatient optho appt in june (per son)    8- Severe protein rodolfo malnutrition cont enlive with meals   MVI daily     DNR/ DNI       #DVT Prophylaxis  - heparin Sc     still pending dc to [facility]

## 2022-05-30 PROCEDURE — 99231 SBSQ HOSP IP/OBS SF/LOW 25: CPT

## 2022-05-30 RX ADMIN — Medication 81 MILLIGRAM(S): at 12:00

## 2022-05-30 RX ADMIN — Medication 1 SPRAY(S): at 17:34

## 2022-05-30 RX ADMIN — CHLORHEXIDINE GLUCONATE 1 APPLICATION(S): 213 SOLUTION TOPICAL at 05:34

## 2022-05-30 RX ADMIN — Medication 25 MILLIGRAM(S): at 17:35

## 2022-05-30 RX ADMIN — Medication 1 MILLIGRAM(S): at 12:01

## 2022-05-30 RX ADMIN — HEPARIN SODIUM 5000 UNIT(S): 5000 INJECTION INTRAVENOUS; SUBCUTANEOUS at 12:01

## 2022-05-30 RX ADMIN — DONEPEZIL HYDROCHLORIDE 10 MILLIGRAM(S): 10 TABLET, FILM COATED ORAL at 20:59

## 2022-05-30 RX ADMIN — PANTOPRAZOLE SODIUM 40 MILLIGRAM(S): 20 TABLET, DELAYED RELEASE ORAL at 05:37

## 2022-05-30 RX ADMIN — Medication 1 SPRAY(S): at 05:39

## 2022-05-30 RX ADMIN — Medication 325 MILLIGRAM(S): at 12:01

## 2022-05-30 RX ADMIN — DORZOLAMIDE HYDROCHLORIDE 1 DROP(S): 20 SOLUTION/ DROPS OPHTHALMIC at 08:57

## 2022-05-30 RX ADMIN — DORZOLAMIDE HYDROCHLORIDE 1 DROP(S): 20 SOLUTION/ DROPS OPHTHALMIC at 18:47

## 2022-05-30 RX ADMIN — Medication 50 MILLIGRAM(S): at 12:01

## 2022-05-30 RX ADMIN — Medication 1 TABLET(S): at 12:01

## 2022-05-30 RX ADMIN — Medication 25 MILLIGRAM(S): at 05:35

## 2022-05-30 RX ADMIN — HEPARIN SODIUM 5000 UNIT(S): 5000 INJECTION INTRAVENOUS; SUBCUTANEOUS at 21:07

## 2022-05-30 RX ADMIN — QUETIAPINE FUMARATE 75 MILLIGRAM(S): 200 TABLET, FILM COATED ORAL at 20:59

## 2022-05-30 RX ADMIN — Medication 150 MICROGRAM(S): at 05:35

## 2022-05-30 RX ADMIN — Medication 600 MILLIGRAM(S): at 17:35

## 2022-05-30 RX ADMIN — Medication 600 MILLIGRAM(S): at 05:36

## 2022-05-30 RX ADMIN — PREGABALIN 1000 MICROGRAM(S): 225 CAPSULE ORAL at 12:01

## 2022-05-30 NOTE — PROGRESS NOTE ADULT - ASSESSMENT
89 year old female with pmh of dementia (aaox1), anemia, neuropathy, gerd, depression, seizures, recent egd for gi bleed requiring gi intervention 1 month prior coming to hospital with complaints of chest pain. per patient currently asymptomatic however stating had chest pain this am was non radiating burning in nature. 5/10 intensity.   ACS ruled out, negative cultures. awaiting [facility].       1- acute encephalopathy due to UTI   resolved, completed 3 days of rocephin      2- Prerenal acute renal failure with decreased GFR   likely due to infection poor oral intake   resolved post IV fluids  US of kidney neg for obstruction    3- severe AS / CP on admission   pain resolved   seen by cardiology team   medical management      4- anemia  - w/ recent egd one month prior at [facility] per son also with intervention   cont  iron and folic acid   vit b12 supplement     5-dementia with no behavioral issues   on  aricept  stable     6-neuropathy  on gabapentin resume lethargy improved     7- blurred vision: has outpatient optho appt in june (per son)    8- Severe protein rodolfo malnutrition cont enlive with meals   MVI daily     DNR/ DNI       #DVT Prophylaxis  - heparin Sc     still pending dc to [facility]

## 2022-05-31 ENCOUNTER — TRANSCRIPTION ENCOUNTER (OUTPATIENT)
Age: 87
End: 2022-05-31

## 2022-05-31 VITALS
RESPIRATION RATE: 20 BRPM | HEART RATE: 77 BPM | OXYGEN SATURATION: 95 % | TEMPERATURE: 98 F | DIASTOLIC BLOOD PRESSURE: 71 MMHG | SYSTOLIC BLOOD PRESSURE: 128 MMHG

## 2022-05-31 LAB — SARS-COV-2 RNA SPEC QL NAA+PROBE: SIGNIFICANT CHANGE UP

## 2022-05-31 PROCEDURE — 85027 COMPLETE CBC AUTOMATED: CPT

## 2022-05-31 PROCEDURE — 85025 COMPLETE CBC W/AUTO DIFF WBC: CPT

## 2022-05-31 PROCEDURE — U0005: CPT

## 2022-05-31 PROCEDURE — 92610 EVALUATE SWALLOWING FUNCTION: CPT

## 2022-05-31 PROCEDURE — 85018 HEMOGLOBIN: CPT

## 2022-05-31 PROCEDURE — 85730 THROMBOPLASTIN TIME PARTIAL: CPT

## 2022-05-31 PROCEDURE — 84443 ASSAY THYROID STIM HORMONE: CPT

## 2022-05-31 PROCEDURE — 87186 SC STD MICRODIL/AGAR DIL: CPT

## 2022-05-31 PROCEDURE — 86850 RBC ANTIBODY SCREEN: CPT

## 2022-05-31 PROCEDURE — 93308 TTE F-UP OR LMTD: CPT

## 2022-05-31 PROCEDURE — 83735 ASSAY OF MAGNESIUM: CPT

## 2022-05-31 PROCEDURE — 94640 AIRWAY INHALATION TREATMENT: CPT

## 2022-05-31 PROCEDURE — 86901 BLOOD TYPING SEROLOGIC RH(D): CPT

## 2022-05-31 PROCEDURE — 87641 MR-STAPH DNA AMP PROBE: CPT

## 2022-05-31 PROCEDURE — 93005 ELECTROCARDIOGRAM TRACING: CPT

## 2022-05-31 PROCEDURE — 84132 ASSAY OF SERUM POTASSIUM: CPT

## 2022-05-31 PROCEDURE — 82962 GLUCOSE BLOOD TEST: CPT

## 2022-05-31 PROCEDURE — 82746 ASSAY OF FOLIC ACID SERUM: CPT

## 2022-05-31 PROCEDURE — 36415 COLL VENOUS BLD VENIPUNCTURE: CPT

## 2022-05-31 PROCEDURE — 71275 CT ANGIOGRAPHY CHEST: CPT | Mod: MA

## 2022-05-31 PROCEDURE — 75635 CT ANGIO ABDOMINAL ARTERIES: CPT | Mod: MA

## 2022-05-31 PROCEDURE — 87077 CULTURE AEROBIC IDENTIFY: CPT

## 2022-05-31 PROCEDURE — 84484 ASSAY OF TROPONIN QUANT: CPT

## 2022-05-31 PROCEDURE — 87086 URINE CULTURE/COLONY COUNT: CPT

## 2022-05-31 PROCEDURE — 84466 ASSAY OF TRANSFERRIN: CPT

## 2022-05-31 PROCEDURE — 97110 THERAPEUTIC EXERCISES: CPT

## 2022-05-31 PROCEDURE — 70450 CT HEAD/BRAIN W/O DYE: CPT | Mod: MA

## 2022-05-31 PROCEDURE — 82330 ASSAY OF CALCIUM: CPT

## 2022-05-31 PROCEDURE — 71045 X-RAY EXAM CHEST 1 VIEW: CPT

## 2022-05-31 PROCEDURE — 99285 EMERGENCY DEPT VISIT HI MDM: CPT | Mod: 25

## 2022-05-31 PROCEDURE — 84481 FREE ASSAY (FT-3): CPT

## 2022-05-31 PROCEDURE — 76770 US EXAM ABDO BACK WALL COMP: CPT

## 2022-05-31 PROCEDURE — 97163 PT EVAL HIGH COMPLEX 45 MIN: CPT

## 2022-05-31 PROCEDURE — 87040 BLOOD CULTURE FOR BACTERIA: CPT

## 2022-05-31 PROCEDURE — 83550 IRON BINDING TEST: CPT

## 2022-05-31 PROCEDURE — 86900 BLOOD TYPING SEROLOGIC ABO: CPT

## 2022-05-31 PROCEDURE — 82947 ASSAY GLUCOSE BLOOD QUANT: CPT

## 2022-05-31 PROCEDURE — 84100 ASSAY OF PHOSPHORUS: CPT

## 2022-05-31 PROCEDURE — 97116 GAIT TRAINING THERAPY: CPT

## 2022-05-31 PROCEDURE — 83690 ASSAY OF LIPASE: CPT

## 2022-05-31 PROCEDURE — 82803 BLOOD GASES ANY COMBINATION: CPT

## 2022-05-31 PROCEDURE — 83540 ASSAY OF IRON: CPT

## 2022-05-31 PROCEDURE — 82607 VITAMIN B-12: CPT

## 2022-05-31 PROCEDURE — 85014 HEMATOCRIT: CPT

## 2022-05-31 PROCEDURE — 83880 ASSAY OF NATRIURETIC PEPTIDE: CPT

## 2022-05-31 PROCEDURE — 87637 SARSCOV2&INF A&B&RSV AMP PRB: CPT

## 2022-05-31 PROCEDURE — 83036 HEMOGLOBIN GLYCOSYLATED A1C: CPT

## 2022-05-31 PROCEDURE — 84295 ASSAY OF SERUM SODIUM: CPT

## 2022-05-31 PROCEDURE — 84439 ASSAY OF FREE THYROXINE: CPT

## 2022-05-31 PROCEDURE — 87640 STAPH A DNA AMP PROBE: CPT

## 2022-05-31 PROCEDURE — 85610 PROTHROMBIN TIME: CPT

## 2022-05-31 PROCEDURE — 80053 COMPREHEN METABOLIC PANEL: CPT

## 2022-05-31 PROCEDURE — 80048 BASIC METABOLIC PNL TOTAL CA: CPT

## 2022-05-31 PROCEDURE — 83605 ASSAY OF LACTIC ACID: CPT

## 2022-05-31 PROCEDURE — U0003: CPT

## 2022-05-31 PROCEDURE — 99231 SBSQ HOSP IP/OBS SF/LOW 25: CPT

## 2022-05-31 PROCEDURE — 82435 ASSAY OF BLOOD CHLORIDE: CPT

## 2022-05-31 PROCEDURE — 80061 LIPID PANEL: CPT

## 2022-05-31 PROCEDURE — 81001 URINALYSIS AUTO W/SCOPE: CPT

## 2022-05-31 RX ADMIN — Medication 50 MILLIGRAM(S): at 09:04

## 2022-05-31 RX ADMIN — Medication 600 MILLIGRAM(S): at 16:20

## 2022-05-31 RX ADMIN — Medication 600 MILLIGRAM(S): at 06:10

## 2022-05-31 RX ADMIN — Medication 1 SPRAY(S): at 06:09

## 2022-05-31 RX ADMIN — PREGABALIN 1000 MICROGRAM(S): 225 CAPSULE ORAL at 09:19

## 2022-05-31 RX ADMIN — Medication 325 MILLIGRAM(S): at 09:04

## 2022-05-31 RX ADMIN — HEPARIN SODIUM 5000 UNIT(S): 5000 INJECTION INTRAVENOUS; SUBCUTANEOUS at 09:04

## 2022-05-31 RX ADMIN — Medication 1 MILLIGRAM(S): at 09:03

## 2022-05-31 RX ADMIN — Medication 25 MILLIGRAM(S): at 06:11

## 2022-05-31 RX ADMIN — Medication 81 MILLIGRAM(S): at 09:03

## 2022-05-31 RX ADMIN — DORZOLAMIDE HYDROCHLORIDE 1 DROP(S): 20 SOLUTION/ DROPS OPHTHALMIC at 06:10

## 2022-05-31 RX ADMIN — PANTOPRAZOLE SODIUM 40 MILLIGRAM(S): 20 TABLET, DELAYED RELEASE ORAL at 06:10

## 2022-05-31 RX ADMIN — Medication 150 MICROGRAM(S): at 06:10

## 2022-05-31 RX ADMIN — Medication 1 TABLET(S): at 09:04

## 2022-05-31 RX ADMIN — Medication 25 MILLIGRAM(S): at 17:00

## 2022-05-31 RX ADMIN — CHLORHEXIDINE GLUCONATE 1 APPLICATION(S): 213 SOLUTION TOPICAL at 06:09

## 2022-05-31 RX ADMIN — Medication 1 SPRAY(S): at 16:21

## 2022-05-31 NOTE — DISCHARGE NOTE NURSING/CASE MANAGEMENT/SOCIAL WORK - PATIENT PORTAL LINK FT
You can access the FollowMyHealth Patient Portal offered by [facility] by registering at the following website: [email or web address]. By joining [employer]’s FollowMyHealth portal, you will also be able to view your health information using other applications (apps) compatible with our system.

## 2022-05-31 NOTE — PROGRESS NOTE ADULT - PROVIDER SPECIALTY LIST ADULT
Cardiology
Hospitalist
Internal Medicine
Hospitalist
Internal Medicine
Hospitalist
Internal Medicine
Internal Medicine
Hospitalist
Hospitalist
Internal Medicine
Hospitalist

## 2022-05-31 NOTE — PROGRESS NOTE ADULT - ASSESSMENT
89 year old female with pmh of dementia (aaox1), anemia, neuropathy, gerd, depression, seizures, recent egd for gi bleed requiring gi intervention 1 month prior coming to hospital with complaints of chest pain. per patient currently asymptomatic however stating had chest pain this am was non radiating burning in nature. 5/10 intensity.   ACS ruled out, negative cultures. awaiting [facility].       1- acute encephalopathy due to UTI   resolved, completed 3 days of rocephin      2- Prerenal acute renal failure with decreased GFR   likely due to infection poor oral intake   resolved post IV fluids  US of kidney neg for obstruction    3- severe AS / CP on admission   pain resolved   seen by cardiology team   medical management      4- anemia  - w/ recent egd one month prior at [facility] per son also with intervention   cont  iron and folic acid   vit b12 supplement     5-dementia with no behavioral issues   on  aricept  stable     6-neuropathy  on gabapentin resume lethargy improved     7- blurred vision: has outpatient optho appt in june (per son)    8- Severe protein rodolfo malnutrition cont enlive with meals   MVI daily     DNR/ DNI       #DVT Prophylaxis  - heparin Sc     still pending dc to [facility]

## 2022-05-31 NOTE — PROGRESS NOTE ADULT - NUTRITIONAL ASSESSMENT
This patient has been assessed with a concern for Malnutrition and has been determined to have a diagnosis/diagnoses of Severe protein-calorie malnutrition.    This patient is being managed with:   Diet Pureed-  Supplement Feeding Modality:  Oral  Ensure Enlive Cans or Servings Per Day:  3       Frequency:  Three Times a day  Entered: May 13 2022 10:34AM    

## 2022-05-31 NOTE — PROGRESS NOTE ADULT - SUBJECTIVE AND OBJECTIVE BOX
complaining of urinary pain  good appetite, states shes constantly hungry     MEDICATIONS  (STANDING):  aspirin  chewable 81 milliGRAM(s) Oral daily  donepezil 10 milliGRAM(s) Oral at bedtime  dorzolamide 2% Ophthalmic Solution 1 Drop(s) Both EYES <User Schedule>  ferrous    sulfate 325 milliGRAM(s) Oral daily  fluticasone propionate 50 MICROgram(s)/spray Nasal Spray 1 Spray(s) Both Nostrils two times a day  folic acid 1 milliGRAM(s) Oral daily  gabapentin 100 milliGRAM(s) Oral two times a day  gemfibrozil 600 milliGRAM(s) Oral two times a day  heparin   Injectable 5000 Unit(s) SubCutaneous every 12 hours  levothyroxine 150 MICROGram(s) Oral daily  metoprolol tartrate 25 milliGRAM(s) Oral two times a day  pantoprazole    Tablet 40 milliGRAM(s) Oral before breakfast  QUEtiapine 100 milliGRAM(s) Oral at bedtime  topiramate 50 milliGRAM(s) Oral daily    MEDICATIONS  (PRN):  acetaminophen     Tablet .. 650 milliGRAM(s) Oral every 6 hours PRN Temp greater or equal to 38C (100.4F), Mild Pain (1 - 3)  aluminum hydroxide/magnesium hydroxide/simethicone Suspension 30 milliLiter(s) Oral every 4 hours PRN Dyspepsia  melatonin 3 milliGRAM(s) Oral at bedtime PRN Insomnia  ondansetron Injectable 4 milliGRAM(s) IV Push every 8 hours PRN Nausea and/or Vomiting      Allergies    No Known Allergies      Vital Signs Last 24 Hrs  T(C): 36.8 (18 May 2022 08:41), Max: 36.8 (17 May 2022 15:52)  T(F): 98.2 (18 May 2022 08:41), Max: 98.3 (17 May 2022 15:52)  HR: 59 (18 May 2022 08:41) (59 - 76)  BP: 148/80 (18 May 2022 08:41) (126/82 - 162/72)  BP(mean): 112 (17 May 2022 15:52) (97 - 112)  RR: 17 (18 May 2022 08:41) (17 - 18)  SpO2: 96% (18 May 2022 06:00) (95% - 96%)    PHYSICAL EXAM:    GENERAL: frail   CHEST/LUNG: Clear to ausculation bilaterally  HEART: Regular rate and rhythm; S1 S2  ABDOMEN: Soft, Bowel sounds present  EXTREMITIES: no carol  NERVOUS SYSTEM:  Alert & Oriented X2 confused at times, follows simple commands    LABS:                CAPILLARY BLOOD GLUCOSE            RADIOLOGY & ADDITIONAL TESTS:  
  no acute complaints /events  ros limited 2/2 dementia     MEDICATIONS  (STANDING):  aspirin  chewable 81 milliGRAM(s) Oral daily  chlorhexidine 2% Cloths 1 Application(s) Topical <User Schedule>  donepezil 10 milliGRAM(s) Oral at bedtime  dorzolamide 2% Ophthalmic Solution 1 Drop(s) Both EYES <User Schedule>  ferrous    sulfate 325 milliGRAM(s) Oral daily  fluticasone propionate 50 MICROgram(s)/spray Nasal Spray 1 Spray(s) Both Nostrils two times a day  folic acid 1 milliGRAM(s) Oral daily  gabapentin 100 milliGRAM(s) Oral two times a day  gemfibrozil 600 milliGRAM(s) Oral two times a day  heparin   Injectable 5000 Unit(s) SubCutaneous every 12 hours  levothyroxine 150 MICROGram(s) Oral daily  metoprolol tartrate 25 milliGRAM(s) Oral two times a day  multivitamin 1 Tablet(s) Oral daily  pantoprazole    Tablet 40 milliGRAM(s) Oral before breakfast  QUEtiapine 100 milliGRAM(s) Oral at bedtime  topiramate 50 milliGRAM(s) Oral daily    MEDICATIONS  (PRN):  acetaminophen     Tablet .. 650 milliGRAM(s) Oral every 6 hours PRN Temp greater or equal to 38C (100.4F), Mild Pain (1 - 3)  aluminum hydroxide/magnesium hydroxide/simethicone Suspension 30 milliLiter(s) Oral every 4 hours PRN Dyspepsia  melatonin 3 milliGRAM(s) Oral at bedtime PRN Insomnia  ondansetron Injectable 4 milliGRAM(s) IV Push every 8 hours PRN Nausea and/or Vomiting      Allergies    No Known Allergies      Vital Signs Last 24 Hrs  T(C): 36.7 (19 May 2022 11:05), Max: 36.9 (18 May 2022 20:00)  T(F): 98 (19 May 2022 11:05), Max: 98.4 (18 May 2022 20:00)  HR: 80 (19 May 2022 11:05) (68 - 92)  BP: 133/85 (19 May 2022 11:05) (133/85 - 164/84)  BP(mean): 107 (19 May 2022 04:00) (97 - 107)  RR: 18 (19 May 2022 11:05) (16 - 18)  SpO2: 98% (19 May 2022 11:05) (96% - 98%)    PHYSICAL EXAM:    GENERAL: NAD  CHEST/LUNG: Clear to ausculation bilateral  HEART: Regular rate and rhythm; S1 S2  ABDOMEN: Soft, bowel sounds present  EXTREMITIES:  no edema   NERVOUS SYSTEM:  Alert & Oriented x1 self confused     LABS:                        9.4    5.47  )-----------( 271      ( 19 May 2022 05:52 )             30.7         139  |  102  |  34.2<H>  ----------------------------<  107<H>  4.6   |  22.0  |  1.16    Ca    10.4<H>      19 May 2022 05:52  Mg     2.1             Urinalysis Basic - ( 18 May 2022 14:23 )    Color: Yellow / Appearance: Clear / S.010 / pH: x  Gluc: x / Ketone: Negative  / Bili: Negative / Urobili: Negative mg/dL   Blood: x / Protein: 30 mg/dL / Nitrite: Negative   Leuk Esterase: Trace / RBC: 0-2 /HPF / WBC 0-2 /HPF   Sq Epi: x / Non Sq Epi: Occasional / Bacteria: Occasional        CAPILLARY BLOOD GLUCOSE            RADIOLOGY & ADDITIONAL TESTS:  
  no events  complaining of hunger this am, about to be fed by aide  ros limited 2/2 confusion     MEDICATIONS  (STANDING):  aspirin  chewable 81 milliGRAM(s) Oral daily  donepezil 10 milliGRAM(s) Oral at bedtime  dorzolamide 2% Ophthalmic Solution 1 Drop(s) Both EYES <User Schedule>  ferrous    sulfate 325 milliGRAM(s) Oral daily  fluticasone propionate 50 MICROgram(s)/spray Nasal Spray 1 Spray(s) Both Nostrils two times a day  folic acid 1 milliGRAM(s) Oral daily  gabapentin 100 milliGRAM(s) Oral two times a day  gemfibrozil 600 milliGRAM(s) Oral two times a day  heparin   Injectable 5000 Unit(s) SubCutaneous every 12 hours  levothyroxine 150 MICROGram(s) Oral daily  metoprolol tartrate 25 milliGRAM(s) Oral two times a day  pantoprazole    Tablet 40 milliGRAM(s) Oral before breakfast  QUEtiapine 100 milliGRAM(s) Oral at bedtime  topiramate 50 milliGRAM(s) Oral daily    MEDICATIONS  (PRN):  acetaminophen     Tablet .. 650 milliGRAM(s) Oral every 6 hours PRN Temp greater or equal to 38C (100.4F), Mild Pain (1 - 3)  aluminum hydroxide/magnesium hydroxide/simethicone Suspension 30 milliLiter(s) Oral every 4 hours PRN Dyspepsia  melatonin 3 milliGRAM(s) Oral at bedtime PRN Insomnia  ondansetron Injectable 4 milliGRAM(s) IV Push every 8 hours PRN Nausea and/or Vomiting      Allergies    No Known Allergies      Vital Signs Last 24 Hrs  T(C): 36.4 (17 May 2022 13:12), Max: 36.6 (16 May 2022 21:30)  T(F): 97.6 (17 May 2022 13:12), Max: 97.9 (16 May 2022 21:30)  HR: 63 (17 May 2022 13:12) (60 - 72)  BP: 126/82 (17 May 2022 13:12) (126/82 - 150/79)  BP(mean): 97 (17 May 2022 13:12) (97 - 97)  RR: 18 (17 May 2022 13:12) (17 - 18)  SpO2: 95% (17 May 2022 13:12) (95% - 95%)    PHYSICAL EXAM:    GENERAL: frail   CHEST/LUNG: Clear to ausculation bilaterally  HEART: Regular rate and rhythm; S1 S2  ABDOMEN: Soft, Nondistended; Bowel sounds present  EXTREMITIES: no edema     LABS:                CAPILLARY BLOOD GLUCOSE            RADIOLOGY & ADDITIONAL TESTS:  
 Follow up visit for dementia , febrile illness     Pt is awake c/o being thirsty , and constipation   no distress sitting in the bed , confused     no overnight events       MEDICATIONS  (STANDING):  aspirin  chewable 81 milliGRAM(s) Oral daily  chlorhexidine 2% Cloths 1 Application(s) Topical <User Schedule>  donepezil 10 milliGRAM(s) Oral at bedtime  dorzolamide 2% Ophthalmic Solution 1 Drop(s) Both EYES <User Schedule>  ferrous    sulfate 325 milliGRAM(s) Oral daily  fluticasone propionate 50 MICROgram(s)/spray Nasal Spray 1 Spray(s) Both Nostrils two times a day  folic acid 1 milliGRAM(s) Oral daily  gabapentin 100 milliGRAM(s) Oral two times a day  gemfibrozil 600 milliGRAM(s) Oral two times a day  heparin   Injectable 5000 Unit(s) SubCutaneous every 12 hours  levothyroxine 150 MICROGram(s) Oral daily  metoprolol tartrate 25 milliGRAM(s) Oral two times a day  multivitamin 1 Tablet(s) Oral daily  pantoprazole    Tablet 40 milliGRAM(s) Oral before breakfast  QUEtiapine 100 milliGRAM(s) Oral at bedtime  topiramate 50 milliGRAM(s) Oral daily    Vital Signs Last 24 Hrs  T(C): 36.6 (22 May 2022 08:06), Max: 37.2 (21 May 2022 15:56)  T(F): 97.8 (22 May 2022 08:06), Max: 98.9 (21 May 2022 15:56)  HR: 84 (22 May 2022 08:06) (56 - 84)  BP: 118/72 (22 May 2022 08:06) (115/75 - 145/82)  BP(mean): 88 (22 May 2022 08:06) (88 - 88)  RR: 16 (22 May 2022 08:06) (16 - 18)  SpO2: 96% (22 May 2022 08:06) (96% - 98%)    GENERAL: No distress awake   CHEST/LUNG: Clear to ausculation bilaterally  HEART: Regular rate and rhythm; S1 S2  ABDOMEN: Soft, Bowel sounds present  EXTREMITIES:  no leg  edema   NERVOUS SYSTEM:  Alert & Orientedx 1, generalized weakness , hard of hearing   no focal deficits , speech normal         
Hospitalist Daily Progress Note    Chief Complaint:  Patient is a 89y old  Female who presents with a chief complaint of Chest Pain (13 May 2022 10:46)      SUBJECTIVE / OVERNIGHT EVENTS:  Patient was seen and examined at bedside. No active complaints. No overnight events.   Patient denies chest pain, SOB, abd pain, N/V, fever, chills, dysuria or any other complaints. All remainder ROS negative.     MEDICATIONS  (STANDING):  aspirin  chewable 81 milliGRAM(s) Oral daily  donepezil 10 milliGRAM(s) Oral at bedtime  dorzolamide 2% Ophthalmic Solution 1 Drop(s) Both EYES <User Schedule>  ferrous    sulfate 325 milliGRAM(s) Oral daily  fluticasone propionate 50 MICROgram(s)/spray Nasal Spray 1 Spray(s) Both Nostrils two times a day  folic acid 1 milliGRAM(s) Oral daily  gabapentin 100 milliGRAM(s) Oral two times a day  gemfibrozil 600 milliGRAM(s) Oral two times a day  heparin   Injectable 5000 Unit(s) SubCutaneous every 12 hours  levothyroxine 150 MICROGram(s) Oral daily  metoprolol tartrate 25 milliGRAM(s) Oral two times a day  pantoprazole    Tablet 40 milliGRAM(s) Oral before breakfast  piperacillin/tazobactam IVPB.. 3.375 Gram(s) IV Intermittent every 12 hours  QUEtiapine 100 milliGRAM(s) Oral at bedtime  topiramate 50 milliGRAM(s) Oral daily    MEDICATIONS  (PRN):  acetaminophen     Tablet .. 650 milliGRAM(s) Oral every 6 hours PRN Temp greater or equal to 38C (100.4F), Mild Pain (1 - 3)  aluminum hydroxide/magnesium hydroxide/simethicone Suspension 30 milliLiter(s) Oral every 4 hours PRN Dyspepsia  melatonin 3 milliGRAM(s) Oral at bedtime PRN Insomnia  ondansetron Injectable 4 milliGRAM(s) IV Push every 8 hours PRN Nausea and/or Vomiting        I&O's Summary    13 May 2022 07:01  -  13 May 2022 16:00  --------------------------------------------------------  IN: 240 mL / OUT: 0 mL / NET: 240 mL        PHYSICAL EXAM:  Vital Signs Last 24 Hrs  T(C): 36.6 (13 May 2022 08:32), Max: 36.6 (12 May 2022 21:33)  T(F): 97.9 (13 May 2022 08:32), Max: 97.9 (13 May 2022 08:32)  HR: 69 (13 May 2022 08:32) (69 - 93)  BP: 130/68 (13 May 2022 08:32) (128/81 - 153/88)  BP(mean): --  RR: 18 (13 May 2022 08:32) (18 - 18)  SpO2: 95% (13 May 2022 08:32) (94% - 95%)      Constitutional: NAD, Resting, thin frail female  ENT: Supple, No JVD  Lungs: CTA B/L, Non-labored breathing  Cardio: RRR, S1/S2, No murmur  Abdomen: Soft, Nontender, Nondistended; Bowel sounds present  Extremities: No calf tenderness, No pitting edema  Musculoskeletal:   No clubbing or cyanosis of digits; no joint swelling or tenderness to palpation  Psych: Calm, cooperative affect appropriate  Neuro: Awake and alert, oriented to only name, moves all extremities  Skin: No rashes; no palpable lesions    LABS:                        8.2    5.20  )-----------( 228      ( 13 May 2022 05:42 )             27.0     05-13    136  |  102  |  26.8<H>  ----------------------------<  103<H>  4.8   |  18.0<L>  |  1.19    Ca    9.3      13 May 2022 05:42    TPro  7.3  /  Alb  3.2<L>  /  TBili  0.2<L>  /  DBili  x   /  AST  14  /  ALT  7   /  AlkPhos  65  05-13      CARDIAC MARKERS ( 13 May 2022 05:42 )  x     / <0.01 ng/mL / x     / x     / x      CARDIAC MARKERS ( 12 May 2022 05:38 )  x     / <0.01 ng/mL / x     / x     / x      CARDIAC MARKERS ( 11 May 2022 17:07 )  x     / <0.01 ng/mL / x     / x     / x              Culture - Urine (collected 11 May 2022 22:13)  Source: Clean Catch Clean Catch (Midstream)  Final Report (12 May 2022 21:14):    No growth    Culture - Blood (collected 11 May 2022 11:51)  Source: .Blood Blood-Peripheral  Preliminary Report (13 May 2022 13:00):    No growth at 48 hours    Culture - Blood (collected 11 May 2022 11:50)  Source: .Blood Blood-Peripheral  Preliminary Report (13 May 2022 13:00):    No growth at 48 hours      CAPILLARY BLOOD GLUCOSE            RADIOLOGY REVIEWED  
  confused,  asking why she cant walk  ros limited 2/2 dementia      MEDICATIONS  (STANDING):  aspirin  chewable 81 milliGRAM(s) Oral daily  donepezil 10 milliGRAM(s) Oral at bedtime  dorzolamide 2% Ophthalmic Solution 1 Drop(s) Both EYES <User Schedule>  ferrous    sulfate 325 milliGRAM(s) Oral daily  fluticasone propionate 50 MICROgram(s)/spray Nasal Spray 1 Spray(s) Both Nostrils two times a day  folic acid 1 milliGRAM(s) Oral daily  gabapentin 100 milliGRAM(s) Oral two times a day  gemfibrozil 600 milliGRAM(s) Oral two times a day  heparin   Injectable 5000 Unit(s) SubCutaneous every 12 hours  levothyroxine 150 MICROGram(s) Oral daily  metoprolol tartrate 25 milliGRAM(s) Oral two times a day  pantoprazole    Tablet 40 milliGRAM(s) Oral before breakfast  QUEtiapine 100 milliGRAM(s) Oral at bedtime  topiramate 50 milliGRAM(s) Oral daily    MEDICATIONS  (PRN):  acetaminophen     Tablet .. 650 milliGRAM(s) Oral every 6 hours PRN Temp greater or equal to 38C (100.4F), Mild Pain (1 - 3)  aluminum hydroxide/magnesium hydroxide/simethicone Suspension 30 milliLiter(s) Oral every 4 hours PRN Dyspepsia  melatonin 3 milliGRAM(s) Oral at bedtime PRN Insomnia  ondansetron Injectable 4 milliGRAM(s) IV Push every 8 hours PRN Nausea and/or Vomiting      Allergies    No Known Allergies    Vital Signs Last 24 Hrs  T(C): 37.3 (15 May 2022 07:51), Max: 37.3 (15 May 2022 07:51)  T(F): 99.2 (15 May 2022 07:51), Max: 99.2 (15 May 2022 07:51)  HR: 61 (15 May 2022 07:51) (61 - 80)  BP: 140/78 (15 May 2022 07:51) (140/78 - 164/82)  BP(mean): 101 (15 May 2022 05:25) (101 - 101)  RR: 18 (15 May 2022 07:51) (16 - 18)  SpO2: 97% (15 May 2022 07:51) (95% - 97%)    PHYSICAL EXAM:    GENERAL: NAD  CHEST/LUNG: Clear to ausculation bilaterally  HEART: Regular rate and rhythm; S1 S2  ABDOMEN: Soft, Nontender, Bowel sounds present  EXTREMITIES:  no edema   NERVOUS SYSTEM:  Alert & Oriented X1 self, moves ext x4  confused     LABS:                        8.4    6.97  )-----------( 267      ( 14 May 2022 07:42 )             27.3     05-14    139  |  105  |  29.5<H>  ----------------------------<  101<H>  4.7   |  19.0<L>  |  1.23    Ca    9.7      14 May 2022 07:42            CAPILLARY BLOOD GLUCOSE      POCT Blood Glucose.: 139 mg/dL (15 May 2022 01:21)        RADIOLOGY & ADDITIONAL TESTS:  
  late entry    no events/ complaints  ros neg    MEDICATIONS  (STANDING):  aspirin  chewable 81 milliGRAM(s) Oral daily  chlorhexidine 2% Cloths 1 Application(s) Topical <User Schedule>  cyanocobalamin 1000 MICROGram(s) Oral daily  donepezil 10 milliGRAM(s) Oral at bedtime  dorzolamide 2% Ophthalmic Solution 1 Drop(s) Both EYES <User Schedule>  ferrous    sulfate 325 milliGRAM(s) Oral daily  fluticasone propionate 50 MICROgram(s)/spray Nasal Spray 1 Spray(s) Both Nostrils two times a day  folic acid 1 milliGRAM(s) Oral daily  gabapentin 100 milliGRAM(s) Oral daily  gemfibrozil 600 milliGRAM(s) Oral two times a day  heparin   Injectable 5000 Unit(s) SubCutaneous every 12 hours  levothyroxine 150 MICROGram(s) Oral daily  metoprolol tartrate 25 milliGRAM(s) Oral two times a day  multivitamin 1 Tablet(s) Oral daily  pantoprazole   Suspension 40 milliGRAM(s) Oral before breakfast  QUEtiapine 75 milliGRAM(s) Oral at bedtime  topiramate 50 milliGRAM(s) Oral daily    MEDICATIONS  (PRN):  acetaminophen     Tablet .. 650 milliGRAM(s) Oral every 6 hours PRN Temp greater or equal to 38C (100.4F), Mild Pain (1 - 3)  aluminum hydroxide/magnesium hydroxide/simethicone Suspension 30 milliLiter(s) Oral every 4 hours PRN Dyspepsia  melatonin 3 milliGRAM(s) Oral at bedtime PRN Insomnia  ondansetron Injectable 4 milliGRAM(s) IV Push every 8 hours PRN Nausea and/or Vomiting      Allergies    No Known Allergies      Vital Signs Last 24 Hrs  T(C): 36.7 (29 May 2022 05:38), Max: 36.8 (28 May 2022 15:40)  T(F): 98 (29 May 2022 05:38), Max: 98.3 (28 May 2022 15:40)  HR: 61 (29 May 2022 05:38) (61 - 82)  BP: 157/71 (29 May 2022 05:38) (142/82 - 157/84)  BP(mean): --  RR: 20 (29 May 2022 05:38) (17 - 20)  SpO2: 98% (29 May 2022 05:38) (97% - 98%)    PHYSICAL EXAM:    GENERAL: NAD  CHEST/LUNG: Clear to ausculation bilaterally  HEART: Regular rate and rhythm; S1 S2  ABDOMEN: Soft, Bowel sounds present  EXTREMITIES: no edema     LABS:                CAPILLARY BLOOD GLUCOSE            RADIOLOGY & ADDITIONAL TESTS:  
  no events/complaints  ros limited 2/2 dementia    son at bedside, patient at baseline, poor po intake     MEDICATIONS  (STANDING):  aspirin  chewable 81 milliGRAM(s) Oral daily  chlorhexidine 2% Cloths 1 Application(s) Topical <User Schedule>  cyanocobalamin 1000 MICROGram(s) Oral daily  donepezil 10 milliGRAM(s) Oral at bedtime  dorzolamide 2% Ophthalmic Solution 1 Drop(s) Both EYES <User Schedule>  ferrous    sulfate 325 milliGRAM(s) Oral daily  fluticasone propionate 50 MICROgram(s)/spray Nasal Spray 1 Spray(s) Both Nostrils two times a day  folic acid 1 milliGRAM(s) Oral daily  gabapentin 100 milliGRAM(s) Oral daily  gemfibrozil 600 milliGRAM(s) Oral two times a day  heparin   Injectable 5000 Unit(s) SubCutaneous every 12 hours  levothyroxine 150 MICROGram(s) Oral daily  metoprolol tartrate 25 milliGRAM(s) Oral two times a day  multivitamin 1 Tablet(s) Oral daily  pantoprazole   Suspension 40 milliGRAM(s) Oral before breakfast  QUEtiapine 75 milliGRAM(s) Oral at bedtime  topiramate 50 milliGRAM(s) Oral daily    MEDICATIONS  (PRN):  acetaminophen     Tablet .. 650 milliGRAM(s) Oral every 6 hours PRN Temp greater or equal to 38C (100.4F), Mild Pain (1 - 3)  aluminum hydroxide/magnesium hydroxide/simethicone Suspension 30 milliLiter(s) Oral every 4 hours PRN Dyspepsia  melatonin 3 milliGRAM(s) Oral at bedtime PRN Insomnia  ondansetron Injectable 4 milliGRAM(s) IV Push every 8 hours PRN Nausea and/or Vomiting      Allergies    No Known Allergies    Vital Signs Last 24 Hrs  T(C): 36.7 (29 May 2022 10:32), Max: 36.8 (28 May 2022 15:40)  T(F): 98 (29 May 2022 10:32), Max: 98.3 (28 May 2022 15:40)  HR: 66 (29 May 2022 10:32) (61 - 82)  BP: 114/87 (29 May 2022 10:32) (114/87 - 157/84)  BP(mean): --  RR: 18 (29 May 2022 10:32) (17 - 20)  SpO2: 94% (29 May 2022 10:32) (94% - 98%)    PHYSICAL EXAM:    GENERAL: frail   CHEST/LUNG: Clear to ausculation bilaterally  HEART: Regular rate and rhythm; S1 S2  ABDOMEN: Soft, Bowel sounds present  EXTREMITIES: no edema   NERVOUS SYSTEM:  mumbling, not following directions.   LABS:                CAPILLARY BLOOD GLUCOSE            RADIOLOGY & ADDITIONAL TESTS:  
 Follow up visit for suspected UTI / AMS underlying dementia     pt is seen this am , awake alert at baseline   no overnight events   she is asking fo help , need to pee    urine cx result reviewed       ROs   limited pt is with dementia       MEDICATIONS  (STANDING):  aspirin  chewable 81 milliGRAM(s) Oral daily  cefTRIAXone   IVPB 1000 milliGRAM(s) IV Intermittent every 24 hours  chlorhexidine 2% Cloths 1 Application(s) Topical <User Schedule>  cyanocobalamin 1000 MICROGram(s) Oral daily  dextrose 5% + sodium chloride 0.9%. 1000 milliLiter(s) (85 mL/Hr) IV Continuous <Continuous>  donepezil 10 milliGRAM(s) Oral at bedtime  dorzolamide 2% Ophthalmic Solution 1 Drop(s) Both EYES <User Schedule>  ferrous    sulfate 325 milliGRAM(s) Oral daily  fluticasone propionate 50 MICROgram(s)/spray Nasal Spray 1 Spray(s) Both Nostrils two times a day  folic acid 1 milliGRAM(s) Oral daily  gabapentin 100 milliGRAM(s) Oral daily  gemfibrozil 600 milliGRAM(s) Oral two times a day  heparin   Injectable 5000 Unit(s) SubCutaneous every 12 hours  levothyroxine 150 MICROGram(s) Oral daily  metoprolol tartrate 25 milliGRAM(s) Oral two times a day  multivitamin 1 Tablet(s) Oral daily  pantoprazole    Tablet 40 milliGRAM(s) Oral before breakfast  QUEtiapine 75 milliGRAM(s) Oral at bedtime  topiramate 50 milliGRAM(s) Oral daily    Vital Signs Last 24 Hrs  T(C): 36.2 (26 May 2022 10:00), Max: 36.7 (25 May 2022 22:40)  T(F): 97.1 (26 May 2022 10:00), Max: 98 (25 May 2022 22:40)  HR: 61 (26 May 2022 10:00) (61 - 105)  BP: 122/71 (26 May 2022 10:00) (119/73 - 135/72)  BP(mean): --  RR: 18 (26 May 2022 10:00) (18 - 18)  SpO2: 96% (26 May 2022 10:00) (93% - 96%)        GENERAL: awake , alert   CHEST/LUNG: Clear to ausculation bilaterally  HEART: Regular rate and rhythm; S1 S2  ABDOMEN: Soft, no tenderness , Bowel sounds +   EXTREMITIES:  no leg  edema   Skin : warm., dry                                    9.3    6.39  )-----------( 253      ( 25 May 2022 05:23 )             30.5   05-25    143  |  105  |  66.7<H>  ----------------------------<  110<H>  4.6   |  23.0  |  1.48<H>    Ca    10.8<H>      25 May 2022 05:23    TPro  8.3  /  Alb  3.6  /  TBili  <0.2<L>  /  DBili  x   /  AST  11  /  ALT  <5  /  AlkPhos  73  05-25  
RAGHAVENDRA WALLACE  [id number]        Chief Complaint: follow up CP      Subjective: confused, no complaints      24 hour Tele: atrial paced, no events        acetaminophen     Tablet .. 650 milliGRAM(s) Oral every 6 hours PRN  aluminum hydroxide/magnesium hydroxide/simethicone Suspension 30 milliLiter(s) Oral every 4 hours PRN  aspirin  chewable 81 milliGRAM(s) Oral daily  donepezil 10 milliGRAM(s) Oral at bedtime  dorzolamide 2% Ophthalmic Solution 1 Drop(s) Both EYES <User Schedule>  ferrous    sulfate 325 milliGRAM(s) Oral daily  fluticasone propionate 50 MICROgram(s)/spray Nasal Spray 1 Spray(s) Both Nostrils two times a day  folic acid 1 milliGRAM(s) Oral daily  gabapentin 100 milliGRAM(s) Oral two times a day  gemfibrozil 600 milliGRAM(s) Oral two times a day  heparin   Injectable 5000 Unit(s) SubCutaneous every 12 hours  levothyroxine 150 MICROGram(s) Oral daily  melatonin 3 milliGRAM(s) Oral at bedtime PRN  metoprolol tartrate 25 milliGRAM(s) Oral two times a day  ondansetron Injectable 4 milliGRAM(s) IV Push every 8 hours PRN  pantoprazole    Tablet 40 milliGRAM(s) Oral before breakfast  piperacillin/tazobactam IVPB.. 3.375 Gram(s) IV Intermittent every 12 hours  QUEtiapine 100 milliGRAM(s) Oral at bedtime  topiramate 50 milliGRAM(s) Oral daily          Physical Exam:  T(C): 36.6 (05-13-22 @ 08:32), Max: 36.8 (05-12-22 @ 12:32)  HR: 69 (05-13-22 @ 08:32) (69 - 138)  BP: 130/68 (05-13-22 @ 08:32) (102/67 - 153/88)  RR: 18 (05-13-22 @ 08:32) (16 - 18)  SpO2: 95% (05-13-22 @ 08:32) (94% - 95%)  Wt(kg): --  General: elderly frail F ,  Comfortable in NAD  HEENT: dry MM, sclera anicteric  Resp: CTA bilaterally  CVS: nl s1s2, rrr, no s3/JVD, II/VI systolic murmur  Abd: soft, NT, ND, BS+  Ext: No c/c/e  Neuro A&O x1  Psych: Normal affect    I&O's Summary        Labs:   13 May 2022 05:42    136    |  102    |  26.8   ----------------------------<  103    4.8     |  18.0   |  1.19     Ca    9.3        13 May 2022 05:42  Mg     2.1       11 May 2022 11:52    TPro  7.3    /  Alb  3.2    /  TBili  0.2    /  DBili  x      /  AST  14     /  ALT  7      /  AlkPhos  65     13 May 2022 05:42                          8.2    5.20  )-----------( 228      ( 13 May 2022 05:42 )             27.0     PT/INR - ( 11 May 2022 15:24 )   PT: 17.8 sec;   INR: 1.53 ratio         PTT - ( 11 May 2022 15:24 )  PTT:31.8 sec  CARDIAC MARKERS ( 13 May 2022 05:42 )  x     / <0.01 ng/mL / x     / x     / x      CARDIAC MARKERS ( 12 May 2022 05:38 )  x     / <0.01 ng/mL / x     / x     / x      CARDIAC MARKERS ( 11 May 2022 17:07 )  x     / <0.01 ng/mL / x     / x     / x      CARDIAC MARKERS ( 11 May 2022 11:52 )  x     / <0.01 ng/mL / x     / x     / x            ECHO: 05/12/2022  Summary:   1. Normal global left ventricular systolic function.   2. LV Ejection Fraction by Roa's Method with a biplane EF of 65 %.   3. Spectral Doppler shows impaired relaxation pattern of left   ventricular myocardial filling (Grade I diastolic dysfunction).   4. Mildly enlarged left atrium.   5. Mild mitral valve regurgitation.   6. Mild thickening and calcification of the anterior and posterior   mitral valve leaflets.   7. Moderate mitral annular calcification.   8. Mild tricuspid regurgitation.   9. Moderate aortic regurgitation.  10. Peak transaortic gradient equals 35.0 mmHg, mean transaortic gradient   equals 17.8 mmHg, the calculated aortic valve area equals 0.85 cm² by the   continuity equation consistent with severe aortic stenosis.  11. Paradoxical low gradient (LVEF >50%) low stroke vol (SVi 31.29cc/m2)   severe aortic stenosis.  12. The Dimesionless Index value is 0.25.  13. Dilatation of the ascending aorta. 3.92cm        Assessment:  89F with hx of advanced dementia, PAF not on AC due to increase risk of falls, stable ascending aorta aneurysm ~5cm, PPM, HTN, HLD, admitted with chest pain  -Has had intermittent chronic chest pain, non-cardiac. Possibly all GI  -No signs CHF, ischemia and mostly atrial paced  -trops all negative, echo findings similar to prior .     Plan:  1. CV stable, continue current meds. High bleed/fall risk so not on A/C for PAF  2. No further cardiac work up  3. Not candidate for intervention on TAA or aortic valve  4. Supportive care, antibiotics  5. Outpatient follow up       Tomy Love MD
  no acute complaints  ros limited 2/2 dementia    MEDICATIONS  (STANDING):  aspirin  chewable 81 milliGRAM(s) Oral daily  donepezil 10 milliGRAM(s) Oral at bedtime  dorzolamide 2% Ophthalmic Solution 1 Drop(s) Both EYES <User Schedule>  ferrous    sulfate 325 milliGRAM(s) Oral daily  fluticasone propionate 50 MICROgram(s)/spray Nasal Spray 1 Spray(s) Both Nostrils two times a day  folic acid 1 milliGRAM(s) Oral daily  gabapentin 100 milliGRAM(s) Oral two times a day  gemfibrozil 600 milliGRAM(s) Oral two times a day  heparin   Injectable 5000 Unit(s) SubCutaneous every 12 hours  levothyroxine 150 MICROGram(s) Oral daily  metoprolol tartrate 25 milliGRAM(s) Oral two times a day  pantoprazole    Tablet 40 milliGRAM(s) Oral before breakfast  QUEtiapine 100 milliGRAM(s) Oral at bedtime  topiramate 50 milliGRAM(s) Oral daily    MEDICATIONS  (PRN):  acetaminophen     Tablet .. 650 milliGRAM(s) Oral every 6 hours PRN Temp greater or equal to 38C (100.4F), Mild Pain (1 - 3)  aluminum hydroxide/magnesium hydroxide/simethicone Suspension 30 milliLiter(s) Oral every 4 hours PRN Dyspepsia  melatonin 3 milliGRAM(s) Oral at bedtime PRN Insomnia  ondansetron Injectable 4 milliGRAM(s) IV Push every 8 hours PRN Nausea and/or Vomiting      Allergies    No Known Allergies    Vital Signs Last 24 Hrs  T(C): 36.6 (16 May 2022 07:51), Max: 36.7 (15 May 2022 16:13)  T(F): 97.9 (16 May 2022 07:51), Max: 98 (15 May 2022 16:13)  HR: 73 (16 May 2022 07:51) (70 - 78)  BP: 132/70 (16 May 2022 07:51) (132/70 - 157/93)  BP(mean): --  RR: 18 (16 May 2022 07:51) (16 - 18)  SpO2: 95% (16 May 2022 07:51) (94% - 96%)    PHYSICAL EXAM:    GENERAL: NAD  CHEST/LUNG: Clear to ausculation bilaterally  HEART: Regular rate and rhythm; S1 S2  ABDOMEN: Soft, Bowel sounds present  EXTREMITIES:  no edema   NERVOUS SYSTEM:  Alert & Oriented X2. follows simple commands. confused      LABS:                CAPILLARY BLOOD GLUCOSE            RADIOLOGY & ADDITIONAL TESTS:  
  no events/complaints  ros negative     MEDICATIONS  (STANDING):  aspirin  chewable 81 milliGRAM(s) Oral daily  chlorhexidine 2% Cloths 1 Application(s) Topical <User Schedule>  cyanocobalamin 1000 MICROGram(s) Oral daily  donepezil 10 milliGRAM(s) Oral at bedtime  dorzolamide 2% Ophthalmic Solution 1 Drop(s) Both EYES <User Schedule>  ferrous    sulfate 325 milliGRAM(s) Oral daily  fluticasone propionate 50 MICROgram(s)/spray Nasal Spray 1 Spray(s) Both Nostrils two times a day  folic acid 1 milliGRAM(s) Oral daily  gabapentin 100 milliGRAM(s) Oral daily  gemfibrozil 600 milliGRAM(s) Oral two times a day  heparin   Injectable 5000 Unit(s) SubCutaneous every 12 hours  iron sucrose IVPB 100 milliGRAM(s) IV Intermittent every 24 hours  levothyroxine 150 MICROGram(s) Oral daily  metoprolol tartrate 25 milliGRAM(s) Oral two times a day  multivitamin 1 Tablet(s) Oral daily  pantoprazole    Tablet 40 milliGRAM(s) Oral before breakfast  QUEtiapine 75 milliGRAM(s) Oral at bedtime  topiramate 50 milliGRAM(s) Oral daily    MEDICATIONS  (PRN):  acetaminophen     Tablet .. 650 milliGRAM(s) Oral every 6 hours PRN Temp greater or equal to 38C (100.4F), Mild Pain (1 - 3)  aluminum hydroxide/magnesium hydroxide/simethicone Suspension 30 milliLiter(s) Oral every 4 hours PRN Dyspepsia  melatonin 3 milliGRAM(s) Oral at bedtime PRN Insomnia  ondansetron Injectable 4 milliGRAM(s) IV Push every 8 hours PRN Nausea and/or Vomiting      Allergies    No Known Allergies        Vital Signs Last 24 Hrs  T(C): 36.3 (27 May 2022 09:06), Max: 36.6 (27 May 2022 05:02)  T(F): 97.4 (27 May 2022 09:06), Max: 97.9 (27 May 2022 05:02)  HR: 61 (27 May 2022 09:06) (59 - 62)  BP: 162/91 (27 May 2022 09:06) (151/68 - 167/73)  BP(mean): --  RR: 18 (27 May 2022 09:06) (18 - 19)  SpO2: 98% (27 May 2022 09:06) (96% - 99%)    PHYSICAL EXAM:    GENERAL: NAD  CHEST/LUNG: Clear to ausculation bilaterall  HEART: Regular rate and rhythm; S1 S2  ABDOMEN: Soft,  Bowel sounds present  EXTREMITIES:  no edema   NERVOUS SYSTEM:  Alert & Oriented X2-3 nonfocal neuro     LABS:                        8.7    4.61  )-----------( 201      ( 27 May 2022 06:33 )             29.5     05-27    139  |  107  |  39.1<H>  ----------------------------<  84  4.9   |  21.0<L>  |  0.91    Ca    9.7      27 May 2022 06:33            CAPILLARY BLOOD GLUCOSE            RADIOLOGY & ADDITIONAL TESTS:  
Hospitalist Daily Progress Note    Chief Complaint:  Patient is a 89y old  Female who presents with a chief complaint of Chest Pain (12 May 2022 14:00)      SUBJECTIVE / OVERNIGHT EVENTS:  Patient was seen and examined at bedside. Patient demented. Unable to provide hx. No CP. On ROS all negative.     MEDICATIONS  (STANDING):  aspirin  chewable 81 milliGRAM(s) Oral daily  donepezil 10 milliGRAM(s) Oral at bedtime  dorzolamide 2% Ophthalmic Solution 1 Drop(s) Both EYES <User Schedule>  ferrous    sulfate 325 milliGRAM(s) Oral daily  fluticasone propionate 50 MICROgram(s)/spray Nasal Spray 1 Spray(s) Both Nostrils two times a day  folic acid 1 milliGRAM(s) Oral daily  gabapentin 100 milliGRAM(s) Oral two times a day  gemfibrozil 600 milliGRAM(s) Oral two times a day  heparin   Injectable 5000 Unit(s) SubCutaneous every 12 hours  levothyroxine 150 MICROGram(s) Oral daily  metoprolol tartrate 25 milliGRAM(s) Oral two times a day  pantoprazole    Tablet 40 milliGRAM(s) Oral before breakfast  piperacillin/tazobactam IVPB.. 3.375 Gram(s) IV Intermittent every 12 hours  QUEtiapine 100 milliGRAM(s) Oral at bedtime  topiramate 50 milliGRAM(s) Oral daily    MEDICATIONS  (PRN):  acetaminophen     Tablet .. 650 milliGRAM(s) Oral every 6 hours PRN Temp greater or equal to 38C (100.4F), Mild Pain (1 - 3)  aluminum hydroxide/magnesium hydroxide/simethicone Suspension 30 milliLiter(s) Oral every 4 hours PRN Dyspepsia  melatonin 3 milliGRAM(s) Oral at bedtime PRN Insomnia  ondansetron Injectable 4 milliGRAM(s) IV Push every 8 hours PRN Nausea and/or Vomiting        I&O's Summary    11 May 2022 07:01  -  12 May 2022 07:00  --------------------------------------------------------  IN: 150 mL / OUT: 0 mL / NET: 150 mL        PHYSICAL EXAM:  Vital Signs Last 24 Hrs  T(C): 36.8 (12 May 2022 12:32), Max: 38 (11 May 2022 20:16)  T(F): 98.2 (12 May 2022 12:32), Max: 100.4 (11 May 2022 20:16)  HR: 138 (12 May 2022 13:33) (64 - 138)  BP: 102/67 (12 May 2022 13:33) (100/65 - 146/72)  BP(mean): --  RR: 16 (12 May 2022 12:32) (16 - 20)  SpO2: 95% (12 May 2022 12:32) (93% - 98%)      Constitutional: NAD, Resting  ENT: Supple, No JVD  Lungs: CTA B/L, Non-labored breathing  Cardio: RRR, S1/S2, Murmur  Abdomen: Soft, Nontender, Nondistended; Bowel sounds present  Extremities: No calf tenderness, No pitting edema  Musculoskeletal:   No clubbing or cyanosis of digits; no joint swelling or tenderness to palpation  Psych: Calm, cooperative affect appropriate  Neuro: Awake and alert, disoriented, confused   Skin: No rashes; no palpable lesions    LABS:                        8.8    x     )-----------( x        ( 12 May 2022 14:36 )             29.1     05-12    133<L>  |  103  |  23.3<H>  ----------------------------<  89  4.7   |  18.0<L>  |  1.10    Ca    9.0      12 May 2022 05:38  Mg     2.1     05-11    TPro  8.7  /  Alb  3.8  /  TBili  0.3<L>  /  DBili  x   /  AST  14  /  ALT  6   /  AlkPhos  80  05-11    PT/INR - ( 11 May 2022 15:24 )   PT: 17.8 sec;   INR: 1.53 ratio         PTT - ( 11 May 2022 15:24 )  PTT:31.8 sec  CARDIAC MARKERS ( 12 May 2022 05:38 )  x     / <0.01 ng/mL / x     / x     / x      CARDIAC MARKERS ( 11 May 2022 17:07 )  x     / <0.01 ng/mL / x     / x     / x      CARDIAC MARKERS ( 11 May 2022 11:52 )  x     / <0.01 ng/mL / x     / x     / x          Urinalysis Basic - ( 11 May 2022 12:01 )    Color: Yellow / Appearance: Clear / S.010 / pH: x  Gluc: x / Ketone: Negative  / Bili: Negative / Urobili: 1 mg/dL   Blood: x / Protein: 100 mg/dL / Nitrite: Negative   Leuk Esterase: Small / RBC: 3-5 /HPF / WBC 0-2 /HPF   Sq Epi: x / Non Sq Epi: Occasional / Bacteria: Few        CAPILLARY BLOOD GLUCOSE            RADIOLOGY REVIEWED  
  INTERVAL HPI/OVERNIGHT EVENTS:    MEDICATIONS  (STANDING):  aspirin  chewable 81 milliGRAM(s) Oral daily  chlorhexidine 2% Cloths 1 Application(s) Topical <User Schedule>  cyanocobalamin 1000 MICROGram(s) Oral daily  donepezil 10 milliGRAM(s) Oral at bedtime  dorzolamide 2% Ophthalmic Solution 1 Drop(s) Both EYES <User Schedule>  ferrous    sulfate 325 milliGRAM(s) Oral daily  fluticasone propionate 50 MICROgram(s)/spray Nasal Spray 1 Spray(s) Both Nostrils two times a day  folic acid 1 milliGRAM(s) Oral daily  gabapentin 100 milliGRAM(s) Oral daily  gemfibrozil 600 milliGRAM(s) Oral two times a day  heparin   Injectable 5000 Unit(s) SubCutaneous every 12 hours  levothyroxine 150 MICROGram(s) Oral daily  metoprolol tartrate 25 milliGRAM(s) Oral two times a day  multivitamin 1 Tablet(s) Oral daily  pantoprazole   Suspension 40 milliGRAM(s) Oral before breakfast  QUEtiapine 75 milliGRAM(s) Oral at bedtime  topiramate 50 milliGRAM(s) Oral daily    MEDICATIONS  (PRN):  acetaminophen     Tablet .. 650 milliGRAM(s) Oral every 6 hours PRN Temp greater or equal to 38C (100.4F), Mild Pain (1 - 3)  aluminum hydroxide/magnesium hydroxide/simethicone Suspension 30 milliLiter(s) Oral every 4 hours PRN Dyspepsia  melatonin 3 milliGRAM(s) Oral at bedtime PRN Insomnia  ondansetron Injectable 4 milliGRAM(s) IV Push every 8 hours PRN Nausea and/or Vomiting      Allergies    No Known Allergies    Intolerances        REVIEW OF SYSTEMS:    CONSTITUTIONAL: No fever, weight loss, or fatigue  RESPIRATORY: No cough, wheezing,; No shortness of breath  CARDIOVASCULAR: No chest pain, palpitations, dizziness, or leg swelling  GASTROINTESTINAL: No abdominal or epigastric pain. No nausea, vomiting; No diarrhea or constipation.   NEUROLOGICAL: No headaches, loss of strength  MISCELLANEOUS:    Vital Signs Last 24 Hrs  T(C): 36.9 (30 May 2022 10:00), Max: 36.9 (30 May 2022 10:00)  T(F): 98.4 (30 May 2022 10:00), Max: 98.4 (30 May 2022 10:00)  HR: 64 (30 May 2022 10:00) (64 - 72)  BP: 110/70 (30 May 2022 10:00) (110/70 - 147/86)  BP(mean): --  RR: 17 (30 May 2022 10:00) (17 - 22)  SpO2: 97% (30 May 2022 10:00) (96% - 98%)    PHYSICAL EXAM:    GENERAL: NAD  CHEST/LUNG: Clear to ausculation bilaterall  HEART: Regular rate and rhythm; S1 S2  ABDOMEN: Soft, Bowel sounds present  EXTREMITIES:  no edema       LABS:                CAPILLARY BLOOD GLUCOSE            RADIOLOGY & ADDITIONAL TESTS:  
  no acute complaints/events    MEDICATIONS  (STANDING):  aspirin  chewable 81 milliGRAM(s) Oral daily  chlorhexidine 2% Cloths 1 Application(s) Topical <User Schedule>  donepezil 10 milliGRAM(s) Oral at bedtime  dorzolamide 2% Ophthalmic Solution 1 Drop(s) Both EYES <User Schedule>  ferrous    sulfate 325 milliGRAM(s) Oral daily  fluticasone propionate 50 MICROgram(s)/spray Nasal Spray 1 Spray(s) Both Nostrils two times a day  folic acid 1 milliGRAM(s) Oral daily  gabapentin 100 milliGRAM(s) Oral two times a day  gemfibrozil 600 milliGRAM(s) Oral two times a day  heparin   Injectable 5000 Unit(s) SubCutaneous every 12 hours  levothyroxine 150 MICROGram(s) Oral daily  metoprolol tartrate 25 milliGRAM(s) Oral two times a day  multivitamin 1 Tablet(s) Oral daily  pantoprazole    Tablet 40 milliGRAM(s) Oral before breakfast  QUEtiapine 100 milliGRAM(s) Oral at bedtime  topiramate 50 milliGRAM(s) Oral daily    MEDICATIONS  (PRN):  acetaminophen     Tablet .. 650 milliGRAM(s) Oral every 6 hours PRN Temp greater or equal to 38C (100.4F), Mild Pain (1 - 3)  aluminum hydroxide/magnesium hydroxide/simethicone Suspension 30 milliLiter(s) Oral every 4 hours PRN Dyspepsia  melatonin 3 milliGRAM(s) Oral at bedtime PRN Insomnia  ondansetron Injectable 4 milliGRAM(s) IV Push every 8 hours PRN Nausea and/or Vomiting      Allergies    No Known Allergies    Vital Signs Last 24 Hrs  T(C): 36.7 (20 May 2022 10:00), Max: 36.7 (20 May 2022 10:00)  T(F): 98 (20 May 2022 10:00), Max: 98 (20 May 2022 10:00)  HR: 76 (20 May 2022 10:00) (65 - 78)  BP: 151/86 (20 May 2022 10:00) (139/79 - 169/78)  BP(mean): --  RR: 18 (20 May 2022 10:00) (18 - 18)  SpO2: 94% (20 May 2022 10:00) (94% - 96%)    PHYSICAL EXAM:    GENERAL: NAD  CHEST/LUNG: Clear to ausculation bilaterally  HEART: Regular rate and rhythm; S1 S2  ABDOMEN: Soft, Bowel sounds present  EXTREMITIES:  no edema   NERVOUS SYSTEM:  Alert & Orientedx 1  LABS:                        9.4    5.47  )-----------( 271      ( 19 May 2022 05:52 )             30.7     05-    139  |  102  |  34.2<H>  ----------------------------<  107<H>  4.6   |  22.0  |  1.16    Ca    10.4<H>      19 May 2022 05:52  Mg     2.1             Urinalysis Basic - ( 18 May 2022 14:23 )    Color: Yellow / Appearance: Clear / S.010 / pH: x  Gluc: x / Ketone: Negative  / Bili: Negative / Urobili: Negative mg/dL   Blood: x / Protein: 30 mg/dL / Nitrite: Negative   Leuk Esterase: Trace / RBC: 0-2 /HPF / WBC 0-2 /HPF   Sq Epi: x / Non Sq Epi: Occasional / Bacteria: Occasional        CAPILLARY BLOOD GLUCOSE            RADIOLOGY & ADDITIONAL TESTS:  
  no events  poor po intake  ros limited 2/2 dementia    MEDICATIONS  (STANDING):  aspirin  chewable 81 milliGRAM(s) Oral daily  chlorhexidine 2% Cloths 1 Application(s) Topical <User Schedule>  cyanocobalamin 1000 MICROGram(s) Oral daily  donepezil 10 milliGRAM(s) Oral at bedtime  dorzolamide 2% Ophthalmic Solution 1 Drop(s) Both EYES <User Schedule>  ferrous    sulfate 325 milliGRAM(s) Oral daily  fluticasone propionate 50 MICROgram(s)/spray Nasal Spray 1 Spray(s) Both Nostrils two times a day  folic acid 1 milliGRAM(s) Oral daily  gabapentin 100 milliGRAM(s) Oral daily  gemfibrozil 600 milliGRAM(s) Oral two times a day  heparin   Injectable 5000 Unit(s) SubCutaneous every 12 hours  levothyroxine 150 MICROGram(s) Oral daily  metoprolol tartrate 25 milliGRAM(s) Oral two times a day  multivitamin 1 Tablet(s) Oral daily  pantoprazole   Suspension 40 milliGRAM(s) Oral before breakfast  QUEtiapine 75 milliGRAM(s) Oral at bedtime  topiramate 50 milliGRAM(s) Oral daily    MEDICATIONS  (PRN):  acetaminophen     Tablet .. 650 milliGRAM(s) Oral every 6 hours PRN Temp greater or equal to 38C (100.4F), Mild Pain (1 - 3)  aluminum hydroxide/magnesium hydroxide/simethicone Suspension 30 milliLiter(s) Oral every 4 hours PRN Dyspepsia  melatonin 3 milliGRAM(s) Oral at bedtime PRN Insomnia  ondansetron Injectable 4 milliGRAM(s) IV Push every 8 hours PRN Nausea and/or Vomiting      Allergies    No Known Allergies      Vital Signs Last 24 Hrs  T(C): 36.9 (31 May 2022 08:21), Max: 36.9 (31 May 2022 06:06)  T(F): 98.4 (31 May 2022 08:21), Max: 98.5 (31 May 2022 06:06)  HR: 90 (31 May 2022 08:21) (65 - 90)  BP: 111/71 (31 May 2022 08:21) (111/71 - 134/70)  BP(mean): --  RR: 20 (31 May 2022 08:21) (18 - 22)  SpO2: 95% (31 May 2022 08:21) (95% - 98%)    PHYSICAL EXAM:    GENERAL: frail  HEART: Regular rate and rhythm; S1 S2  ABDOMEN: Soft, Bowel sounds present  EXTREMITIES:  no edema       LABS:                CAPILLARY BLOOD GLUCOSE            RADIOLOGY & ADDITIONAL TESTS:  
  seen for dementia, chest pain    complaining of left eye decreased vision  ros negative   [place]     MEDICATIONS  (STANDING):  aspirin  chewable 81 milliGRAM(s) Oral daily  donepezil 10 milliGRAM(s) Oral at bedtime  dorzolamide 2% Ophthalmic Solution 1 Drop(s) Both EYES <User Schedule>  ferrous    sulfate 325 milliGRAM(s) Oral daily  fluticasone propionate 50 MICROgram(s)/spray Nasal Spray 1 Spray(s) Both Nostrils two times a day  folic acid 1 milliGRAM(s) Oral daily  gabapentin 100 milliGRAM(s) Oral two times a day  gemfibrozil 600 milliGRAM(s) Oral two times a day  heparin   Injectable 5000 Unit(s) SubCutaneous every 12 hours  levothyroxine 150 MICROGram(s) Oral daily  metoprolol tartrate 25 milliGRAM(s) Oral two times a day  pantoprazole    Tablet 40 milliGRAM(s) Oral before breakfast  QUEtiapine 100 milliGRAM(s) Oral at bedtime  topiramate 50 milliGRAM(s) Oral daily    MEDICATIONS  (PRN):  acetaminophen     Tablet .. 650 milliGRAM(s) Oral every 6 hours PRN Temp greater or equal to 38C (100.4F), Mild Pain (1 - 3)  aluminum hydroxide/magnesium hydroxide/simethicone Suspension 30 milliLiter(s) Oral every 4 hours PRN Dyspepsia  melatonin 3 milliGRAM(s) Oral at bedtime PRN Insomnia  ondansetron Injectable 4 milliGRAM(s) IV Push every 8 hours PRN Nausea and/or Vomiting      Allergies    No Known Allergies      Vital Signs Last 24 Hrs  T(C): 36.8 (14 May 2022 10:32), Max: 36.9 (14 May 2022 05:45)  T(F): 98.3 (14 May 2022 10:32), Max: 98.5 (14 May 2022 05:45)  HR: 71 (14 May 2022 10:32) (71 - 78)  BP: 146/77 (14 May 2022 10:32) (105/68 - 158/82)  BP(mean): --  RR: 18 (14 May 2022 08:03) (18 - 18)  SpO2: 97% (14 May 2022 08:03) (95% - 97%)    PHYSICAL EXAM:    GENERAL: NAD  HEENT: EOMI, SHASTA no conjunctival erythema, blurred vision on left   CHEST/LUNG: Clear to ausculation bilaterall  HEART: Regular rate and rhythm; S1 S2  ABDOMEN: Soft, Bowel sounds present  EXTREMITIES:  no edema   NERVOUS SYSTEM:  Alert & Oriented X2, confused at times, following commands.    LABS:                        8.4    6.97  )-----------( 267      ( 14 May 2022 07:42 )             27.3     05-14    139  |  105  |  29.5<H>  ----------------------------<  101<H>  4.7   |  19.0<L>  |  1.23    Ca    9.7      14 May 2022 07:42    TPro  7.3  /  Alb  3.2<L>  /  TBili  0.2<L>  /  DBili  x   /  AST  14  /  ALT  7   /  AlkPhos  65  05-13          CAPILLARY BLOOD GLUCOSE            RADIOLOGY & ADDITIONAL TESTS:  
 Follow up visit for dementia , febrile illness     Pt is seen in am , she is asking to have food " hungry "   no overnight events       ROS   limited pt is with dementia       Vital Signs Last 24 Hrs  T(C): 36.6 (23 May 2022 09:52), Max: 36.9 (22 May 2022 16:00)  T(F): 97.8 (23 May 2022 09:52), Max: 98.4 (22 May 2022 16:00)  HR: 82 (23 May 2022 09:52) (74 - 82)  BP: 138/88 (23 May 2022 09:52) (136/75 - 153/79)  BP(mean): --  RR: 18 (23 May 2022 09:52) (16 - 18)  SpO2: 98% (23 May 2022 09:52) (96% - 98%)      GENERAL: No distress awake , confused   CHEST/LUNG: Clear to ausculation bilaterally  HEART: Regular rate and rhythm; S1 S2  ABDOMEN: Soft, Bowel sounds present  EXTREMITIES:  no leg  edema   NERVOUS SYSTEM:  Alert & Orientedx 1, generalized weakness   no focal deficits , speech normal   confused     05-22    139  |  98  |  51.0<H>  ----------------------------<  122<H>  5.1   |  25.0  |  1.06    Ca    10.4<H>      22 May 2022 11:33  Phos  3.6     05-22        
 Follow up visit for dementia , febrile illness     Pt is seen twice trough the day , pt is sleepy less awake to other days   able to pen eyes briefly , this am was c/o pain in the neck   no overnight events       ROS   limited pt is with dementia , lethargy       Vital Signs Last 24 Hrs  T(C): 36.6 (23 May 2022 09:52), Max: 36.9 (22 May 2022 16:00)  T(F): 97.8 (23 May 2022 09:52), Max: 98.4 (22 May 2022 16:00)  HR: 82 (23 May 2022 09:52) (74 - 82)  BP: 138/88 (23 May 2022 09:52) (136/75 - 153/79)  BP(mean): --  RR: 18 (23 May 2022 09:52) (16 - 18)  SpO2: 98% (23 May 2022 09:52) (96% - 98%)      GENERAL: sleepy less verbal  today    Neck Supple, no JVD   CHEST/LUNG: Clear to ausculation bilaterally  HEART: Regular rate and rhythm; S1 S2  ABDOMEN: Soft, Bowel sounds present  EXTREMITIES:  no leg  edema   NERVOUS SYSTEM:  lethargic , moves ext spontaneously , not able to follow commands answer question at present   Skin : warm., dry     cbc / BMP done few days ago reviewed   
 Follow up visit     pt is with dementia .son at the bedside   he is feeding her ensure seen in am       no acute complaints/events reported     stable       MEDICATIONS  (STANDING):  aspirin  chewable 81 milliGRAM(s) Oral daily  chlorhexidine 2% Cloths 1 Application(s) Topical <User Schedule>  donepezil 10 milliGRAM(s) Oral at bedtime  dorzolamide 2% Ophthalmic Solution 1 Drop(s) Both EYES <User Schedule>  ferrous    sulfate 325 milliGRAM(s) Oral daily  fluticasone propionate 50 MICROgram(s)/spray Nasal Spray 1 Spray(s) Both Nostrils two times a day  folic acid 1 milliGRAM(s) Oral daily  gabapentin 100 milliGRAM(s) Oral two times a day  gemfibrozil 600 milliGRAM(s) Oral two times a day  heparin   Injectable 5000 Unit(s) SubCutaneous every 12 hours  levothyroxine 150 MICROGram(s) Oral daily  metoprolol tartrate 25 milliGRAM(s) Oral two times a day  multivitamin 1 Tablet(s) Oral daily  pantoprazole    Tablet 40 milliGRAM(s) Oral before breakfast  QUEtiapine 100 milliGRAM(s) Oral at bedtime  topiramate 50 milliGRAM(s) Oral daily    MEDICATIONS  (PRN):  acetaminophen     Tablet .. 650 milliGRAM(s) Oral every 6 hours PRN Temp greater or equal to 38C (100.4F), Mild Pain (1 - 3)  aluminum hydroxide/magnesium hydroxide/simethicone Suspension 30 milliLiter(s) Oral every 4 hours PRN Dyspepsia  melatonin 3 milliGRAM(s) Oral at bedtime PRN Insomnia  ondansetron Injectable 4 milliGRAM(s) IV Push every 8 hours PRN Nausea and/or Vomiting      Allergies    No Known Allergies    Vital Signs Last 24 Hrs  T(C): 36.7 (21 May 2022 10:00), Max: 37 (20 May 2022 20:00)  T(F): 98.1 (21 May 2022 10:00), Max: 98.6 (20 May 2022 20:00)  HR: 75 (21 May 2022 10:00) (60 - 84)  BP: 114/78 (21 May 2022 10:00) (114/78 - 142/87)  BP(mean): 101 (20 May 2022 15:40) (101 - 101)  RR: 18 (21 May 2022 10:00) (18 - 18)  SpO2: 98% (21 May 2022 06:00) (96% - 98%)  PHYSICAL EXAM:    GENERAL: No distress lying in the bed   CHEST/LUNG: Clear to ausculation bilaterally  HEART: Regular rate and rhythm; S1 S2  ABDOMEN: Soft, Bowel sounds present  EXTREMITIES:  no leg  edema   NERVOUS SYSTEM:  Alert & Orientedx 1, generalized weakness , hard of hearing           Urinalysis Basic - ( 18 May 2022 14:23 )    Color: Yellow / Appearance: Clear / S.010 / pH: x  Gluc: x / Ketone: Negative  / Bili: Negative / Urobili: Negative mg/dL   Blood: x / Protein: 30 mg/dL / Nitrite: Negative   Leuk Esterase: Trace / RBC: 0-2 /HPF / WBC 0-2 /HPF   Sq Epi: x / Non Sq Epi: Occasional / Bacteria: Occasional        CAPILLARY BLOOD GLUCOSE  CAPILLARY BLOOD GLUCOSE                RADIOLOGY & ADDITIONAL TESTS:  
 Follow up visit for suspected UTI / AMS underlying dementia     pt is little more awake , c/o " I can not pee"   bladder scan ordered , no retention     pt is on iv abx         ROS   limited pt is with dementia , lethargy       MEDICATIONS  (STANDING):  aspirin  chewable 81 milliGRAM(s) Oral daily  cefTRIAXone   IVPB 1000 milliGRAM(s) IV Intermittent every 24 hours  chlorhexidine 2% Cloths 1 Application(s) Topical <User Schedule>  cyanocobalamin 1000 MICROGram(s) Oral daily  dextrose 5% + sodium chloride 0.9%. 1000 milliLiter(s) (85 mL/Hr) IV Continuous <Continuous>  donepezil 10 milliGRAM(s) Oral at bedtime  dorzolamide 2% Ophthalmic Solution 1 Drop(s) Both EYES <User Schedule>  ferrous    sulfate 325 milliGRAM(s) Oral daily  fluticasone propionate 50 MICROgram(s)/spray Nasal Spray 1 Spray(s) Both Nostrils two times a day  folic acid 1 milliGRAM(s) Oral daily  gabapentin 100 milliGRAM(s) Oral daily  gemfibrozil 600 milliGRAM(s) Oral two times a day  heparin   Injectable 5000 Unit(s) SubCutaneous every 12 hours  levothyroxine 150 MICROGram(s) Oral daily  metoprolol tartrate 25 milliGRAM(s) Oral two times a day  multivitamin 1 Tablet(s) Oral daily  pantoprazole    Tablet 40 milliGRAM(s) Oral before breakfast  QUEtiapine 75 milliGRAM(s) Oral at bedtime  topiramate 50 milliGRAM(s) Oral daily    Vital Signs Last 24 Hrs  T(C): 36.8 (25 May 2022 08:09), Max: 36.8 (25 May 2022 08:09)  T(F): 98.2 (25 May 2022 08:09), Max: 98.2 (25 May 2022 08:09)  HR: 61 (25 May 2022 08:09) (61 - 69)  BP: 129/57 (25 May 2022 08:09) (116/72 - 133/82)  BP(mean): --  RR: 18 (25 May 2022 08:09) (17 - 18)  SpO2: 96% (25 May 2022 08:09) (94% - 98%)    GENERAL: sleeping , eyes open to voice , minimal verbal responses   CHEST/LUNG: Clear to ausculation bilaterally  HEART: Regular rate and rhythm; S1 S2  ABDOMEN: Soft, Bowel sounds present  EXTREMITIES:  no leg  edema   NERVOUS SYSTEM:  lethargic , moves ext spontaneously  Skin : warm., dry                             9.3    6.39  )-----------( 253      ( 25 May 2022 05:23 )             30.5   05-25    143  |  105  |  66.7<H>  ----------------------------<  110<H>  4.6   |  23.0  |  1.48<H>    Ca    10.8<H>      25 May 2022 05:23    TPro  8.3  /  Alb  3.6  /  TBili  <0.2<L>  /  DBili  x   /  AST  11  /  ALT  <5  /  AlkPhos  73  05-25

## 2022-05-31 NOTE — DISCHARGE NOTE NURSING/CASE MANAGEMENT/SOCIAL WORK - NSDCPEFALRISK_GEN_ALL_CORE
For information on Fall & Injury Prevention, visit: [email or web address] OR  [email or web address] OR  https://www.cdc.gov/steadi/patient.html

## 2022-06-01 NOTE — CDI QUERY NOTE - NSCDIOTHERTXTBX2_GEN_ALL_CORE_FT
Dr. Andrade,     The record indicates a 89-year-old female with pmh of dementia (aaox1) admitted with chest pain. Acute encephalopathy is documented in the record. Can you please provide clarification on the diagnosis Encephalopathy?    Conditions include but not limited to:  Encephalopathy ruled out, likely acute delirium.  Encephalopathy ruled in (please provide the clinical criteria you are using to support this along with the mental status changes noted)  Other (Please clarify)  Not clinically significant    Supporting evidence.  • Assessment	  H&P Adult [Charted Location: [facility] ER 1606 34] [Authored: 11-May-2022 16:58]  89 year old female with pmh of dementia (aaox1).  #dementia   - Aricept    Progress Note Adult-Hospitalist Attending [Charted Location: [address] 4223 01] [Authored: 24-May-2022 15:19]-  • Subjective and Objective:    Follow up visit for dementia , febrile illness   Pt is seen twice trough the day , pt is sleepy less awake to other days   able to pen eyes briefly , this am was c/o pain in the neck   no overnight events   ROS limited pt is with dementia , lethargy.  1- AMS - decreased alertness   will get CBC , BMP   CT of brain   r/o infection   monitor closely clinical worsening   d/w nurse and NP     Progress Note Adult-Hospitalist Attending [Charted Location: [address] 4225 01] [Authored: 25-May-2022 13:39]  1- AMS - decreased alertness suspected UTI   on iv rocephin   urine cx send     Progress Note Adult-Internal Medicine Attending [Charted Location: [address] 422 01] [Authored: 27-May-2022 12:39]-  NERVOUS SYSTEM:  Alert & Oriented X2-3 nonfocal neuro  1- acute encephalopathy due to UTI   gr neg rods in urine cx   cont rocephin   final cx result pending    Progress Note Adult-Internal Medicine Attending [Charted Location: [address] 422 01] [Authored: 31-May-2022 14:45]-  1- acute encephalopathy due to UTI   resolved, completed 3 days of Rocephin.

## 2022-06-01 NOTE — CDI QUERY NOTE - NSCDIOTHERTXTBX_GEN_ALL_CORE_HH
Dr. Andrade,     The record indicates that the patient was admitted with chest pain.    Can you please indicate the possible/likely cause of chest pain? Conditions include but not limited to:  1. Chest pain likely due to Gerd.  2. Chest pain likely due to Aortic Stenosis.  3. Other (please clarify)  4. Not clinically significant.    Supporting evidence:  H&P Adult [Charted Location: [facility] ER 1606 34] [Authored: 11-May-2022 16:58  · Assessment	  89 year old female with pmh of dementia (aaox1), anemia, neuropathy, gerd, depression, seizures, recent egd for gi bleed requiring gi intervention 1 month prior coming to hospital with complaints of chest pain. per patient currently asymptomatic however stating had chest pain this am was non radiating burning in nature. 5/10 intensity. denies sob nausea vomiting diarrhea at this time.  #chest pain   - admit to medicine tele   - probable 2/2 nstemi (doubt) vs other etiology (gi)  - start aspirin   - cardio consult in am     Consult Note Adult-Cardiology Attending [Charted Location: [address] 4224 02] [Authored: 12-May-2022 14:00]-  Cardiology service consulted for recommendations. Patient is demented, unable to obtain information regarding chest pain. On exam patient noted to be in rapid afib with evident distress.   unclear nature of chest pain, overall not concern with CAD, assuming worse case scenario, medical treatment would be the best option at this point, continue with asa, add metoprolol and statin.    Progress Note Adult-Cardiology Attending [Charted Location: [address] 4224 02] [Authored: 13-May-2022 10:46]  -Has had intermittent chronic chest pain, non-cardiac. Possibly all GI  -No signs CHF, ischemia and mostly atrial paced  -trops all negative, echo findings similar to prior.    Progress Note Adult-Hospitalist Attending [[address]] [Authored: 24-May-2022   3- severe AS / CP on admission   seen by cardiology team   medical management    Discharge Note Provider [  Diagnosis: Acute chest pain  Assessment and Plan of Treatment: - No evidence of active ischemia. Your cardiac enzymes were negative.   - TTE revealed aortic stenosis and you were evaluated by cardiology, who recommend conservative management.

## 2022-06-03 ENCOUNTER — NON-APPOINTMENT (OUTPATIENT)
Age: 87
End: 2022-06-03

## 2022-08-08 ENCOUNTER — INPATIENT (INPATIENT)
Facility: HOSPITAL | Age: 87
LOS: 2 days | Discharge: NURSING, REHAB OR LONG-TERM CARE | DRG: 378 | End: 2022-08-11
Attending: INTERNAL MEDICINE | Admitting: STUDENT IN AN ORGANIZED HEALTH CARE EDUCATION/TRAINING PROGRAM
Payer: MEDICARE

## 2022-08-08 VITALS
HEART RATE: 118 BPM | WEIGHT: 184.97 LBS | TEMPERATURE: 98 F | SYSTOLIC BLOOD PRESSURE: 91 MMHG | RESPIRATION RATE: 20 BRPM | DIASTOLIC BLOOD PRESSURE: 59 MMHG | HEIGHT: 62 IN | OXYGEN SATURATION: 95 %

## 2022-08-08 DIAGNOSIS — Z90.710 ACQUIRED ABSENCE OF BOTH CERVIX AND UTERUS: Chronic | ICD-10-CM

## 2022-08-08 DIAGNOSIS — Z95.0 PRESENCE OF CARDIAC PACEMAKER: Chronic | ICD-10-CM

## 2022-08-08 DIAGNOSIS — Z96.649 PRESENCE OF UNSPECIFIED ARTIFICIAL HIP JOINT: Chronic | ICD-10-CM

## 2022-08-08 DIAGNOSIS — K92.2 GASTROINTESTINAL HEMORRHAGE, UNSPECIFIED: ICD-10-CM

## 2022-08-08 LAB
ALBUMIN SERPL ELPH-MCNC: 3.5 G/DL — SIGNIFICANT CHANGE UP (ref 3.3–5.2)
ALP SERPL-CCNC: 55 U/L — SIGNIFICANT CHANGE UP (ref 40–120)
ALT FLD-CCNC: 7 U/L — SIGNIFICANT CHANGE UP
ANION GAP SERPL CALC-SCNC: 18 MMOL/L — HIGH (ref 5–17)
ANISOCYTOSIS BLD QL: SLIGHT — SIGNIFICANT CHANGE UP
APTT BLD: 26.9 SEC — LOW (ref 27.5–35.5)
AST SERPL-CCNC: 14 U/L — SIGNIFICANT CHANGE UP
BASOPHILS # BLD AUTO: 0 K/UL — SIGNIFICANT CHANGE UP (ref 0–0.2)
BASOPHILS NFR BLD AUTO: 0 % — SIGNIFICANT CHANGE UP (ref 0–2)
BILIRUB SERPL-MCNC: <0.2 MG/DL — LOW (ref 0.4–2)
BLD GP AB SCN SERPL QL: SIGNIFICANT CHANGE UP
BUN SERPL-MCNC: 103.7 MG/DL — HIGH (ref 8–20)
CALCIUM SERPL-MCNC: 9 MG/DL — SIGNIFICANT CHANGE UP (ref 8.4–10.5)
CHLORIDE SERPL-SCNC: 100 MMOL/L — SIGNIFICANT CHANGE UP (ref 98–107)
CO2 SERPL-SCNC: 21 MMOL/L — LOW (ref 22–29)
CREAT SERPL-MCNC: 2.28 MG/DL — HIGH (ref 0.5–1.3)
EGFR: 20 ML/MIN/1.73M2 — LOW
ELLIPTOCYTES BLD QL SMEAR: SLIGHT — SIGNIFICANT CHANGE UP
EOSINOPHIL # BLD AUTO: 0.09 K/UL — SIGNIFICANT CHANGE UP (ref 0–0.5)
EOSINOPHIL NFR BLD AUTO: 1.7 % — SIGNIFICANT CHANGE UP (ref 0–6)
FLUAV AG NPH QL: SIGNIFICANT CHANGE UP
FLUBV AG NPH QL: SIGNIFICANT CHANGE UP
GLUCOSE SERPL-MCNC: 135 MG/DL — HIGH (ref 70–99)
HCT VFR BLD CALC: 16.4 % — CRITICAL LOW (ref 34.5–45)
HGB BLD-MCNC: 5.2 G/DL — CRITICAL LOW (ref 11.5–15.5)
IMM GRANULOCYTES NFR BLD AUTO: 0.4 % — SIGNIFICANT CHANGE UP (ref 0–1.5)
INR BLD: 1.2 RATIO — HIGH (ref 0.88–1.16)
LYMPHOCYTES # BLD AUTO: 1.1 K/UL — SIGNIFICANT CHANGE UP (ref 1–3.3)
LYMPHOCYTES # BLD AUTO: 21.1 % — SIGNIFICANT CHANGE UP (ref 13–44)
MANUAL SMEAR VERIFICATION: SIGNIFICANT CHANGE UP
MCHC RBC-ENTMCNC: 30.1 PG — SIGNIFICANT CHANGE UP (ref 27–34)
MCHC RBC-ENTMCNC: 31.7 GM/DL — LOW (ref 32–36)
MCV RBC AUTO: 94.8 FL — SIGNIFICANT CHANGE UP (ref 80–100)
MICROCYTES BLD QL: SLIGHT — SIGNIFICANT CHANGE UP
MONOCYTES # BLD AUTO: 0.5 K/UL — SIGNIFICANT CHANGE UP (ref 0–0.9)
MONOCYTES NFR BLD AUTO: 9.6 % — SIGNIFICANT CHANGE UP (ref 2–14)
NEUTROPHILS # BLD AUTO: 3.51 K/UL — SIGNIFICANT CHANGE UP (ref 1.8–7.4)
NEUTROPHILS NFR BLD AUTO: 67.2 % — SIGNIFICANT CHANGE UP (ref 43–77)
OB PNL STL: POSITIVE
OVALOCYTES BLD QL SMEAR: SLIGHT — SIGNIFICANT CHANGE UP
PLAT MORPH BLD: NORMAL — SIGNIFICANT CHANGE UP
PLATELET # BLD AUTO: 132 K/UL — LOW (ref 150–400)
POIKILOCYTOSIS BLD QL AUTO: SLIGHT — SIGNIFICANT CHANGE UP
POLYCHROMASIA BLD QL SMEAR: SLIGHT — SIGNIFICANT CHANGE UP
POTASSIUM SERPL-MCNC: 3.9 MMOL/L — SIGNIFICANT CHANGE UP (ref 3.5–5.3)
POTASSIUM SERPL-SCNC: 3.9 MMOL/L — SIGNIFICANT CHANGE UP (ref 3.5–5.3)
PROT SERPL-MCNC: 6.3 G/DL — LOW (ref 6.6–8.7)
PROTHROM AB SERPL-ACNC: 13.9 SEC — HIGH (ref 10.5–13.4)
RBC # BLD: 1.73 M/UL — LOW (ref 3.8–5.2)
RBC # FLD: 17.2 % — HIGH (ref 10.3–14.5)
RBC BLD AUTO: ABNORMAL
RSV RNA NPH QL NAA+NON-PROBE: SIGNIFICANT CHANGE UP
SARS-COV-2 RNA SPEC QL NAA+PROBE: SIGNIFICANT CHANGE UP
SODIUM SERPL-SCNC: 139 MMOL/L — SIGNIFICANT CHANGE UP (ref 135–145)
TROPONIN T SERPL-MCNC: 0.03 NG/ML — SIGNIFICANT CHANGE UP (ref 0–0.06)
WBC # BLD: 5.22 K/UL — SIGNIFICANT CHANGE UP (ref 3.8–10.5)
WBC # FLD AUTO: 5.22 K/UL — SIGNIFICANT CHANGE UP (ref 3.8–10.5)

## 2022-08-08 PROCEDURE — 99285 EMERGENCY DEPT VISIT HI MDM: CPT | Mod: 25,GC

## 2022-08-08 PROCEDURE — 99223 1ST HOSP IP/OBS HIGH 75: CPT

## 2022-08-08 PROCEDURE — 36000 PLACE NEEDLE IN VEIN: CPT | Mod: GC

## 2022-08-08 PROCEDURE — 71045 X-RAY EXAM CHEST 1 VIEW: CPT | Mod: 26

## 2022-08-08 PROCEDURE — 93010 ELECTROCARDIOGRAM REPORT: CPT

## 2022-08-08 RX ORDER — SODIUM CHLORIDE 9 MG/ML
1000 INJECTION INTRAMUSCULAR; INTRAVENOUS; SUBCUTANEOUS ONCE
Refills: 0 | Status: COMPLETED | OUTPATIENT
Start: 2022-08-08 | End: 2022-08-08

## 2022-08-08 RX ORDER — PANTOPRAZOLE SODIUM 20 MG/1
40 TABLET, DELAYED RELEASE ORAL ONCE
Refills: 0 | Status: DISCONTINUED | OUTPATIENT
Start: 2022-08-08 | End: 2022-08-08

## 2022-08-08 RX ORDER — PANTOPRAZOLE SODIUM 20 MG/1
80 TABLET, DELAYED RELEASE ORAL ONCE
Refills: 0 | Status: COMPLETED | OUTPATIENT
Start: 2022-08-08 | End: 2022-08-08

## 2022-08-08 RX ORDER — LANOLIN ALCOHOL/MO/W.PET/CERES
3 CREAM (GRAM) TOPICAL AT BEDTIME
Refills: 0 | Status: DISCONTINUED | OUTPATIENT
Start: 2022-08-08 | End: 2022-08-08

## 2022-08-08 RX ORDER — ONDANSETRON 8 MG/1
4 TABLET, FILM COATED ORAL EVERY 8 HOURS
Refills: 0 | Status: DISCONTINUED | OUTPATIENT
Start: 2022-08-08 | End: 2022-08-11

## 2022-08-08 RX ORDER — ACETAMINOPHEN 500 MG
650 TABLET ORAL EVERY 6 HOURS
Refills: 0 | Status: DISCONTINUED | OUTPATIENT
Start: 2022-08-08 | End: 2022-08-08

## 2022-08-08 RX ADMIN — PANTOPRAZOLE SODIUM 80 MILLIGRAM(S): 20 TABLET, DELAYED RELEASE ORAL at 23:17

## 2022-08-08 NOTE — ED ADULT NURSE NOTE - OBJECTIVE STATEMENT
pt presents via ems from [facility] for low hemoglobin, placed on telemonitor on 3l nc, pt poor historian, pending md so

## 2022-08-08 NOTE — H&P ADULT - HISTORY OF PRESENT ILLNESS
89yo F with PMHx Afib(not on AC), GIB, advanced dementia, anemia, seizure sent to ED from [place] for abnormal labs. Pt was found to have h/h 5.1 on routine labs sent to ED for further workup. Pt is demented, unable to provide any hx. [facility] placed for [facility]/DNI. In the ED BP 91/59, , labs pertinent for h/h 5.2/16.4, FOBT+, Cr 2.28, .7, AG 18.

## 2022-08-08 NOTE — ED ADULT TRIAGE NOTE - CHIEF COMPLAINT QUOTE
C/o low hemoglobin and dehydration. As per EMS patient hemoglobin 5.1. Pt at baseline (responsive to verbal stimuli). Patient pale in color.

## 2022-08-08 NOTE — ED PROVIDER NOTE - NSICDXPASTMEDICALHX_GEN_ALL_CORE_FT
PAST MEDICAL HISTORY:  Diabetes mellitus     HTN (hypertension)     Hypothyroid       Abdominal aortic aneurysm     Dementia     GERD (gastroesophageal reflux disease)     GI bleed     Neuropathy     Stage 3 chronic kidney disease

## 2022-08-08 NOTE — ED PROVIDER NOTE - OBJECTIVE STATEMENT
89 y/o female with a PMHx of Dementia, anemia, EGD for GI bleed requiring GI intervention, CKD stage 3, neuropathy, GERD, HTN, DM, hypothyroid, seizures, presents to the ED BIBA for low hemoglobin of 5.1. Also reports abdominal pain. Pt is a poor historian. 89 y/o female with a PMHx of Dementia, anemia, EGD for GI bleed requiring GI intervention, CKD stage 3, neuropathy, GERD, HTN, DM, hypothyroid, seizures, presents to the ED BIBA for low hemoglobin of 5.1. Also c/o abdominal pain and feeling thirsty. Pt is a poor historian.

## 2022-08-08 NOTE — ED ADULT TRIAGE NOTE - WEIGHT IN LBS
Chief complaint:   Chief Complaint   Patient presents with   • Follow-up     yearly check       Vitals:  Visit Vitals  /88   Pulse 78   Temp 98.4 °F (36.9 °C) (Temporal Artery)   Resp 16   Ht 5' 2\" (1.575 m)   Wt 81 kg   BMI 32.66 kg/m²       HISTORY OF PRESENT ILLNESS     Jade is a 57 year old female who presents today for follow up of her hypertension, GERD and hyperlipidemia.  Her blood pressure is under fair control.  Her last lipid testing revealed fair control and LDL at goal but HDL low and triglyerides elevated.  She notes no side effects from her medications.    Diet:  fair  Exercise:  active but does not specifically exercise    Her most recent labs are as follows:    CHOLESTEROL (mg/dL)       Date                     Value                 07/31/2017               189                   07/29/2016               200              ----------  CALCULATED LDL (mg/dL)       Date                     Value                 07/31/2017               109                   07/29/2016               100              ----------  HDL (mg/dL)       Date                     Value                 07/31/2017               40 (L)                07/29/2016               38 (L)           ----------  TRIGLYCERIDE (mg/dL)       Date                     Value                 07/31/2017               202 (H)               07/29/2016               311 (H)          ----------  Glucose (mg/dL)       Date                     Value                 07/31/2017               110 (H)               07/29/2016               104 (H)          ----------  Creatinine (mg/dL)       Date                     Value                 07/31/2017               0.64             ----------  Sodium (mmol/L)       Date                     Value                 07/31/2017               141              ----------  Potassium (mmol/L)       Date                     Value                 07/31/2017               4.1              ----------  ALT/SGPT  (Units/L)       Date                     Value                 07/31/2017               108 (H)          ----------  AST/SGOT (Units/L)       Date                     Value                 07/31/2017               51 (H)           ----------  TOTAL BILIRUBIN (mg/dL)       Date                     Value                 07/31/2017               0.5              ----------        Other significant problems:  Patient Active Problem List    Diagnosis Date Noted   • Mild cervical dysplasia 09/28/2014     Priority: Low   • Hyperlipidemia 08/22/2014     Priority: Low   • Seasonal allergies 03/14/2013     Priority: Low   • Ovarian cyst 03/14/2013     Priority: Low   • HTN (hypertension) 03/14/2013     Priority: Low   • Esophageal reflux 03/14/2013     Priority: Low       PAST MEDICAL, FAMILY AND SOCIAL HISTORY     Medications:  Current Outpatient Prescriptions   Medication   • amLODIPine (NORVASC) 5 MG tablet   • lisinopril (PRINIVIL,ZESTRIL) 40 MG tablet   • metoPROLOL (LOPRESSOR) 50 MG tablet   • pravastatin (PRAVACHOL) 40 MG tablet   • fluticasone (FLONASE) 50 MCG/ACT nasal spray   • omeprazole (PRILOSEC) 20 MG capsule   • aspirin (ECOTRIN) 81 MG EC tablet   • Omega-3 Fatty Acids (FISH OIL PO)     No current facility-administered medications for this visit.        Allergies:  ALLERGIES:   Allergen Reactions   • Peanuts [Peanut] Nausea & Vomiting     Abdominal pain.   • Wasp Sting SWELLING and Other (See Comments)     redness       Past Medical  History/Surgeries:  Past Medical History:   Diagnosis Date   • Allergy    • Essential (primary) hypertension    • GERD (gastroesophageal reflux disease)    • Hyperlipidemia    • Ovarian cyst        Past Surgical History:   Procedure Laterality Date   • NO PAST SURGERIES         Family History:  Family History   Problem Relation Age of Onset   • Cancer Mother      Lung   • Hypertension Father    • Hypertension Brother        Social History:  Social History   Substance Use Topics   •  Smoking status: Never Smoker   • Smokeless tobacco: Never Used   • Alcohol use Not on file       REVIEW OF SYSTEMS     Review of Systems   Constitutional: Negative for fatigue.   Respiratory: Negative for chest tightness and shortness of breath.    Cardiovascular: Negative for chest pain, palpitations and leg swelling.   Gastrointestinal: Negative for abdominal pain and nausea.       PHYSICAL EXAM     Physical Exam   Constitutional: She is oriented to person, place, and time. She appears well-developed and well-nourished. No distress.   HENT:   Mouth/Throat: Oropharynx is clear and moist. No oropharyngeal exudate.   Neck: Neck supple. Carotid bruit is not present.   Cardiovascular: Normal rate, regular rhythm and normal heart sounds.    Pulmonary/Chest: Effort normal and breath sounds normal.   Lymphadenopathy:     She has no cervical adenopathy.   Neurological: She is alert and oriented to person, place, and time.   Skin: Skin is warm and dry.   Psychiatric: She has a normal mood and affect. Her behavior is normal.       ASSESSMENT/PLAN     1. Essential hypertension    2. Mixed hyperlipidemia    3. Gastroesophageal reflux disease without esophagitis      Continue same medications. Goals discussed of becoming more active, eating regularly and weight reduction. Advance directives to be completed and returned. Recommend mammogram and colonoscopy and she declines today. She understands that cancer may be present and undiagnosed without these screening tests.    184.9

## 2022-08-08 NOTE — ED PROVIDER NOTE - CLINICAL SUMMARY MEDICAL DECISION MAKING FREE TEXT BOX
Pt with likely with recurring lower GI bleeding requiring blood transfusion and GI evaluation. Pt with likely recurring lower GI bleed requiring blood transfusion and GI evaluation.

## 2022-08-08 NOTE — ED PROVIDER NOTE - NEUROLOGICAL, MLM
Alert and oriented, no focal deficits, no motor or sensory deficits. No focal deficits, no gross motor or sensory deficits.

## 2022-08-08 NOTE — H&P ADULT - NSHPPHYSICALEXAM_GEN_ALL_CORE
T(C): 36.4 (08-08-22 @ 19:53), Max: 36.7 (08-08-22 @ 17:19)  HR: 87 (08-08-22 @ 19:53) (87 - 118)  BP: 103/53 (08-08-22 @ 19:53) (91/59 - 103/53)  RR: 20 (08-08-22 @ 19:53) (20 - 20)  SpO2: 95% (08-08-22 @ 19:53) (95% - 95%)    GENERAL: patient appears well, no acute distress, appropriate, pleasant  EYES: sclera clear, no exudates  ENMT: oropharynx clear without erythema, no exudates, moist mucous membranes  NECK: supple, soft, no thyromegaly noted  LUNGS: good air entry bilaterally, clear to auscultation, symmetric breath sounds, no wheezing or rhonchi appreciated  HEART: soft S1/S2, regular rate and rhythm, no murmurs noted, no lower extremity edema  GASTROINTESTINAL: abdomen is soft, nontender, nondistended, normoactive bowel sounds, no palpable masses  INTEGUMENT: good skin turgor, warm skin, appears well perfused  MUSCULOSKELETAL: no clubbing or cyanosis, no obvious deformity  NEUROLOGIC: awake, alert, oriented x1, good muscle tone in 4 extremities, no obvious sensory deficits  PSYCHIATRIC: mood is good, demented   HEME/LYMPH: no palpable supraclavicular nodules, no obvious ecchymosis or petechiae T(C): 36.4 (08-08-22 @ 19:53), Max: 36.7 (08-08-22 @ 17:19)  HR: 87 (08-08-22 @ 19:53) (87 - 118)  BP: 103/53 (08-08-22 @ 19:53) (91/59 - 103/53)  RR: 20 (08-08-22 @ 19:53) (20 - 20)  SpO2: 95% (08-08-22 @ 19:53) (95% - 95%)    GENERAL: patient is frail, no acute distress, demented   EYES: sclera clear, no exudates  ENMT: oropharynx clear without erythema, no exudates, moist mucous membranes  NECK: supple, soft, no thyromegaly noted  LUNGS: good air entry bilaterally, clear to auscultation, symmetric breath sounds, no wheezing or rhonchi appreciated  HEART: soft S1/S2, regular rate and rhythm, no murmurs noted, no lower extremity edema  GASTROINTESTINAL: abdomen is soft, nontender, nondistended, normoactive bowel sounds, no palpable masses  INTEGUMENT: good skin turgor, warm skin, appears well perfused  MUSCULOSKELETAL: no clubbing or cyanosis, no obvious deformity  NEUROLOGIC: awake, alert, oriented x1, demented, good voluntary muscle tone in 4 extremities  PSYCHIATRIC: mood is good, demented   HEME/LYMPH: no palpable supraclavicular nodules, no obvious ecchymosis or petechiae

## 2022-08-08 NOTE — ED PROVIDER NOTE - ENMT, MLM
Airway patent, Nasal mucosa clear. (+)Slightly dry mucous membranes. Throat clear, no vesicles, no oropharyngeal exudates and uvula is midline.

## 2022-08-08 NOTE — H&P ADULT - ASSESSMENT
89yo F with PMHx Afib(not on AC), GIB, advanced dementia, anemia, seizure sent to ED from [place] for abnormal labs. Pt was found to have h/h 5.1 on routine labs sent to ED for further workup.    Acute blood loss anemia due to GIB  -hx of GIB in the past as per chart review   -H/H 5.2/16.4, FOBT+  -with borderline BP but stable  -2U PRBC ordered, received 80mg Protonix IVP   -cont Protonix 40mg BID IV for now    -no active bleeding noted   -check post transfusion H/H, transfuse if <7 or symptomatic   -NPO  -GI consulted     ARF with AG metabolic acidosis   -likely prerenal azotemia    -Cr 2.28, .7, AG 18  -2U prbc ordered   -check BMP post transfusion  -avoid nephrotoxic medications    Afib   -rate controlled   -not on AC due to bleeding risk   -cont Metoprolol 25mg PO BID     Hx of seizure   -cont topiramate     Hypothyroidism   -cont Synthroid    Dementia with behavior disturbances  -cont aricept, Seroquel       DVT ppx  -no pharmacological AC due to bleeding risk   -SCD

## 2022-08-08 NOTE — ED PROVIDER NOTE - PROGRESS NOTE DETAILS
Stool occult positive. Hgb 5.2. Will require admission for transfusion and GI evaluation. SonTheo, spoken to on the phone, consents to blood products. Will admit. - Guy

## 2022-08-08 NOTE — ED PROCEDURE NOTE - PROCEDURE ADDITIONAL DETAILS
Peripheral IV access in the Emergency Department obtained under dynamic ultrasound guidance with dark nonpulsatile blood return.  Catheter was flushed afterwards without any resistance or resulting extravasation.  IV catheter confirmed in compressible vein after insertion. 20 G needle used.

## 2022-08-09 LAB
ANION GAP SERPL CALC-SCNC: 14 MMOL/L — SIGNIFICANT CHANGE UP (ref 5–17)
BUN SERPL-MCNC: 95.5 MG/DL — HIGH (ref 8–20)
CALCIUM SERPL-MCNC: 8.8 MG/DL — SIGNIFICANT CHANGE UP (ref 8.4–10.5)
CHLORIDE SERPL-SCNC: 106 MMOL/L — SIGNIFICANT CHANGE UP (ref 98–107)
CO2 SERPL-SCNC: 20 MMOL/L — LOW (ref 22–29)
CREAT SERPL-MCNC: 2.16 MG/DL — HIGH (ref 0.5–1.3)
EGFR: 21 ML/MIN/1.73M2 — LOW
GLUCOSE BLDC GLUCOMTR-MCNC: 105 MG/DL — HIGH (ref 70–99)
GLUCOSE BLDC GLUCOMTR-MCNC: 130 MG/DL — HIGH (ref 70–99)
GLUCOSE BLDC GLUCOMTR-MCNC: 96 MG/DL — SIGNIFICANT CHANGE UP (ref 70–99)
GLUCOSE SERPL-MCNC: 107 MG/DL — HIGH (ref 70–99)
HCT VFR BLD CALC: 26.2 % — LOW (ref 34.5–45)
HGB BLD-MCNC: 8.8 G/DL — LOW (ref 11.5–15.5)
MCHC RBC-ENTMCNC: 30.4 PG — SIGNIFICANT CHANGE UP (ref 27–34)
MCHC RBC-ENTMCNC: 33.6 GM/DL — SIGNIFICANT CHANGE UP (ref 32–36)
MCV RBC AUTO: 90.7 FL — SIGNIFICANT CHANGE UP (ref 80–100)
PLATELET # BLD AUTO: 112 K/UL — LOW (ref 150–400)
POTASSIUM SERPL-MCNC: 4.4 MMOL/L — SIGNIFICANT CHANGE UP (ref 3.5–5.3)
POTASSIUM SERPL-SCNC: 4.4 MMOL/L — SIGNIFICANT CHANGE UP (ref 3.5–5.3)
RBC # BLD: 2.89 M/UL — LOW (ref 3.8–5.2)
RBC # FLD: 16.4 % — HIGH (ref 10.3–14.5)
SODIUM SERPL-SCNC: 139 MMOL/L — SIGNIFICANT CHANGE UP (ref 135–145)
WBC # BLD: 7.8 K/UL — SIGNIFICANT CHANGE UP (ref 3.8–10.5)
WBC # FLD AUTO: 7.8 K/UL — SIGNIFICANT CHANGE UP (ref 3.8–10.5)

## 2022-08-09 PROCEDURE — 99223 1ST HOSP IP/OBS HIGH 75: CPT

## 2022-08-09 PROCEDURE — 99233 SBSQ HOSP IP/OBS HIGH 50: CPT

## 2022-08-09 RX ORDER — FOLIC ACID 0.8 MG
1 TABLET ORAL DAILY
Refills: 0 | Status: DISCONTINUED | OUTPATIENT
Start: 2022-08-09 | End: 2022-08-11

## 2022-08-09 RX ORDER — ONDANSETRON 8 MG/1
1 TABLET, FILM COATED ORAL
Qty: 0 | Refills: 0 | DISCHARGE

## 2022-08-09 RX ORDER — METOPROLOL TARTRATE 50 MG
25 TABLET ORAL
Refills: 0 | Status: DISCONTINUED | OUTPATIENT
Start: 2022-08-09 | End: 2022-08-11

## 2022-08-09 RX ORDER — QUETIAPINE FUMARATE 200 MG/1
100 TABLET, FILM COATED ORAL AT BEDTIME
Refills: 0 | Status: DISCONTINUED | OUTPATIENT
Start: 2022-08-09 | End: 2022-08-11

## 2022-08-09 RX ORDER — DORZOLAMIDE HYDROCHLORIDE 20 MG/ML
1 SOLUTION/ DROPS OPHTHALMIC
Refills: 0 | Status: DISCONTINUED | OUTPATIENT
Start: 2022-08-09 | End: 2022-08-11

## 2022-08-09 RX ORDER — GEMFIBROZIL 600 MG
600 TABLET ORAL
Refills: 0 | Status: DISCONTINUED | OUTPATIENT
Start: 2022-08-09 | End: 2022-08-11

## 2022-08-09 RX ORDER — TOPIRAMATE 25 MG
50 TABLET ORAL DAILY
Refills: 0 | Status: DISCONTINUED | OUTPATIENT
Start: 2022-08-09 | End: 2022-08-11

## 2022-08-09 RX ORDER — FLUTICASONE PROPIONATE 50 MCG
1 SPRAY, SUSPENSION NASAL
Qty: 0 | Refills: 0 | DISCHARGE

## 2022-08-09 RX ORDER — PANTOPRAZOLE SODIUM 20 MG/1
40 TABLET, DELAYED RELEASE ORAL
Refills: 0 | Status: DISCONTINUED | OUTPATIENT
Start: 2022-08-09 | End: 2022-08-11

## 2022-08-09 RX ORDER — GABAPENTIN 400 MG/1
100 CAPSULE ORAL DAILY
Refills: 0 | Status: DISCONTINUED | OUTPATIENT
Start: 2022-08-09 | End: 2022-08-11

## 2022-08-09 RX ORDER — PREGABALIN 225 MG/1
1000 CAPSULE ORAL DAILY
Refills: 0 | Status: DISCONTINUED | OUTPATIENT
Start: 2022-08-09 | End: 2022-08-11

## 2022-08-09 RX ORDER — SODIUM CHLORIDE 9 MG/ML
1000 INJECTION INTRAMUSCULAR; INTRAVENOUS; SUBCUTANEOUS
Refills: 0 | Status: DISCONTINUED | OUTPATIENT
Start: 2022-08-09 | End: 2022-08-10

## 2022-08-09 RX ORDER — DONEPEZIL HYDROCHLORIDE 10 MG/1
10 TABLET, FILM COATED ORAL AT BEDTIME
Refills: 0 | Status: DISCONTINUED | OUTPATIENT
Start: 2022-08-09 | End: 2022-08-11

## 2022-08-09 RX ORDER — LEVOTHYROXINE SODIUM 125 MCG
150 TABLET ORAL DAILY
Refills: 0 | Status: DISCONTINUED | OUTPATIENT
Start: 2022-08-09 | End: 2022-08-11

## 2022-08-09 RX ADMIN — Medication 150 MICROGRAM(S): at 05:59

## 2022-08-09 RX ADMIN — GABAPENTIN 100 MILLIGRAM(S): 400 CAPSULE ORAL at 11:53

## 2022-08-09 RX ADMIN — PREGABALIN 1000 MICROGRAM(S): 225 CAPSULE ORAL at 11:52

## 2022-08-09 RX ADMIN — Medication 50 MILLIGRAM(S): at 11:53

## 2022-08-09 RX ADMIN — Medication 600 MILLIGRAM(S): at 05:59

## 2022-08-09 RX ADMIN — Medication 600 MILLIGRAM(S): at 17:29

## 2022-08-09 RX ADMIN — DORZOLAMIDE HYDROCHLORIDE 1 DROP(S): 20 SOLUTION/ DROPS OPHTHALMIC at 23:01

## 2022-08-09 RX ADMIN — Medication 1 MILLIGRAM(S): at 11:53

## 2022-08-09 RX ADMIN — QUETIAPINE FUMARATE 100 MILLIGRAM(S): 200 TABLET, FILM COATED ORAL at 22:54

## 2022-08-09 RX ADMIN — DORZOLAMIDE HYDROCHLORIDE 1 DROP(S): 20 SOLUTION/ DROPS OPHTHALMIC at 11:52

## 2022-08-09 RX ADMIN — PANTOPRAZOLE SODIUM 40 MILLIGRAM(S): 20 TABLET, DELAYED RELEASE ORAL at 05:58

## 2022-08-09 RX ADMIN — PANTOPRAZOLE SODIUM 40 MILLIGRAM(S): 20 TABLET, DELAYED RELEASE ORAL at 17:29

## 2022-08-09 RX ADMIN — DONEPEZIL HYDROCHLORIDE 10 MILLIGRAM(S): 10 TABLET, FILM COATED ORAL at 22:54

## 2022-08-09 NOTE — CONSULT NOTE ADULT - ASSESSMENT
Acute on chronic anemia in a 89 y/o severely demented pt who is both frail and malnourished appearing with acute on chronic renal failure. No plans for any type of endoscopic evaluation at this time. Little to be gained from endoscopic testing in a pt such as this. Suggest Renal evaluation for acute on chronic renal failure possibly pre-renal in etiology. Would keep on oral iron TID and Pantoprazole indefinitely. No further GI f/u is required. Reconsult as needed. Thank you.

## 2022-08-09 NOTE — CONSULT NOTE ADULT - SUBJECTIVE AND OBJECTIVE BOX
HPI: The pt is a 89yo F with PMH + dementia, Afib, seizure sent to ED from NH for severe anemia seen on outpatient labs.  We are called regarding RHONDA.  Pt cannot provide any further history.      PAST MEDICAL & SURGICAL HISTORY:  HTN (hypertension)      Diabetes mellitus      Hypothyroid      S/P hysterectomy      S/P hip replacement      Pacemaker          FAMILY HISTORY:  FHx: diabetes mellitus        Social History:  Unknown    MEDICATIONS  (STANDING):  cyanocobalamin 1000 MICROGram(s) Oral daily  donepezil 10 milliGRAM(s) Oral at bedtime  dorzolamide 2% Ophthalmic Solution 1 Drop(s) Both EYES <User Schedule>  folic acid 1 milliGRAM(s) Oral daily  gabapentin 100 milliGRAM(s) Oral daily  gemfibrozil 600 milliGRAM(s) Oral two times a day  levothyroxine 150 MICROGram(s) Oral daily  metoprolol tartrate 25 milliGRAM(s) Oral two times a day  pantoprazole  Injectable 40 milliGRAM(s) IV Push two times a day  QUEtiapine 100 milliGRAM(s) Oral at bedtime  topiramate 50 milliGRAM(s) Oral daily    MEDICATIONS  (PRN):  ondansetron Injectable 4 milliGRAM(s) IV Push every 8 hours PRN Nausea and/or Vomiting      Allergies    No Known Allergies    Intolerances        REVIEW OF SYSTEMS:    unable to assess      Vital Signs Last 24 Hrs  T(C): 36.7 (09 Aug 2022 11:52), Max: 37 (09 Aug 2022 03:30)  T(F): 98 (09 Aug 2022 11:52), Max: 98.6 (09 Aug 2022 03:30)  HR: 67 (09 Aug 2022 11:52) (40 - 118)  BP: 123/74 (09 Aug 2022 11:52) (90/50 - 123/74)  BP(mean): --  RR: 18 (09 Aug 2022 11:52) (16 - 21)  SpO2: 95% (09 Aug 2022 11:52) (95% - 100%)    Parameters below as of 09 Aug 2022 11:52  Patient On (Oxygen Delivery Method): nasal cannula  O2 Flow (L/min): 2      PHYSICAL EXAM:    GENERAL: Appears frail and chronically debilitated  HEAD:  Atraumatic, Normocephalic  EYES: EOMI, PERRLA, conjunctiva and sclera clear  ENMT: Dry mucous membranes  NECK: Supple, No JVD  NERVOUS SYSTEM:  Awake, demented  CHEST/LUNG: Clear tbilaterally  HEART: Regular rate and rhythm; No rub  ABDOMEN: Soft, Nontender, Nondistended; BS+  EXTREMITIES:  2+ Peripheral Pulses, No LE edema  SKIN: No rashes or lesions      LABS:                        8.8    7.80  )-----------( 112      ( 09 Aug 2022 11:40 )             26.2     Hemoglobin: 5.2: Test Repeated   TYPE:(C=Critical, N=Notification, A=Abnormal) c   TESTS: hgb and hct   DATE/TIME CALLED: 08/08/202      08-08    139  |  100  |  103.7<H>  ----------------------------<  135<H>  3.9   |  21.0<L>  |  2.28<H>        Ca    9.0      08 Aug 2022 21:23    TPro  6.3<L>  /  Alb  3.5  /  TBili  <0.2<L>  /  DBili  x   /  AST  14  /  ALT  7   /  AlkPhos  55  08-08    PT/INR - ( 08 Aug 2022 21:23 )   PT: 13.9 sec;   INR: 1.20 ratio         PTT - ( 08 Aug 2022 21:23 )  PTT:26.9 sec        RADIOLOGY & ADDITIONAL TESTS:  
Patient is a 89y old  Female who presents with a chief complaint of acute on chronic anemia.      HPI:  89yo F with PMHx Afib(not on AC), GIB, advanced dementia, anemia, seizure sent to ED from [place] for abnormal labs. Pt was found to have h/h 5.1 on routine labs sent to ED for further workup. Pt is demented, unable to provide any hx. [facility] order placed for [facility]NR/DNI. In the ED BP 91/59, , labs pertinent for h/h 5.2/16.4, FOBT+, Cr 2.28, .7, AG 18. Unable to obtain any type of history from the pt. who is profoundly demented. Pt. is on chronic iron therapy at home.      REVIEW OF SYSTEMS:  Unobtainable in this pt.    PAST MEDICAL & SURGICAL HISTORY:  HTN (hypertension)      Diabetes mellitus      Hypothyroid      S/P hysterectomy      S/P hip replacement      Pacemaker          FAMILY HISTORY:  FHx: diabetes mellitus        SOCIAL HISTORY:  Smoking Status: Unknown  Pack Years: Unknown, Unknown ETOH or drug abuse history.    MEDICATIONS:  MEDICATIONS  (STANDING):  cyanocobalamin 1000 MICROGram(s) Oral daily  donepezil 10 milliGRAM(s) Oral at bedtime  dorzolamide 2% Ophthalmic Solution 1 Drop(s) Both EYES <User Schedule>  folic acid 1 milliGRAM(s) Oral daily  gabapentin 100 milliGRAM(s) Oral daily  gemfibrozil 600 milliGRAM(s) Oral two times a day  levothyroxine 150 MICROGram(s) Oral daily  metoprolol tartrate 25 milliGRAM(s) Oral two times a day  pantoprazole  Injectable 40 milliGRAM(s) IV Push two times a day  QUEtiapine 100 milliGRAM(s) Oral at bedtime  topiramate 50 milliGRAM(s) Oral daily    MEDICATIONS  (PRN):  ondansetron Injectable 4 milliGRAM(s) IV Push every 8 hours PRN Nausea and/or Vomiting      Allergies    No Known Allergies    Intolerances        Vital Signs Last 24 Hrs  T(C): 36.6 (09 Aug 2022 05:50), Max: 37 (09 Aug 2022 03:30)  T(F): 97.8 (09 Aug 2022 05:50), Max: 98.6 (09 Aug 2022 03:30)  HR: 57 (09 Aug 2022 05:50) (57 - 118)  BP: 115/64 (09 Aug 2022 05:50) (90/50 - 121/78)  BP(mean): --  RR: 18 (09 Aug 2022 05:50) (16 - 21)  SpO2: 99% (09 Aug 2022 05:50) (95% - 100%)    Parameters below as of 09 Aug 2022 05:50  Patient On (Oxygen Delivery Method): nasal cannula  O2 Flow (L/min): 2          PHYSICAL EXAM:    General: Well developed; frail, cachectic, malnourished, and disheveled appearing, alert but severely demented.  HEENT: MMM, conjunctiva pale and sclera anicteric.  Lungs: Clear bilaterally,  Cor: RRR S1, S2 only  Gastrointestinal: Soft, non-tender non-distended; Normal bowel sounds; No rebound or guarding or HSM.  GERI: Decreased sphincter tone, brown stool in rectal vault.  Extremities: Normal range of motion, No clubbing, cyanosis or edema  Neurological: Alert but demented and severely altered.  Skin: Turgor is poor. Pt. appears dehydrated.      LABS:                        5.2    5.22  )-----------( 132      ( 08 Aug 2022 21:23 )             16.4     08-08    139  |  100  |  103.7<H>  ----------------------------<  135<H>  3.9   |  21.0<L>  |  2.28<H>    Ca    9.0      08 Aug 2022 21:23    TPro  6.3<L>  /  Alb  3.5  /  TBili  <0.2<L>  /  DBili  x   /  AST  14  /  ALT  7   /  AlkPhos  55  08-08          RADIOLOGY & ADDITIONAL STUDIES:   None to review from this admission.

## 2022-08-09 NOTE — PATIENT PROFILE ADULT - FALL HARM RISK - HARM RISK INTERVENTIONS

## 2022-08-09 NOTE — PROGRESS NOTE ADULT - ASSESSMENT
89yo F with PMHx Afib(not on AC), GIB, advanced dementia, anemia, seizure sent to ED from [place] for abnormal labs. Pt was found to have h/h 5.1 on routine labs sent to ED for further workup.    Acute blood loss anemia due to GIB  -likely sec to GIB given elevated BUN and hx of GIB - w/ recent EGD in April 2022  at [facility] per son also with intervention  -H/H 5.2/16.4, FOBT+  -with borderline BP but stable  -2U PRBC ordered, received 80mg Protonix IVP   -cont Protonix 40mg BID IV for now    -no active bleeding noted   -GI consult- no invasive intervention planned.  -will start PO diet     ARF with AG metabolic acidosis superimposed on CKD stg 2  -sec to anemia  -Cr 2.28, .7, AG 18  -2U PRBC ordered   -check BMP post transfusion  -avoid nephrotoxic medications  -renal consulted for optimization of renal care  -bladder scan- prior h/o retention    Afib   -rate controlled   -not on AC due to bleeding risk   -cont Metoprolol 25mg PO BID     Hx of seizure   -cont topiramate     Hypothyroidism   -cont Synthroid    Dementia with behavior disturbances  -cont Aricept, Seroquel       DVT ppx  -no pharmacological AC due to bleeding risk   -SCD       Dispo- PT eval- P. likely Home. once H/H stable. and renal fxn improved

## 2022-08-09 NOTE — CONSULT NOTE ADULT - ASSESSMENT
RHONDA: r/o renal hypoperfusion in setting of severe anemia  - avoid potential nephrotoxins  - check renal sono  - IVF and further PRBCs as needed  - follow labs    Anemia:  - check Fe stores  - check serum immunofixation  - consider addition of LENA

## 2022-08-10 LAB
ANION GAP SERPL CALC-SCNC: 14 MMOL/L — SIGNIFICANT CHANGE UP (ref 5–17)
APPEARANCE UR: CLEAR — SIGNIFICANT CHANGE UP
BACTERIA # UR AUTO: NEGATIVE — SIGNIFICANT CHANGE UP
BILIRUB UR-MCNC: NEGATIVE — SIGNIFICANT CHANGE UP
BUN SERPL-MCNC: 78.6 MG/DL — HIGH (ref 8–20)
CALCIUM SERPL-MCNC: 8.9 MG/DL — SIGNIFICANT CHANGE UP (ref 8.4–10.5)
CHLORIDE SERPL-SCNC: 110 MMOL/L — HIGH (ref 98–107)
CO2 SERPL-SCNC: 19 MMOL/L — LOW (ref 22–29)
COLOR SPEC: YELLOW — SIGNIFICANT CHANGE UP
CREAT SERPL-MCNC: 1.69 MG/DL — HIGH (ref 0.5–1.3)
DIFF PNL FLD: NEGATIVE — SIGNIFICANT CHANGE UP
EGFR: 28 ML/MIN/1.73M2 — LOW
EPI CELLS # UR: NEGATIVE — SIGNIFICANT CHANGE UP
FERRITIN SERPL-MCNC: 82 NG/ML — SIGNIFICANT CHANGE UP (ref 15–150)
GLUCOSE SERPL-MCNC: 97 MG/DL — SIGNIFICANT CHANGE UP (ref 70–99)
GLUCOSE UR QL: NEGATIVE MG/DL — SIGNIFICANT CHANGE UP
HCT VFR BLD CALC: 26 % — LOW (ref 34.5–45)
HGB BLD-MCNC: 8.3 G/DL — LOW (ref 11.5–15.5)
IRON SATN MFR SERPL: 22 UG/DL — LOW (ref 37–145)
IRON SATN MFR SERPL: 7 % — LOW (ref 14–50)
KETONES UR-MCNC: NEGATIVE — SIGNIFICANT CHANGE UP
LEUKOCYTE ESTERASE UR-ACNC: ABNORMAL
MCHC RBC-ENTMCNC: 29.9 PG — SIGNIFICANT CHANGE UP (ref 27–34)
MCHC RBC-ENTMCNC: 31.9 GM/DL — LOW (ref 32–36)
MCV RBC AUTO: 93.5 FL — SIGNIFICANT CHANGE UP (ref 80–100)
NITRITE UR-MCNC: NEGATIVE — SIGNIFICANT CHANGE UP
PH UR: 6 — SIGNIFICANT CHANGE UP (ref 5–8)
PLATELET # BLD AUTO: 106 K/UL — LOW (ref 150–400)
POTASSIUM SERPL-MCNC: 3.7 MMOL/L — SIGNIFICANT CHANGE UP (ref 3.5–5.3)
POTASSIUM SERPL-SCNC: 3.7 MMOL/L — SIGNIFICANT CHANGE UP (ref 3.5–5.3)
PROT UR-MCNC: NEGATIVE — SIGNIFICANT CHANGE UP
RBC # BLD: 2.78 M/UL — LOW (ref 3.8–5.2)
RBC # BLD: 2.78 M/UL — LOW (ref 3.8–5.2)
RBC # FLD: 16.9 % — HIGH (ref 10.3–14.5)
RBC CASTS # UR COMP ASSIST: NEGATIVE /HPF — SIGNIFICANT CHANGE UP (ref 0–4)
RETICS #: 68.9 K/UL — SIGNIFICANT CHANGE UP (ref 25–125)
RETICS/RBC NFR: 2.5 % — SIGNIFICANT CHANGE UP (ref 0.5–2.5)
SODIUM SERPL-SCNC: 143 MMOL/L — SIGNIFICANT CHANGE UP (ref 135–145)
SP GR SPEC: 1.01 — SIGNIFICANT CHANGE UP (ref 1.01–1.02)
TIBC SERPL-MCNC: 330 UG/DL — SIGNIFICANT CHANGE UP (ref 220–430)
TRANSFERRIN SERPL-MCNC: 231 MG/DL — SIGNIFICANT CHANGE UP (ref 192–382)
UROBILINOGEN FLD QL: NEGATIVE MG/DL — SIGNIFICANT CHANGE UP
WBC # BLD: 2.99 K/UL — LOW (ref 3.8–10.5)
WBC # FLD AUTO: 2.99 K/UL — LOW (ref 3.8–10.5)
WBC UR QL: SIGNIFICANT CHANGE UP /HPF (ref 0–5)

## 2022-08-10 PROCEDURE — 76775 US EXAM ABDO BACK WALL LIM: CPT | Mod: 26

## 2022-08-10 PROCEDURE — 99233 SBSQ HOSP IP/OBS HIGH 50: CPT

## 2022-08-10 RX ORDER — ERYTHROPOIETIN 10000 [IU]/ML
40000 INJECTION, SOLUTION INTRAVENOUS; SUBCUTANEOUS ONCE
Refills: 0 | Status: COMPLETED | OUTPATIENT
Start: 2022-08-10 | End: 2022-08-10

## 2022-08-10 RX ORDER — SODIUM BICARBONATE 1 MEQ/ML
0.06 SYRINGE (ML) INTRAVENOUS
Qty: 75 | Refills: 0 | Status: DISCONTINUED | OUTPATIENT
Start: 2022-08-10 | End: 2022-08-11

## 2022-08-10 RX ORDER — FOLIC ACID 0.8 MG
1 TABLET ORAL DAILY
Refills: 0 | Status: DISCONTINUED | OUTPATIENT
Start: 2022-08-10 | End: 2022-08-10

## 2022-08-10 RX ORDER — SENNA PLUS 8.6 MG/1
2 TABLET ORAL AT BEDTIME
Refills: 0 | Status: DISCONTINUED | OUTPATIENT
Start: 2022-08-10 | End: 2022-08-11

## 2022-08-10 RX ORDER — TAMSULOSIN HYDROCHLORIDE 0.4 MG/1
0.4 CAPSULE ORAL AT BEDTIME
Refills: 0 | Status: DISCONTINUED | OUTPATIENT
Start: 2022-08-10 | End: 2022-08-11

## 2022-08-10 RX ORDER — ERYTHROPOIETIN 10000 [IU]/ML
20000 INJECTION, SOLUTION INTRAVENOUS; SUBCUTANEOUS
Refills: 0 | Status: DISCONTINUED | OUTPATIENT
Start: 2022-08-10 | End: 2022-08-10

## 2022-08-10 RX ORDER — IRON SUCROSE 20 MG/ML
200 INJECTION, SOLUTION INTRAVENOUS EVERY 24 HOURS
Refills: 0 | Status: DISCONTINUED | OUTPATIENT
Start: 2022-08-10 | End: 2022-08-11

## 2022-08-10 RX ADMIN — Medication 25 MILLIGRAM(S): at 05:37

## 2022-08-10 RX ADMIN — PANTOPRAZOLE SODIUM 40 MILLIGRAM(S): 20 TABLET, DELAYED RELEASE ORAL at 05:38

## 2022-08-10 RX ADMIN — DORZOLAMIDE HYDROCHLORIDE 1 DROP(S): 20 SOLUTION/ DROPS OPHTHALMIC at 23:14

## 2022-08-10 RX ADMIN — DORZOLAMIDE HYDROCHLORIDE 1 DROP(S): 20 SOLUTION/ DROPS OPHTHALMIC at 13:21

## 2022-08-10 RX ADMIN — PANTOPRAZOLE SODIUM 40 MILLIGRAM(S): 20 TABLET, DELAYED RELEASE ORAL at 23:14

## 2022-08-10 RX ADMIN — TAMSULOSIN HYDROCHLORIDE 0.4 MILLIGRAM(S): 0.4 CAPSULE ORAL at 23:13

## 2022-08-10 RX ADMIN — GABAPENTIN 100 MILLIGRAM(S): 400 CAPSULE ORAL at 13:20

## 2022-08-10 RX ADMIN — Medication 1 MILLIGRAM(S): at 13:19

## 2022-08-10 RX ADMIN — Medication 70 MEQ/KG/HR: at 17:40

## 2022-08-10 RX ADMIN — SENNA PLUS 2 TABLET(S): 8.6 TABLET ORAL at 23:13

## 2022-08-10 RX ADMIN — ERYTHROPOIETIN 40000 UNIT(S): 10000 INJECTION, SOLUTION INTRAVENOUS; SUBCUTANEOUS at 13:20

## 2022-08-10 RX ADMIN — IRON SUCROSE 110 MILLIGRAM(S): 20 INJECTION, SOLUTION INTRAVENOUS at 13:21

## 2022-08-10 RX ADMIN — Medication 600 MILLIGRAM(S): at 05:35

## 2022-08-10 RX ADMIN — Medication 150 MICROGRAM(S): at 05:38

## 2022-08-10 RX ADMIN — DONEPEZIL HYDROCHLORIDE 10 MILLIGRAM(S): 10 TABLET, FILM COATED ORAL at 23:13

## 2022-08-10 RX ADMIN — QUETIAPINE FUMARATE 100 MILLIGRAM(S): 200 TABLET, FILM COATED ORAL at 23:14

## 2022-08-10 RX ADMIN — PREGABALIN 1000 MICROGRAM(S): 225 CAPSULE ORAL at 13:19

## 2022-08-10 RX ADMIN — Medication 600 MILLIGRAM(S): at 17:40

## 2022-08-10 RX ADMIN — Medication 25 MILLIGRAM(S): at 17:40

## 2022-08-10 RX ADMIN — SODIUM CHLORIDE 75 MILLILITER(S): 9 INJECTION INTRAMUSCULAR; INTRAVENOUS; SUBCUTANEOUS at 13:23

## 2022-08-10 RX ADMIN — Medication 50 MILLIGRAM(S): at 13:20

## 2022-08-10 NOTE — PROGRESS NOTE ADULT - ASSESSMENT
89yo F with PMHx Afib(not on AC), GIB, advanced dementia, anemia, seizure sent to ED from [place] for abnormal labs. Pt was found to have h/h 5.1 on routine labs sent to ED for further workup. Pt is demented, unable to provide any hx. [facility] chart reviewed from [facility] and [facility] noted in the chart, order placed for DNR/DNI. In the ED BP 91/59, , labs pertinent for h/h 5.2/16.4, FOBT+, Cr 2.28, .7, AG 18.         HRONDA: r/o renal hypoperfusion in setting of severe anemia  - avoid potential nephrotoxins  - No hydro on renal sono 8/10  - IVF and further PRBCs as needed  - Add Bicarb to IVF   - follow labs  - Follow sequential bladder scans , post void if possible     Anemia:  - Low Fe stores , add Venofer   -  Follow serum immunofixation  -  Add Retacrit and folate      89yo F with PMHx Afib(not on AC), GIB, advanced dementia, anemia, seizure sent to ED from [place] for abnormal labs. Pt was found to have h/h 5.1 on routine labs sent to ED for further workup. Pt is demented, unable to provide any hx. paper chart reviewed from NH and [facility] form noted in the chart, order placed for DNR/DNI. In the ED BP 91/59, , labs pertinent for h/h 5.2/16.4, FOBT+, Cr 2.28, .7, AG 18.         RHONDA: r/o renal hypoperfusion in setting of severe anemia  - avoid potential nephrotoxins  - No hydro on renal sono 8/10  - IVF and further PRBCs as needed  - Add Bicarb to IVF   - follow labs  - Follow sequential bladder scans , post void if possible   - Flomax added     Anemia:  - Low Fe stores , add Venofer   -  Follow serum immunofixation  -  Add Retacrit and folate

## 2022-08-10 NOTE — PROGRESS NOTE ADULT - ASSESSMENT
89yo F with PMHx Afib(not on AC), GIB, advanced dementia, anemia, seizure sent to ED from [place] for abnormal labs. Pt was found to have h/h 5.1 on routine labs sent to ED for further workup.    Acute blood loss anemia due to GIB and anemia of chronic disease along with iron deficiency  -likely sec to GIB given elevated BUN and hx of GIB - w/ recent EGD in April 2022 at [facility] per son also with intervention  -H/H 5.2/16.4, FOBT+  -s/p 2U PRBC,, received 80mg Protonix IVP   -cont Protonix 40mg BID IV for now    -no active bleeding noted   -GI consult- no invasive intervention planned.  -tolerating PO diet   -EPogen 40K x 1 dose today along with venofer IV    ARF with AG metabolic acidosis superimposed on CKD stg 2; ARF component improving well  -sec to anemia  -Cr 2.28, .7, AG 18  -s/p 2U PRBC   -avoid nephrotoxic medications  -renal consulted for optimization of renal care  -bladder scan- prior h/o retention- retains 400-500ml  -flomax added to regimen  -renal sono with CKD    Constiaption  -tap water enema x 1 ordered  -senna    Afib   -rate controlled   -not on AC due to bleeding risk   -cont Metoprolol 25mg PO BID     Hx of seizure   -cont topiramate     Hypothyroidism   -cont Synthroid    Dementia with behavior disturbances  -cont Aricept, Seroquel       DVT ppx  -no pharmacological AC due to bleeding risk   -SCD       Dispo- PT eval- P. to [facility]. once H/H stable. and renal fxn improved.  planned for AM. D/W SW June about same

## 2022-08-10 NOTE — CHART NOTE - NSCHARTNOTEFT_GEN_A_CORE
Notified by RN pt with 412mL urine on bladder scan this AM. Patient unable to void on her own. Instructed RN to straight cath pt x1 and continue to bladder scan Q6H and PRN. PMD will be notified in AM.

## 2022-08-11 ENCOUNTER — TRANSCRIPTION ENCOUNTER (OUTPATIENT)
Age: 87
End: 2022-08-11

## 2022-08-11 VITALS
OXYGEN SATURATION: 98 % | DIASTOLIC BLOOD PRESSURE: 61 MMHG | HEART RATE: 62 BPM | SYSTOLIC BLOOD PRESSURE: 117 MMHG | TEMPERATURE: 97 F | RESPIRATION RATE: 19 BRPM

## 2022-08-11 LAB
ANION GAP SERPL CALC-SCNC: 11 MMOL/L — SIGNIFICANT CHANGE UP (ref 5–17)
BUN SERPL-MCNC: 47.9 MG/DL — HIGH (ref 8–20)
CALCIUM SERPL-MCNC: 8.5 MG/DL — SIGNIFICANT CHANGE UP (ref 8.4–10.5)
CHLORIDE SERPL-SCNC: 110 MMOL/L — HIGH (ref 98–107)
CO2 SERPL-SCNC: 22 MMOL/L — SIGNIFICANT CHANGE UP (ref 22–29)
CREAT SERPL-MCNC: 1.29 MG/DL — SIGNIFICANT CHANGE UP (ref 0.5–1.3)
EGFR: 39 ML/MIN/1.73M2 — LOW
GLUCOSE SERPL-MCNC: 84 MG/DL — SIGNIFICANT CHANGE UP (ref 70–99)
HCT VFR BLD CALC: 24.4 % — LOW (ref 34.5–45)
HGB BLD-MCNC: 8.1 G/DL — LOW (ref 11.5–15.5)
MCHC RBC-ENTMCNC: 30.8 PG — SIGNIFICANT CHANGE UP (ref 27–34)
MCHC RBC-ENTMCNC: 33.2 GM/DL — SIGNIFICANT CHANGE UP (ref 32–36)
MCV RBC AUTO: 92.8 FL — SIGNIFICANT CHANGE UP (ref 80–100)
PLATELET # BLD AUTO: 122 K/UL — LOW (ref 150–400)
POTASSIUM SERPL-MCNC: 3.5 MMOL/L — SIGNIFICANT CHANGE UP (ref 3.5–5.3)
POTASSIUM SERPL-SCNC: 3.5 MMOL/L — SIGNIFICANT CHANGE UP (ref 3.5–5.3)
RBC # BLD: 2.63 M/UL — LOW (ref 3.8–5.2)
RBC # FLD: 16.7 % — HIGH (ref 10.3–14.5)
RETICS #: 78.3 K/UL — SIGNIFICANT CHANGE UP (ref 25–125)
RETICS/RBC NFR: 2.9 % — HIGH (ref 0.5–2.5)
SARS-COV-2 RNA SPEC QL NAA+PROBE: SIGNIFICANT CHANGE UP
SODIUM SERPL-SCNC: 143 MMOL/L — SIGNIFICANT CHANGE UP (ref 135–145)
WBC # BLD: 3.65 K/UL — LOW (ref 3.8–10.5)
WBC # FLD AUTO: 3.65 K/UL — LOW (ref 3.8–10.5)

## 2022-08-11 PROCEDURE — 99239 HOSP IP/OBS DSCHRG MGMT >30: CPT

## 2022-08-11 PROCEDURE — 84484 ASSAY OF TROPONIN QUANT: CPT

## 2022-08-11 PROCEDURE — 80048 BASIC METABOLIC PNL TOTAL CA: CPT

## 2022-08-11 PROCEDURE — 84466 ASSAY OF TRANSFERRIN: CPT

## 2022-08-11 PROCEDURE — U0005: CPT

## 2022-08-11 PROCEDURE — 86900 BLOOD TYPING SEROLOGIC ABO: CPT

## 2022-08-11 PROCEDURE — 85610 PROTHROMBIN TIME: CPT

## 2022-08-11 PROCEDURE — 36415 COLL VENOUS BLD VENIPUNCTURE: CPT

## 2022-08-11 PROCEDURE — 87086 URINE CULTURE/COLONY COUNT: CPT

## 2022-08-11 PROCEDURE — 81001 URINALYSIS AUTO W/SCOPE: CPT

## 2022-08-11 PROCEDURE — 82272 OCCULT BLD FECES 1-3 TESTS: CPT

## 2022-08-11 PROCEDURE — 86850 RBC ANTIBODY SCREEN: CPT

## 2022-08-11 PROCEDURE — 82728 ASSAY OF FERRITIN: CPT

## 2022-08-11 PROCEDURE — 36430 TRANSFUSION BLD/BLD COMPNT: CPT

## 2022-08-11 PROCEDURE — 87186 SC STD MICRODIL/AGAR DIL: CPT

## 2022-08-11 PROCEDURE — 93005 ELECTROCARDIOGRAM TRACING: CPT

## 2022-08-11 PROCEDURE — 80053 COMPREHEN METABOLIC PANEL: CPT

## 2022-08-11 PROCEDURE — 76775 US EXAM ABDO BACK WALL LIM: CPT

## 2022-08-11 PROCEDURE — 96374 THER/PROPH/DIAG INJ IV PUSH: CPT

## 2022-08-11 PROCEDURE — 99285 EMERGENCY DEPT VISIT HI MDM: CPT | Mod: 25

## 2022-08-11 PROCEDURE — 85025 COMPLETE CBC W/AUTO DIFF WBC: CPT

## 2022-08-11 PROCEDURE — 83540 ASSAY OF IRON: CPT

## 2022-08-11 PROCEDURE — 71045 X-RAY EXAM CHEST 1 VIEW: CPT

## 2022-08-11 PROCEDURE — 85730 THROMBOPLASTIN TIME PARTIAL: CPT

## 2022-08-11 PROCEDURE — 86901 BLOOD TYPING SEROLOGIC RH(D): CPT

## 2022-08-11 PROCEDURE — 82962 GLUCOSE BLOOD TEST: CPT

## 2022-08-11 PROCEDURE — 85045 AUTOMATED RETICULOCYTE COUNT: CPT

## 2022-08-11 PROCEDURE — 86334 IMMUNOFIX E-PHORESIS SERUM: CPT

## 2022-08-11 PROCEDURE — P9016: CPT

## 2022-08-11 PROCEDURE — 85027 COMPLETE CBC AUTOMATED: CPT

## 2022-08-11 PROCEDURE — 83550 IRON BINDING TEST: CPT

## 2022-08-11 PROCEDURE — U0003: CPT

## 2022-08-11 PROCEDURE — 87637 SARSCOV2&INF A&B&RSV AMP PRB: CPT

## 2022-08-11 PROCEDURE — 86923 COMPATIBILITY TEST ELECTRIC: CPT

## 2022-08-11 RX ORDER — SENNA PLUS 8.6 MG/1
2 TABLET ORAL
Qty: 0 | Refills: 0 | DISCHARGE
Start: 2022-08-11

## 2022-08-11 RX ORDER — TAMSULOSIN HYDROCHLORIDE 0.4 MG/1
1 CAPSULE ORAL
Qty: 0 | Refills: 0 | DISCHARGE
Start: 2022-08-11

## 2022-08-11 RX ORDER — ACETAMINOPHEN 500 MG
650 TABLET ORAL EVERY 6 HOURS
Refills: 0 | Status: DISCONTINUED | OUTPATIENT
Start: 2022-08-11 | End: 2022-08-11

## 2022-08-11 RX ORDER — ACETAMINOPHEN 500 MG
2 TABLET ORAL
Qty: 0 | Refills: 0 | DISCHARGE
Start: 2022-08-11

## 2022-08-11 RX ORDER — METHOCARBAMOL 500 MG/1
500 TABLET, FILM COATED ORAL ONCE
Refills: 0 | Status: COMPLETED | OUTPATIENT
Start: 2022-08-11 | End: 2022-08-11

## 2022-08-11 RX ADMIN — Medication 600 MILLIGRAM(S): at 05:12

## 2022-08-11 RX ADMIN — Medication 50 MILLIGRAM(S): at 09:58

## 2022-08-11 RX ADMIN — PANTOPRAZOLE SODIUM 40 MILLIGRAM(S): 20 TABLET, DELAYED RELEASE ORAL at 05:12

## 2022-08-11 RX ADMIN — IRON SUCROSE 110 MILLIGRAM(S): 20 INJECTION, SOLUTION INTRAVENOUS at 09:59

## 2022-08-11 RX ADMIN — METHOCARBAMOL 500 MILLIGRAM(S): 500 TABLET, FILM COATED ORAL at 12:16

## 2022-08-11 RX ADMIN — Medication 70 MEQ/KG/HR: at 12:17

## 2022-08-11 RX ADMIN — GABAPENTIN 100 MILLIGRAM(S): 400 CAPSULE ORAL at 10:01

## 2022-08-11 RX ADMIN — Medication 1 MILLIGRAM(S): at 09:58

## 2022-08-11 RX ADMIN — Medication 150 MICROGRAM(S): at 05:12

## 2022-08-11 RX ADMIN — PREGABALIN 1000 MICROGRAM(S): 225 CAPSULE ORAL at 09:58

## 2022-08-11 RX ADMIN — Medication 70 MEQ/KG/HR: at 09:58

## 2022-08-11 RX ADMIN — Medication 650 MILLIGRAM(S): at 10:01

## 2022-08-11 RX ADMIN — DORZOLAMIDE HYDROCHLORIDE 1 DROP(S): 20 SOLUTION/ DROPS OPHTHALMIC at 10:21

## 2022-08-11 NOTE — DISCHARGE NOTE NURSING/CASE MANAGEMENT/SOCIAL WORK - NSDCPEFALRISK_GEN_ALL_CORE
For information on Fall & Injury Prevention, visit: [email or web address] OR  [email or web address] OR  https://www.cdc.gov/steadi/patient.html

## 2022-08-11 NOTE — PROGRESS NOTE ADULT - ASSESSMENT
89yo F with PMHx Afib(not on AC), GIB, advanced dementia, anemia, seizure sent to ED from [place] for abnormal labs. Pt was found to have h/h 5.1 on routine labs sent to ED for further workup. Pt is demented, unable to provide any hx. [facility] placed for [facility]NR/DNI. In the ED BP 91/59, , labs pertinent for h/h 5.2/16.4, FOBT+, Cr 2.28, .7, AG 18.         RHONDA: r/o renal hypoperfusion in setting of severe anemia ---> resolved   - avoid potential nephrotoxins  - No hydro on renal sono 8/10  - IVF and further PRBCs as needed  - follow labs  - Flomax added     Anemia:  - Low Fe stores , added Venofer   -  Follow serum immunofixation  -  Add Retacrit and folate

## 2022-08-11 NOTE — DISCHARGE NOTE PROVIDER - ATTENDING DISCHARGE PHYSICAL EXAMINATION:
Vital Signs Last 24 Hrs  T(C): 36.3 (08-11-22 @ 08:55), Max: 36.8 (08-10-22 @ 10:26)  T(F): 97.4 (08-11-22 @ 08:55), Max: 98.2 (08-10-22 @ 10:26)  HR: 62 (08-11-22 @ 08:55) (62 - 98)  BP: 117/61 (08-11-22 @ 08:55) (94/61 - 117/66)  BP(mean): --  RR: 19 (08-11-22 @ 08:55) (19 - 19)  SpO2: 98% (08-11-22 @ 08:55) (98% - 100%)    GENERAL: patient is frail, no acute distress, demented , was lying in an awkward position as found by RN today and pt c/o neck pain now.  EYES: sclera clear, no exudates  ENT: oropharynx clear without erythema, no exudates, dry mucous membranes, dentures in  NECK: supple, soft, no thyromegaly noted  LUNGS: good air entry bilaterally, clear to auscultation, symmetric breath sounds, no wheezing or rhonchi appreciated  HEART: soft S1/S2, regular rate and rhythm, no murmurs noted, no lower extremity edema  GASTROINTESTINAL: abdomen is soft, nontender, nondistended, normoactive bowel sounds, no palpable masses  INTEGUMENT: good skin turgor, warm skin, appears well perfused  MUSCULOSKELETAL: no clubbing or cyanosis, no obvious deformity  NEUROLOGIC: awake, alert, oriented x1, demented, good voluntary muscle tone in 4 extremities  PSYCHIATRIC: mood is good, demented   HEME/LYMPH: no palpable supraclavicular nodules, no obvious ecchymosis or petechiae

## 2022-08-11 NOTE — DISCHARGE NOTE PROVIDER - NSDCMRMEDTOKEN_GEN_ALL_CORE_FT
acetaminophen 325 mg oral tablet: 2 tab(s) orally every 6 hours, As needed, Temp greater or equal to 38C (100.4F), Mild Pain (1 - 3)  Aricept 10 mg oral tablet: 1 tab(s) orally once a day (at bedtime)  cyanocobalamin 1000 mcg oral tablet: 1 tab(s) orally once a day  dorzolamide 2% ophthalmic solution: 1 drop(s) to each affected eye 2 times a day  Epogen 40,000 units/mL injectable solution: injectable once a week  last given 8/10/2022  ferrous sulfate 324 mg (65 mg elemental iron) oral delayed release tablet: 1 tab(s) orally once a day  folic acid 1 mg oral tablet: 1 tab(s) orally once a day  gabapentin 100 mg oral capsule: 1 cap(s) orally once a day  gemfibrozil 600 mg oral tablet: 1 tab(s) orally 2 times a day  iron dextran 50 mg/mL injectable solution: 200 milligram(s) injectable 3 times a week  metoprolol tartrate 25 mg oral tablet: 1 tab(s) orally 2 times a day  pantoprazole 40 mg oral delayed release tablet: 1 tab(s) orally 2 times a day  senna leaf extract oral tablet: 2 tab(s) orally once a day (at bedtime)  SEROquel 100 mg oral tablet: 1 tab(s) orally once a day (at bedtime)  Synthroid 150 mcg (0.15 mg) oral tablet: 1 tab(s) orally once a day  tamsulosin 0.4 mg oral capsule: 1 cap(s) orally once a day (at bedtime)  topiramate 50 mg oral tablet: 1 tab(s) orally once a day

## 2022-08-11 NOTE — DISCHARGE NOTE PROVIDER - NSDCCPCAREPLAN_GEN_ALL_CORE_FT
PRINCIPAL DISCHARGE DIAGNOSIS  Diagnosis: Acute on chronic anemia  Assessment and Plan of Treatment:       SECONDARY DISCHARGE DIAGNOSES  Diagnosis: GI bleed  Assessment and Plan of Treatment:     Diagnosis: Anemia of chronic disease  Assessment and Plan of Treatment:     Diagnosis: Iron deficiency anemia  Assessment and Plan of Treatment:     Diagnosis: Stage 3 chronic kidney disease  Assessment and Plan of Treatment:     Diagnosis: RHONDA (acute kidney injury)  Assessment and Plan of Treatment:     Diagnosis: Dementia  Assessment and Plan of Treatment:     Diagnosis: Urinary retention  Assessment and Plan of Treatment:

## 2022-08-11 NOTE — PROGRESS NOTE ADULT - SUBJECTIVE AND OBJECTIVE BOX
NEPHROLOGY INTERVAL HPI/OVERNIGHT EVENTS:    Seen earlier   large residual noted on bladder scan   S/P straight cath   Remains on IV NS     MEDICATIONS  (STANDING):  cyanocobalamin 1000 MICROGram(s) Oral daily  donepezil 10 milliGRAM(s) Oral at bedtime  dorzolamide 2% Ophthalmic Solution 1 Drop(s) Both EYES <User Schedule>  folic acid 1 milliGRAM(s) Oral daily  gabapentin 100 milliGRAM(s) Oral daily  gemfibrozil 600 milliGRAM(s) Oral two times a day  iron sucrose IVPB 200 milliGRAM(s) IV Intermittent every 24 hours  levothyroxine 150 MICROGram(s) Oral daily  metoprolol tartrate 25 milliGRAM(s) Oral two times a day  pantoprazole  Injectable 40 milliGRAM(s) IV Push two times a day  QUEtiapine 100 milliGRAM(s) Oral at bedtime  sodium chloride 0.9%. 1000 milliLiter(s) (75 mL/Hr) IV Continuous <Continuous>  tamsulosin 0.4 milliGRAM(s) Oral at bedtime  topiramate 50 milliGRAM(s) Oral daily    MEDICATIONS  (PRN):  ondansetron Injectable 4 milliGRAM(s) IV Push every 8 hours PRN Nausea and/or Vomiting      Allergies    No Known Allergies    Intolerances          Vital Signs Last 24 Hrs  T(C): 36.8 (10 Aug 2022 10:26), Max: 36.8 (10 Aug 2022 10:26)  T(F): 98.2 (10 Aug 2022 10:26), Max: 98.2 (10 Aug 2022 10:26)  HR: 74 (10 Aug 2022 10:26) (60 - 79)  BP: 101/63 (10 Aug 2022 10:26) (101/63 - 109/69)  BP(mean): --  RR: 19 (10 Aug 2022 10:26) (18 - 19)  SpO2: 100% (10 Aug 2022 10:26) (90% - 100%)    Parameters below as of 10 Aug 2022 10:26  Patient On (Oxygen Delivery Method): room air      Daily     Daily   I&O's Detail    09 Aug 2022 07:01  -  10 Aug 2022 07:00  --------------------------------------------------------  IN:  Total IN: 0 mL    OUT:    Voided (mL): 400 mL  Total OUT: 400 mL    Total NET: -400 mL        I&O's Summary    09 Aug 2022 07:01  -  10 Aug 2022 07:00  --------------------------------------------------------  IN: 0 mL / OUT: 400 mL / NET: -400 mL        PHYSICAL EXAM:    GENERAL: Appears frail and chronically debilitated  HEAD:  Atraumatic, Normocephalic  EYES: EOMI, PERRLA, conjunctiva and sclera clear  ENMT: Dry mucous membranes  NECK: Supple, No JVD  NERVOUS SYSTEM:  Awake, demented  CHEST/LUNG: EAE , No wheeze   HEART: Regular rate and rhythm; No rub  ABDOMEN: Soft, Nontender, Nondistended; BS+  EXTREMITIES:   No LE edema    LABS:                        8.3    2.99  )-----------( 106      ( 10 Aug 2022 06:50 )             26.0     08-10    143  |  110<H>  |  78.6<H>  ----------------------------<  97  3.7   |  19.0<L>  |  1.69<H>    Ca    8.9      10 Aug 2022 06:50    TPro  6.3<L>  /  Alb  3.5  /  TBili  <0.2<L>  /  DBili  x   /  AST  14  /  ALT  7   /  AlkPhos  55  08-08    PT/INR - ( 08 Aug 2022 21:23 )   PT: 13.9 sec;   INR: 1.20 ratio         PTT - ( 08 Aug 2022 21:23 )  PTT:26.9 sec  Urinalysis Basic - ( 10 Aug 2022 06:30 )    Color: Yellow / Appearance: Clear / S.010 / pH: x  Gluc: x / Ketone: Negative  / Bili: Negative / Urobili: Negative mg/dL   Blood: x / Protein: Negative / Nitrite: Negative   Leuk Esterase: Trace / RBC: Negative /HPF / WBC 0-2 /HPF   Sq Epi: x / Non Sq Epi: Negative / Bacteria: Negative        < from: US Renal (08.10.22 @ 11:39) >    ACC: [id number] EXAM:  US KIDNEY(S)                          PROCEDURE DATE:  08/10/2022          INTERPRETATION:  CLINICAL INFORMATION: Acute kidney injury    COMPARISON: 2022    TECHNIQUE: Sonography of the kidneys and bladder.    FINDINGS:  Right kidney: 10.0 cm. Increased echogenicity. No renal mass,   hydronephrosis or calculi. 2.8 x 2.5 cm and 5.3 x 4.3 cm (with medial   calcification) cysts.    Left kidney: 10.9 cm. Increased echogenicity. No renal mass,   hydronephrosis or calculi. 0.5 cm cyst.    Urinary bladder: Within normal limits.    IMPRESSION:  Bilaterally increased renal cortical echogenicity compatible with medical   renal disease.  No hydronephrosis.        --- End of Report ---        < end of copied text >        RADIOLOGY & ADDITIONAL TESTS:  
HEALTH ISSUES - PROBLEM Dx:    PMHx Afib(not on AC), GIB, advanced dementia, anemia, seizure sent to ED from [place]     ANemia    INTERVAL HPI/ OVERNIGHT EVENTS:    sitting in bed  eating her lunch  asking for hot coffee and that her tea is cold  is not really responding to any of my specific questions  is focused on eating  per RN pt c/o constipation and asked specifically for enema    REVIEW OF SYSTEMS:    as above    Vital Signs Last 24 Hrs  T(C): 36.8 (10 Aug 2022 10:26), Max: 36.8 (10 Aug 2022 10:)  T(F): 98.2 (10 Aug 2022 10:), Max: 98.2 (10 Aug 2022 10:)  HR: 74 (10 Aug 2022 10:) (60 - 79)  BP: 101/63 (10 Aug 2022 10:) (101/63 - 109/69)  BP(mean): --  RR: 19 (10 Aug 2022 10:) (18 - 19)  SpO2: 100% (10 Aug 2022 10:) (90% - 100%)    Parameters below as of 10 Aug 2022 10:26  Patient On (Oxygen Delivery Method): room air    PHYSICAL EXAM-  GENERAL: patient is frail, no acute distress, demented , appears comfortable  EYES: sclera clear, no exudates  ENT: oropharynx clear without erythema, no exudates, dry mucous membranes, dentures in  NECK: supple, soft, no thyromegaly noted  LUNGS: good air entry bilaterally, clear to auscultation, symmetric breath sounds, no wheezing or rhonchi appreciated  HEART: soft S1/S2, regular rate and rhythm, no murmurs noted, no lower extremity edema  GASTROINTESTINAL: abdomen is soft, nontender, nondistended, normoactive bowel sounds, no palpable masses  INTEGUMENT: good skin turgor, warm skin, appears well perfused  MUSCULOSKELETAL: no clubbing or cyanosis, no obvious deformity  NEUROLOGIC: awake, alert, oriented x1, demented, good voluntary muscle tone in 4 extremities  PSYCHIATRIC: mood is good, demented   HEME/LYMPH: no palpable supraclavicular nodules, no obvious ecchymosis or petechiae    MEDICATIONS  (STANDING):  cyanocobalamin 1000 MICROGram(s) Oral daily  donepezil 10 milliGRAM(s) Oral at bedtime  dorzolamide 2% Ophthalmic Solution 1 Drop(s) Both EYES <User Schedule>  folic acid 1 milliGRAM(s) Oral daily  gabapentin 100 milliGRAM(s) Oral daily  gemfibrozil 600 milliGRAM(s) Oral two times a day  iron sucrose IVPB 200 milliGRAM(s) IV Intermittent every 24 hours  levothyroxine 150 MICROGram(s) Oral daily  metoprolol tartrate 25 milliGRAM(s) Oral two times a day  pantoprazole  Injectable 40 milliGRAM(s) IV Push two times a day  QUEtiapine 100 milliGRAM(s) Oral at bedtime  sodium bicarbonate  Infusion 0.063 mEq/kG/Hr (70 mL/Hr) IV Continuous <Continuous>  tamsulosin 0.4 milliGRAM(s) Oral at bedtime  topiramate 50 milliGRAM(s) Oral daily    MEDICATIONS  (PRN):  ondansetron Injectable 4 milliGRAM(s) IV Push every 8 hours PRN Nausea and/or Vomiting      LABS:                        8.3    2.99  )-----------( 106      ( 10 Aug 2022 06:50 )             26.0     08-10    143  |  110<H>  |  78.6<H>  ----------------------------<  97  3.7   |  19.0<L>  |  1.69<H>    Ca    8.9      10 Aug 2022 06:50    TPro  6.3<L>  /  Alb  3.5  /  TBili  <0.2<L>  /  DBili  x   /  AST  14  /  ALT  7   /  AlkPhos  55  08-08    PT/INR - ( 08 Aug 2022 21:23 )   PT: 13.9 sec;   INR: 1.20 ratio         PTT - ( 08 Aug 2022 21:23 )  PTT:26.9 sec  Urinalysis Basic - ( 10 Aug 2022 06:30 )    Color: Yellow / Appearance: Clear / S.010 / pH: x  Gluc: x / Ketone: Negative  / Bili: Negative / Urobili: Negative mg/dL   Blood: x / Protein: Negative / Nitrite: Negative   Leuk Esterase: Trace / RBC: Negative /HPF / WBC 0-2 /HPF   Sq Epi: x / Non Sq Epi: Negative / Bacteria: Negative    
NEPHROLOGY INTERVAL HPI/OVERNIGHT EVENTS:      Feels better   No new events     MEDICATIONS  (STANDING):  cyanocobalamin 1000 MICROGram(s) Oral daily  donepezil 10 milliGRAM(s) Oral at bedtime  dorzolamide 2% Ophthalmic Solution 1 Drop(s) Both EYES <User Schedule>  folic acid 1 milliGRAM(s) Oral daily  gabapentin 100 milliGRAM(s) Oral daily  gemfibrozil 600 milliGRAM(s) Oral two times a day  iron sucrose IVPB 200 milliGRAM(s) IV Intermittent every 24 hours  levothyroxine 150 MICROGram(s) Oral daily  metoprolol tartrate 25 milliGRAM(s) Oral two times a day  pantoprazole  Injectable 40 milliGRAM(s) IV Push two times a day  QUEtiapine 100 milliGRAM(s) Oral at bedtime  senna 2 Tablet(s) Oral at bedtime  sodium bicarbonate  Infusion 0.063 mEq/kG/Hr (70 mL/Hr) IV Continuous <Continuous>  tamsulosin 0.4 milliGRAM(s) Oral at bedtime  topiramate 50 milliGRAM(s) Oral daily    MEDICATIONS  (PRN):  acetaminophen     Tablet .. 650 milliGRAM(s) Oral every 6 hours PRN Temp greater or equal to 38C (100.4F), Mild Pain (1 - 3)  ondansetron Injectable 4 milliGRAM(s) IV Push every 8 hours PRN Nausea and/or Vomiting      Allergies    No Known Allergies    Intolerances          Vital Signs Last 24 Hrs  T(C): 36.3 (11 Aug 2022 08:55), Max: 36.4 (10 Aug 2022 16:34)  T(F): 97.4 (11 Aug 2022 08:55), Max: 97.6 (11 Aug 2022 04:31)  HR: 62 (11 Aug 2022 08:55) (62 - 98)  BP: 117/61 (11 Aug 2022 08:55) (94/61 - 117/66)  BP(mean): --  RR: 19 (11 Aug 2022 08:55) (19 - 19)  SpO2: 98% (11 Aug 2022 08:55) (98% - 100%)    Parameters below as of 11 Aug 2022 08:55  Patient On (Oxygen Delivery Method): room air      Daily     Daily   I&O's Detail    10 Aug 2022 07:01  -  11 Aug 2022 07:00  --------------------------------------------------------  IN:  Total IN: 0 mL    OUT:    Voided (mL): 500 mL  Total OUT: 500 mL    Total NET: -500 mL        I&O's Summary    10 Aug 2022 07:01  -  11 Aug 2022 07:00  --------------------------------------------------------  IN: 0 mL / OUT: 500 mL / NET: -500 mL        PHYSICAL EXAM:    GENERAL: Appears frail and chronically debilitated  HEAD:  Atraumatic, Normocephalic  EYES: EOMI, PERRLA, conjunctiva and sclera clear  ENMT: Dry mucous membranes  NECK: Supple, No JVD  NERVOUS SYSTEM:  Awake, demented  CHEST/LUNG: EAE , No wheeze   HEART: Regular rate and rhythm; No rub  ABDOMEN: Soft, Nontender, Nondistended; BS+  EXTREMITIES:   No LE edema  LABS:                        8.1    3.65  )-----------( 122      ( 11 Aug 2022 07:00 )             24.4     08-11    143  |  110<H>  |  47.9<H>  ----------------------------<  84  3.5   |  22.0  |  1.29    Ca    8.5      11 Aug 2022 07:00        Urinalysis Basic - ( 10 Aug 2022 06:30 )    Color: Yellow / Appearance: Clear / S.010 / pH: x  Gluc: x / Ketone: Negative  / Bili: Negative / Urobili: Negative mg/dL   Blood: x / Protein: Negative / Nitrite: Negative   Leuk Esterase: Trace / RBC: Negative /HPF / WBC 0-2 /HPF   Sq Epi: x / Non Sq Epi: Negative / Bacteria: Negative              RADIOLOGY & ADDITIONAL TESTS:  
HEALTH ISSUES - PROBLEM Dx:  PMHx Afib(not on AC), GIB, advanced dementia, anemia, seizure sent to ED from [place]     ANemia    INTERVAL HPI/ OVERNIGHT EVENTS:    lying in bed  asking for water'  wants to eat food  informed her that I would give her food  denies pain    REVIEW OF SYSTEMS:    as above    Vital Signs Last 24 Hrs  T(C): 36.3 (09 Aug 2022 20:03), Max: 37 (09 Aug 2022 03:30)  T(F): 97.4 (09 Aug 2022 20:03), Max: 98.6 (09 Aug 2022 03:30)  HR: 60 (09 Aug 2022 20:03) (40 - 79)  BP: 108/62 (09 Aug 2022 20:03) (90/50 - 123/74)  BP(mean): --  RR: 18 (09 Aug 2022 20:03) (16 - 21)  SpO2: 90% (09 Aug 2022 20:03) (90% - 100%)    Parameters below as of 09 Aug 2022 15:29  Patient On (Oxygen Delivery Method): room air    PHYSICAL EXAM-  GENERAL: patient is frail, no acute distress, demented , asking for food  EYES: sclera clear, no exudates  ENT: oropharynx clear without erythema, no exudates, dry mucous membranes, dentures in  NECK: supple, soft, no thyromegaly noted  LUNGS: good air entry bilaterally, clear to auscultation, symmetric breath sounds, no wheezing or rhonchi appreciated  HEART: soft S1/S2, regular rate and rhythm, no murmurs noted, no lower extremity edema  GASTROINTESTINAL: abdomen is soft, nontender, nondistended, normoactive bowel sounds, no palpable masses  INTEGUMENT: good skin turgor, warm skin, appears well perfused  MUSCULOSKELETAL: no clubbing or cyanosis, no obvious deformity  NEUROLOGIC: awake, alert, oriented x1, demented, good voluntary muscle tone in 4 extremities  PSYCHIATRIC: mood is good, demented   HEME/LYMPH: no palpable supraclavicular nodules, no obvious ecchymosis or petechiae    MEDICATIONS  (STANDING):  cyanocobalamin 1000 MICROGram(s) Oral daily  donepezil 10 milliGRAM(s) Oral at bedtime  dorzolamide 2% Ophthalmic Solution 1 Drop(s) Both EYES <User Schedule>  folic acid 1 milliGRAM(s) Oral daily  gabapentin 100 milliGRAM(s) Oral daily  gemfibrozil 600 milliGRAM(s) Oral two times a day  levothyroxine 150 MICROGram(s) Oral daily  metoprolol tartrate 25 milliGRAM(s) Oral two times a day  pantoprazole  Injectable 40 milliGRAM(s) IV Push two times a day  QUEtiapine 100 milliGRAM(s) Oral at bedtime  topiramate 50 milliGRAM(s) Oral daily    MEDICATIONS  (PRN):  ondansetron Injectable 4 milliGRAM(s) IV Push every 8 hours PRN Nausea and/or Vomiting      LABS:                        8.8    7.80  )-----------( 112      ( 09 Aug 2022 11:40 )             26.2     08-09    139  |  106  |  95.5<H>  ----------------------------<  107<H>  4.4   |  20.0<L>  |  2.16<H>    Ca    8.8      09 Aug 2022 11:40    TPro  6.3<L>  /  Alb  3.5  /  TBili  <0.2<L>  /  DBili  x   /  AST  14  /  ALT  7   /  AlkPhos  55  08-08    PT/INR - ( 08 Aug 2022 21:23 )   PT: 13.9 sec;   INR: 1.20 ratio         PTT - ( 08 Aug 2022 21:23 )  PTT:26.9 sec

## 2022-08-11 NOTE — DISCHARGE NOTE NURSING/CASE MANAGEMENT/SOCIAL WORK - PATIENT PORTAL LINK FT
You can access the FollowMyHealth Patient Portal offered by [facility] by registering at the following website: [email or web address]. By joining [email or web address] FollowMyHealth portal, you will also be able to view your health information using other applications (apps) compatible with our system.

## 2022-08-11 NOTE — DISCHARGE NOTE PROVIDER - HOSPITAL COURSE
89yo F with PMHx Afib(not on AC), GIB, advanced dementia, anemia, seizure sent to ED from [place] for abnormal labs. Pt was found to have h/h 5.1 on routine labs sent to ED for further workup.    Acute blood loss anemia due to GIB and anemia of chronic disease along with iron deficiency  -likely sec to GIB given elevated BUN and hx of GIB - w/ recent EGD in April 2022 at [facility] per son also with intervention  s/p 2 PRBC and epogen and venofer. improved Retic  ARF with AG metabolic acidosis superimposed on CKD stg 2; ARF component resolved with IVF, BT and bicarb GTT    Medically stable and agreeable with discharge and follow up plan. Patient was advised to return to ED if any symptoms occur or worsen.  TIME 38 mins

## 2022-08-12 LAB
-  AMIKACIN: SIGNIFICANT CHANGE UP
-  AMOXICILLIN/CLAVULANIC ACID: SIGNIFICANT CHANGE UP
-  AMPICILLIN/SULBACTAM: SIGNIFICANT CHANGE UP
-  AMPICILLIN: SIGNIFICANT CHANGE UP
-  AMPICILLIN: SIGNIFICANT CHANGE UP
-  AZTREONAM: SIGNIFICANT CHANGE UP
-  CEFAZOLIN: SIGNIFICANT CHANGE UP
-  CEFEPIME: SIGNIFICANT CHANGE UP
-  CEFOXITIN: SIGNIFICANT CHANGE UP
-  CEFTRIAXONE: SIGNIFICANT CHANGE UP
-  CIPROFLOXACIN: SIGNIFICANT CHANGE UP
-  CIPROFLOXACIN: SIGNIFICANT CHANGE UP
-  ERTAPENEM: SIGNIFICANT CHANGE UP
-  GENTAMICIN: SIGNIFICANT CHANGE UP
-  IMIPENEM: SIGNIFICANT CHANGE UP
-  LEVOFLOXACIN: SIGNIFICANT CHANGE UP
-  LEVOFLOXACIN: SIGNIFICANT CHANGE UP
-  MEROPENEM: SIGNIFICANT CHANGE UP
-  NITROFURANTOIN: SIGNIFICANT CHANGE UP
-  NITROFURANTOIN: SIGNIFICANT CHANGE UP
-  PIPERACILLIN/TAZOBACTAM: SIGNIFICANT CHANGE UP
-  TETRACYCLINE: SIGNIFICANT CHANGE UP
-  TIGECYCLINE: SIGNIFICANT CHANGE UP
-  TOBRAMYCIN: SIGNIFICANT CHANGE UP
-  TRIMETHOPRIM/SULFAMETHOXAZOLE: SIGNIFICANT CHANGE UP
-  VANCOMYCIN: SIGNIFICANT CHANGE UP
CULTURE RESULTS: SIGNIFICANT CHANGE UP
INTERPRETATION SERPL IFE-IMP: SIGNIFICANT CHANGE UP
METHOD TYPE: SIGNIFICANT CHANGE UP
METHOD TYPE: SIGNIFICANT CHANGE UP
ORGANISM # SPEC MICROSCOPIC CNT: SIGNIFICANT CHANGE UP
SPECIMEN SOURCE: SIGNIFICANT CHANGE UP

## 2022-08-15 ENCOUNTER — NON-APPOINTMENT (OUTPATIENT)
Age: 87
End: 2022-08-15

## 2023-02-24 ENCOUNTER — OFFICE (OUTPATIENT)
Dept: URBAN - METROPOLITAN AREA CLINIC 104 | Facility: CLINIC | Age: 88
Setting detail: OPHTHALMOLOGY
End: 2023-02-24
Payer: MEDICARE

## 2023-02-24 DIAGNOSIS — H35.3122: ICD-10-CM

## 2023-02-24 DIAGNOSIS — H35.342: ICD-10-CM

## 2023-02-24 DIAGNOSIS — E11.9: ICD-10-CM

## 2023-02-24 DIAGNOSIS — H35.3211: ICD-10-CM

## 2023-02-24 PROCEDURE — 92012 INTRM OPH EXAM EST PATIENT: CPT | Performed by: SPECIALIST

## 2023-02-24 ASSESSMENT — PUNCTA - ASSESSMENT
OS_PUNCTA: COL PLUG
OD_PUNCTA: COL PLUG

## 2023-02-24 ASSESSMENT — TEAR BREAK UP TIME (TBUT)
OS_TBUT: 2+
OD_TBUT: 2+

## 2023-02-24 ASSESSMENT — LID EXAM ASSESSMENTS
OS_BLEPHARITIS: LLL LUL 2+
OD_BLEPHARITIS: RLL RUL 2+

## 2023-02-24 ASSESSMENT — SUPERFICIAL PUNCTATE KERATITIS (SPK): OS_SPK: 1+

## 2023-03-14 NOTE — CDI QUERY NOTE - NSCDINOTEDOCCLARIFICATION_GEN_A_CORE
Mr Dick called to request the discharge paperwork from procedure be mailed to him. During discussion asked to verify current dose of tacrolimus which he reports to be tacrolimus 1.5mg in am and 1mg in the pm. Labs were drawn today will assess level. Last instructions were for 1mg Q 12hours.   PLEASE INCLUDE MORE SPECIFIC DOCUMENTATION IN YOUR PROGRESS NOTE AND DISCHARGE SUMMARY.  The documentation in this patient's medical record requires additional clarification to ensure that we accurately capture the patients diagnosis(es), treatment and/or severity of illness. Please document to the greatest level of specificity all corresponding diagnoses (either known or suspected) and/or treatment associated with the clinical information described below.

## 2023-04-18 ENCOUNTER — INPATIENT (INPATIENT)
Facility: HOSPITAL | Age: 88
LOS: 3 days | Discharge: NURSING, REHAB OR LONG-TERM CARE | DRG: 378 | End: 2023-04-22
Attending: FAMILY MEDICINE | Admitting: STUDENT IN AN ORGANIZED HEALTH CARE EDUCATION/TRAINING PROGRAM
Payer: MEDICARE

## 2023-04-18 VITALS
HEART RATE: 88 BPM | OXYGEN SATURATION: 98 % | RESPIRATION RATE: 973 BRPM | HEIGHT: 62 IN | TEMPERATURE: 97 F | WEIGHT: 100.09 LBS | DIASTOLIC BLOOD PRESSURE: 80 MMHG | SYSTOLIC BLOOD PRESSURE: 128 MMHG

## 2023-04-18 DIAGNOSIS — Z95.0 PRESENCE OF CARDIAC PACEMAKER: Chronic | ICD-10-CM

## 2023-04-18 DIAGNOSIS — Z96.649 PRESENCE OF UNSPECIFIED ARTIFICIAL HIP JOINT: Chronic | ICD-10-CM

## 2023-04-18 DIAGNOSIS — K92.2 GASTROINTESTINAL HEMORRHAGE, UNSPECIFIED: ICD-10-CM

## 2023-04-18 DIAGNOSIS — Z90.710 ACQUIRED ABSENCE OF BOTH CERVIX AND UTERUS: Chronic | ICD-10-CM

## 2023-04-18 PROBLEM — G62.9 POLYNEUROPATHY, UNSPECIFIED: Chronic | Status: ACTIVE | Noted: 2022-08-11

## 2023-04-18 PROBLEM — F03.90 UNSPECIFIED DEMENTIA WITHOUT BEHAVIORAL DISTURBANCE: Chronic | Status: ACTIVE | Noted: 2022-08-11

## 2023-04-18 PROBLEM — I71.4 ABDOMINAL AORTIC ANEURYSM, WITHOUT RUPTURE: Chronic | Status: ACTIVE | Noted: 2022-08-11

## 2023-04-18 PROBLEM — K21.9 GASTRO-ESOPHAGEAL REFLUX DISEASE WITHOUT ESOPHAGITIS: Chronic | Status: ACTIVE | Noted: 2022-08-11

## 2023-04-18 PROBLEM — N18.30 CHRONIC KIDNEY DISEASE, STAGE 3 UNSPECIFIED: Chronic | Status: ACTIVE | Noted: 2022-08-11

## 2023-04-18 LAB
ALBUMIN SERPL ELPH-MCNC: 3.7 G/DL — SIGNIFICANT CHANGE UP (ref 3.3–5.2)
ALP SERPL-CCNC: 62 U/L — SIGNIFICANT CHANGE UP (ref 40–120)
ALT FLD-CCNC: 7 U/L — SIGNIFICANT CHANGE UP
ANION GAP SERPL CALC-SCNC: 11 MMOL/L — SIGNIFICANT CHANGE UP (ref 5–17)
ANISOCYTOSIS BLD QL: SLIGHT — SIGNIFICANT CHANGE UP
APTT BLD: 28.2 SEC — SIGNIFICANT CHANGE UP (ref 27.5–35.5)
AST SERPL-CCNC: 16 U/L — SIGNIFICANT CHANGE UP
BASOPHILS # BLD AUTO: 0.02 K/UL — SIGNIFICANT CHANGE UP (ref 0–0.2)
BASOPHILS NFR BLD AUTO: 0.3 % — SIGNIFICANT CHANGE UP (ref 0–2)
BILIRUB SERPL-MCNC: <0.2 MG/DL — LOW (ref 0.4–2)
BLD GP AB SCN SERPL QL: SIGNIFICANT CHANGE UP
BUN SERPL-MCNC: 42.8 MG/DL — HIGH (ref 8–20)
CALCIUM SERPL-MCNC: 8.9 MG/DL — SIGNIFICANT CHANGE UP (ref 8.4–10.5)
CHLORIDE SERPL-SCNC: 114 MMOL/L — HIGH (ref 96–108)
CO2 SERPL-SCNC: 16 MMOL/L — LOW (ref 22–29)
CREAT SERPL-MCNC: 1.04 MG/DL — SIGNIFICANT CHANGE UP (ref 0.5–1.3)
EGFR: 50 ML/MIN/1.73M2 — LOW
EOSINOPHIL # BLD AUTO: 0.08 K/UL — SIGNIFICANT CHANGE UP (ref 0–0.5)
EOSINOPHIL NFR BLD AUTO: 1.4 % — SIGNIFICANT CHANGE UP (ref 0–6)
GLUCOSE BLDC GLUCOMTR-MCNC: 81 MG/DL — SIGNIFICANT CHANGE UP (ref 70–99)
GLUCOSE BLDC GLUCOMTR-MCNC: 86 MG/DL — SIGNIFICANT CHANGE UP (ref 70–99)
GLUCOSE SERPL-MCNC: 92 MG/DL — SIGNIFICANT CHANGE UP (ref 70–99)
HCT VFR BLD CALC: 20.7 % — CRITICAL LOW (ref 34.5–45)
HCT VFR BLD CALC: 28.1 % — LOW (ref 34.5–45)
HGB BLD-MCNC: 6.7 G/DL — CRITICAL LOW (ref 11.5–15.5)
HGB BLD-MCNC: 9.4 G/DL — LOW (ref 11.5–15.5)
IMM GRANULOCYTES NFR BLD AUTO: 0.5 % — SIGNIFICANT CHANGE UP (ref 0–0.9)
INR BLD: 1.56 RATIO — HIGH (ref 0.88–1.16)
LIDOCAIN IGE QN: 58 U/L — HIGH (ref 22–51)
LYMPHOCYTES # BLD AUTO: 1.04 K/UL — SIGNIFICANT CHANGE UP (ref 1–3.3)
LYMPHOCYTES # BLD AUTO: 17.8 % — SIGNIFICANT CHANGE UP (ref 13–44)
MACROCYTES BLD QL: SLIGHT — SIGNIFICANT CHANGE UP
MANUAL SMEAR VERIFICATION: SIGNIFICANT CHANGE UP
MCHC RBC-ENTMCNC: 31.9 PG — SIGNIFICANT CHANGE UP (ref 27–34)
MCHC RBC-ENTMCNC: 32.4 GM/DL — SIGNIFICANT CHANGE UP (ref 32–36)
MCHC RBC-ENTMCNC: 32.6 PG — SIGNIFICANT CHANGE UP (ref 27–34)
MCHC RBC-ENTMCNC: 33.5 GM/DL — SIGNIFICANT CHANGE UP (ref 32–36)
MCV RBC AUTO: 97.6 FL — SIGNIFICANT CHANGE UP (ref 80–100)
MCV RBC AUTO: 98.6 FL — SIGNIFICANT CHANGE UP (ref 80–100)
MONOCYTES # BLD AUTO: 0.44 K/UL — SIGNIFICANT CHANGE UP (ref 0–0.9)
MONOCYTES NFR BLD AUTO: 7.5 % — SIGNIFICANT CHANGE UP (ref 2–14)
NEUTROPHILS # BLD AUTO: 4.23 K/UL — SIGNIFICANT CHANGE UP (ref 1.8–7.4)
NEUTROPHILS NFR BLD AUTO: 72.5 % — SIGNIFICANT CHANGE UP (ref 43–77)
OVALOCYTES BLD QL SMEAR: SLIGHT — SIGNIFICANT CHANGE UP
PLAT MORPH BLD: NORMAL — SIGNIFICANT CHANGE UP
PLATELET # BLD AUTO: 113 K/UL — LOW (ref 150–400)
PLATELET # BLD AUTO: 141 K/UL — LOW (ref 150–400)
POIKILOCYTOSIS BLD QL AUTO: SLIGHT — SIGNIFICANT CHANGE UP
POLYCHROMASIA BLD QL SMEAR: SLIGHT — SIGNIFICANT CHANGE UP
POTASSIUM SERPL-MCNC: 4 MMOL/L — SIGNIFICANT CHANGE UP (ref 3.5–5.3)
POTASSIUM SERPL-SCNC: 4 MMOL/L — SIGNIFICANT CHANGE UP (ref 3.5–5.3)
PROT SERPL-MCNC: 6.6 G/DL — SIGNIFICANT CHANGE UP (ref 6.6–8.7)
PROTHROM AB SERPL-ACNC: 18.2 SEC — HIGH (ref 10.5–13.4)
RBC # BLD: 2.1 M/UL — LOW (ref 3.8–5.2)
RBC # BLD: 2.88 M/UL — LOW (ref 3.8–5.2)
RBC # FLD: 13.9 % — SIGNIFICANT CHANGE UP (ref 10.3–14.5)
RBC # FLD: 14.2 % — SIGNIFICANT CHANGE UP (ref 10.3–14.5)
RBC BLD AUTO: ABNORMAL
SODIUM SERPL-SCNC: 141 MMOL/L — SIGNIFICANT CHANGE UP (ref 135–145)
WBC # BLD: 5.05 K/UL — SIGNIFICANT CHANGE UP (ref 3.8–10.5)
WBC # BLD: 5.84 K/UL — SIGNIFICANT CHANGE UP (ref 3.8–10.5)
WBC # FLD AUTO: 5.05 K/UL — SIGNIFICANT CHANGE UP (ref 3.8–10.5)
WBC # FLD AUTO: 5.84 K/UL — SIGNIFICANT CHANGE UP (ref 3.8–10.5)

## 2023-04-18 PROCEDURE — 99222 1ST HOSP IP/OBS MODERATE 55: CPT

## 2023-04-18 PROCEDURE — 99223 1ST HOSP IP/OBS HIGH 75: CPT

## 2023-04-18 PROCEDURE — 93010 ELECTROCARDIOGRAM REPORT: CPT

## 2023-04-18 PROCEDURE — 99285 EMERGENCY DEPT VISIT HI MDM: CPT | Mod: GC

## 2023-04-18 PROCEDURE — 74178 CT ABD&PLV WO CNTR FLWD CNTR: CPT | Mod: 26,MG

## 2023-04-18 PROCEDURE — G1004: CPT

## 2023-04-18 RX ORDER — SODIUM CHLORIDE 9 MG/ML
1000 INJECTION, SOLUTION INTRAVENOUS
Refills: 0 | Status: DISCONTINUED | OUTPATIENT
Start: 2023-04-18 | End: 2023-04-22

## 2023-04-18 RX ORDER — PANTOPRAZOLE SODIUM 20 MG/1
80 TABLET, DELAYED RELEASE ORAL ONCE
Refills: 0 | Status: COMPLETED | OUTPATIENT
Start: 2023-04-18 | End: 2023-04-18

## 2023-04-18 RX ORDER — PANTOPRAZOLE SODIUM 20 MG/1
1 TABLET, DELAYED RELEASE ORAL
Qty: 0 | Refills: 0 | DISCHARGE

## 2023-04-18 RX ORDER — FOLIC ACID 0.8 MG
1 TABLET ORAL
Qty: 0 | Refills: 0 | DISCHARGE

## 2023-04-18 RX ORDER — SODIUM CHLORIDE 9 MG/ML
1000 INJECTION INTRAMUSCULAR; INTRAVENOUS; SUBCUTANEOUS ONCE
Refills: 0 | Status: COMPLETED | OUTPATIENT
Start: 2023-04-18 | End: 2023-04-18

## 2023-04-18 RX ORDER — DEXTROSE 50 % IN WATER 50 %
25 SYRINGE (ML) INTRAVENOUS ONCE
Refills: 0 | Status: DISCONTINUED | OUTPATIENT
Start: 2023-04-18 | End: 2023-04-22

## 2023-04-18 RX ORDER — FERROUS SULFATE 325(65) MG
1 TABLET ORAL
Qty: 0 | Refills: 0 | DISCHARGE

## 2023-04-18 RX ORDER — LANOLIN ALCOHOL/MO/W.PET/CERES
3 CREAM (GRAM) TOPICAL AT BEDTIME
Refills: 0 | Status: DISCONTINUED | OUTPATIENT
Start: 2023-04-18 | End: 2023-04-22

## 2023-04-18 RX ORDER — ERYTHROPOIETIN 10000 [IU]/ML
0 INJECTION, SOLUTION INTRAVENOUS; SUBCUTANEOUS
Qty: 0 | Refills: 0 | DISCHARGE

## 2023-04-18 RX ORDER — DEXTROSE 50 % IN WATER 50 %
12.5 SYRINGE (ML) INTRAVENOUS ONCE
Refills: 0 | Status: DISCONTINUED | OUTPATIENT
Start: 2023-04-18 | End: 2023-04-22

## 2023-04-18 RX ORDER — DEXTROSE 50 % IN WATER 50 %
15 SYRINGE (ML) INTRAVENOUS ONCE
Refills: 0 | Status: DISCONTINUED | OUTPATIENT
Start: 2023-04-18 | End: 2023-04-22

## 2023-04-18 RX ORDER — GLUCAGON INJECTION, SOLUTION 0.5 MG/.1ML
1 INJECTION, SOLUTION SUBCUTANEOUS ONCE
Refills: 0 | Status: DISCONTINUED | OUTPATIENT
Start: 2023-04-18 | End: 2023-04-22

## 2023-04-18 RX ORDER — IRON DEXTRAN COMPLEX 100 MG/2ML
200 VIAL (ML) INJECTION
Qty: 0 | Refills: 0 | DISCHARGE

## 2023-04-18 RX ORDER — ACETAMINOPHEN 500 MG
650 TABLET ORAL EVERY 6 HOURS
Refills: 0 | Status: DISCONTINUED | OUTPATIENT
Start: 2023-04-18 | End: 2023-04-22

## 2023-04-18 RX ORDER — LEVOTHYROXINE SODIUM 125 MCG
75 TABLET ORAL
Refills: 0 | Status: DISCONTINUED | OUTPATIENT
Start: 2023-04-18 | End: 2023-04-22

## 2023-04-18 RX ORDER — HALOPERIDOL DECANOATE 100 MG/ML
5 INJECTION INTRAMUSCULAR ONCE
Refills: 0 | Status: COMPLETED | OUTPATIENT
Start: 2023-04-18 | End: 2023-04-18

## 2023-04-18 RX ORDER — PANTOPRAZOLE SODIUM 20 MG/1
8 TABLET, DELAYED RELEASE ORAL
Qty: 80 | Refills: 0 | Status: DISCONTINUED | OUTPATIENT
Start: 2023-04-18 | End: 2023-04-22

## 2023-04-18 RX ORDER — HALOPERIDOL DECANOATE 100 MG/ML
2 INJECTION INTRAMUSCULAR ONCE
Refills: 0 | Status: DISCONTINUED | OUTPATIENT
Start: 2023-04-18 | End: 2023-04-18

## 2023-04-18 RX ORDER — INSULIN LISPRO 100/ML
VIAL (ML) SUBCUTANEOUS
Refills: 0 | Status: DISCONTINUED | OUTPATIENT
Start: 2023-04-18 | End: 2023-04-20

## 2023-04-18 RX ADMIN — PANTOPRAZOLE SODIUM 10 MG/HR: 20 TABLET, DELAYED RELEASE ORAL at 18:23

## 2023-04-18 RX ADMIN — HALOPERIDOL DECANOATE 5 MILLIGRAM(S): 100 INJECTION INTRAMUSCULAR at 10:48

## 2023-04-18 RX ADMIN — SODIUM CHLORIDE 1000 MILLILITER(S): 9 INJECTION INTRAMUSCULAR; INTRAVENOUS; SUBCUTANEOUS at 10:30

## 2023-04-18 RX ADMIN — SODIUM CHLORIDE 1000 MILLILITER(S): 9 INJECTION INTRAMUSCULAR; INTRAVENOUS; SUBCUTANEOUS at 15:05

## 2023-04-18 RX ADMIN — PANTOPRAZOLE SODIUM 80 MILLIGRAM(S): 20 TABLET, DELAYED RELEASE ORAL at 10:49

## 2023-04-18 NOTE — ED PROVIDER NOTE - PROGRESS NOTE DETAILS
C[place]: Despite patient's dementia - no known allergy to IV contrast - IV contrast study medically necessary due to critical nature of the patient with benefits of identifying bleed outweighing risks of possible contrast nephropathy or allergic reaction. Patient will be closely monitored. Kelly: Patient's son at the bedside - updated and he signed consent for blood transfusion.

## 2023-04-18 NOTE — ED PROVIDER NOTE - ATTENDING CONTRIBUTION TO CARE
I, Jacqueline Mendoza, performed the initial face to face bedside interview with this patient regarding history of present illness, review of symptoms and relevant past medical, social and family history.  I completed an independent physical examination.  I was the initial provider who evaluated this patient. I have signed out the follow up of any pending tests (i.e. labs, radiological studies) to the resident.  I have communicated the patient’s plan of care and disposition with the resident.

## 2023-04-18 NOTE — H&P ADULT - NSHPPHYSICALEXAM_GEN_ALL_CORE
GENERAL: elderly F, restless but NAD  HEENT: NC/AT, dry oral mucosa, pale conjunctiva, sclera nonicteric  RESPIRATORY: Normal respiratory effort, no wheezing, rhonchi, rales  CARDIOVASCULAR: RRR, normal S1 and S2, +murmurs  ABDOMEN: soft, NT/ND, normoactive bowel sounds, no rebound/guarding  MSK: No joint deformities, edema, erythema  EXTREMITIES: No cynaosis, no clubbing, no lower extremity edema, pulses are 2+ bilaterally  PSYCH: agitated and restless  NEUROLOGY: A+O x0, no focal neurologic deficits appreciated   SKIN: No rashes or no palpable lesions

## 2023-04-18 NOTE — ED PROVIDER NOTE - CLINICAL SUMMARY MEDICAL DECISION MAKING FREE TEXT BOX
89 y/o F with PMH dementia and GI bleed presents for abdominal pain. She is pale with copious amounts of pure liquid melena. Abd soft, NT, non-peritonitic. Labs, protonix bolus and gtt, CT A/P for further evaluation. 89 y/o F with PMH dementia and GI bleed presents for abdominal pain. She is pale with copious amounts of pure liquid melena. Abd soft, NT, non-peritonitic. Labs, protonix bolus and gtt, CT A/P for further evaluation.    Progress: Hemoglobin 6.7. Patient consented for blood through son. CT GI bleed protocol does not reveal active GI extravasation. Spoke with GI who will see the patient. Plan for medical admission.

## 2023-04-18 NOTE — ED ADULT TRIAGE NOTE - CHIEF COMPLAINT QUOTE
Pt A&Ox1, at baseline, states "This place is so nice did you re do it?" [facility] c/o abd pain, and low H&H. Patient has dementia.

## 2023-04-18 NOTE — H&P ADULT - HISTORY OF PRESENT ILLNESS
89 y/o F w/ PMH of AF (not on AC 2/2 GIB in past), neuropathy, advanced dementia, AAA, DM, HTN, CKD III, hypothyroid  was BIBA from NH after having large melanotic BM.  Pt is a poor historian and unable to obtain hx.  Hx obtained from chart.

## 2023-04-18 NOTE — CONSULT NOTE ADULT - SUBJECTIVE AND OBJECTIVE BOX
HPI:89-year-old old woman with a history of AAA, dementia, diabetes, hypertension, CKD stage III, question of GI bleeding in the past was referred to emergency room from nursing home with abdominal pain.  She was noted to have melanotic stools.  She could not give any history and most of the history was obtained from the chart and also from talking to the emergency room department attending and the son on the phone.  She was noted to have a hemoglobin of 6.7 and is being given 2 units of packed RBC.  It is unclear whether she is on any anticoagulation.    PAST MEDICAL & SURGICAL HISTORY:  HTN (hypertension)      Diabetes mellitus      Hypothyroid      Dementia      GI bleed      Neuropathy      GERD (gastroesophageal reflux disease)      Abdominal aortic aneurysm      Stage 3 chronic kidney disease      S/P hysterectomy      S/P hip replacement      Pacemaker          ROS:  Could not be obtained    MEDICATIONS  (STANDING):  pantoprazole Infusion 8 mG/Hr (10 mL/Hr) IV Continuous <Continuous>    MEDICATIONS  (PRN):      Allergies    No Known Allergies    Intolerances        SOCIAL HISTORY:NC    ENDOSCOPIC/GI HISTORY:NC    FAMILY HISTORY:  FHx: diabetes mellitus        Vital Signs Last 24 Hrs  T(C): 36.3 (18 Apr 2023 09:07), Max: 36.3 (18 Apr 2023 09:07)  T(F): 97.3 (18 Apr 2023 09:07), Max: 97.3 (18 Apr 2023 09:07)  HR: 62 (18 Apr 2023 10:30) (62 - 88)  BP: 117/60 (18 Apr 2023 10:30) (117/60 - 128/80)  BP(mean): --  RR: 20 (18 Apr 2023 10:30) (20 - 22)  SpO2: 98% (18 Apr 2023 10:30) (98% - 98%)    Parameters below as of 18 Apr 2023 10:30  Patient On (Oxygen Delivery Method): room air        PHYSICAL EXAM:    GENERAL: Agitated  HEAD:  Atraumatic, Normocephalic  EYES: EOMI, PERRLA, conjunctiva and sclera clear  ENMT:  Moist mucous membranes  NECK: Supple, No JVD, Normal thyroid  CHEST/LUNG: Clear to percussion bilaterally; No rales, rhonchi, wheezing, or rubs  HEART: Regular rate and rhythm; No murmurs, rubs, or gallops  ABDOMEN: Soft, Nontender, Nondistended; Bowel sounds present  EXTREMITIES:  2+ Peripheral Pulses, No clubbing, cyanosis, or edema  LYMPH: No lymphadenopathy noted  SKIN: No rashes or lesions      LABS:                        6.7    5.84  )-----------( 141      ( 18 Apr 2023 10:00 )             20.7     04-18    141  |  114<H>  |  42.8<H>  ----------------------------<  92  4.0   |  16.0<L>  |  1.04    Ca    8.9      18 Apr 2023 10:00    TPro  6.6  /  Alb  3.7  /  TBili  <0.2<L>  /  DBili  x   /  AST  16  /  ALT  7   /  AlkPhos  62  04-18    PT/INR - ( 18 Apr 2023 10:00 )   PT: 18.2 sec;   INR: 1.56 ratio         PTT - ( 18 Apr 2023 10:00 )  PTT:28.2 sec       LIVER FUNCTIONS - ( 18 Apr 2023 10:00 )  Alb: 3.7 g/dL / Pro: 6.6 g/dL / ALK PHOS: 62 U/L / ALT: 7 U/L / AST: 16 U/L / GGT: x               RADIOLOGY & ADDITIONAL STUDIES:< from: CT Abdomen and Pelvis w/wo IV Cont (04.18.23 @ 11:46) >    ACC: [id number] EXAM:  CT ABDOMEN AND PELVIS WAW IC   ORDERED BY: VAIBHAV OVIEDO     PROCEDURE DATE:  04/18/2023          INTERPRETATION:  CLINICAL INFORMATION: Abdominal pain and melena.    ADDITIONAL CLINICAL INFORMATION: Other, Non-specified    COMPARISON: CTA chest abdomen pelvis 5/11/2022    CONTRAST/COMPLICATIONS:  IV Contrast: Omnipaque 350  85 cc administered   15 cc discarded  Oral Contrast: NONE  Complications: None reported at time of study completion    PROCEDURE:  CT of the Abdomen and Pelvis was performed.  Precontrast, Arterial and Delayed phases were performed.  Sagittal and coronal reformats were performed.    FINDINGS: Images are degraded by respiratory motion artifact.  LOWER CHEST: Partially imaged proximal ascending thoracic aorta grossly   stable with aneurysmal dilatation of approximately 4.9 cm. Cardiomegaly.   Coronary artery and cardiac valvular calcifications. AICD lead tips in   the right atrium and right ventricle. Trace bilateral pleural effusions.    LIVER: Within normal limits.  BILE DUCTS: Normal caliber.  GALLBLADDER: Mild gallbladder wall edema.  SPLEEN: Within normal limits.  PANCREAS: A stable 1.4 cm hypodense lesion in the pancreatic head is   noted. No ductal dilatation. A stable 1.1 cm cystic lesion of the   uncinate process.  ADRENALS: Within normal limits.  KIDNEYS/URETERS: Stable right renal cysts including an upper pole cyst   within a thinly, partially calcified wall measuring 5.2 cm..    BLADDER: Diffuse bladder wall thickening and mucosal hyperemia..  REPRODUCTIVE ORGANS: Hysterectomy.    BOWEL: No evidence for active GI bleed. Bowel underdistention limits   evaluation for wall thickening. No bowel obstruction. Appendix is normal.  PERITONEUM: Trace free fluid in the pelvis..  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   Atherosclerotic calcifications are present. Moderate eccentric mural   thrombus within the infrarenal abdominal aorta, progressed since prior   study. Infrarenal abdominal aortic fusiform aneurysm measures 3.6 cm in   maximum diameter, previously 2.8 cm..  RETROPERITONEUM/LYMPH NODES: No lymphadenopathy.  ABDOMINAL WALL: Within normal limits.  BONES: Degenerative changes.    IMPRESSION:  No evidence for active GI bleed.    CT findings suggestive of cystitis.    Interval growth of a fusiform infrarenal abdominal aortic aneurysm with   moderate eccentric mural thrombus, measuring 3.6 cm in maximum diameter.   Grossly stable partially imaged proximal ascending thoracic aortic   aneurysm.    --- End of Report ---            ABEBE PITT MD; Attending Radiologist  This document has been electronically signed. Apr 18 2023  1:28PM    < end of copied text >  
CHIEF COMPLAINT:    HPI: 89F with hx of advanced dementia, PAF not on AC due to increase risk of falls, stable ascending aorta aneurysm ~5cm, PPM, HTN, HLD,  CKD III, hypothyroid  was BIBA from NH after having large melanotic BM.  Pt is a poor historian and unable to obtain hx. Cardiology evaluation for a pre-operative cardiac risk stratification prior to an EGD.     PAST MEDICAL & SURGICAL HISTORY:  HTN (hypertension)      Diabetes mellitus      Hypothyroid      Dementia      GI bleed      Neuropathy      GERD (gastroesophageal reflux disease)      Abdominal aortic aneurysm      Stage 3 chronic kidney disease      S/P hysterectomy      S/P hip replacement      Pacemaker          Allergies    No Known Allergies    Intolerances        MEDICATIONS  (STANDING):  dextrose 5%. 1000 milliLiter(s) (50 mL/Hr) IV Continuous <Continuous>  dextrose 5%. 1000 milliLiter(s) (100 mL/Hr) IV Continuous <Continuous>  dextrose 50% Injectable 25 Gram(s) IV Push once  dextrose 50% Injectable 25 Gram(s) IV Push once  dextrose 50% Injectable 12.5 Gram(s) IV Push once  glucagon  Injectable 1 milliGRAM(s) IntraMuscular once  insulin lispro (ADMELOG) corrective regimen sliding scale   SubCutaneous three times a day before meals  levothyroxine Injectable 75 MICROGram(s) IV Push <User Schedule>  pantoprazole Infusion 8 mG/Hr (10 mL/Hr) IV Continuous <Continuous>    MEDICATIONS  (PRN):  acetaminophen     Tablet .. 650 milliGRAM(s) Oral every 6 hours PRN Temp greater or equal to 38C (100.4F), Mild Pain (1 - 3)  aluminum hydroxide/magnesium hydroxide/simethicone Suspension 30 milliLiter(s) Oral every 4 hours PRN Dyspepsia  dextrose Oral Gel 15 Gram(s) Oral once PRN Blood Glucose LESS THAN 70 milliGRAM(s)/deciliter  melatonin 3 milliGRAM(s) Oral at bedtime PRN Insomnia      FAMILY HISTORY:  FHx: diabetes mellitus        ***No family history of premature coronary artery disease or sudden cardiac death    SOCIAL HISTORY:  Smoking-  Alcohol-  Illicit Drug use-    REVIEW OF SYSTEMS:  Constitutional: [ ] fever, [ ]weight loss,  [ ]fatigue  Eyes: [ ] visual changes  Respiratory: [ ]shortness of breath;  [ ] cough, [ ]wheezing, [ ]chills, [ ]hemoptysis  Cardiovascular: [ ] chest pain, [ ]palpitations, [ ]dizziness,  [ ]leg swelling [ ]syncope  Gastrointestinal: [ ] abdominal pain, [ ]nausea, [ ]vomiting,  [ ]diarrhea   Genitourinary: [ ] dysuria, [ ] hematuria  Neurologic: [ ] headaches [ ] tremors  [ ] weakness [ ] lightheadedness  Skin: [ ] itching, [ ]burning, [ ] rashes  Endocrine: [ ] heat or cold intolerance  Musculoskeletal: [ ] joint pain or swelling; [ ] muscle, back, or extremity pain  Psychiatric: [ ] depression, [ ]anxiety, [ ]mood swings, or [ ]difficulty sleeping  Hematologic: [ ] easy bruising, [ ] bleeding gums     [ ] All others negative	  [ x] Unable to obtain    Vital Signs Last 24 Hrs  T(C): 36.6 (18 Apr 2023 19:53), Max: 36.8 (18 Apr 2023 18:10)  T(F): 97.9 (18 Apr 2023 19:53), Max: 98.3 (18 Apr 2023 18:10)  HR: 65 (18 Apr 2023 19:53) (58 - 88)  BP: 151/80 (18 Apr 2023 19:53) (116/84 - 155/58)  BP(mean): --  RR: 17 (18 Apr 2023 19:53) (17 - 22)  SpO2: 98% (18 Apr 2023 19:53) (98% - 99%)    Parameters below as of 18 Apr 2023 19:53  Patient On (Oxygen Delivery Method): room air      I&O's Summary      PHYSICAL EXAM:  General: No acute distress  HEENT: EOMI  Neck:  No JVD  Lungs: Clear to auscultation bilaterally; No rales or wheezing  Heart: Regular rate and rhythm; No murmurs, rubs, or gallops  Abdomen: soft, non tender, non distended   Extremities: warm, no edema   Nervous system:  Alert & Oriented X1  Skin: No rashes or lesions    LABS:  04-18    141  |  114<H>  |  42.8<H>  ----------------------------<  92  4.0   |  16.0<L>  |  1.04    Ca    8.9      18 Apr 2023 10:00    TPro  6.6  /  Alb  3.7  /  TBili  <0.2<L>  /  DBili  x   /  AST  16  /  ALT  7   /  AlkPhos  62  04-18    Creatinine Trend: 1.04<--                        9.4    5.05  )-----------( 113      ( 18 Apr 2023 21:33 )             28.1     PT/INR - ( 18 Apr 2023 10:00 )   PT: 18.2 sec;   INR: 1.56 ratio         PTT - ( 18 Apr 2023 10:00 )  PTT:28.2 sec    Lipid Panel:   Cardiac Enzymes:           RADIOLOGY:    ECG: N/A    TELEMETRY: AFIB    ECHO: 05/12/2022  Summary:   1. Normal global left ventricular systolic function.   2. LV Ejection Fraction by Roa's Method with a biplane EF of 65 %.   3. Spectral Doppler shows impaired relaxation pattern of left   ventricular myocardial filling (Grade I diastolic dysfunction).   4. Mildly enlarged left atrium.   5. Mild mitral valve regurgitation.   6. Mild thickening and calcification of the anterior and posterior   mitral valve leaflets.   7. Moderate mitral annular calcification.   8. Mild tricuspid regurgitation.   9. Moderate aortic regurgitation.  10. Peak transaortic gradient equals 35.0 mmHg, mean transaortic gradient   equals 17.8 mmHg, the calculated aortic valve area equals 0.85 cm² by the   continuity equation consistent with severe aortic stenosis.  11. Paradoxical low gradient (LVEF >50%) low stroke vol (SVi 31.29cc/m2)   severe aortic stenosis.  12. The Dimesionless Index value is 0.25.  13. Dilatation of the ascending aorta. 3.92cm    STRESS TEST:    CATHETERIZATION:

## 2023-04-18 NOTE — CONSULT NOTE ADULT - ASSESSMENT
89F with hx of advanced dementia, PAF not on AC due to increase risk of falls, stable ascending aorta aneurysm ~5cm, PPM, HTN, HLD,  CKD III, hypothyroid  was BIBA from NH after having large melanotic BM.  Pt is a poor historian and unable to obtain hx. Cardiology evaluation for a pre-operative cardiac risk stratification prior to an EGD.     Patient is planned for a low risk intervention   Patient is at low-intermediate risk of MACE  At this time there are no active cardiac conditions  There is no indication for additional cardiac testing and there are no CV contraindications for the EGD  Please call us back with questions or concerns. 
Acute blood loss anemia/melena: Patient will be started on PPI infusion.  Packed RBC transfusion.  IV line and IV fluids.  Clearance as per the primary team from cardiology.  Hold any anticoagulation.  Hold any Lovenox or heparin.  EGD tomorrow.  Spoke to the son on the phone who is agreeable for the procedure.

## 2023-04-18 NOTE — H&P ADULT - NSHPLABSRESULTS_GEN_ALL_CORE
6.7    5.84  )-----------( 141      ( 18 Apr 2023 10:00 )             20.7       04-18    141  |  114<H>  |  42.8<H>  ----------------------------<  92  4.0   |  16.0<L>  |  1.04    Ca    8.9      18 Apr 2023 10:00    TPro  6.6  /  Alb  3.7  /  TBili  <0.2<L>  /  DBili  x   /  AST  16  /  ALT  7   /  AlkPhos  62  04-18        < from: CT Abdomen and Pelvis w/wo IV Cont (04.18.23 @ 11:46) >    IMPRESSION:  No evidence for active GI bleed.    CT findings suggestive of cystitis.    Interval growth of a fusiform infrarenal abdominal aortic aneurysm with   moderate eccentric mural thrombus, measuring 3.6 cm in maximum diameter.   Grossly stable partially imaged proximal ascending thoracic aortic   aneurysm.    < end of copied text >      < from: TTE Echo Limited or F/U (05.12.22 @ 10:03) >    Summary:   1. Normal global left ventricular systolic function.   2. LV Ejection Fraction bySimpson's Method with a biplane EF of 65 %.   3. Spectral Doppler shows impaired relaxation pattern of left   ventricular myocardial filling (Grade I diastolic dysfunction).   4. Mildly enlarged left atrium.   5. Mild mitral valve regurgitation.   6.Mild thickening and calcification of the anterior and posterior   mitral valve leaflets.   7. Moderate mitral annular calcification.   8. Mild tricuspid regurgitation.   9. Moderate aortic regurgitation.  10. Peak transaortic gradient equals 35.0 mmHg, mean transaortic gradient   equals 17.8 mmHg, the calculated aortic valve area equals 0.85 cm² by the   continuity equation consistent with severe aortic stenosis.  11. Paradoxical low gradient (LVEF >50%) low stroke vol (SVi 31.29cc/m2)   severe aortic stenosis.  12. The Dimesionless Index value is 0.25.  13. Dilatation of the ascending aorta. 3.92cm    < end of copied text >

## 2023-04-18 NOTE — ED ADULT NURSE NOTE - OBJECTIVE STATEMENT
pt comes to ED with reports of abd cramping, sent by nursing facility for rectal bleeding. hx of GI bleed in the past. pt is awake, alert to person, significantly confused with hx severe dementia. pt with no vomiting, pale in color and diaphoretic. pt with no chest pain no SOB, incontinent of melana on this RN exam. pt vitals otherwise stable. pt agitated confused and screaming.

## 2023-04-18 NOTE — H&P ADULT - ASSESSMENT
89 y/o F w/ PMH of HFpEF, AF (not on AC 2/2 GIB in past), neuropathy, advanced dementia, AAA, DM, HTN, CKD III, hypothyroid was BIBA from NH after having large melanotic BM.  Pt has had 2 melanotic BMs since arrival to ED.  VS stable.  Hb 6.7 and 2u PRBC ordered.  GI consulted.  Pt admitted for GIB    Admit to telemetry     Acute blood loss anemia likely 2/2 UGIB  Mild thrombocytopenia   - Hb 6.7 and 2u prbc ordered   - f/u post transfusion CBC and transfuse for Hb<8  - Keep NPO and placed on protonix gtt  - Trend CBC, maintain Hb>8 and monitor plts    - INR elevated, trend periodically   - Low threshold for upgrade to SDU   - Cardio consulted for clearance   - GI consulted and pt will go for EGD tomorow       AF   - Currently in sinus rhthm and rate controlled  - Not on AC due to hx of GIB in the past   - Resume home meds once able to tolerate po       HTN / ChronicHFpEF  - Clinically hypovolemic   - TTE from 2022 noted, LVEF 65%, mile MR, mild TR, mod AR, severe AS  - Resume home meds once able to tolerate po       AAA  - Interval growth noted and reported to have moderate eccentric mural thrombus   - Pt unable to get AC however given active GIB       CKD III  - Monitor renal fxn and lytes   - Avoid nephrotoxic meds or renally dose if needed       Hypothyroid   - c/w synthroid but transition to IV given pt npo       DM II w/ neuropathies   - Hold po meds while hospitalized  - Currently NPO   - ISS and hypoglycemic protocol in place       Dementia w/ behavioral disturbances   - If agitated and unable to redirect give zyprexa 2.5mg IM or 5mg IM q6h prn  - Avoid benzo's        VTE ppx: scds 89 y/o F w/ PMH of HFpEF, AF (not on AC 2/2 GIB in past), neuropathy, advanced dementia, AAA, DM, HTN, CKD III, hypothyroid was BIBA from NH after having large melanotic BM.  Pt has had 2 melanotic BMs since arrival to ED.  VS stable.  Hb 6.7 and 2u PRBC ordered.  GI consulted.  Pt admitted for GIB      Admit to telemetry     Acute blood loss anemia likely 2/2 UGIB  Mild thrombocytopenia   - Hb 6.7 and 2u prbc ordered   - f/u post transfusion CBC and transfuse for Hb<8  - Keep NPO and placed on protonix gtt  - Trend CBC, maintain Hb>8 and monitor plts    - INR elevated, trend periodically   - Low threshold for upgrade to SDU   - Cardio consulted for clearance   - GI consulted and pt will go for EGD tomorow       AF   - Currently in sinus rhthm and rate controlled  - Not on AC due to hx of GIB in the past   - Resume home meds once able to tolerate po       HTN / Chronic HFpEF  - Clinically hypovolemic   - TTE from 2022 noted, LVEF 65%, mile MR, mild TR, mod AR, severe AS  - Resume home meds once able to tolerate po       AAA  - Interval growth noted and reported to have moderate eccentric mural thrombus   - Pt unable to get AC however given active GIB       CKD III  - Monitor renal fxn and lytes   - Avoid nephrotoxic meds or renally dose if needed       Hypothyroid   - c/w synthroid but transition to IV given pt npo       DM II w/ neuropathies   - Hold po meds while hospitalized  - Currently NPO   - ISS and hypoglycemic protocol in place       Dementia w/ behavioral disturbances   - If agitated and unable to redirect give zyprexa 2.5mg IM or 5mg IM q6h prn  - Avoid benzo's   - Haldol contraindicated given possible parkinson's          VTE ppx: scds

## 2023-04-18 NOTE — ED PROVIDER NOTE - NSICDXPASTMEDICALHX_GEN_ALL_CORE_FT
PAST MEDICAL HISTORY:  Abdominal aortic aneurysm     Dementia     Diabetes mellitus     GERD (gastroesophageal reflux disease)     GI bleed     HTN (hypertension)     Hypothyroid     Neuropathy     Stage 3 chronic kidney disease

## 2023-04-18 NOTE — ED ADULT TRIAGE NOTE - ESI TRIAGE ACUITY LEVEL, MLM
Pt's daughter requesting refill for Triamcinolone Acetonide 0.1 % External Cream.  Daughter states pt used all of the cream up.  Please send to [facility] [address].  Thanks   3

## 2023-04-18 NOTE — ED PROVIDER NOTE - PHYSICAL EXAMINATION
Const: Awake, alert and oriented x 1. In no acute distress. Well appearing.  HEENT: NC/AT. Moist mucous membranes.  Eyes: No scleral icterus. EOMI.  Neck:. Soft and supple. Full ROM without pain.  Cardiac: Regular rate and regular rhythm.   Resp: Speaking in full sentences. No evidence of respiratory distress.  Abd: Soft, non-tender, non-distended. No guarding or rebound. Liquid melena seeping through diaper, down legs and up back.  Back: Spine midline and non-tender. No CVAT.  Skin: Pale. No rashes, abrasions or lacerations.  Neuro: Awake, alert & oriented x 1. Moves all extremities symmetrically.

## 2023-04-18 NOTE — PHARMACOTHERAPY INTERVENTION NOTE - COMMENTS
Referenced facility records to obtain medication list. Outpatient Medication Review updated.    HOME MEDICATIONS:  acetaminophen 325 mg oral tablet: 2 tab(s) orally every 6 hours, As needed, Temp greater or equal to 38C (100.4F), Mild Pain (1 - 3) (18 Apr 2023 16:36)  Aricept 10 mg oral tablet: 1 tab(s) orally once a day (at bedtime) (18 Apr 2023 16:36)  dorzolamide 2% ophthalmic solution: 1 drop(s) to each affected eye 2 times a day (18 Apr 2023 16:36)  ferrous sulfate 325 mg (65 mg elemental iron) oral tablet: 1 tab(s) orally once a day (18 Apr 2023 16:38)  gemfibrozil 600 mg oral tablet: 1 tab(s) orally 2 times a day (18 Apr 2023 16:36)  metoprolol tartrate 25 mg oral tablet: 1 tab(s) orally 2 times a day (18 Apr 2023 16:36)  SEROquel 100 mg oral tablet: 1 tab(s) orally once a day (at bedtime) (18 Apr 2023 16:36)  Synthroid 150 mcg (0.15 mg) oral tablet: 1 tab(s) orally once a day (18 Apr 2023 16:36)  tamsulosin 0.4 mg oral capsule: 1 cap(s) orally once a day (at bedtime) (18 Apr 2023 16:36)  topiramate 50 mg oral tablet: 1 tab(s) orally once a day (18 Apr 2023 16:36)

## 2023-04-18 NOTE — ED ADULT NURSE NOTE - CHIEF COMPLAINT QUOTE
Pt A&Ox1, at baseline, states "This place is so nice did you re do it?" [facility] and low H&H. Patient has dementia.

## 2023-04-18 NOTE — ED PROVIDER NOTE - OBJECTIVE STATEMENT
89 y/o F with PMH AAA, dementia, DM, HTN, CKD III, GI bleed presents for abdominal pain from NH. She is pleasantly demented - complaining of being very thirst and that her belly hurts. She cannot offer any further history, states that she thinks she wet herself. On exam, she is covered in seeping melena. Further history cannot be obtained 2/2 melena.

## 2023-04-18 NOTE — ED ADULT NURSE REASSESSMENT NOTE - NS ED NURSE REASSESS COMMENT FT1
pt with no change in mental status, vitals remain stable and PRBC infusing on IV pump as ordered. skin color improving, no chest pain, report handed off to [facility] RN and pt moved to holding. pt cleaned up repositioned and made more comfortable.
pt with son at bedside, VSS on cardiac monitor, pt awaiting CT scan. son aware of need for admission and blood transfusion, consent signed and PRBC ordered. awaiting blood bank. pt with no SOB, remains alert to person only and otherwise confused. IV site patent.

## 2023-04-19 ENCOUNTER — TRANSCRIPTION ENCOUNTER (OUTPATIENT)
Age: 88
End: 2023-04-19

## 2023-04-19 LAB
A1C WITH ESTIMATED AVERAGE GLUCOSE RESULT: 5.3 % — SIGNIFICANT CHANGE UP (ref 4–5.6)
ALBUMIN SERPL ELPH-MCNC: 3.1 G/DL — LOW (ref 3.3–5.2)
ALP SERPL-CCNC: 62 U/L — SIGNIFICANT CHANGE UP (ref 40–120)
ALT FLD-CCNC: 7 U/L — SIGNIFICANT CHANGE UP
ANION GAP SERPL CALC-SCNC: 11 MMOL/L — SIGNIFICANT CHANGE UP (ref 5–17)
ANION GAP SERPL CALC-SCNC: 12 MMOL/L — SIGNIFICANT CHANGE UP (ref 5–17)
APTT BLD: 28.2 SEC — SIGNIFICANT CHANGE UP (ref 27.5–35.5)
AST SERPL-CCNC: 21 U/L — SIGNIFICANT CHANGE UP
BASOPHILS # BLD AUTO: 0.01 K/UL — SIGNIFICANT CHANGE UP (ref 0–0.2)
BASOPHILS NFR BLD AUTO: 0.2 % — SIGNIFICANT CHANGE UP (ref 0–2)
BILIRUB SERPL-MCNC: 0.7 MG/DL — SIGNIFICANT CHANGE UP (ref 0.4–2)
BUN SERPL-MCNC: 30.5 MG/DL — HIGH (ref 8–20)
BUN SERPL-MCNC: 31.7 MG/DL — HIGH (ref 8–20)
CALCIUM SERPL-MCNC: 8.5 MG/DL — SIGNIFICANT CHANGE UP (ref 8.4–10.5)
CALCIUM SERPL-MCNC: 8.6 MG/DL — SIGNIFICANT CHANGE UP (ref 8.4–10.5)
CHLORIDE SERPL-SCNC: 117 MMOL/L — HIGH (ref 96–108)
CHLORIDE SERPL-SCNC: 117 MMOL/L — HIGH (ref 96–108)
CO2 SERPL-SCNC: 11 MMOL/L — LOW (ref 22–29)
CO2 SERPL-SCNC: 15 MMOL/L — LOW (ref 22–29)
CREAT SERPL-MCNC: 0.95 MG/DL — SIGNIFICANT CHANGE UP (ref 0.5–1.3)
CREAT SERPL-MCNC: 1 MG/DL — SIGNIFICANT CHANGE UP (ref 0.5–1.3)
EGFR: 53 ML/MIN/1.73M2 — LOW
EGFR: 56 ML/MIN/1.73M2 — LOW
EOSINOPHIL # BLD AUTO: 0.05 K/UL — SIGNIFICANT CHANGE UP (ref 0–0.5)
EOSINOPHIL NFR BLD AUTO: 1.1 % — SIGNIFICANT CHANGE UP (ref 0–6)
ESTIMATED AVERAGE GLUCOSE: 105 MG/DL — SIGNIFICANT CHANGE UP (ref 68–114)
GLUCOSE BLDC GLUCOMTR-MCNC: 102 MG/DL — HIGH (ref 70–99)
GLUCOSE BLDC GLUCOMTR-MCNC: 113 MG/DL — HIGH (ref 70–99)
GLUCOSE BLDC GLUCOMTR-MCNC: 71 MG/DL — SIGNIFICANT CHANGE UP (ref 70–99)
GLUCOSE BLDC GLUCOMTR-MCNC: 73 MG/DL — SIGNIFICANT CHANGE UP (ref 70–99)
GLUCOSE BLDC GLUCOMTR-MCNC: 73 MG/DL — SIGNIFICANT CHANGE UP (ref 70–99)
GLUCOSE BLDC GLUCOMTR-MCNC: 82 MG/DL — SIGNIFICANT CHANGE UP (ref 70–99)
GLUCOSE SERPL-MCNC: 76 MG/DL — SIGNIFICANT CHANGE UP (ref 70–99)
GLUCOSE SERPL-MCNC: 79 MG/DL — SIGNIFICANT CHANGE UP (ref 70–99)
HCT VFR BLD CALC: 29 % — LOW (ref 34.5–45)
HGB BLD-MCNC: 9.7 G/DL — LOW (ref 11.5–15.5)
IMM GRANULOCYTES NFR BLD AUTO: 1.1 % — HIGH (ref 0–0.9)
INR BLD: 1.56 RATIO — HIGH (ref 0.88–1.16)
LYMPHOCYTES # BLD AUTO: 0.6 K/UL — LOW (ref 1–3.3)
LYMPHOCYTES # BLD AUTO: 13.8 % — SIGNIFICANT CHANGE UP (ref 13–44)
MAGNESIUM SERPL-MCNC: 2 MG/DL — SIGNIFICANT CHANGE UP (ref 1.6–2.6)
MCHC RBC-ENTMCNC: 32.2 PG — SIGNIFICANT CHANGE UP (ref 27–34)
MCHC RBC-ENTMCNC: 33.4 GM/DL — SIGNIFICANT CHANGE UP (ref 32–36)
MCV RBC AUTO: 96.3 FL — SIGNIFICANT CHANGE UP (ref 80–100)
MONOCYTES # BLD AUTO: 0.35 K/UL — SIGNIFICANT CHANGE UP (ref 0–0.9)
MONOCYTES NFR BLD AUTO: 8 % — SIGNIFICANT CHANGE UP (ref 2–14)
NEUTROPHILS # BLD AUTO: 3.3 K/UL — SIGNIFICANT CHANGE UP (ref 1.8–7.4)
NEUTROPHILS NFR BLD AUTO: 75.8 % — SIGNIFICANT CHANGE UP (ref 43–77)
PHOSPHATE SERPL-MCNC: 3 MG/DL — SIGNIFICANT CHANGE UP (ref 2.4–4.7)
PLATELET # BLD AUTO: 120 K/UL — LOW (ref 150–400)
POTASSIUM SERPL-MCNC: 3.9 MMOL/L — SIGNIFICANT CHANGE UP (ref 3.5–5.3)
POTASSIUM SERPL-MCNC: 4.6 MMOL/L — SIGNIFICANT CHANGE UP (ref 3.5–5.3)
POTASSIUM SERPL-SCNC: 3.9 MMOL/L — SIGNIFICANT CHANGE UP (ref 3.5–5.3)
POTASSIUM SERPL-SCNC: 4.6 MMOL/L — SIGNIFICANT CHANGE UP (ref 3.5–5.3)
PROT SERPL-MCNC: 6.3 G/DL — LOW (ref 6.6–8.7)
PROTHROM AB SERPL-ACNC: 18.2 SEC — HIGH (ref 10.5–13.4)
RBC # BLD: 3.01 M/UL — LOW (ref 3.8–5.2)
RBC # FLD: 15 % — HIGH (ref 10.3–14.5)
SODIUM SERPL-SCNC: 140 MMOL/L — SIGNIFICANT CHANGE UP (ref 135–145)
SODIUM SERPL-SCNC: 143 MMOL/L — SIGNIFICANT CHANGE UP (ref 135–145)
WBC # BLD: 4.36 K/UL — SIGNIFICANT CHANGE UP (ref 3.8–10.5)
WBC # FLD AUTO: 4.36 K/UL — SIGNIFICANT CHANGE UP (ref 3.8–10.5)

## 2023-04-19 PROCEDURE — 43235 EGD DIAGNOSTIC BRUSH WASH: CPT

## 2023-04-19 PROCEDURE — 99233 SBSQ HOSP IP/OBS HIGH 50: CPT

## 2023-04-19 RX ORDER — SODIUM CHLORIDE 9 MG/ML
500 INJECTION, SOLUTION INTRAVENOUS
Refills: 0 | Status: DISCONTINUED | OUTPATIENT
Start: 2023-04-19 | End: 2023-04-22

## 2023-04-19 RX ORDER — CHLORHEXIDINE GLUCONATE 213 G/1000ML
1 SOLUTION TOPICAL DAILY
Refills: 0 | Status: DISCONTINUED | OUTPATIENT
Start: 2023-04-19 | End: 2023-04-22

## 2023-04-19 RX ADMIN — PANTOPRAZOLE SODIUM 10 MG/HR: 20 TABLET, DELAYED RELEASE ORAL at 22:28

## 2023-04-19 RX ADMIN — Medication 75 MICROGRAM(S): at 06:17

## 2023-04-19 RX ADMIN — PANTOPRAZOLE SODIUM 10 MG/HR: 20 TABLET, DELAYED RELEASE ORAL at 02:36

## 2023-04-19 NOTE — PATIENT PROFILE ADULT - FALL HARM RISK - HARM RISK INTERVENTIONS

## 2023-04-19 NOTE — PATIENT PROFILE ADULT - NSPROIMPLANTSMEDDEV_GEN_A_NUR
[No Acute Distress] : no acute distress [Normal Oropharynx] : normal oropharynx [Normal Appearance] : normal appearance [No Neck Mass] : no neck mass [Normal Rate/Rhythm] : normal rate/rhythm [Normal S1, S2] : normal s1, s2 [No Murmurs] : no murmurs [No Resp Distress] : no resp distress [Clear to Auscultation Bilaterally] : clear to auscultation bilaterally [No Abnormalities] : no abnormalities [Benign] : benign [Normal Gait] : normal gait [No Clubbing] : no clubbing [No Cyanosis] : no cyanosis [No Edema] : no edema [FROM] : FROM [Normal Color/ Pigmentation] : normal color/ pigmentation [No Focal Deficits] : no focal deficits [Oriented x3] : oriented x3 [Normal Affect] : normal affect Pacemaker

## 2023-04-20 DIAGNOSIS — K26.3 ACUTE DUODENAL ULCER WITHOUT HEMORRHAGE OR PERFORATION: ICD-10-CM

## 2023-04-20 LAB
ANION GAP SERPL CALC-SCNC: 11 MMOL/L — SIGNIFICANT CHANGE UP (ref 5–17)
BUN SERPL-MCNC: 27.5 MG/DL — HIGH (ref 8–20)
CALCIUM SERPL-MCNC: 8.5 MG/DL — SIGNIFICANT CHANGE UP (ref 8.4–10.5)
CHLORIDE SERPL-SCNC: 113 MMOL/L — HIGH (ref 96–108)
CO2 SERPL-SCNC: 15 MMOL/L — LOW (ref 22–29)
CREAT SERPL-MCNC: 1.06 MG/DL — SIGNIFICANT CHANGE UP (ref 0.5–1.3)
EGFR: 49 ML/MIN/1.73M2 — LOW
GLUCOSE BLDC GLUCOMTR-MCNC: 82 MG/DL — SIGNIFICANT CHANGE UP (ref 70–99)
GLUCOSE SERPL-MCNC: 83 MG/DL — SIGNIFICANT CHANGE UP (ref 70–99)
HCT VFR BLD CALC: 26.4 % — LOW (ref 34.5–45)
HGB BLD-MCNC: 8.9 G/DL — LOW (ref 11.5–15.5)
MCHC RBC-ENTMCNC: 32.1 PG — SIGNIFICANT CHANGE UP (ref 27–34)
MCHC RBC-ENTMCNC: 33.7 GM/DL — SIGNIFICANT CHANGE UP (ref 32–36)
MCV RBC AUTO: 95.3 FL — SIGNIFICANT CHANGE UP (ref 80–100)
MRSA PCR RESULT.: SIGNIFICANT CHANGE UP
PLATELET # BLD AUTO: 119 K/UL — LOW (ref 150–400)
POTASSIUM SERPL-MCNC: 3.4 MMOL/L — LOW (ref 3.5–5.3)
POTASSIUM SERPL-SCNC: 3.4 MMOL/L — LOW (ref 3.5–5.3)
RBC # BLD: 2.77 M/UL — LOW (ref 3.8–5.2)
RBC # FLD: 15.3 % — HIGH (ref 10.3–14.5)
S AUREUS DNA NOSE QL NAA+PROBE: SIGNIFICANT CHANGE UP
SODIUM SERPL-SCNC: 139 MMOL/L — SIGNIFICANT CHANGE UP (ref 135–145)
WBC # BLD: 5.71 K/UL — SIGNIFICANT CHANGE UP (ref 3.8–10.5)
WBC # FLD AUTO: 5.71 K/UL — SIGNIFICANT CHANGE UP (ref 3.8–10.5)

## 2023-04-20 PROCEDURE — 99232 SBSQ HOSP IP/OBS MODERATE 35: CPT

## 2023-04-20 PROCEDURE — 99233 SBSQ HOSP IP/OBS HIGH 50: CPT | Mod: FS

## 2023-04-20 RX ADMIN — Medication 650 MILLIGRAM(S): at 08:12

## 2023-04-20 RX ADMIN — PANTOPRAZOLE SODIUM 10 MG/HR: 20 TABLET, DELAYED RELEASE ORAL at 18:06

## 2023-04-20 RX ADMIN — PANTOPRAZOLE SODIUM 10 MG/HR: 20 TABLET, DELAYED RELEASE ORAL at 07:57

## 2023-04-20 RX ADMIN — Medication 75 MICROGRAM(S): at 05:49

## 2023-04-20 RX ADMIN — CHLORHEXIDINE GLUCONATE 1 APPLICATION(S): 213 SOLUTION TOPICAL at 13:05

## 2023-04-20 RX ADMIN — Medication 650 MILLIGRAM(S): at 09:00

## 2023-04-21 ENCOUNTER — TRANSCRIPTION ENCOUNTER (OUTPATIENT)
Age: 88
End: 2023-04-21

## 2023-04-21 DIAGNOSIS — K92.2 GASTROINTESTINAL HEMORRHAGE, UNSPECIFIED: ICD-10-CM

## 2023-04-21 LAB
ANION GAP SERPL CALC-SCNC: 11 MMOL/L — SIGNIFICANT CHANGE UP (ref 5–17)
BUN SERPL-MCNC: 18 MG/DL — SIGNIFICANT CHANGE UP (ref 8–20)
CALCIUM SERPL-MCNC: 8.6 MG/DL — SIGNIFICANT CHANGE UP (ref 8.4–10.5)
CHLORIDE SERPL-SCNC: 112 MMOL/L — HIGH (ref 96–108)
CO2 SERPL-SCNC: 17 MMOL/L — LOW (ref 22–29)
CREAT SERPL-MCNC: 1.11 MG/DL — SIGNIFICANT CHANGE UP (ref 0.5–1.3)
EGFR: 46 ML/MIN/1.73M2 — LOW
GLUCOSE BLDC GLUCOMTR-MCNC: 105 MG/DL — HIGH (ref 70–99)
GLUCOSE SERPL-MCNC: 94 MG/DL — SIGNIFICANT CHANGE UP (ref 70–99)
HCT VFR BLD CALC: 28.9 % — LOW (ref 34.5–45)
HGB BLD-MCNC: 9.6 G/DL — LOW (ref 11.5–15.5)
MCHC RBC-ENTMCNC: 32 PG — SIGNIFICANT CHANGE UP (ref 27–34)
MCHC RBC-ENTMCNC: 33.2 GM/DL — SIGNIFICANT CHANGE UP (ref 32–36)
MCV RBC AUTO: 96.3 FL — SIGNIFICANT CHANGE UP (ref 80–100)
PLATELET # BLD AUTO: 137 K/UL — LOW (ref 150–400)
POTASSIUM SERPL-MCNC: 3.4 MMOL/L — LOW (ref 3.5–5.3)
POTASSIUM SERPL-SCNC: 3.4 MMOL/L — LOW (ref 3.5–5.3)
RBC # BLD: 3 M/UL — LOW (ref 3.8–5.2)
RBC # FLD: 15.7 % — HIGH (ref 10.3–14.5)
SARS-COV-2 RNA SPEC QL NAA+PROBE: SIGNIFICANT CHANGE UP
SODIUM SERPL-SCNC: 140 MMOL/L — SIGNIFICANT CHANGE UP (ref 135–145)
WBC # BLD: 6.12 K/UL — SIGNIFICANT CHANGE UP (ref 3.8–10.5)
WBC # FLD AUTO: 6.12 K/UL — SIGNIFICANT CHANGE UP (ref 3.8–10.5)

## 2023-04-21 PROCEDURE — 99233 SBSQ HOSP IP/OBS HIGH 50: CPT | Mod: FS

## 2023-04-21 PROCEDURE — 99232 SBSQ HOSP IP/OBS MODERATE 35: CPT

## 2023-04-21 RX ADMIN — PANTOPRAZOLE SODIUM 10 MG/HR: 20 TABLET, DELAYED RELEASE ORAL at 07:58

## 2023-04-21 RX ADMIN — PANTOPRAZOLE SODIUM 10 MG/HR: 20 TABLET, DELAYED RELEASE ORAL at 20:23

## 2023-04-21 RX ADMIN — PANTOPRAZOLE SODIUM 10 MG/HR: 20 TABLET, DELAYED RELEASE ORAL at 21:15

## 2023-04-21 RX ADMIN — Medication 75 MICROGRAM(S): at 05:29

## 2023-04-21 RX ADMIN — PANTOPRAZOLE SODIUM 10 MG/HR: 20 TABLET, DELAYED RELEASE ORAL at 18:03

## 2023-04-21 RX ADMIN — CHLORHEXIDINE GLUCONATE 1 APPLICATION(S): 213 SOLUTION TOPICAL at 14:36

## 2023-04-21 NOTE — DISCHARGE NOTE PROVIDER - CARE PROVIDER_API CALL
Blossom Tena (DO)  Gastroenterology  [address], [address]  [place] [address]  Phone: [phone]  Fax: [phone]  Follow Up Time:     pcp,   Phone: (   )    -  Fax: (   )    -  Follow Up Time:

## 2023-04-21 NOTE — PROGRESS NOTE ADULT - ASSESSMENT
89 y/o F w/ PMH of HFpEF, AF (not on AC 2/2 GIB in past), neuropathy, advanced dementia, AAA, DM, HTN, CKD III, hypothyroid was BIBA from NH after having large melanotic BM.  Pt has had 2 melanotic BMs since arrival to ED.  VS stable.  Hb 6.7 and 2u PRBC ordered.  GI consulted.  Pt admitted for GIB    *Acute blood loss anemia likely 2/2 UGIB with Mild thrombocytopenia   Hg slightly dropping down   cont protonix gtt  cont current diet per GI   Cardio risk stratification for EGD noted   Trend CBC, maintain Hb>8 and monitor plts    Monitor INRs  Low threshold for upgrade to SDU   Pt HH stable   pending PT consult then possible discharge over the weekend if cleared by GI       *Paroxysmal Atrial Fibrillation not on AC   Currently in sinus rhthm and rate controlled  Not on AC due to hx of GIB in the past   Resume home meds once able to tolerate po     *HTN / Chronic HFpEF  Clinically hypovolemic   TTE from 2022 noted, LVEF 65%, mile MR, mild TR, mod AR, severe AS  Resume home meds once able to tolerate po       *AAA  Interval growth noted and reported to have moderate eccentric mural thrombus   Pt unable to get AC however given active GIB       *CKD III  stable, cr 0.95  Monitor renal fxn and lytes   Avoid nephrotoxic meds or renally dose if needed       *Hypothyroid   c/w synthroid but transition to IV given pt npo       *DM II w/ neuropathies   ISS     *Dementia w/ behavioral disturbances   If agitated and unable to redirect give zyprexa 2.5mg IM or 5mg IM q6h prn  Avoid benzo's   Haldol contraindicated given possible parkinson's        Healthcare maintenance  VTE ppx: scds  Dispo: pending clearance from GI 
89 y/o F w/ PMH of HFpEF, AF (not on AC 2/2 GIB in past), neuropathy, advanced dementia, AAA, DM, HTN, CKD III, hypothyroid was BIBA from NH after having large melanotic BM.  Pt has had 2 melanotic BMs since arrival to ED.  VS stable.  Hb 6.7 and 2u PRBC ordered.  GI consulted.  Pt admitted for GIB    *Acute blood loss anemia likely 2/2 UGIB with Mild thrombocytopenia   Hg slightly dropping down   cont protonix gtt  cont current diet per GI   Cardio risk stratification for EGD noted   Trend CBC, maintain Hb>8 and monitor plts    Monitor INRs  Low threshold for upgrade to SDU   patient pending EGD    *Paroxysmal Atrial Fibrillation not on AC   Currently in sinus rhthm and rate controlled  Not on AC due to hx of GIB in the past   Resume home meds once able to tolerate po     *HTN / Chronic HFpEF  Clinically hypovolemic   TTE from 2022 noted, LVEF 65%, mile MR, mild TR, mod AR, severe AS  Resume home meds once able to tolerate po       *AAA  Interval growth noted and reported to have moderate eccentric mural thrombus   Pt unable to get AC however given active GIB       *CKD III  stable, cr 0.95  Monitor renal fxn and lytes   Avoid nephrotoxic meds or renally dose if needed       *Hypothyroid   c/w synthroid but transition to IV given pt npo       *DM II w/ neuropathies   ISS     *Dementia w/ behavioral disturbances   If agitated and unable to redirect give zyprexa 2.5mg IM or 5mg IM q6h prn  Avoid benzo's   Haldol contraindicated given possible parkinson's        Healthcare maintenance  VTE ppx: scds  Dispo: Acute  
89 y/o F w/ PMH of HFpEF, AF (not on AC 2/2 GIB in past), neuropathy, advanced dementia, AAA, DM, HTN, CKD III, hypothyroid was BIBA from NH after having large melanotic BM. Admitted with GI bleed.       
89 y/o F w/ PMH of HFpEF, AF (not on AC 2/2 GIB in past), neuropathy, advanced dementia, AAA, DM, HTN, CKD III, hypothyroid was BIBA from NH after having large melanotic BM.  Pt has had 2 melanotic BMs since arrival to ED.  VS stable.  Hb 6.7 and 2u PRBC ordered.  GI consulted.  Pt admitted for GIB        #Acute blood loss anemia likely 2/2 UGIB  #Mild thrombocytopenia   - Improvement in hgb to 9.4  - Keep NPO and placed on protonix gtt  - Trend CBC, maintain Hb>8 and monitor plts    - Monitor INRs  - Low threshold for upgrade to SDU   - Cardio consulted for clearance   - GI on board, patient pending EGD      #Paroxysmal Atrial Fibrillation not on AC   - Currently in sinus rhthm and rate controlled  - Not on AC due to hx of GIB in the past   - Resume home meds once able to tolerate po       #HTN / Chronic HFpEF  - Clinically hypovolemic   - TTE from 2022 noted, LVEF 65%, mile MR, mild TR, mod AR, severe AS  - Resume home meds once able to tolerate po       #AAA  - Interval growth noted and reported to have moderate eccentric mural thrombus   - Pt unable to get AC however given active GIB       #CKD III  stable, cr 0.95  - Monitor renal fxn and lytes   - Avoid nephrotoxic meds or renally dose if needed       #Hypothyroid   - c/w synthroid but transition to IV given pt npo       #DM II w/ neuropathies   - Hold po meds while hospitalized  - Currently NPO   - ISS and hypoglycemic protocol in place       #Dementia w/ behavioral disturbances   - If agitated and unable to redirect give zyprexa 2.5mg IM or 5mg IM q6h prn  - Avoid benzo's   - Haldol contraindicated given possible parkinson's        #Healthcare maintenance  VTE ppx: scds  Dispo: Acute

## 2023-04-21 NOTE — DISCHARGE NOTE PROVIDER - NSDCMRMEDTOKEN_GEN_ALL_CORE_FT
acetaminophen 325 mg oral tablet: 2 tab(s) orally every 6 hours, As needed, Temp greater or equal to 38C (100.4F), Mild Pain (1 - 3)  Aricept 10 mg oral tablet: 1 tab(s) orally once a day (at bedtime)  dorzolamide 2% ophthalmic solution: 1 drop(s) to each affected eye 2 times a day  ferrous sulfate 325 mg (65 mg elemental iron) oral tablet: 1 tab(s) orally once a day  gemfibrozil 600 mg oral tablet: 1 tab(s) orally 2 times a day  metoprolol tartrate 25 mg oral tablet: 1 tab(s) orally 2 times a day  SEROquel 100 mg oral tablet: 1 tab(s) orally once a day (at bedtime)  Synthroid 150 mcg (0.15 mg) oral tablet: 1 tab(s) orally once a day  tamsulosin 0.4 mg oral capsule: 1 cap(s) orally once a day (at bedtime)  topiramate 50 mg oral tablet: 1 tab(s) orally once a day   acetaminophen 325 mg oral tablet: 2 tab(s) orally every 6 hours, As needed, Temp greater or equal to 38C (100.4F), Mild Pain (1 - 3)  Aricept 10 mg oral tablet: 1 tab(s) orally once a day (at bedtime)  dorzolamide 2% ophthalmic solution: 1 drop(s) to each affected eye 2 times a day  ferrous sulfate 325 mg (65 mg elemental iron) oral tablet: 1 tab(s) orally once a day  gemfibrozil 600 mg oral tablet: 1 tab(s) orally 2 times a day  metoprolol tartrate 25 mg oral tablet: 1 tab(s) orally 2 times a day  pantoprazole 40 mg oral delayed release tablet: 1 tab(s) orally every 12 hours  SEROquel 100 mg oral tablet: 1 tab(s) orally once a day (at bedtime)  Synthroid 150 mcg (0.15 mg) oral tablet: 1 tab(s) orally once a day  tamsulosin 0.4 mg oral capsule: 1 cap(s) orally once a day (at bedtime)  topiramate 50 mg oral tablet: 1 tab(s) orally once a day

## 2023-04-21 NOTE — DISCHARGE NOTE PROVIDER - NSDCCPCAREPLAN_GEN_ALL_CORE_FT
PRINCIPAL DISCHARGE DIAGNOSIS  Diagnosis: GI bleed  Assessment and Plan of Treatment: no EGD performed  GIB spontanously stopped  continue current diet  Protonix 40mg BID      SECONDARY DISCHARGE DIAGNOSES  Diagnosis: Anemia due to acute blood loss  Assessment and Plan of Treatment:

## 2023-04-21 NOTE — DIETITIAN INITIAL EVALUATION ADULT - OTHER INFO
89 y/o F w/ PMH of HFpEF, AF (not on AC 2/2 GIB in past), neuropathy, advanced dementia, AAA, DM, HTN, CKD III, hypothyroid was BIBA from NH after having large melanotic BM.  Pt has had 2 melanotic BMs since arrival to ED.  VS stable.  Hb 6.7 and 2u PRBC ordered.  GI consulted.  Pt admitted for GIB.

## 2023-04-21 NOTE — DISCHARGE NOTE NURSING/CASE MANAGEMENT/SOCIAL WORK - PATIENT PORTAL LINK FT
You can access the FollowMyHealth Patient Portal offered by [facility] by registering at the following website: [email or web address]. By joining [facility]’s FollowMyHealth portal, you will also be able to view your health information using other applications (apps) compatible with our system.

## 2023-04-21 NOTE — DISCHARGE NOTE NURSING/CASE MANAGEMENT/SOCIAL WORK - NSDCPEFALRISK_GEN_ALL_CORE
For information on Fall & Injury Prevention, visit: [email or web address] OR  [email or web address] OR  https://www.cdc.gov/steadi/patient.html

## 2023-04-21 NOTE — PROGRESS NOTE ADULT - NS ATTEND AMEND GEN_ALL_CORE FT
I reviewed the labs, notes, meds   pt seen and examined with NP  +BS, soft , non tender  I agree with the above assessment and plan
Patient seen and examined at bedside here with duodenal ulcer with acute blood loss anemia. Continue regular diet PPI monitor hemoglobin, remainder of care per primary team, please call with questions

## 2023-04-21 NOTE — DISCHARGE NOTE PROVIDER - NSDCACTIVITY_GEN_ALL_CORE
----- Message from Trinh Cason sent at 2/20/2023 12:19 PM EST -----  Subject: Referral Request    Reason for referral request? wants referral to DR Soto at [facility] for   colonoscopy  Provider patient wants to be referred to(if known):     Provider Phone Number(if known):     Additional Information for Provider?   ---------------------------------------------------------------------------  --------------  [id number]    [phone]; OK to leave message on voicemail  ---------------------------------------------------------------------------  --------------
Referral made to surgeon of choice for colonoscopy
Referral pended below.
Bathing allowed

## 2023-04-21 NOTE — DIETITIAN INITIAL EVALUATION ADULT - PERTINENT LABORATORY DATA
04-21    140  |  112<H>  |  18.0  ----------------------------<  94  3.4<L>   |  17.0<L>  |  1.11    Ca    8.6      21 Apr 2023 03:56    A1C with Estimated Average Glucose Result: 5.3 % (04-19-23 @ 06:28)  A1C with Estimated Average Glucose Result: 5.1 % (05-12-22 @ 05:49)

## 2023-04-21 NOTE — DIETITIAN INITIAL EVALUATION ADULT - ADD RECOMMEND
Ensure TID to optimize po intake and provide an additional 350 kcal, 20g protein per serving   MVI, feso4, folic acid

## 2023-04-21 NOTE — PROGRESS NOTE ADULT - PROBLEM SELECTOR PLAN 1
S/p EGD, shows duodenal ulcer, no erosive gastritis. Her hemoglobin remain stable, today 9.6 gm  Can switch Protonix infusion to 40 IV BID   Avoid NSAIDs  Trend CBC, transfuse to  Hgb 8 or higher.   Diet as tolerated, on puree diet.   Continue further care per primary team
anemia. Slight drop in hemoglobin  continue protonix drip  serial hemoglobin  tolerating pureed diet

## 2023-04-21 NOTE — PROGRESS NOTE ADULT - SUBJECTIVE AND OBJECTIVE BOX
Hospitalist Progress Note    Chief Complaint:  ABLA 2/2 GIB    SUBJECTIVE / OVERNIGHT EVENTS:  Admitted yesterday, patient seen at bedside, in NAD, asking for cold water. Unable to obtain ROS due to patient being a poor historian likely 2/2 baseline MS.     MEDICATIONS  (STANDING):  dextrose 5%. 1000 milliLiter(s) (50 mL/Hr) IV Continuous <Continuous>  dextrose 5%. 1000 milliLiter(s) (100 mL/Hr) IV Continuous <Continuous>  dextrose 50% Injectable 25 Gram(s) IV Push once  dextrose 50% Injectable 12.5 Gram(s) IV Push once  dextrose 50% Injectable 25 Gram(s) IV Push once  glucagon  Injectable 1 milliGRAM(s) IntraMuscular once  insulin lispro (ADMELOG) corrective regimen sliding scale   SubCutaneous three times a day before meals  levothyroxine Injectable 75 MICROGram(s) IV Push <User Schedule>  pantoprazole Infusion 8 mG/Hr (10 mL/Hr) IV Continuous <Continuous>    MEDICATIONS  (PRN):  acetaminophen     Tablet .. 650 milliGRAM(s) Oral every 6 hours PRN Temp greater or equal to 38C (100.4F), Mild Pain (1 - 3)  aluminum hydroxide/magnesium hydroxide/simethicone Suspension 30 milliLiter(s) Oral every 4 hours PRN Dyspepsia  dextrose Oral Gel 15 Gram(s) Oral once PRN Blood Glucose LESS THAN 70 milliGRAM(s)/deciliter  melatonin 3 milliGRAM(s) Oral at bedtime PRN Insomnia        I&O's Summary    18 Apr 2023 07:01  -  19 Apr 2023 07:00  --------------------------------------------------------  IN: 110 mL / OUT: 0 mL / NET: 110 mL        PHYSICAL EXAM:  Vital Signs Last 24 Hrs  T(C): 36.4 (19 Apr 2023 11:36), Max: 36.8 (18 Apr 2023 18:10)  T(F): 97.6 (19 Apr 2023 11:36), Max: 98.3 (18 Apr 2023 18:10)  HR: 59 (19 Apr 2023 11:36) (58 - 66)  BP: 148/73 (19 Apr 2023 11:36) (130/67 - 156/81)  BP(mean): --  RR: 18 (19 Apr 2023 11:36) (17 - 20)  SpO2: 96% (19 Apr 2023 11:36) (95% - 99%)    Parameters below as of 19 Apr 2023 11:36  Patient On (Oxygen Delivery Method): room air        GENERAL: elderly F, restless but NAD  HEENT: NC/AT, dry oral mucosa, pale conjunctiva, sclera nonicteric  RESPIRATORY: Normal respiratory effort, no wheezing, rhonchi, rales  CARDIOVASCULAR: RRR, normal S1 and S2, +murmurs  ABDOMEN: soft, NT/ND, normoactive bowel sounds, no rebound/guarding  MSK: No joint deformities, edema, erythema  EXTREMITIES: No cynaosis, no clubbing, no lower extremity edema, pulses are 2+ bilaterally  PSYCH: agitated and restless  NEUROLOGY: A+O x0, no focal neurologic deficits appreciated   SKIN: No rashes or no palpable lesions    LABS:                        9.7    4.36  )-----------( 120      ( 19 Apr 2023 06:28 )             29.0     04-19    143  |  117<H>  |  30.5<H>  ----------------------------<  76  3.9   |  15.0<L>  |  0.95    Ca    8.5      19 Apr 2023 06:28  Phos  3.0     04-19  Mg     2.0     04-19    TPro  6.3<L>  /  Alb  3.1<L>  /  TBili  0.7  /  DBili  x   /  AST  21  /  ALT  7   /  AlkPhos  62  04-19    PT/INR - ( 19 Apr 2023 06:28 )   PT: 18.2 sec;   INR: 1.56 ratio         PTT - ( 19 Apr 2023 06:28 )  PTT:28.2 sec          CAPILLARY BLOOD GLUCOSE      POCT Blood Glucose.: 73 mg/dL (19 Apr 2023 11:49)  POCT Blood Glucose.: 73 mg/dL (19 Apr 2023 07:50)  POCT Blood Glucose.: 82 mg/dL (19 Apr 2023 05:50)  POCT Blood Glucose.: 81 mg/dL (18 Apr 2023 23:24)  POCT Blood Glucose.: 86 mg/dL (18 Apr 2023 17:30)        RADIOLOGY & ADDITIONAL TESTS:  Results Reviewed: Y  Imaging Personally Reviewed: N  Electrocardiogram Personally Reviewed: ANGELA                                          
HPI  Pt is a 89yo F admitted to [facility] for upper GIB   Pt was seen and examined at bedside.  No overnight complaints.  hemoglobin stable     Vital Signs Last 24 Hrs  T(C): 36.6 (21 Apr 2023 10:03), Max: 37.1 (20 Apr 2023 16:31)  T(F): 97.8 (21 Apr 2023 10:03), Max: 98.7 (20 Apr 2023 16:31)  HR: 66 (21 Apr 2023 10:03) (62 - 68)  BP: 157/75 (21 Apr 2023 10:03) (123/77 - 181/90)  BP(mean): --  RR: 17 (21 Apr 2023 10:03) (17 - 18)  SpO2: 99% (21 Apr 2023 10:03) (98% - 99%)    Parameters below as of 21 Apr 2023 10:03  Patient On (Oxygen Delivery Method): room air        I&O's Summary    20 Apr 2023 07:01  -  21 Apr 2023 07:00  --------------------------------------------------------  IN: 100 mL / OUT: 1200 mL / NET: -1100 mL        CAPILLARY BLOOD GLUCOSE          PHYSICAL EXAM:  Constitutional: NAD, frail   HEENT: PERR, EOMI,   Neck: Soft and supple  Respiratory: Breath sounds are clear bilaterally,   Cardiovascular: S1 and S2,    Gastrointestinal: Bowel Sounds present, soft, nontender, no pain examination   Extremities: No peripheral edema  Vascular: 2+ peripheral pulses  Musculoskeletal: 5/5 strength b/l upper and lower extremities  Skin: No rashes      MEDICATIONS:  MEDICATIONS  (STANDING):  chlorhexidine 2% Cloths 1 Application(s) Topical daily  dextrose 5%. 500 milliLiter(s) (50 mL/Hr) IV Continuous <Continuous>  dextrose 5%. 1000 milliLiter(s) (100 mL/Hr) IV Continuous <Continuous>  dextrose 5%. 1000 milliLiter(s) (50 mL/Hr) IV Continuous <Continuous>  dextrose 50% Injectable 25 Gram(s) IV Push once  dextrose 50% Injectable 12.5 Gram(s) IV Push once  dextrose 50% Injectable 25 Gram(s) IV Push once  glucagon  Injectable 1 milliGRAM(s) IntraMuscular once  levothyroxine Injectable 75 MICROGram(s) IV Push <User Schedule>  pantoprazole Infusion 8 mG/Hr (10 mL/Hr) IV Continuous <Continuous>      LABS: All Labs Reviewed:                        9.6    6.12  )-----------( 137      ( 21 Apr 2023 03:56 )             28.9     04-21    140  |  112<H>  |  18.0  ----------------------------<  94  3.4<L>   |  17.0<L>  |  1.11    Ca    8.6      21 Apr 2023 03:56            Blood Culture:     RADIOLOGY/EKG:    DVT PPX:    ADVANCED DIRECTIVE:    DISPOSITION:
HPI  Pt is a 89yo F admitted to [facility] for upper GIB   Pt was seen and examined at bedside. Pt is hungry and cold. No overnight complaints.      Vital Signs Last 24 Hrs  T(C): 36.6 (20 Apr 2023 09:08), Max: 36.7 (20 Apr 2023 04:36)  T(F): 97.9 (20 Apr 2023 09:08), Max: 98 (20 Apr 2023 04:36)  HR: 71 (20 Apr 2023 09:08) (64 - 72)  BP: 132/64 (20 Apr 2023 09:08) (132/64 - 145/74)  BP(mean): --  RR: 18 (20 Apr 2023 09:08) (18 - 18)  SpO2: 97% (20 Apr 2023 09:08) (94% - 97%)    Parameters below as of 20 Apr 2023 09:08  Patient On (Oxygen Delivery Method): room air        I&O's Summary    19 Apr 2023 07:01  -  20 Apr 2023 07:00  --------------------------------------------------------  IN: 100 mL / OUT: 400 mL / NET: -300 mL        CAPILLARY BLOOD GLUCOSE      POCT Blood Glucose.: 82 mg/dL (20 Apr 2023 07:43)  POCT Blood Glucose.: 113 mg/dL (19 Apr 2023 23:54)  POCT Blood Glucose.: 102 mg/dL (19 Apr 2023 19:24)  POCT Blood Glucose.: 71 mg/dL (19 Apr 2023 14:41)      PHYSICAL EXAM:    Constitutional: NAD, frail   HEENT: PERR, EOMI,   Neck: Soft and supple  Respiratory: Breath sounds are clear bilaterally,   Cardiovascular: S1 and S2,    Gastrointestinal: Bowel Sounds present, soft, nontender, no pain examination   Extremities: No peripheral edema  Vascular: 2+ peripheral pulses  Musculoskeletal: 5/5 strength b/l upper and lower extremities  Skin: No rashes    MEDICATIONS:  MEDICATIONS  (STANDING):  chlorhexidine 2% Cloths 1 Application(s) Topical daily  dextrose 5%. 500 milliLiter(s) (50 mL/Hr) IV Continuous <Continuous>  dextrose 5%. 1000 milliLiter(s) (100 mL/Hr) IV Continuous <Continuous>  dextrose 5%. 1000 milliLiter(s) (50 mL/Hr) IV Continuous <Continuous>  dextrose 50% Injectable 25 Gram(s) IV Push once  dextrose 50% Injectable 12.5 Gram(s) IV Push once  dextrose 50% Injectable 25 Gram(s) IV Push once  glucagon  Injectable 1 milliGRAM(s) IntraMuscular once  levothyroxine Injectable 75 MICROGram(s) IV Push <User Schedule>  pantoprazole Infusion 8 mG/Hr (10 mL/Hr) IV Continuous <Continuous>      LABS: All Labs Reviewed:                        8.9    5.71  )-----------( 119      ( 20 Apr 2023 04:05 )             26.4     04-20    139  |  113<H>  |  27.5<H>  ----------------------------<  83  3.4<L>   |  15.0<L>  |  1.06    Ca    8.5      20 Apr 2023 04:05  Phos  3.0     04-19  Mg     2.0     04-19    TPro  6.3<L>  /  Alb  3.1<L>  /  TBili  0.7  /  DBili  x   /  AST  21  /  ALT  7   /  AlkPhos  62  04-19    PT/INR - ( 19 Apr 2023 06:28 )   PT: 18.2 sec;   INR: 1.56 ratio         PTT - ( 19 Apr 2023 06:28 )  PTT:28.2 sec      Blood Culture:     RADIOLOGY/EKG:    DVT PPX:    ADVANCED DIRECTIVE:    DISPOSITION:
Patient is a 89y old  Female who presents with a chief complaint of gib (19 Apr 2023 13:48)      HPI:  89 y/o F w/ PMH of AF (not on AC 2/2 GIB in past), neuropathy, advanced dementia, AAA, DM, HTN, CKD III, hypothyroid  was BIBA from NH after having large melanotic BM.  Pt is a poor historian . Patient had EGD yesterday.  Found to have gastritis and  1 cm duodenal ulcer. NO intervention needed. Hgb slight drop today from 9.7 to 8.9.  NO melena noted. Pt complaining about constipation. Aide at bedside feeding patient. Tolerating pureed diet     REVIEW OF SYSTEMS:  Constitutional: No fever, weight loss or fatigue  ENMT:  No difficulty hearing, tinnitus, vertigo; No sinus or throat pain  Respiratory: No cough, wheezing, chills or hemoptysis  Cardiovascular: No chest pain, palpitations, dizziness or leg swelling  Gastrointestinal: No abdominal or epigastric pain. No nausea, vomiting or hematemesis; No diarrhea or constipation. No melena or hematochezia.  Skin: No itching, burning, rashes or lesions   Musculoskeletal: No joint pain or swelling; No muscle, back or extremity pain    PAST MEDICAL & SURGICAL HISTORY:  HTN (hypertension)      Diabetes mellitus      Hypothyroid      Dementia      GI bleed      Neuropathy      GERD (gastroesophageal reflux disease)      Abdominal aortic aneurysm      Stage 3 chronic kidney disease      S/P hysterectomy      S/P hip replacement      Pacemaker          FAMILY HISTORY:  FHx: diabetes mellitus        SOCIAL HISTORY:  Smoking Status: [ ] Current, [ ] Former, [ ] Never  Pack Years:  [  ] EtOH-no  [  ] IVDA    MEDICATIONS:  MEDICATIONS  (STANDING):  chlorhexidine 2% Cloths 1 Application(s) Topical daily  dextrose 5%. 500 milliLiter(s) (50 mL/Hr) IV Continuous <Continuous>  dextrose 5%. 1000 milliLiter(s) (100 mL/Hr) IV Continuous <Continuous>  dextrose 5%. 1000 milliLiter(s) (50 mL/Hr) IV Continuous <Continuous>  dextrose 50% Injectable 25 Gram(s) IV Push once  dextrose 50% Injectable 12.5 Gram(s) IV Push once  dextrose 50% Injectable 25 Gram(s) IV Push once  glucagon  Injectable 1 milliGRAM(s) IntraMuscular once  insulin lispro (ADMELOG) corrective regimen sliding scale   SubCutaneous three times a day before meals  levothyroxine Injectable 75 MICROGram(s) IV Push <User Schedule>  pantoprazole Infusion 8 mG/Hr (10 mL/Hr) IV Continuous <Continuous>    MEDICATIONS  (PRN):  acetaminophen     Tablet .. 650 milliGRAM(s) Oral every 6 hours PRN Temp greater or equal to 38C (100.4F), Mild Pain (1 - 3)  aluminum hydroxide/magnesium hydroxide/simethicone Suspension 30 milliLiter(s) Oral every 4 hours PRN Dyspepsia  dextrose Oral Gel 15 Gram(s) Oral once PRN Blood Glucose LESS THAN 70 milliGRAM(s)/deciliter  melatonin 3 milliGRAM(s) Oral at bedtime PRN Insomnia      Allergies    No Known Allergies    Intolerances        Vital Signs Last 24 Hrs  T(C): 36.7 (20 Apr 2023 04:36), Max: 36.7 (20 Apr 2023 04:36)  T(F): 98 (20 Apr 2023 04:36), Max: 98 (20 Apr 2023 04:36)  HR: 64 (20 Apr 2023 04:36) (59 - 72)  BP: 137/63 (20 Apr 2023 04:36) (137/63 - 148/73)  BP(mean): --  RR: 18 (20 Apr 2023 04:36) (18 - 18)  SpO2: 97% (20 Apr 2023 04:36) (94% - 97%)    Parameters below as of 20 Apr 2023 04:36  Patient On (Oxygen Delivery Method): room air        04-19 @ 07:01  -  04-20 @ 07:00  --------------------------------------------------------  IN: 100 mL / OUT: 400 mL / NET: -300 mL          PHYSICAL EXAM:    General: ; in no acute distress. elderly, frail  HEENT: MMM, conjunctiva and sclera clear. [place]  H-RRR  L-CTA  Gastrointestinal: Soft, non-tender non-distended; Normal bowel sounds; No rebound or guarding  Extremities: Normal range of motion, No clubbing, cyanosis or edema  Neurological: Alert and oriented x3  Skin: Warm and dry. No obvious rash      LABS:                        8.9    5.71  )-----------( 119      ( 20 Apr 2023 04:05 )             26.4     20 Apr 2023 04:05    139    |  113    |  27.5   ----------------------------<  83     3.4     |  15.0   |  1.06     Ca    8.5        20 Apr 2023 04:05  Phos  3.0       19 Apr 2023 02:30  Mg     2.0       19 Apr 2023 02:30    TPro  6.3    /  Alb  3.1    /  TBili  0.7    /  DBili  x      /  AST  21     /  ALT  7      /  AlkPhos  62     / Amylase x      /Lipase x      19 Apr 2023 02:30              RADIOLOGY & ADDITIONAL STUDIES:     < from: CT Abdomen and Pelvis w/wo IV Cont (04.18.23 @ 11:46) >  MPRESSION:  No evidence for active GI bleed.    CT findings suggestive of cystitis.    Interval growth of a fusiform infrarenal abdominal aortic aneurysm with   moderate eccentric mural thrombus, measuring 3.6 cm in maximum diameter.   Grossly stable partially imaged proximal ascending thoracic aortic   aneurysm.    --- End of Report ---            ABEBE PITT MD; Attending Radiologist  This document has been electronically signed. Apr 18 2023  1:    < end of copied text >  
Chief Complaint: This is a 89y old woman patient being seen in follow-up consultation for GI Bleed.     Interval HPI / 24H events:  Patient is seen ans examined this morning. Reports generalized abdominal pain. Her hemoglobin remain stable, 9.6 gm this morning. Denies fever, chills, abdominal pain. No hematochezia, melena, or hematemesis reported.     Review of Systems:  . Constitutional: No fever, chills  . HEENT: Negative  · Respiratory and Thorax: No shortness of breath, no cough  · Cardiovascular: No chest pain, palpitation, no dizziness  · Gastrointestinal: see above  · Genitourinary: No hematuria  · Musculoskeletal: Negative  · Neurological: negative  · Psychiatric: no agitation, no anxiety      PAST MEDICAL/SURGICAL HISTORY:  HTN (hypertension)    Diabetes mellitus    Hypothyroid    Dementia    GI bleed    Neuropathy    GERD (gastroesophageal reflux disease)    Abdominal aortic aneurysm    Stage 3 chronic kidney disease    S/P hysterectomy    S/P hip replacement    Pacemaker      MEDICATIONS  (STANDING):  chlorhexidine 2% Cloths 1 Application(s) Topical daily  dextrose 5%. 500 milliLiter(s) (50 mL/Hr) IV Continuous <Continuous>  dextrose 5%. 1000 milliLiter(s) (100 mL/Hr) IV Continuous <Continuous>  dextrose 5%. 1000 milliLiter(s) (50 mL/Hr) IV Continuous <Continuous>  dextrose 50% Injectable 25 Gram(s) IV Push once  dextrose 50% Injectable 12.5 Gram(s) IV Push once  dextrose 50% Injectable 25 Gram(s) IV Push once  glucagon  Injectable 1 milliGRAM(s) IntraMuscular once  levothyroxine Injectable 75 MICROGram(s) IV Push <User Schedule>  pantoprazole Infusion 8 mG/Hr (10 mL/Hr) IV Continuous <Continuous>    MEDICATIONS  (PRN):  acetaminophen     Tablet .. 650 milliGRAM(s) Oral every 6 hours PRN Temp greater or equal to 38C (100.4F), Mild Pain (1 - 3)  aluminum hydroxide/magnesium hydroxide/simethicone Suspension 30 milliLiter(s) Oral every 4 hours PRN Dyspepsia  dextrose Oral Gel 15 Gram(s) Oral once PRN Blood Glucose LESS THAN 70 milliGRAM(s)/deciliter  melatonin 3 milliGRAM(s) Oral at bedtime PRN Insomnia    No Known Allergies    T(C): 36.6 (04-21-23 @ 10:03), Max: 37.1 (04-20-23 @ 16:31)  HR: 66 (04-21-23 @ 10:03) (62 - 68)  BP: 157/75 (04-21-23 @ 10:03) (152/78 - 181/90)  RR: 17 (04-21-23 @ 10:03) (17 - 18)  SpO2: 99% (04-21-23 @ 10:03) (98% - 99%)    I&O's Summary    20 Apr 2023 07:01  -  21 Apr 2023 07:00  --------------------------------------------------------  IN: 100 mL / OUT: 1200 mL / NET: -1100 mL      PHYSICAL EXAM:    Constitutional: Thi appearing elderly woman, in no acute distress  Neuro: Awake alert,   HEENT: anicteric sclerae  Neck: supple, no JVD  CV: regular rate, regular rhythm  Pulm/chest: lung sounds CTA and equal bilaterally, no accessory muscle use noted  Abd: soft, +diffuse tenderness, nondistended +bowel sounds. No rigidity, rebound tenderness, or guarding  Ext: no Cyanosis, clubbing or edema  Skin: warm, no jaundice   Psych: calm, cooperative      LABS:               9.6    6.12  )-----------( 137      ( 04-21 @ 03:56 )             28.9                8.9    5.71  )-----------( 119      ( 04-20 @ 04:05 )             26.4                9.7    4.36  )-----------( 120      ( 04-19 @ 06:28 )             29.0                9.4    5.05  )-----------( 113      ( 04-18 @ 21:33 )             28.1       04-21    140  |  112<H>  |  18.0  ----------------------------<  94  3.4<L>   |  17.0<L>  |  1.11    Ca    8.6      21 Apr 2023 03:56          Lipase, Serum: 58 U/L (04-18-23 @ 10:00)      < from: CT Abdomen and Pelvis w/wo IV Cont (04.18.23 @ 11:46) >    PROCEDURE:  CT of the Abdomen and Pelvis was performed.  Precontrast, Arterial and Delayed phases were performed.  Sagittal and coronal reformats were performed.    FINDINGS: Images are degraded by respiratory motion artifact.  LOWER CHEST: Partially imaged proximal ascending thoracic aorta grossly   stable with aneurysmal dilatation of approximately 4.9 cm. Cardiomegaly.   Coronary artery and cardiac valvular calcifications. AICD lead tips in   the right atrium and right ventricle. Trace bilateral pleural effusions.    LIVER: Within normal limits.  BILE DUCTS: Normal caliber.  GALLBLADDER: Mild gallbladder wall edema.  SPLEEN: Within normal limits.  PANCREAS: A stable 1.4 cm hypodense lesion in the pancreatic head is   noted. No ductal dilatation. A stable 1.1 cm cystic lesion of the   uncinate process.  ADRENALS: Within normal limits.  KIDNEYS/URETERS: Stable right renal cysts including an upper pole cyst   within a thinly, partially calcified wall measuring 5.2 cm..    BLADDER: Diffuse bladder wall thickening and mucosal hyperemia..  REPRODUCTIVE ORGANS: Hysterectomy.    BOWEL: No evidence for active GI bleed. Bowel underdistention limits   evaluation for wall thickening. No bowel obstruction. Appendix is normal.  PERITONEUM: Trace free fluid in the pelvis..  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   Atherosclerotic calcifications are present. Moderate eccentric mural   thrombus within the infrarenal abdominal aorta, progressed since prior   study. Infrarenal abdominal aortic fusiform aneurysm measures 3.6 cm in   maximum diameter, previously 2.8 cm..  RETROPERITONEUM/LYMPH NODES: No lymphadenopathy.  ABDOMINAL WALL: Within normal limits.  BONES: Degenerative changes.    IMPRESSION:  No evidence for active GI bleed.    CT findings suggestive of cystitis.    Interval growth of a fusiform infrarenal abdominal aortic aneurysm with   moderate eccentric mural thrombus, measuring 3.6 cm in maximum diameter.   Grossly stable partially imaged proximal ascending thoracic aortic   aneurysm.    < end of copied text >    < from: EGD (04.19.23 @ 15:16) >      Findings:        Esophagus Lumen A Schatzki's ring was found in the gastroesophageal junction 35    mm from the incisors.        Stomach Excavated lesions A single cratered 10 mm ulcer was found in the second    portion of the duodenum at 12 o'clock positive.        Mucosa Diffuse erythema of the mucosa with no bleeding was noted in the stomach    body and antrum. These findings are compatible with non-erosive gastritis.        Duodenum Excavated lesions A single cratered non-bleeding 10 mm healing ulcer    was found in the duodenal bulb.        Impressions:        Ring in the gastroesophageal junction.        Erythema in the stomach body and antrum compatible with non-erosive gastritis.            Ulcer in the duodenal bulb.        Ulcer in the second portion of the duodenum at 12 o'clock positive.        Plan:        Return to floor for further management        Advance diet as tolerated        Protonix 40 mg po bid for 12 weeks, then daily        Avoid nsaids        Beverley Natarajan    < end of copied text >

## 2023-04-21 NOTE — DISCHARGE NOTE PROVIDER - HOSPITAL COURSE
89 y/o F w/ PMH of HFpEF, AF (not on AC 2/2 GIB in past), neuropathy, advanced dementia, AAA, DM, HTN, CKD III, hypothyroid was BIBA from NH after having large melanotic BM.  Pt has had 2 melanotic BMs since arrival to ED.  VS stable.  Hb 6.7 and 2u PRBC ordered.  GI consulted.  Pt admitted for GIB    *Acute blood loss anemia likely 2/2 UGIB with Mild thrombocytopenia   pt tolerate pureed diet  bleeding spontanously stopped with no EGD this admission  per GI cleared to discharge with protonix 40mg bid      89 y/o F w/ PMH of HFpEF, AF (not on AC 2/2 GIB in past), neuropathy, advanced dementia, AAA, DM, HTN, CKD III, hypothyroid was BIBA from NH after having large melanotic BM.  Pt has had 2 melanotic BMs since arrival to ED.  VS stable.  Hb 6.7 and 2u PRBC ordered.  GI consulted.  Pt admitted for GIB    *Acute blood loss anemia likely 2/2 UGIB with Mild thrombocytopenia   pt tolerate pureed diet  bleeding spontanously stopped with no EGD this admission  per GI cleared to discharge with protonix 40mg bid     Vital Signs Last 24 Hrs  T(C): 36.3 (22 Apr 2023 05:36), Max: 36.9 (21 Apr 2023 17:04)  T(F): 97.4 (22 Apr 2023 05:36), Max: 98.5 (21 Apr 2023 17:04)  HR: 59 (22 Apr 2023 05:36) (59 - 66)  BP: 161/79 (22 Apr 2023 05:36) (145/68 - 164/73)  BP(mean): --  RR: 18 (22 Apr 2023 05:36) (16 - 18)  SpO2: 100% (22 Apr 2023 05:36) (98% - 100%)    Parameters below as of 22 Apr 2023 00:14  Patient On (Oxygen Delivery Method): room air    PHYSICAL EXAM:  Constitutional: NAD, frail   HEENT: PERR, EOMI,   Neck: Soft and supple  Respiratory: Breath sounds are clear bilaterally,   Cardiovascular: S1 and S2,    Gastrointestinal: Bowel Sounds present, soft, nontender, no pain examination   Extremities: No peripheral edema  Vascular: 2+ peripheral pulses  Musculoskeletal: 5/5 strength b/l upper and lower extremities  Skin: No rashes    Time spent on discharge 36min

## 2023-04-22 VITALS
DIASTOLIC BLOOD PRESSURE: 78 MMHG | SYSTOLIC BLOOD PRESSURE: 150 MMHG | OXYGEN SATURATION: 100 % | TEMPERATURE: 98 F | HEART RATE: 64 BPM | RESPIRATION RATE: 16 BRPM

## 2023-04-22 LAB
ANION GAP SERPL CALC-SCNC: 11 MMOL/L — SIGNIFICANT CHANGE UP (ref 5–17)
BUN SERPL-MCNC: 11.9 MG/DL — SIGNIFICANT CHANGE UP (ref 8–20)
CALCIUM SERPL-MCNC: 8.4 MG/DL — SIGNIFICANT CHANGE UP (ref 8.4–10.5)
CHLORIDE SERPL-SCNC: 111 MMOL/L — HIGH (ref 96–108)
CO2 SERPL-SCNC: 17 MMOL/L — LOW (ref 22–29)
CREAT SERPL-MCNC: 1.04 MG/DL — SIGNIFICANT CHANGE UP (ref 0.5–1.3)
EGFR: 50 ML/MIN/1.73M2 — LOW
GLUCOSE SERPL-MCNC: 96 MG/DL — SIGNIFICANT CHANGE UP (ref 70–99)
HCT VFR BLD CALC: 25.2 % — LOW (ref 34.5–45)
HGB BLD-MCNC: 8.8 G/DL — LOW (ref 11.5–15.5)
MCHC RBC-ENTMCNC: 32.4 PG — SIGNIFICANT CHANGE UP (ref 27–34)
MCHC RBC-ENTMCNC: 34.9 GM/DL — SIGNIFICANT CHANGE UP (ref 32–36)
MCV RBC AUTO: 92.6 FL — SIGNIFICANT CHANGE UP (ref 80–100)
PLATELET # BLD AUTO: 139 K/UL — LOW (ref 150–400)
POTASSIUM SERPL-MCNC: 3.3 MMOL/L — LOW (ref 3.5–5.3)
POTASSIUM SERPL-SCNC: 3.3 MMOL/L — LOW (ref 3.5–5.3)
RBC # BLD: 2.72 M/UL — LOW (ref 3.8–5.2)
RBC # FLD: 15.2 % — HIGH (ref 10.3–14.5)
SODIUM SERPL-SCNC: 139 MMOL/L — SIGNIFICANT CHANGE UP (ref 135–145)
WBC # BLD: 6.34 K/UL — SIGNIFICANT CHANGE UP (ref 3.8–10.5)
WBC # FLD AUTO: 6.34 K/UL — SIGNIFICANT CHANGE UP (ref 3.8–10.5)

## 2023-04-22 PROCEDURE — 85610 PROTHROMBIN TIME: CPT

## 2023-04-22 PROCEDURE — U0005: CPT

## 2023-04-22 PROCEDURE — 86923 COMPATIBILITY TEST ELECTRIC: CPT

## 2023-04-22 PROCEDURE — 99285 EMERGENCY DEPT VISIT HI MDM: CPT

## 2023-04-22 PROCEDURE — 80048 BASIC METABOLIC PNL TOTAL CA: CPT

## 2023-04-22 PROCEDURE — 83036 HEMOGLOBIN GLYCOSYLATED A1C: CPT

## 2023-04-22 PROCEDURE — 84100 ASSAY OF PHOSPHORUS: CPT

## 2023-04-22 PROCEDURE — 96375 TX/PRO/DX INJ NEW DRUG ADDON: CPT

## 2023-04-22 PROCEDURE — 36415 COLL VENOUS BLD VENIPUNCTURE: CPT

## 2023-04-22 PROCEDURE — 82962 GLUCOSE BLOOD TEST: CPT

## 2023-04-22 PROCEDURE — P9040: CPT

## 2023-04-22 PROCEDURE — 36430 TRANSFUSION BLD/BLD COMPNT: CPT

## 2023-04-22 PROCEDURE — 86850 RBC ANTIBODY SCREEN: CPT

## 2023-04-22 PROCEDURE — 83690 ASSAY OF LIPASE: CPT

## 2023-04-22 PROCEDURE — 85025 COMPLETE CBC W/AUTO DIFF WBC: CPT

## 2023-04-22 PROCEDURE — 87640 STAPH A DNA AMP PROBE: CPT

## 2023-04-22 PROCEDURE — G1004: CPT

## 2023-04-22 PROCEDURE — 80053 COMPREHEN METABOLIC PANEL: CPT

## 2023-04-22 PROCEDURE — 85027 COMPLETE CBC AUTOMATED: CPT

## 2023-04-22 PROCEDURE — 85730 THROMBOPLASTIN TIME PARTIAL: CPT

## 2023-04-22 PROCEDURE — 96374 THER/PROPH/DIAG INJ IV PUSH: CPT

## 2023-04-22 PROCEDURE — 86900 BLOOD TYPING SEROLOGIC ABO: CPT

## 2023-04-22 PROCEDURE — P9016: CPT

## 2023-04-22 PROCEDURE — 86901 BLOOD TYPING SEROLOGIC RH(D): CPT

## 2023-04-22 PROCEDURE — U0003: CPT

## 2023-04-22 PROCEDURE — 83735 ASSAY OF MAGNESIUM: CPT

## 2023-04-22 PROCEDURE — 87641 MR-STAPH DNA AMP PROBE: CPT

## 2023-04-22 PROCEDURE — 74178 CT ABD&PLV WO CNTR FLWD CNTR: CPT | Mod: MG

## 2023-04-22 PROCEDURE — 99239 HOSP IP/OBS DSCHRG MGMT >30: CPT

## 2023-04-22 PROCEDURE — 97163 PT EVAL HIGH COMPLEX 45 MIN: CPT

## 2023-04-22 PROCEDURE — 93005 ELECTROCARDIOGRAM TRACING: CPT

## 2023-04-22 RX ORDER — PANTOPRAZOLE SODIUM 20 MG/1
40 TABLET, DELAYED RELEASE ORAL EVERY 12 HOURS
Refills: 0 | Status: DISCONTINUED | OUTPATIENT
Start: 2023-04-22 | End: 2023-04-22

## 2023-04-22 RX ORDER — PANTOPRAZOLE SODIUM 20 MG/1
1 TABLET, DELAYED RELEASE ORAL
Qty: 0 | Refills: 0 | DISCHARGE
Start: 2023-04-22

## 2023-04-22 RX ADMIN — Medication 75 MICROGRAM(S): at 05:48

## 2023-05-11 ENCOUNTER — APPOINTMENT (OUTPATIENT)
Dept: GASTROENTEROLOGY | Facility: CLINIC | Age: 88
End: 2023-05-11
Payer: MEDICARE

## 2023-05-11 VITALS
DIASTOLIC BLOOD PRESSURE: 72 MMHG | SYSTOLIC BLOOD PRESSURE: 138 MMHG | TEMPERATURE: 97.6 F | HEART RATE: 63 BPM | RESPIRATION RATE: 16 BRPM | OXYGEN SATURATION: 97 % | HEIGHT: 64 IN

## 2023-05-11 DIAGNOSIS — N39.0 URINARY TRACT INFECTION, SITE NOT SPECIFIED: ICD-10-CM

## 2023-05-11 DIAGNOSIS — E03.9 HYPOTHYROIDISM, UNSPECIFIED: ICD-10-CM

## 2023-05-11 DIAGNOSIS — R33.9 RETENTION OF URINE, UNSPECIFIED: ICD-10-CM

## 2023-05-11 DIAGNOSIS — I71.20 THORACIC AORTIC ANEURYSM, WITHOUT RUPTURE, UNSPECIFIED: ICD-10-CM

## 2023-05-11 DIAGNOSIS — I10 ESSENTIAL (PRIMARY) HYPERTENSION: ICD-10-CM

## 2023-05-11 DIAGNOSIS — K26.3 ACUTE DUODENAL ULCER W/OUT HEMORRHAGE OR PERFORATION: ICD-10-CM

## 2023-05-11 DIAGNOSIS — N18.30 CHRONIC KIDNEY DISEASE, STAGE 3 UNSPECIFIED: ICD-10-CM

## 2023-05-11 DIAGNOSIS — G62.9 POLYNEUROPATHY, UNSPECIFIED: ICD-10-CM

## 2023-05-11 DIAGNOSIS — E78.9 DISORDER OF LIPOPROTEIN METABOLISM, UNSPECIFIED: ICD-10-CM

## 2023-05-11 DIAGNOSIS — H34.9 UNSPECIFIED RETINAL VASCULAR OCCLUSION: ICD-10-CM

## 2023-05-11 DIAGNOSIS — Z87.19 PERSONAL HISTORY OF OTHER DISEASES OF THE DIGESTIVE SYSTEM: ICD-10-CM

## 2023-05-11 PROCEDURE — 99214 OFFICE O/P EST MOD 30 MIN: CPT

## 2023-05-11 NOTE — ASSESSMENT
[FreeTextEntry1] : This time I recommended that her son be certain and that he check with the nursing home that she is indeed getting pantoprazole 40 mg twice daily which should be continued at this dose until July 19 after which it can be decreased to 40 mg p.o. every morning.  I have also recommended that the doctor at the nursing home check her hemoglobin intermittently to be certain that it is acceptable and increasing and that she may need iron supplements.  Otherwise she will simply be seen again as needed.

## 2023-05-11 NOTE — PHYSICAL EXAM
[Alert] : alert [Normal Voice/Communication] : normal voice/communication [Healthy Appearing] : healthy appearing [No Acute Distress] : no acute distress [Sclera] : the sclera and conjunctiva were normal [Hearing Threshold Finger Rub Not Otoe] : hearing was normal [Normal Lips/Gums] : the lips and gums were normal [Oropharynx] : the oropharynx was normal [Normal Appearance] : the appearance of the neck was normal [No Neck Mass] : no neck mass was observed [No Respiratory Distress] : no respiratory distress [No Acc Muscle Use] : no accessory muscle use [Respiration, Rhythm And Depth] : normal respiratory rhythm and effort [Heart Rate And Rhythm] : heart rate was normal and rhythm regular [Bowel Sounds] : normal bowel sounds [Abdomen Tenderness] : non-tender [No Masses] : no abdominal mass palpated [Abdomen Soft] : soft [] : no hepatosplenomegaly [Oriented To Time, Place, And Person] : oriented to person, place, and time [de-identified] : Elderly female who is verbal but inappropriate and confined to a wheelchair.

## 2023-05-11 NOTE — HISTORY OF PRESENT ILLNESS
[FreeTextEntry1] : The patient was admitted to [facility] on April 18 of this year with melena and anemia with a hemoglobin of 6.7 for which she was transfused 2 units of packed red blood cells.  She had apparently had prior upper GI bleeds and was not on any anticoagulation.  On April 19 she underwent an upper endoscopy by Dr. Natarajan which revealed a nonobstructing Schatzki ring as well as a cratered 10 mm ulcer in the second portion of the duodenum.  There was some erythema of the mucosa in the stomach but no other significant abnormalities.  It was recommended that she be discharged on pantoprazole 40 mg p.o. twice daily for 12 weeks and then 40 mg p.o. every morning indefinitely thereafter.  He was discharged on April 21.  She has advanced dementia and lives in [facility].  She is accompanied by her son.  She is verbal but oriented x0 and unreliable.  According to the son she has not been having any significant or obvious abdominal pain and is eating without difficulty.  She is no longer experiencing any melena.  Is not altogether certain as to the medications that she is given in the nursing home.

## 2023-05-11 NOTE — REASON FOR VISIT
[Follow-up] : a follow-up of an existing diagnosis [FreeTextEntry1] : Recent hospitalization for upper GI bleed due to duodenal ulcer

## 2023-05-23 ENCOUNTER — INPATIENT (INPATIENT)
Facility: HOSPITAL | Age: 88
LOS: 1 days | Discharge: NURSING, REHAB OR LONG-TERM CARE | DRG: 871 | End: 2023-05-25
Attending: STUDENT IN AN ORGANIZED HEALTH CARE EDUCATION/TRAINING PROGRAM | Admitting: STUDENT IN AN ORGANIZED HEALTH CARE EDUCATION/TRAINING PROGRAM
Payer: MEDICARE

## 2023-05-23 VITALS
TEMPERATURE: 101 F | RESPIRATION RATE: 18 BRPM | SYSTOLIC BLOOD PRESSURE: 139 MMHG | HEART RATE: 96 BPM | HEIGHT: 62 IN | DIASTOLIC BLOOD PRESSURE: 84 MMHG | OXYGEN SATURATION: 97 %

## 2023-05-23 DIAGNOSIS — R50.9 FEVER, UNSPECIFIED: ICD-10-CM

## 2023-05-23 DIAGNOSIS — Z90.710 ACQUIRED ABSENCE OF BOTH CERVIX AND UTERUS: Chronic | ICD-10-CM

## 2023-05-23 DIAGNOSIS — Z96.649 PRESENCE OF UNSPECIFIED ARTIFICIAL HIP JOINT: Chronic | ICD-10-CM

## 2023-05-23 DIAGNOSIS — Z95.0 PRESENCE OF CARDIAC PACEMAKER: Chronic | ICD-10-CM

## 2023-05-23 LAB
ALBUMIN SERPL ELPH-MCNC: 3.5 G/DL — SIGNIFICANT CHANGE UP (ref 3.3–5.2)
ALP SERPL-CCNC: 73 U/L — SIGNIFICANT CHANGE UP (ref 40–120)
ALT FLD-CCNC: 7 U/L — SIGNIFICANT CHANGE UP
ANION GAP SERPL CALC-SCNC: 13 MMOL/L — SIGNIFICANT CHANGE UP (ref 5–17)
APPEARANCE UR: ABNORMAL
APTT BLD: 28.3 SEC — SIGNIFICANT CHANGE UP (ref 27.5–35.5)
AST SERPL-CCNC: 19 U/L — SIGNIFICANT CHANGE UP
BACTERIA # UR AUTO: ABNORMAL
BASE EXCESS BLDV CALC-SCNC: -2.6 MMOL/L — LOW (ref -2–3)
BASOPHILS # BLD AUTO: 0.02 K/UL — SIGNIFICANT CHANGE UP (ref 0–0.2)
BASOPHILS NFR BLD AUTO: 0.2 % — SIGNIFICANT CHANGE UP (ref 0–2)
BILIRUB SERPL-MCNC: 0.2 MG/DL — LOW (ref 0.4–2)
BILIRUB UR-MCNC: NEGATIVE — SIGNIFICANT CHANGE UP
BUN SERPL-MCNC: 31.6 MG/DL — HIGH (ref 8–20)
CA-I SERPL-SCNC: 1.18 MMOL/L — SIGNIFICANT CHANGE UP (ref 1.15–1.33)
CALCIUM SERPL-MCNC: 9.3 MG/DL — SIGNIFICANT CHANGE UP (ref 8.4–10.5)
CHLORIDE BLDV-SCNC: 108 MMOL/L — SIGNIFICANT CHANGE UP (ref 96–108)
CHLORIDE SERPL-SCNC: 103 MMOL/L — SIGNIFICANT CHANGE UP (ref 96–108)
CO2 SERPL-SCNC: 21 MMOL/L — LOW (ref 22–29)
COLOR SPEC: YELLOW — SIGNIFICANT CHANGE UP
CREAT SERPL-MCNC: 1.39 MG/DL — HIGH (ref 0.5–1.3)
DIFF PNL FLD: ABNORMAL
EGFR: 35 ML/MIN/1.73M2 — LOW
EOSINOPHIL # BLD AUTO: 0.03 K/UL — SIGNIFICANT CHANGE UP (ref 0–0.5)
EOSINOPHIL NFR BLD AUTO: 0.3 % — SIGNIFICANT CHANGE UP (ref 0–6)
EPI CELLS # UR: SIGNIFICANT CHANGE UP
GAS PNL BLDV: 135 MMOL/L — LOW (ref 136–145)
GAS PNL BLDV: SIGNIFICANT CHANGE UP
GLUCOSE BLDV-MCNC: 137 MG/DL — HIGH (ref 70–99)
GLUCOSE SERPL-MCNC: 136 MG/DL — HIGH (ref 70–99)
GLUCOSE UR QL: NEGATIVE — SIGNIFICANT CHANGE UP
HCO3 BLDV-SCNC: 23 MMOL/L — SIGNIFICANT CHANGE UP (ref 22–29)
HCT VFR BLD CALC: 34 % — LOW (ref 34.5–45)
HCT VFR BLDA CALC: 34 % — SIGNIFICANT CHANGE UP
HGB BLD CALC-MCNC: 11.4 G/DL — LOW (ref 11.7–16.1)
HGB BLD-MCNC: 11 G/DL — LOW (ref 11.5–15.5)
IMM GRANULOCYTES NFR BLD AUTO: 0.3 % — SIGNIFICANT CHANGE UP (ref 0–0.9)
INR BLD: 1.21 RATIO — HIGH (ref 0.88–1.16)
KETONES UR-MCNC: NEGATIVE — SIGNIFICANT CHANGE UP
LACTATE BLDV-MCNC: 1.9 MMOL/L — SIGNIFICANT CHANGE UP (ref 0.5–2)
LEUKOCYTE ESTERASE UR-ACNC: ABNORMAL
LYMPHOCYTES # BLD AUTO: 0.78 K/UL — LOW (ref 1–3.3)
LYMPHOCYTES # BLD AUTO: 7.3 % — LOW (ref 13–44)
MCHC RBC-ENTMCNC: 32.1 PG — SIGNIFICANT CHANGE UP (ref 27–34)
MCHC RBC-ENTMCNC: 32.4 GM/DL — SIGNIFICANT CHANGE UP (ref 32–36)
MCV RBC AUTO: 99.1 FL — SIGNIFICANT CHANGE UP (ref 80–100)
MONOCYTES # BLD AUTO: 0.86 K/UL — SIGNIFICANT CHANGE UP (ref 0–0.9)
MONOCYTES NFR BLD AUTO: 8 % — SIGNIFICANT CHANGE UP (ref 2–14)
NEUTROPHILS # BLD AUTO: 8.98 K/UL — HIGH (ref 1.8–7.4)
NEUTROPHILS NFR BLD AUTO: 83.9 % — HIGH (ref 43–77)
NITRITE UR-MCNC: POSITIVE
PCO2 BLDV: 43 MMHG — HIGH (ref 39–42)
PH BLDV: 7.34 — SIGNIFICANT CHANGE UP (ref 7.32–7.43)
PH UR: 7 — SIGNIFICANT CHANGE UP (ref 5–8)
PLATELET # BLD AUTO: 152 K/UL — SIGNIFICANT CHANGE UP (ref 150–400)
PO2 BLDV: 60 MMHG — HIGH (ref 25–45)
POTASSIUM BLDV-SCNC: 4.8 MMOL/L — SIGNIFICANT CHANGE UP (ref 3.5–5.1)
POTASSIUM SERPL-MCNC: 4.7 MMOL/L — SIGNIFICANT CHANGE UP (ref 3.5–5.3)
POTASSIUM SERPL-SCNC: 4.7 MMOL/L — SIGNIFICANT CHANGE UP (ref 3.5–5.3)
PROT SERPL-MCNC: 7.4 G/DL — SIGNIFICANT CHANGE UP (ref 6.6–8.7)
PROT UR-MCNC: 100
PROTHROM AB SERPL-ACNC: 14.1 SEC — HIGH (ref 10.5–13.4)
RBC # BLD: 3.43 M/UL — LOW (ref 3.8–5.2)
RBC # FLD: 14 % — SIGNIFICANT CHANGE UP (ref 10.3–14.5)
RBC CASTS # UR COMP ASSIST: ABNORMAL /HPF (ref 0–4)
SAO2 % BLDV: 92.7 % — SIGNIFICANT CHANGE UP
SODIUM SERPL-SCNC: 137 MMOL/L — SIGNIFICANT CHANGE UP (ref 135–145)
SP GR SPEC: 1 — LOW (ref 1.01–1.02)
UROBILINOGEN FLD QL: NEGATIVE — SIGNIFICANT CHANGE UP
WBC # BLD: 10.7 K/UL — HIGH (ref 3.8–10.5)
WBC # FLD AUTO: 10.7 K/UL — HIGH (ref 3.8–10.5)
WBC UR QL: ABNORMAL /HPF (ref 0–5)

## 2023-05-23 PROCEDURE — 71045 X-RAY EXAM CHEST 1 VIEW: CPT | Mod: 26

## 2023-05-23 PROCEDURE — 99223 1ST HOSP IP/OBS HIGH 75: CPT

## 2023-05-23 PROCEDURE — 99285 EMERGENCY DEPT VISIT HI MDM: CPT | Mod: GC

## 2023-05-23 RX ORDER — HEPARIN SODIUM 5000 [USP'U]/ML
5000 INJECTION INTRAVENOUS; SUBCUTANEOUS EVERY 12 HOURS
Refills: 0 | Status: DISCONTINUED | OUTPATIENT
Start: 2023-05-23 | End: 2023-05-25

## 2023-05-23 RX ORDER — SODIUM CHLORIDE 9 MG/ML
1000 INJECTION INTRAMUSCULAR; INTRAVENOUS; SUBCUTANEOUS ONCE
Refills: 0 | Status: COMPLETED | OUTPATIENT
Start: 2023-05-23 | End: 2023-05-23

## 2023-05-23 RX ORDER — CEFTRIAXONE 500 MG/1
1000 INJECTION, POWDER, FOR SOLUTION INTRAMUSCULAR; INTRAVENOUS EVERY 24 HOURS
Refills: 0 | Status: DISCONTINUED | OUTPATIENT
Start: 2023-05-23 | End: 2023-05-25

## 2023-05-23 RX ORDER — ACETAMINOPHEN 500 MG
1000 TABLET ORAL ONCE
Refills: 0 | Status: DISCONTINUED | OUTPATIENT
Start: 2023-05-23 | End: 2023-05-25

## 2023-05-23 RX ORDER — ACETAMINOPHEN 500 MG
650 TABLET ORAL ONCE
Refills: 0 | Status: DISCONTINUED | OUTPATIENT
Start: 2023-05-23 | End: 2023-05-23

## 2023-05-23 RX ORDER — LANOLIN ALCOHOL/MO/W.PET/CERES
3 CREAM (GRAM) TOPICAL AT BEDTIME
Refills: 0 | Status: DISCONTINUED | OUTPATIENT
Start: 2023-05-23 | End: 2023-05-25

## 2023-05-23 RX ORDER — PIPERACILLIN AND TAZOBACTAM 4; .5 G/20ML; G/20ML
3.38 INJECTION, POWDER, LYOPHILIZED, FOR SOLUTION INTRAVENOUS ONCE
Refills: 0 | Status: COMPLETED | OUTPATIENT
Start: 2023-05-23 | End: 2023-05-23

## 2023-05-23 RX ORDER — ACETAMINOPHEN 500 MG
650 TABLET ORAL EVERY 6 HOURS
Refills: 0 | Status: DISCONTINUED | OUTPATIENT
Start: 2023-05-23 | End: 2023-05-25

## 2023-05-23 RX ORDER — ONDANSETRON 8 MG/1
4 TABLET, FILM COATED ORAL EVERY 8 HOURS
Refills: 0 | Status: DISCONTINUED | OUTPATIENT
Start: 2023-05-23 | End: 2023-05-25

## 2023-05-23 RX ORDER — VANCOMYCIN HCL 1 G
1000 VIAL (EA) INTRAVENOUS ONCE
Refills: 0 | Status: COMPLETED | OUTPATIENT
Start: 2023-05-23 | End: 2023-05-23

## 2023-05-23 RX ADMIN — PIPERACILLIN AND TAZOBACTAM 200 GRAM(S): 4; .5 INJECTION, POWDER, LYOPHILIZED, FOR SOLUTION INTRAVENOUS at 17:46

## 2023-05-23 RX ADMIN — Medication 250 MILLIGRAM(S): at 19:45

## 2023-05-23 RX ADMIN — PIPERACILLIN AND TAZOBACTAM 25 GRAM(S): 4; .5 INJECTION, POWDER, LYOPHILIZED, FOR SOLUTION INTRAVENOUS at 21:59

## 2023-05-23 RX ADMIN — SODIUM CHLORIDE 1000 MILLILITER(S): 9 INJECTION INTRAMUSCULAR; INTRAVENOUS; SUBCUTANEOUS at 17:46

## 2023-05-23 NOTE — ED ADULT NURSE NOTE - CHIEF COMPLAINT QUOTE
Pt A&Ox1, NAD. Pt BIBEMS from [place] with complaints of back pain and chest pain. Breathing even and unlabored.

## 2023-05-23 NOTE — ED PROVIDER NOTE - OBJECTIVE STATEMENT
89 y/o F w/ PMH of HFpEF, AF (not on AC 2/2 GIB in past), neuropathy, advanced dementia, AAA, DM, HTN, CKD III, hypothyroid was BIBA from NH c/o 89 y/o F w/ PMH of HFpEF, AF (not on AC 2/2 GIB in past), neuropathy, advanced dementia, AAA, DM, HTN, CKD III, hypothyroid was BIBA from NH c/o Chest pain.  Patient found to be febrile here.  No paperwork from nursing home was able to be found.  Patient is reporting pain in her left side of her chest.  She is also asking for a sandwich.  She denies nausea, vomiting, diarrhea, dysuria, hematuria, cough, or any other complaints.   Patient is oriented only to self and is limited in her contributions to history

## 2023-05-23 NOTE — H&P ADULT - HISTORY OF PRESENT ILLNESS
89 y/o F w/ PMH of HFpEF, AF (not on AC 2/2 GIB in past), neuropathy, advanced dementia, AAA, DM, HTN, CKD III, hypothyroid presented from NH for chest pain but found to be febrile in the ED.  89y/o F w/ PMH of HFpEF, AF (not on AC 2/2 GIB in past), neuropathy, advanced dementia, AAA, DM, HTN, CKD III, hypothyroid presented from [place] for chest pain but found to be febrile in the ED. Pt unable to provided hx at this time due to dementia, unable to find NH paper work. She is c/o right arm pain and swelling but arm does not appear to be swollen on exam. In the ED febrile 101.3, UA grossly positive, CXR with small left pleural effusion. Pt received zosyn and vanco.  89y/o F w/ PMH of HFpEF, AF (not on AC 2/2 GIB in past), neuropathy, advanced dementia, AAA, HTN, CKD III, hypothyroid presented from [place] for chest pain but found to be febrile in the ED. Pt unable to provided hx at this time due to dementia, unable to find NH paper work. She is c/o right arm pain and swelling but arm does not appear to be swollen on exam. In the ED febrile 101.3, UA grossly positive, CXR with small left pleural effusion. Pt received zosyn and vanco.

## 2023-05-23 NOTE — ED PROVIDER NOTE - CLINICAL SUMMARY MEDICAL DECISION MAKING FREE TEXT BOX
patient with past medical history of hypertension, advanced dementia, AAA  sent from nursing home for chest pain.  Found to be febrile.  Currently unknown origin for fever.  Labs, EKG, cultures, urine, chest x-ray ordered.  Patient started on broad-spectrum antibiotics. patient with past medical history of hypertension, advanced dementia, AAA  sent from nursing home for chest pain.  Found to be febrile.  Currently unknown origin for fever.  Labs, EKG, cultures, urine, chest x-ray ordered.  Patient started on broad-spectrum antibiotics. Pt admitted for IV abx

## 2023-05-23 NOTE — ED PROVIDER NOTE - PHYSICAL EXAMINATION
AROM Gen: NAD  Head: NC/AT  Neck: trachea midline  Card: regular rate and rhythm  Resp:  CTAB  Abd: soft, non-distended, non-tender  Ext: no deformities above reported baseline  Neuro:  A&O to self only, no motor or sensory deficits above reported baseline  Skin:  Warm and dry as visualized  Psych:  Normal affect and mood

## 2023-05-23 NOTE — ED ADULT NURSE REASSESSMENT NOTE - NS ED NURSE REASSESS COMMENT FT1
Assumed care of pt at 19:15 as stated in report from MARIAH Real. Charting as noted. Patient A&O x1, denies pain/discomfort, denies CP/SOB. Updated on the plan of care. Call bell within reach, bed locked in lowest position. IV site flushed w/ NS. No redness, swelling or pain noted to site. No signs of acute distress noted, safety maintained. Pt remains on CM in NSR.

## 2023-05-23 NOTE — H&P ADULT - ASSESSMENT
89y/o F w/ PMH of HFpEF, AF (not on AC 2/2 GIB in past), neuropathy, advanced dementia, AAA, DM, HTN, CKD III, hypothyroid presented from [facility] for chest pain but found to be febrile in the ED. 89y/o F w/ PMH of HFpEF, AF (not on AC 2/2 GIB in past), neuropathy, advanced dementia, AAA, HTN, CKD III, hypothyroid presented from [place] for chest pain but found to be febrile in the ED.    Sepsis due to UTI   -febrile 101.3, HR>90  -UA grossly positive, CXR without pneumonia but with small pleural effusion   -Received zosyn and vanco in the ED   -started on Ceftriaxone 1g Q24hrs   -f/u cultures   -trend fever     ARF on CKD   -Cr 1.39, likely prerenal   -received 1L NS bolus in the ED   -check repeat BMP   -renally dose medications     Afib  -rate controlled   -not on AC due to bleeding risk   -cont with Metoprolol 25mg Po BID      HTN  -cont Metoprolol 25mg Po BID     Dementia with behavioral disturbances  -cont Aricept, Seroquel, topiramate     Hypothyroidism  -cont synthroid     DVT ppx  -Heparin subq      89y/o F w/ PMH of HFpEF, AF (not on AC 2/2 GIB in past), neuropathy, advanced dementia, AAA, HTN, CKD III, hypothyroid presented from [place] for chest pain but found to be febrile in the ED.    Sepsis due to UTI   -febrile 101.3, HR>90  -UA grossly positive, CXR without pneumonia but with small pleural effusion   -Received zosyn and vanco in the ED   -started on Ceftriaxone 1g Q24hrs   -f/u cultures   -trend fever     ARF on CKD   -Cr 1.39, likely prerenal   -received 1L NS bolus in the ED   -check repeat BMP   -renally dose medications     Afib  -rate controlled   -not on AC due to bleeding risk   -cont with Metoprolol 25mg Po BID      HTN  -cont Metoprolol 25mg Po BID     Dementia with behavioral disturbances  -cont Aricept, Seroquel, topiramate     Hypothyroidism  -cont synthroid     DVT ppx  -Heparin subq         I spent 75 minutes in patient encounter, greater than 50% of the time was spent in counseling/coordination of care.

## 2023-05-23 NOTE — PATIENT PROFILE ADULT - DOES PATIENT HAVE ADVANCE DIRECTIVE
Seen as f/u patient for diabetes        Pursuant to the emergency declaration under the [facility] waiver authority and the [employer] and [employer] Act, this Virtual  Visit was conducted, with patient's consent, to reduce the patient's risk of exposure to COVID-19 and provide continuity of care for an established patient. Patient was at home. Provider was at home. No one else was involved  Services were provided through a video synchronous discussion virtually to substitute for in-person clinic visit.       Interim:     stable    Diagnosed with Type 2 diabetes mellitus 4-5 yrs ago  Known diabetic complications: None    Current diabetic medications   U-500 pen 115   SSI 5 for 50 >150     Metformin ER  500mg BID  jardiance 25mg    Previous  lantus 110 units HS  Novolog 25 TID + SSI 5 for 50>150  Usually takes 35-40  januvia 100mg  Was on victoza in the past for 2 months  Bydureon    Moderate, uncontrolled    Insulin started 3/13    Intolerance to diabetes medications: yes - Metformin     Last A1c 7.4%<-----7.5%<------ 6.9%<-----7%<------6.6%<------6.6%<----- 6.8%<-----6.3 on 3/17<----6.3 on 12/16<----6.1 on 3/15  <---- 8.5 On 12/13<---- 9.6<---- 8.9 on 5/13<---10.8<---10.9    Prior visit with dietician: Yes   Current diet: on average, 3 meals per day 45gm CHO  Current exercise: No exercise for one month  Current monitoring regimen: home blood tests occ , mainly in evening    Has brought blood glucose log/meter:yes  Home blood sugar records:139-183  Any episodes of hypoglycemia? occ    No Hx of CAD , PVD, CVA    Hyperlipidemia: pravastatin 40mg fenofibrate 145   on 10/14-switched to lipitor 80mg LDL 73 on 2/16  LDL 96  HDL 29 on 3/18     LDL 82 on 2/19  Tolerating , no aches  LDL 76 on 3/20  Last eye exam: 3/21  Last foot exam:5/19  Last microalbumin to creatinine ratio: M/C nl on 5/19  ,   35 on 5/13 34 on 9/13  nl on Thank you for the referral.   For your information:    The following patient has been assigned to Briana Boyle RN with [facility] for high risk screening.    Reason:   Pneumonia of both lower lobes due to infectious organism  Primary lung cancer with metastasis from lung to other site, unspecified laterality  LUX (dyspnea on exertion)  Pneumonia of both lower lobes due to infectious organism  Essential hypertension  Hyponatremia  Hypophosphatemia  Transaminitis  Acute cystitis with hematuria  Lower leg edema  Nonrheumatic aortic valve stenosis  Hx of CABG  Malignant neoplasm metastatic to lung, unspecified laterality  Bilateral lung cancer  Pure hypercholesterolemia    Please contact [facility] at [phone] with any questions.    Thank you,  Joselin Chavis       3/12---> 33.6 on 11/20    HTN: Stable, tolerating Lisinopril 40mg HCTZ 25mg    4/14  CGMS Report   CGMS insertion date 4/29/14 CGMS data collection was performed on 4/29/14 - 5/3/14. Patient provided written diet, activities and medical management for specified period. MAD 3.6 %   Average reading 252 mg/dL   Std Dev 49 mg/dL   % of time <70 mg/dL 0 %   % of time >140 mg/dL 100 %   Number of hypoglycemia episodes noted: 0   CGMS showed daytime and nocturnal hyperglycemia   Impression:   Daytime and nocturnal hyperglycemia . No documentation of U500 injections. See scanned i-Pro download   Recommendation:   Advised patient to reduce carbohydrate intake to no more than 30 grams with large meals. Add Invokana or [place]. Increase Humulin U500 by 5-10% or change Humulin U500 to be delivered via V-Go 20 and IC of 1:6    Review of Systems    Constitutional: Negative for weight loss and malaise/fatigue. Negative for fever   Eyes: Negative for blurred vision. Negative for double vision, photophobia and pain. Respiratory: Negative for cough and sputum production. Cardiovascular: Negative for chest pain, palpitations and leg swelling. Gastrointestinal: Negative for nausea, vomiting and abdominal pain. Genitourinary: Negative for dysuria, urgency and frequency. Musculoskeletal: Negative for back pain. No joint pain  Skin: Negative for itching and rash. Neurological: Negative for dizziness. Negative for tingling, tremors, focal weakness and headaches.    Endo/Heme/Allergies: see HPI        PHYSICAL EXAMINATION:  [ INSTRUCTIONS:  \"[x]\" Indicates a positive item  \"[]\" Indicates a negative item  -- DELETE ALL ITEMS NOT EXAMINED]  Vital Signs: (As obtained by patient/caregiver or practitioner observation)    Blood pressure-  Heart rate-    Respiratory rate-    Temperature-  Pulse oximetry-     Constitutional Appears well-developed and well-nourished No apparent distress        Mental status  Alert and awake  Oriented Fanta Ramos is a 52 y.o. female with :    1.T2DM:Longstanding, insulin resistant,fairly controlled. Given A1c and insulin requirements ,  switched to U-500 insulin. Added GLP agonist, did not help. On SGLT-2 inhibitor, A1c high, needs self monitoring . Make insulin TID  Reviewed log, fasting high, adjust dinner dose  2. HTN;At goal  3. HLD:LDL at goal, on lipitor/ fenofibrate  4. Obesity:Recommend aggressive life style and discussed bariatric option  5. SUNDAY: using Cpap machine more now    Plan:       U-500  115/115/120   SSI 5 for 50>150    jardiance 25mg    Metformin ER 500mg BID   Discussed in detail use of U-500. Discussed side effects of hypoglcyemia   Advise to check blood sugar 2 times a day   Patient to send blood sugar log for titration. Advise to exercise regularly. Advise to low simple carbohydrate and protein with each  meal diet. Diabetes Care: recommend yearly eye exam, foot exam and urine microalbumin to   creatinine ratio.      -Hyperlipidemia, LDL goal is <100 mg/dl   -Hypertension:Continue current  -Daily ASA:Not indicated  -Smoking status:Non smoker    She is on U-500 insulin which has a narrow therapeutic index and high risk of complications including severe hypoglycemia Yes

## 2023-05-23 NOTE — ED PROVIDER NOTE - ATTENDING CONTRIBUTION TO CARE
I performed a history and physical exam of the patient and discussed their management with the resident. I reviewed the resident's note and agree with the documented findings and plan of care. My medical decision making and observations are found below.     89yo female with PMH HFpEF, AF, neuropathy, dementia, AAA, DM, HTN, chronic kidney disease presenting with left-sided chest pain. patient poor historian. Noted to be febrile in ED to 103F. On exam patient with tenderness over L chest wall. Lungs CTA. No focal signs of infection. Plan to obtain labs, CXR, EKG, trop, and reassess. Trudy Corona DO

## 2023-05-23 NOTE — PATIENT PROFILE ADULT - FALL HARM RISK - HARM RISK INTERVENTIONS

## 2023-05-23 NOTE — PATIENT PROFILE ADULT - TOBACCO USE
"Awilda CONSTANTINO Chief Complaints during this visit:  f/u Patient visit for  Parkinsonism      Referring Physician:     No referring provider defined for this encounter.       Primary Care Physician:  Gato Ivan MD  [id number] [facility] [id number]      History of present illness:   64 y.o.  W seen in f/u for Parkinsonism, probable CBD.  Accompanied by sitter.  Pain in right shoulder not relieved by recent injection.    Taking 2 rytary 4x/day (PCP took it off her list, but she and sitter confirm she is taking).  Not sure about sinemet.      Physically, "about the same."  Her main complaint is her right shoulder pain.    She believes her  is under too much pressure and she is a burden; however, he tells her that she is not.       Interval history 11/17/17:  Saw Chester Hagen in interim, put her on rytary which she felt was really helpful for a couple months, but now does not think it works.  She was on 2 of the 95's qid, but now on one of the 195's qid.    From Jon's note 9/13/17:  Last seen in office 8-7-17. At that time, discussed reduction of cd/ld CR to see if it helped alleviate any of her symptoms or if she could see that it actually provided any benefit. She also had an increased HR at that time, as is the case again today.      Since last visit, she went to a  who recommended she see another doctor. She saw Dr. Hagen.   He wanted her off CR for 3 days before seeing her. She fell 12 times, was dizzier and felt awful without it. Friend says she deteriorated so bad that she was nearly bed ridden.      By her account, Dr. Hagen felt she had PD, did not mention parkinsonism and had her begin Rytary 23.75 mg/ 95 mg 1 capsule QID and CR 25/100 QID. Doses 7-11-5-9.  She says everything is better on it. She She vomited twice when began regimen and continues with intermittent nausea.   She has not regained normal diet. She is still eating soft foods and fresh fruits. She had gotten down " Never smoker "to 103 lbs and is now up to 105 lbs on her scale.      Interval history 5/25/17:  Very "ansy" in the afternoons/ evenings and says her legs are "ansy."  Still easy lability.  Double dose of sinemet at noon did not help.      Interval history 1/31/17:  Accompanied by a friend.  Has been seeing Karina in the interim who has been wondering if patient may have MSA.  One fall last month when tripping at top of stairs.  Lability has improved with celexa.    Sinemet CR + carbidopa has reduced nausea and she is tolerating.  However not sure the 4th dose of sinemet has had any effect.    Voice has been changing in last couple of weeks.  No trouble with swallowing.    Home orthostatics:  1/29:  130/70, 96 laying down, 122/66, 94 standing  1/30:  126/70, 92 laying down, 110/70, 100 standing    Interval history 5/26/16:  Complains of severe spasm mid back every afternoon into the evening for past couple months.   says she is tender on spinous process, but unable to detect a spasming muscle.  Finally goes away when her restoril takes effect.  Mobic helps a little.  Wakes up intermittently at night, 1-3am, wide awake.  Restless and difficulty getting comfortable in the evenings.  Says feels like "locusts" all over her.    Right hand ring finger as well as foot tingling and numb.  Never started the neudexta because of potential interaction with selegiline.  Despite this, she has done better with tears.    From my note 2/12/16:  Annoyed by a restless feeling in her right foot that worsens as she prepares to bed.  Also having severe insomnia.  Right hand is still swollen and had poor dexterity, and right foot rolling in, but no pain in right side any longer.  Cries easily, whether sad or happy.  She denies feeling depressed and is resistant to going back on prozac.    From my note 12/2/15:  ... seen in consultation at the request of  Dr. Segovia () for evaluation of joint pains and fatigue.  Symptoms started as joint " "pain, swelling in right hand about 6-8 months ago.  When typing, letters skipped with right hand.  Otherwise feels "fine."  Right arm, hand, leg, foot still have pain, intermittently.  Yoga feels good.  Seen by Kelley and given presumptive diagnosis of PD.  Had control of depression with wellbutrin and prozac, now off in past 3 weeks.  Recent active dreaming (screaming, laughing, kicks).  Not falling asleep any more.  No problems with voice.       II.  Review of systems:  As in HPI, otherwise, balance 3 systems reviewed and are negative.    III.  Past Medical History:   Diagnosis Date    H/O hysterectomy for benign disease     Menopause present     Osteopenia     Parkinson disease 12/2015    Sprain of left foot     2008     Family History   Problem Relation Age of Onset    Diabetes Mother     Cataracts Mother     Hypertension Mother     Cancer Maternal Aunt      breast    Cancer Maternal Uncle      brain , bone     Cancer Paternal Aunt      uterine    Diabetes Maternal Grandfather     Cataracts Father      Social History     Social History    Marital status:      Spouse name: N/A    Number of children: N/A    Years of education: N/A     Social History Main Topics    Smoking status: Never Smoker    Smokeless tobacco: Never Used    Alcohol use 0.6 oz/week     1 Glasses of wine per week      Comment: social    Drug use: No    Sexual activity: Yes     Partners: Male     Other Topics Concern    None     Social History Narrative    Works for [employer] station          Current Outpatient Prescriptions   Medication Sig Dispense Refill    carbidopa-levodopa  mg (SINEMET CR)  mg TbSR Take 1 tablet by mouth 4 (four) times daily. Take 2 tablets at 7am, then 1 tab at noon, 6pm and 9:30pm 450 tablet 3    cyclobenzaprine (FLEXERIL) 5 MG tablet Take 5 mg by mouth 3 (three) times daily as needed.  5    DULoxetine (CYMBALTA) 30 MG capsule TAKE ONE CAPSULE BY MOUTH EVERY DAY. TAKE W/60 MG " "CAPSULE 30 capsule 0    DULoxetine (CYMBALTA) 60 MG capsule TAKE ONE CAPSULE BY MOUTH EVERY DAY TAKE WITH 30/MG CAPSULE 30 capsule 0    ESTRING 2 mg (7.5 mcg /24 hour) vaginal ring INSERT 1 VAGINAL RING(S) EVERY 3 MONTHS 1 each 3    gabapentin (NEURONTIN) 100 MG capsule TAKE 1-2 CAPSULES EVERY EVENING  11    gabapentin (NEURONTIN) 300 MG capsule Take 1 capsule (300 mg total) by mouth 3 (three) times daily. 270 capsule 3    temazepam (RESTORIL) 30 mg capsule Take 1 capsule (30 mg total) by mouth nightly as needed for Insomnia. 90 capsule 1    traMADol (ULTRAM) 50 mg tablet Take 1 tablet (50 mg total) by mouth 4 (four) times daily. 360 tablet 1    carbidopa-levodopa (RYTARY) 23.75-95 mg CpSR Take 2 capsules by mouth 4 (four) times daily. 720 capsule 3    diclofenac sodium (VOLTAREN) 1 % Gel Apply 2 g topically 4 (four) times daily as needed. 300 g 3     No current facility-administered medications for this visit.       PRIOR NEURO MEDICATIONS TRIED:  na    Review of patient's allergies indicates:   Allergen Reactions    Silver nitrate Anaphylaxis     Put wholes skin     Demerol [meperidine]      Can worsen PD    Reglan [metoclopramide hcl]      Can worsen PD    Risperdal [risperidone]      Can worsen PD         IV. Physical Exam (Includes Motor Unified Parkinson's Disease Rating Scale 2008)  Patient taking PD meds?         Vitals:    03/08/18 1030   BP: 121/69   Pulse: (!) 123   Weight: 46.6 kg (102 lb 11.2 oz)   Height: 5' 1" (1.549 m)       Wt Readings from Last 10 Encounters:   03/08/18 46.6 kg (102 lb 11.2 oz)   02/27/18 51.3 kg (113 lb)   02/05/18 52.2 kg (115 lb 1.3 oz)   01/31/18 52.2 kg (115 lb)   12/27/17 44 kg (97 lb)   11/27/17 45.9 kg (101 lb 3.1 oz)   11/17/17 45.6 kg (100 lb 8.5 oz)   09/28/17 47.6 kg (105 lb)   09/26/17 47.8 kg (105 lb 6.4 oz)   09/13/17 50.1 kg (110 lb 7.2 oz)      General appearance: very thin, no acute distress     "   -------------------------------------------------------------  Affect: full, tearful when discussing her feelings of being a burden       Orientation to time & place:  Oriented to time, place, person and situation       Attention & concentration:  Normal attention span and concentration       -------------------------------------------------------  Cranial nerves: normal visual acuity, visual fields full, optic discs not visualized, pupils equal round and reactive, extraocular movements intact,       facial sensation intact, face symmetrical, hearing intact to whisper, palate raises midline, shoulder shrug strength normal, tongue protrudes midline.        -------------------------------------------------------  Muscle Bulk: all 4 extremities normal                                                   Muscle strength:  5/5 in all 4 extremities        No pronator drift    --------------------------------------------------------------  Unified Parkinson's Disease Rating Scale (motor part only)      UPDRS Motor Examination      Speech  Loss of prosody   Facial Expression  0 - Normal.   Rigidity     Neck 0 - Absent.   Upper Extremity: Right 2 - Mild or moderate.   Upper Extremity: Left 0 - Absent.   Lower Extremity: Right 2 - Mild or moderate.   Lower Extremity: Left 0 - Absent.     Finger Taps      right 3 - Severely impaired. Frequent hesitation in initiating movements or arrests in ongoing movement.   left 0 - Normal.   Hand Movements      right 3 - Severely impaired. Frequent hesitation in initiating movements or arrests in ongoing movement.   left 0 - Normal.   Pronation/supination of Hands      right 3 - Severely impaired. Frequent hesitation in initiating movements or arrests in ongoing movement.   left 0 - Normal.     Toe Tapping    right 3 - Severely impaired. Frequent hesitation in initiating movements or arrests in ongoing movement.   left 0 - Normal.     Leg Agility      right 2 - Moderately impaired. Definite  and early fatiguing. May have occasional arrests in movement.   left 0 - Normal.   Arising from Chair  0 - Normal.   Posture  0 - Normal erect.   Gait  1 - Walks slowly, may shuffle with short steps, but no festination (hastening steps) or propulsion.   Freezing of gait 0: Normal: No freezing.    Postural Stability (Response to sudden, strong posterior displacement produced by pull on shoulders while patient erect with eyes open and feet slightly apart.   1 - Retropulsion, but recovers unaided.    Body Bradykinesia and Hypokinesia (Combining slowness, hesitancy, decreased armswing, small amplitude, and poverty of movement in general)  1 - Minimal slowness, giving movement a deliberate character; could be normal for some persons. Possibly reduced amplitude.     Tremor at Rest:      Face, lips, chin 0 - Absent.    Hands:      right 0 - Absent.    left 0 - Absent.    Feet:     right 0 - Absent.    left 0 - Absent.    Constancy of REST tremor: 0: Normal: No tremor.   Postural tremor:    right 0 - Absent.    left 0 - Absent.    Kinetic tremor:    right 0 - Absent.    left 0 - Absent.    Dyskinesias present? Yes, moderate, Right foot           V.  Laboratory/ Radiological Data:     11/17/17 paraneoplastic panel neg.    MRI brain 2017:  A there is narrowing and blurring of the left and right pars compacta with diminished distinction between the substantia nigra and red nucleus bilaterally.  There is also hypointense signal present within the posterior basal ganglia bilaterally on the FLAIR pulse sequences.  These findings likely related to the provided history of Parkinson's disease        MRI brain 2015 personally reviewed and midbrain appears normal:            Lab Results   Component Value Date    TSH 1.287 09/26/2017         VI.     Problem List Items Addressed This Visit        1 - High    Parkinsonism - Primary    Overview     Right side effected (rigidity).  Probable CBD         Current Assessment & Plan      Unfortunate that the injections to shoulder were not effective.  Not sure what else can be done.         Relevant Medications    carbidopa-levodopa (RYTARY) 23.75-95 mg CpSR    Levodopa-induced dyskinesia    Overview     Right foot dyskinesias         Current Assessment & Plan     She is not sure if still taking sinemet.  If she is, this may need to be reduces as she is having new dyskinesias.   -> patient to call or message me when she gets home with updated med list.            2     Aprosodic speech       3     Weight loss, abnormal    Overview     6/2016 134lb  5/25/17 114lb  10/17/17 100lb         Current Assessment & Plan     Seems to have stabilized.            4     PBA (pseudobulbar affect)            Follow-up in about 4 months (around 7/8/2018).

## 2023-05-23 NOTE — ED ADULT TRIAGE NOTE - CHIEF COMPLAINT QUOTE
Pt A&Ox1, NAD. Pt BIBEMS from [place] with complaints of back pain and chest pain. Breathing even and unlabored.

## 2023-05-23 NOTE — H&P ADULT - NSHPPHYSICALEXAM_GEN_ALL_CORE
T(C): 37.1 (05-23-23 @ 22:47), Max: 38.5 (05-23-23 @ 14:50)  HR: 71 (05-23-23 @ 22:47) (64 - 96)  BP: 162/84 (05-23-23 @ 22:47) (139/84 - 166/83)  RR: 19 (05-23-23 @ 22:47) (18 - 19)  SpO2: 96% (05-23-23 @ 22:47) (96% - 100%)    GENERAL: patient appears well, no acute distress, appropriate, pleasant  EYES: sclera clear, no exudates  ENMT: oropharynx clear without erythema, no exudates, moist mucous membranes  NECK: supple, soft, no thyromegaly noted  LUNGS: good air entry bilaterally, clear to auscultation, symmetric breath sounds, no wheezing or rhonchi appreciated  HEART: soft S1/S2, regular rate and rhythm, no murmurs noted, no lower extremity edema  GASTROINTESTINAL: abdomen is soft, nontender, nondistended, normoactive bowel sounds, no palpable masses  INTEGUMENT: good skin turgor, warm skin, appears well perfused  MUSCULOSKELETAL: no clubbing or cyanosis, no obvious deformity  NEUROLOGIC: awake, alert, oriented x3, good muscle tone in 4 extremities, no obvious sensory deficits  PSYCHIATRIC: mood is good, affect is congruent, linear and logical thought process  HEME/LYMPH: no palpable supraclavicular nodules, no obvious ecchymosis or petechiae T(C): 37.1 (05-23-23 @ 22:47), Max: 38.5 (05-23-23 @ 14:50)  HR: 71 (05-23-23 @ 22:47) (64 - 96)  BP: 162/84 (05-23-23 @ 22:47) (139/84 - 166/83)  RR: 19 (05-23-23 @ 22:47) (18 - 19)  SpO2: 96% (05-23-23 @ 22:47) (96% - 100%)    GENERAL: patient appears well, no acute distress, appropriate, pleasant  EYES: sclera clear, no exudates  ENMT: oropharynx clear without erythema, no exudates, moist mucous membranes  NECK: supple, soft, no thyromegaly noted  LUNGS: good air entry bilaterally, clear to auscultation, symmetric breath sounds, no wheezing or rhonchi appreciated  HEART: soft S1/S2, regular rate and rhythm, no murmurs noted, no lower extremity edema  GASTROINTESTINAL: abdomen is soft, nontender, nondistended, normoactive bowel sounds, no palpable masses  INTEGUMENT: good skin turgor, warm skin, appears well perfused  MUSCULOSKELETAL: no clubbing or cyanosis, no obvious deformity  NEUROLOGIC: awake, alert, oriented x1, unable to assess muscle strength   PSYCHIATRIC: mood is good, calm   HEME/LYMPH: no palpable supraclavicular nodules, no obvious ecchymosis or petechiae

## 2023-05-23 NOTE — ED ADULT NURSE NOTE - NSFALLHARMRISKINTERV_ED_ALL_ED
Assistance OOB with selected safe patient handling equipment if applicable/Assistance with ambulation/Communicate risk of Fall with Harm to all staff, patient, and family/Monitor gait and stability/Monitor for mental status changes and reorient to person, place, and time, as needed/Move patient closer to nursing station/within visual sight of ED staff/Provide patient with walking aids/Provide visual cue: red socks, yellow wristband, yellow gown, etc/Reinforce activity limits and safety measures with patient and family/Toileting schedule using arm’s reach rule for commode and bathroom/Use of alarms - bed, stretcher, chair and/or video monitoring/Bed in lowest position, wheels locked, appropriate side rails in place/Call bell, personal items and telephone in reach/Instruct patient to call for assistance before getting out of bed/chair/stretcher/Non-slip footwear applied when patient is off stretcher/[place] to call system/Physically safe environment - no spills, clutter or unnecessary equipment/Purposeful Proactive Rounding/Room/bathroom lighting operational, light cord in reach

## 2023-05-24 LAB
ANION GAP SERPL CALC-SCNC: 15 MMOL/L — SIGNIFICANT CHANGE UP (ref 5–17)
BUN SERPL-MCNC: 27.1 MG/DL — HIGH (ref 8–20)
CALCIUM SERPL-MCNC: 9.3 MG/DL — SIGNIFICANT CHANGE UP (ref 8.4–10.5)
CHLORIDE SERPL-SCNC: 104 MMOL/L — SIGNIFICANT CHANGE UP (ref 96–108)
CO2 SERPL-SCNC: 20 MMOL/L — LOW (ref 22–29)
CREAT SERPL-MCNC: 1.27 MG/DL — SIGNIFICANT CHANGE UP (ref 0.5–1.3)
EGFR: 39 ML/MIN/1.73M2 — LOW
GLUCOSE SERPL-MCNC: 103 MG/DL — HIGH (ref 70–99)
HCT VFR BLD CALC: 35 % — SIGNIFICANT CHANGE UP (ref 34.5–45)
HGB BLD-MCNC: 10.7 G/DL — LOW (ref 11.5–15.5)
MCHC RBC-ENTMCNC: 30.6 GM/DL — LOW (ref 32–36)
MCHC RBC-ENTMCNC: 31.3 PG — SIGNIFICANT CHANGE UP (ref 27–34)
MCV RBC AUTO: 102.3 FL — HIGH (ref 80–100)
PLATELET # BLD AUTO: SIGNIFICANT CHANGE UP K/UL (ref 150–400)
POTASSIUM SERPL-MCNC: 4.2 MMOL/L — SIGNIFICANT CHANGE UP (ref 3.5–5.3)
POTASSIUM SERPL-SCNC: 4.2 MMOL/L — SIGNIFICANT CHANGE UP (ref 3.5–5.3)
RAPID RVP RESULT: SIGNIFICANT CHANGE UP
RBC # BLD: 3.42 M/UL — LOW (ref 3.8–5.2)
RBC # FLD: 14.1 % — SIGNIFICANT CHANGE UP (ref 10.3–14.5)
SARS-COV-2 RNA SPEC QL NAA+PROBE: SIGNIFICANT CHANGE UP
SODIUM SERPL-SCNC: 139 MMOL/L — SIGNIFICANT CHANGE UP (ref 135–145)
WBC # BLD: 8.03 K/UL — SIGNIFICANT CHANGE UP (ref 3.8–10.5)
WBC # FLD AUTO: 8.03 K/UL — SIGNIFICANT CHANGE UP (ref 3.8–10.5)

## 2023-05-24 PROCEDURE — 99233 SBSQ HOSP IP/OBS HIGH 50: CPT

## 2023-05-24 PROCEDURE — 71250 CT THORAX DX C-: CPT | Mod: 26

## 2023-05-24 RX ORDER — QUETIAPINE FUMARATE 200 MG/1
100 TABLET, FILM COATED ORAL AT BEDTIME
Refills: 0 | Status: DISCONTINUED | OUTPATIENT
Start: 2023-05-24 | End: 2023-05-25

## 2023-05-24 RX ORDER — PANTOPRAZOLE SODIUM 20 MG/1
40 TABLET, DELAYED RELEASE ORAL
Refills: 0 | Status: DISCONTINUED | OUTPATIENT
Start: 2023-05-24 | End: 2023-05-25

## 2023-05-24 RX ORDER — LEVOTHYROXINE SODIUM 125 MCG
150 TABLET ORAL DAILY
Refills: 0 | Status: DISCONTINUED | OUTPATIENT
Start: 2023-05-24 | End: 2023-05-25

## 2023-05-24 RX ORDER — DONEPEZIL HYDROCHLORIDE 10 MG/1
10 TABLET, FILM COATED ORAL AT BEDTIME
Refills: 0 | Status: DISCONTINUED | OUTPATIENT
Start: 2023-05-24 | End: 2023-05-25

## 2023-05-24 RX ORDER — DORZOLAMIDE HYDROCHLORIDE 20 MG/ML
1 SOLUTION/ DROPS OPHTHALMIC
Refills: 0 | Status: DISCONTINUED | OUTPATIENT
Start: 2023-05-24 | End: 2023-05-25

## 2023-05-24 RX ORDER — GEMFIBROZIL 600 MG
600 TABLET ORAL
Refills: 0 | Status: DISCONTINUED | OUTPATIENT
Start: 2023-05-24 | End: 2023-05-25

## 2023-05-24 RX ORDER — METOPROLOL TARTRATE 50 MG
25 TABLET ORAL
Refills: 0 | Status: DISCONTINUED | OUTPATIENT
Start: 2023-05-24 | End: 2023-05-25

## 2023-05-24 RX ORDER — TOPIRAMATE 25 MG
50 TABLET ORAL DAILY
Refills: 0 | Status: DISCONTINUED | OUTPATIENT
Start: 2023-05-24 | End: 2023-05-25

## 2023-05-24 RX ORDER — FERROUS SULFATE 325(65) MG
325 TABLET ORAL DAILY
Refills: 0 | Status: DISCONTINUED | OUTPATIENT
Start: 2023-05-24 | End: 2023-05-25

## 2023-05-24 RX ADMIN — DORZOLAMIDE HYDROCHLORIDE 1 DROP(S): 20 SOLUTION/ DROPS OPHTHALMIC at 21:32

## 2023-05-24 RX ADMIN — Medication 150 MICROGRAM(S): at 05:54

## 2023-05-24 RX ADMIN — Medication 600 MILLIGRAM(S): at 05:50

## 2023-05-24 RX ADMIN — QUETIAPINE FUMARATE 100 MILLIGRAM(S): 200 TABLET, FILM COATED ORAL at 21:31

## 2023-05-24 RX ADMIN — CEFTRIAXONE 1000 MILLIGRAM(S): 500 INJECTION, POWDER, FOR SOLUTION INTRAMUSCULAR; INTRAVENOUS at 12:06

## 2023-05-24 RX ADMIN — PANTOPRAZOLE SODIUM 40 MILLIGRAM(S): 20 TABLET, DELAYED RELEASE ORAL at 05:54

## 2023-05-24 RX ADMIN — HEPARIN SODIUM 5000 UNIT(S): 5000 INJECTION INTRAVENOUS; SUBCUTANEOUS at 05:50

## 2023-05-24 RX ADMIN — Medication 25 MILLIGRAM(S): at 05:54

## 2023-05-24 RX ADMIN — DONEPEZIL HYDROCHLORIDE 10 MILLIGRAM(S): 10 TABLET, FILM COATED ORAL at 21:32

## 2023-05-24 NOTE — PROGRESS NOTE ADULT - ASSESSMENT
89y/o F w/ PMH of HFpEF, AF (not on AC 2/2 GIB in past), neuropathy, advanced dementia, AAA, HTN, CKD III, hypothyroid presented from [place] for chest pain but found to be febrile in the ED.    Sepsis POA due to UTI   - febrile 101.3, HR>90. UA grossly positive, CXR without pneumonia but with small pleural effusion   - Follow cultures  - Continue Rocephin  - Monitor WBC, fever curve  - Tylenol PRN for fever    RHONDA on CKD3  - Resolved  - Monitor    Afib  -rate controlled   -not on AC due to bleeding risk   -cont with Metoprolol 25mg Po BID      HTN  -cont Metoprolol 25mg Po BID     Dementia with behavioral disturbances  -cont Aricept, Seroquel, topiramate     Hypothyroidism  -cont synthroid     DVT ppx  -Heparin subq

## 2023-05-25 ENCOUNTER — TRANSCRIPTION ENCOUNTER (OUTPATIENT)
Age: 88
End: 2023-05-25

## 2023-05-25 VITALS
HEART RATE: 85 BPM | SYSTOLIC BLOOD PRESSURE: 176 MMHG | OXYGEN SATURATION: 97 % | TEMPERATURE: 98 F | DIASTOLIC BLOOD PRESSURE: 83 MMHG | RESPIRATION RATE: 19 BRPM

## 2023-05-25 LAB
ALBUMIN SERPL ELPH-MCNC: 3.4 G/DL — SIGNIFICANT CHANGE UP (ref 3.3–5.2)
ALP SERPL-CCNC: 65 U/L — SIGNIFICANT CHANGE UP (ref 40–120)
ALT FLD-CCNC: 5 U/L — SIGNIFICANT CHANGE UP
ANION GAP SERPL CALC-SCNC: 15 MMOL/L — SIGNIFICANT CHANGE UP (ref 5–17)
AST SERPL-CCNC: 22 U/L — SIGNIFICANT CHANGE UP
BILIRUB SERPL-MCNC: 0.4 MG/DL — SIGNIFICANT CHANGE UP (ref 0.4–2)
BUN SERPL-MCNC: 27.6 MG/DL — HIGH (ref 8–20)
CALCIUM SERPL-MCNC: 9.9 MG/DL — SIGNIFICANT CHANGE UP (ref 8.4–10.5)
CHLORIDE SERPL-SCNC: 102 MMOL/L — SIGNIFICANT CHANGE UP (ref 96–108)
CO2 SERPL-SCNC: 17 MMOL/L — LOW (ref 22–29)
CREAT SERPL-MCNC: 1.26 MG/DL — SIGNIFICANT CHANGE UP (ref 0.5–1.3)
EGFR: 40 ML/MIN/1.73M2 — LOW
GLUCOSE SERPL-MCNC: 89 MG/DL — SIGNIFICANT CHANGE UP (ref 70–99)
HCT VFR BLD CALC: 36.7 % — SIGNIFICANT CHANGE UP (ref 34.5–45)
HGB BLD-MCNC: 11.1 G/DL — LOW (ref 11.5–15.5)
MCHC RBC-ENTMCNC: 30.2 GM/DL — LOW (ref 32–36)
MCHC RBC-ENTMCNC: 32.2 PG — SIGNIFICANT CHANGE UP (ref 27–34)
MCV RBC AUTO: 106.4 FL — HIGH (ref 80–100)
PLATELET # BLD AUTO: 127 K/UL — LOW (ref 150–400)
POTASSIUM SERPL-MCNC: 5.1 MMOL/L — SIGNIFICANT CHANGE UP (ref 3.5–5.3)
POTASSIUM SERPL-SCNC: 5.1 MMOL/L — SIGNIFICANT CHANGE UP (ref 3.5–5.3)
PROT SERPL-MCNC: 8 G/DL — SIGNIFICANT CHANGE UP (ref 6.6–8.7)
RBC # BLD: 3.45 M/UL — LOW (ref 3.8–5.2)
RBC # FLD: 14.1 % — SIGNIFICANT CHANGE UP (ref 10.3–14.5)
SODIUM SERPL-SCNC: 134 MMOL/L — LOW (ref 135–145)
WBC # BLD: 6.2 K/UL — SIGNIFICANT CHANGE UP (ref 3.8–10.5)
WBC # FLD AUTO: 6.2 K/UL — SIGNIFICANT CHANGE UP (ref 3.8–10.5)

## 2023-05-25 PROCEDURE — 93005 ELECTROCARDIOGRAM TRACING: CPT

## 2023-05-25 PROCEDURE — 85018 HEMOGLOBIN: CPT

## 2023-05-25 PROCEDURE — 71250 CT THORAX DX C-: CPT | Mod: MA

## 2023-05-25 PROCEDURE — 96374 THER/PROPH/DIAG INJ IV PUSH: CPT

## 2023-05-25 PROCEDURE — 85730 THROMBOPLASTIN TIME PARTIAL: CPT

## 2023-05-25 PROCEDURE — 87040 BLOOD CULTURE FOR BACTERIA: CPT

## 2023-05-25 PROCEDURE — 85014 HEMATOCRIT: CPT

## 2023-05-25 PROCEDURE — 99285 EMERGENCY DEPT VISIT HI MDM: CPT

## 2023-05-25 PROCEDURE — 83605 ASSAY OF LACTIC ACID: CPT

## 2023-05-25 PROCEDURE — 87186 SC STD MICRODIL/AGAR DIL: CPT

## 2023-05-25 PROCEDURE — 85025 COMPLETE CBC W/AUTO DIFF WBC: CPT

## 2023-05-25 PROCEDURE — 84484 ASSAY OF TROPONIN QUANT: CPT

## 2023-05-25 PROCEDURE — 82803 BLOOD GASES ANY COMBINATION: CPT

## 2023-05-25 PROCEDURE — 80053 COMPREHEN METABOLIC PANEL: CPT

## 2023-05-25 PROCEDURE — 82330 ASSAY OF CALCIUM: CPT

## 2023-05-25 PROCEDURE — 0225U NFCT DS DNA&RNA 21 SARSCOV2: CPT

## 2023-05-25 PROCEDURE — 99239 HOSP IP/OBS DSCHRG MGMT >30: CPT

## 2023-05-25 PROCEDURE — 36415 COLL VENOUS BLD VENIPUNCTURE: CPT

## 2023-05-25 PROCEDURE — 81001 URINALYSIS AUTO W/SCOPE: CPT

## 2023-05-25 PROCEDURE — 84132 ASSAY OF SERUM POTASSIUM: CPT

## 2023-05-25 PROCEDURE — 87086 URINE CULTURE/COLONY COUNT: CPT

## 2023-05-25 PROCEDURE — 82435 ASSAY OF BLOOD CHLORIDE: CPT

## 2023-05-25 PROCEDURE — 82947 ASSAY GLUCOSE BLOOD QUANT: CPT

## 2023-05-25 PROCEDURE — 85027 COMPLETE CBC AUTOMATED: CPT

## 2023-05-25 PROCEDURE — 85610 PROTHROMBIN TIME: CPT

## 2023-05-25 PROCEDURE — 84295 ASSAY OF SERUM SODIUM: CPT

## 2023-05-25 PROCEDURE — 80048 BASIC METABOLIC PNL TOTAL CA: CPT

## 2023-05-25 PROCEDURE — 71045 X-RAY EXAM CHEST 1 VIEW: CPT

## 2023-05-25 RX ADMIN — PANTOPRAZOLE SODIUM 40 MILLIGRAM(S): 20 TABLET, DELAYED RELEASE ORAL at 06:44

## 2023-05-25 RX ADMIN — HEPARIN SODIUM 5000 UNIT(S): 5000 INJECTION INTRAVENOUS; SUBCUTANEOUS at 06:44

## 2023-05-25 RX ADMIN — Medication 325 MILLIGRAM(S): at 11:56

## 2023-05-25 RX ADMIN — Medication 50 MILLIGRAM(S): at 11:56

## 2023-05-25 RX ADMIN — CEFTRIAXONE 1000 MILLIGRAM(S): 500 INJECTION, POWDER, FOR SOLUTION INTRAMUSCULAR; INTRAVENOUS at 11:56

## 2023-05-25 RX ADMIN — Medication 25 MILLIGRAM(S): at 06:44

## 2023-05-25 RX ADMIN — Medication 600 MILLIGRAM(S): at 06:44

## 2023-05-25 RX ADMIN — Medication 150 MICROGRAM(S): at 06:43

## 2023-05-25 RX ADMIN — DORZOLAMIDE HYDROCHLORIDE 1 DROP(S): 20 SOLUTION/ DROPS OPHTHALMIC at 07:56

## 2023-05-25 NOTE — DISCHARGE NOTE PROVIDER - NSDCMRMEDTOKEN_GEN_ALL_CORE_FT
acetaminophen 325 mg oral tablet: 2 tab(s) orally every 6 hours, As needed, Temp greater or equal to 38C (100.4F), Mild Pain (1 - 3)  Aricept 10 mg oral tablet: 1 tab(s) orally once a day (at bedtime)  cefpodoxime 100 mg oral tablet: 1 tab(s) orally 2 times a day  dorzolamide 2% ophthalmic solution: 1 drop(s) to each affected eye 2 times a day  ferrous sulfate 325 mg (65 mg elemental iron) oral tablet: 1 tab(s) orally once a day  gemfibrozil 600 mg oral tablet: 1 tab(s) orally 2 times a day  metoprolol tartrate 25 mg oral tablet: 1 tab(s) orally 2 times a day  pantoprazole 40 mg oral delayed release tablet: 1 tab(s) orally every 12 hours  SEROquel 100 mg oral tablet: 1 tab(s) orally once a day (at bedtime)  Synthroid 150 mcg (0.15 mg) oral tablet: 1 tab(s) orally once a day  tamsulosin 0.4 mg oral capsule: 1 cap(s) orally once a day (at bedtime)  topiramate 50 mg oral tablet: 1 tab(s) orally once a day

## 2023-05-25 NOTE — PROGRESS NOTE ADULT - ASSESSMENT
89y/o F w/ PMH of HFpEF, AF (not on AC 2/2 GIB in past), neuropathy, advanced dementia, AAA, HTN, CKD III, hypothyroid presented from [place] for chest pain but found to be febrile in the ED.    Sepsis POA due to UTI   - febrile 101.3, HR>90. UA grossly positive, CXR without pneumonia but with small pleural effusion   - UCx: E. coli, f/u sensitivities  - Continue Rocephin  - Monitor WBC, fever curve  - Tylenol PRN for fever    RHONDA on CKD3  - Resolved  - Monitor    Afib  -rate controlled   -not on AC due to bleeding risk   -cont with Metoprolol 25mg Po BID      HTN  -cont Metoprolol 25mg Po BID     Dementia with behavioral disturbances  -cont Aricept, Seroquel, topiramate     Hypothyroidism  -cont synthroid     DVT ppx  -Heparin subq       Medically optimized for discharge to [facility]

## 2023-05-25 NOTE — DISCHARGE NOTE PROVIDER - ATTENDING DISCHARGE PHYSICAL EXAMINATION:
Vital Signs Last 24 Hrs  T(F): 97.4 (25 May 2023 11:11), Max: 98.7 (24 May 2023 19:25)  HR: 58 (25 May 2023 11:11) (58 - 77)  BP: 145/80 (25 May 2023 11:11) (134/84 - 178/79)  RR: 18 (25 May 2023 04:41) (18 - 18)  SpO2: 98% (25 May 2023 11:11) (92% - 98%)    Physical Exam:  Constitutional: NAD  HEENT: NC/AT, PERRL, EOMI, trachea midline, no JVD  Respiratory: CTA bilaterally, symmetrical chest rise  Cardiovascular: RRR, no m/g/r  Gastrointestinal: Soft, NT/ND, BS+  Vascular: 2+ peripheral pulses  Neurological: Alert, oriented  Psych: Fair mood/affect  Musculoskeletal: No edema, cyanosis, deformities. ROM normal  Skin: No obvious rash, lesions. No jaundice.

## 2023-05-25 NOTE — PROGRESS NOTE ADULT - SUBJECTIVE AND OBJECTIVE BOX
[facility] Division of Hospital Medicine  Jax Myrick MD    Chief Complaint:  Patient is a 89y old  Female who presents with a chief complaint of sepsis due UTI (24 May 2023 11:54)      SUBJECTIVE / OVERNIGHT EVENTS:  Patient seen and examined at bedside. No acute events reported overnight. No new complaints.    MEDICATIONS  (STANDING):  acetaminophen   IVPB .. 1000 milliGRAM(s) IV Intermittent once  cefTRIAXone Injectable. 1000 milliGRAM(s) IV Push every 24 hours  donepezil 10 milliGRAM(s) Oral at bedtime  dorzolamide 2% Ophthalmic Solution 1 Drop(s) Both EYES <User Schedule>  ferrous    sulfate 325 milliGRAM(s) Oral daily  gemfibrozil 600 milliGRAM(s) Oral two times a day  heparin   Injectable 5000 Unit(s) SubCutaneous every 12 hours  levothyroxine 150 MICROGram(s) Oral daily  metoprolol tartrate 25 milliGRAM(s) Oral two times a day  pantoprazole    Tablet 40 milliGRAM(s) Oral two times a day  QUEtiapine 100 milliGRAM(s) Oral at bedtime  topiramate 50 milliGRAM(s) Oral daily    MEDICATIONS  (PRN):  acetaminophen     Tablet .. 650 milliGRAM(s) Oral every 6 hours PRN Temp greater or equal to 38C (100.4F), Mild Pain (1 - 3)  aluminum hydroxide/magnesium hydroxide/simethicone Suspension 30 milliLiter(s) Oral every 4 hours PRN Dyspepsia  melatonin 3 milliGRAM(s) Oral at bedtime PRN Insomnia  ondansetron Injectable 4 milliGRAM(s) IV Push every 8 hours PRN Nausea and/or Vomiting        I&O's Summary      PHYSICAL EXAM:  Vital Signs Last 24 Hrs  T(C): 36.6 (25 May 2023 07:52), Max: 37.1 (24 May 2023 19:25)  T(F): 97.9 (25 May 2023 07:52), Max: 98.7 (24 May 2023 19:25)  HR: 71 (25 May 2023 07:52) (64 - 77)  BP: 158/81 (25 May 2023 07:52) (134/84 - 178/79)  BP(mean): --  RR: 18 (25 May 2023 04:41) (18 - 18)  SpO2: 97% (25 May 2023 07:52) (92% - 97%)    Parameters below as of 25 May 2023 07:52  Patient On (Oxygen Delivery Method): room air          GENERAL: NAD  EYES: sclera clear, no exudates  ENMT: oropharynx clear without erythema, no exudates, moist mucous membranes  NECK: supple, soft, no thyromegaly noted  LUNGS: good air entry bilaterally, clear to auscultation, symmetric breath sounds, no wheezing or rhonchi appreciated  HEART: soft S1/S2, regular rate and rhythm, no murmurs noted, no lower extremity edema  GASTROINTESTINAL: abdomen is soft, nontender, nondistended, normoactive bowel sounds, no palpable masses  INTEGUMENT: good skin turgor, warm skin, appears well perfused  MUSCULOSKELETAL: no clubbing or cyanosis, no obvious deformity  NEUROLOGIC: A&Ox3, no focal neurological deficits.   PSYCHIATRIC: mood is good, calm   HEME/LYMPH: no palpable supraclavicular nodules, no obvious ecchymosis or petechiae    LABS:                        11.1   6.20  )-----------( 127      ( 25 May 2023 02:44 )             36.7     05-25    134<L>  |  102  |  27.6<H>  ----------------------------<  89  5.1   |  17.0<L>  |  1.26    Ca    9.9      25 May 2023 02:44    TPro  8.0  /  Alb  3.4  /  TBili  0.4  /  DBili  x   /  AST  22  /  ALT  5   /  AlkPhos  65  05-25    PT/INR - ( 23 May 2023 16:49 )   PT: 14.1 sec;   INR: 1.21 ratio         PTT - ( 23 May 2023 16:49 )  PTT:28.3 sec  CARDIAC MARKERS ( 23 May 2023 16:49 )  x     / <0.01 ng/mL / x     / x     / x          Urinalysis Basic - ( 23 May 2023 22:00 )    Color: Yellow / Appearance: very cloudy / S.005 / pH: x  Gluc: x / Ketone: Negative  / Bili: Negative / Urobili: Negative   Blood: x / Protein: 100 / Nitrite: Positive   Leuk Esterase: Moderate / RBC: 11-25 /HPF / WBC 26-50 /HPF   Sq Epi: x / Non Sq Epi: x / Bacteria: Moderate        Culture - Urine (collected 23 May 2023 22:00)  Source: Clean Catch Clean Catch (Midstream)  Preliminary Report (25 May 2023 07:57):    50,000 - 99,000 CFU/mL Escherichia coli    Culture - Blood (collected 23 May 2023 16:55)  Source: .Blood Blood-Peripheral  Preliminary Report (24 May 2023 22:02):    No growth to date.    Culture - Blood (collected 23 May 2023 16:50)  Source: .Blood Blood-Peripheral  Preliminary Report (24 May 2023 22:02):    No growth to date.      CAPILLARY BLOOD GLUCOSE            RADIOLOGY & ADDITIONAL TESTS:  Results Reviewed:   Imaging Personally Reviewed:  Electrocardiogram Personally Reviewed:                                          
[facility] Division of Hospital Medicine  Jax Myrick MD    Chief Complaint:  Patient is a 89y old  Female who presents with a chief complaint of sepsis due UTI (23 May 2023 22:57)      SUBJECTIVE / OVERNIGHT EVENTS:  Patient seen and examined at bedside. No acute events reported overnight. No new complaints.    MEDICATIONS  (STANDING):  acetaminophen   IVPB .. 1000 milliGRAM(s) IV Intermittent once  cefTRIAXone Injectable. 1000 milliGRAM(s) IV Push every 24 hours  donepezil 10 milliGRAM(s) Oral at bedtime  dorzolamide 2% Ophthalmic Solution 1 Drop(s) Both EYES <User Schedule>  ferrous    sulfate 325 milliGRAM(s) Oral daily  gemfibrozil 600 milliGRAM(s) Oral two times a day  heparin   Injectable 5000 Unit(s) SubCutaneous every 12 hours  levothyroxine 150 MICROGram(s) Oral daily  metoprolol tartrate 25 milliGRAM(s) Oral two times a day  pantoprazole    Tablet 40 milliGRAM(s) Oral two times a day  QUEtiapine 100 milliGRAM(s) Oral at bedtime  topiramate 50 milliGRAM(s) Oral daily    MEDICATIONS  (PRN):  acetaminophen     Tablet .. 650 milliGRAM(s) Oral every 6 hours PRN Temp greater or equal to 38C (100.4F), Mild Pain (1 - 3)  aluminum hydroxide/magnesium hydroxide/simethicone Suspension 30 milliLiter(s) Oral every 4 hours PRN Dyspepsia  melatonin 3 milliGRAM(s) Oral at bedtime PRN Insomnia  ondansetron Injectable 4 milliGRAM(s) IV Push every 8 hours PRN Nausea and/or Vomiting        I&O's Summary      PHYSICAL EXAM:  Vital Signs Last 24 Hrs  T(C): 36.9 (24 May 2023 11:17), Max: 38.5 (23 May 2023 14:50)  T(F): 98.4 (24 May 2023 11:17), Max: 101.3 (23 May 2023 14:50)  HR: 64 (24 May 2023 11:17) (63 - 96)  BP: 139/76 (24 May 2023 11:17) (139/76 - 166/83)  BP(mean): 110 (23 May 2023 22:47) (96 - 110)  RR: 18 (24 May 2023 11:17) (18 - 19)  SpO2: 94% (24 May 2023 11:17) (93% - 100%)    Parameters below as of 24 May 2023 11:17  Patient On (Oxygen Delivery Method): room air    GENERAL: NAD  EYES: sclera clear, no exudates  ENMT: oropharynx clear without erythema, no exudates, moist mucous membranes  NECK: supple, soft, no thyromegaly noted  LUNGS: good air entry bilaterally, clear to auscultation, symmetric breath sounds, no wheezing or rhonchi appreciated  HEART: soft S1/S2, regular rate and rhythm, no murmurs noted, no lower extremity edema  GASTROINTESTINAL: abdomen is soft, nontender, nondistended, normoactive bowel sounds, no palpable masses  INTEGUMENT: good skin turgor, warm skin, appears well perfused  MUSCULOSKELETAL: no clubbing or cyanosis, no obvious deformity  NEUROLOGIC: A&Ox3, no focal neurological deficits.   PSYCHIATRIC: mood is good, calm   HEME/LYMPH: no palpable supraclavicular nodules, no obvious ecchymosis or petechiae      LABS:                        10.7   8.03  )-----------( CLUMPED    ( 24 May 2023 02:29 )             35.0     05-24    139  |  104  |  27.1<H>  ----------------------------<  103<H>  4.2   |  20.0<L>  |  1.27    Ca    9.3      24 May 2023 02:29    TPro  7.4  /  Alb  3.5  /  TBili  0.2<L>  /  DBili  x   /  AST  19  /  ALT  7   /  AlkPhos  73  05-23    PT/INR - ( 23 May 2023 16:49 )   PT: 14.1 sec;   INR: 1.21 ratio         PTT - ( 23 May 2023 16:49 )  PTT:28.3 sec  CARDIAC MARKERS ( 23 May 2023 16:49 )  x     / <0.01 ng/mL / x     / x     / x          Urinalysis Basic - ( 23 May 2023 22:00 )    Color: Yellow / Appearance: very cloudy / S.005 / pH: x  Gluc: x / Ketone: Negative  / Bili: Negative / Urobili: Negative   Blood: x / Protein: 100 / Nitrite: Positive   Leuk Esterase: Moderate / RBC: 11-25 /HPF / WBC 26-50 /HPF   Sq Epi: x / Non Sq Epi: x / Bacteria: Moderate        CAPILLARY BLOOD GLUCOSE            RADIOLOGY & ADDITIONAL TESTS:  Results Reviewed:   Imaging Personally Reviewed:  Electrocardiogram Personally Reviewed:

## 2023-05-25 NOTE — DISCHARGE NOTE NURSING/CASE MANAGEMENT/SOCIAL WORK - PATIENT PORTAL LINK FT
You can access the FollowMyHealth Patient Portal offered by [facility] by registering at the following website: [email or web address]. By joining [facility]’s FollowMyHealth portal, you will also be able to view your health information using other applications (apps) compatible with our system.

## 2023-05-25 NOTE — DISCHARGE NOTE PROVIDER - HOSPITAL COURSE
89y/o F w/ PMH of HFpEF, AF (not on AC 2/2 GIB in past), neuropathy, advanced dementia, AAA, HTN, CKD III, hypothyroid presented from [facility] for chest pain but found to be febrile in the ED.    Patient was admitted for sepsis POA 2/2 UTI. Started on Rocephin. BCx w/ NGTD. UCx positive for E. Coli. Patient also noted to have RHONDA likely in setting of ATN which resolved w/ fluids. Patient now afebrile and no longer requires inpatient hospitilization and is stable for discharge with outpatient follow up.

## 2023-05-25 NOTE — DISCHARGE NOTE NURSING/CASE MANAGEMENT/SOCIAL WORK - NSDCPEFALRISK_GEN_ALL_CORE
For information on Fall & Injury Prevention, visit: [email or web address] OR  [email or web address] OR  https://www.cdc.gov/steadi/patient.html

## 2023-05-25 NOTE — DISCHARGE NOTE PROVIDER - NSDCCPCAREPLAN_GEN_ALL_CORE_FT
PRINCIPAL DISCHARGE DIAGNOSIS  Diagnosis: Fever  Assessment and Plan of Treatment: 2/2 UTI. Take Vantin as prescribed

## 2023-05-26 RX ORDER — CEFPODOXIME PROXETIL 100 MG
1 TABLET ORAL
Qty: 4 | Refills: 0
Start: 2023-05-26 | End: 2023-05-27

## 2023-05-28 LAB
CULTURE RESULTS: SIGNIFICANT CHANGE UP
CULTURE RESULTS: SIGNIFICANT CHANGE UP
SPECIMEN SOURCE: SIGNIFICANT CHANGE UP
SPECIMEN SOURCE: SIGNIFICANT CHANGE UP

## 2023-06-01 NOTE — PROGRESS NOTE ADULT - NS ATTEND BILL GEN_ALL_CORE
Attending to bill
Attending to bill
Graft Donor Site Bandage (Optional-Leave Blank If You Don't Want In Note): Steri-strips and a pressure bandage were applied to the donor site.

## 2023-07-22 ENCOUNTER — INPATIENT (INPATIENT)
Facility: HOSPITAL | Age: 88
LOS: 2 days | Discharge: NURSING, REHAB OR LONG-TERM CARE | DRG: 101 | End: 2023-07-25
Attending: INTERNAL MEDICINE | Admitting: INTERNAL MEDICINE
Payer: MEDICARE

## 2023-07-22 VITALS
SYSTOLIC BLOOD PRESSURE: 131 MMHG | OXYGEN SATURATION: 95 % | RESPIRATION RATE: 16 BRPM | DIASTOLIC BLOOD PRESSURE: 70 MMHG | HEIGHT: 62 IN | WEIGHT: 149.91 LBS | HEART RATE: 91 BPM

## 2023-07-22 DIAGNOSIS — Z96.649 PRESENCE OF UNSPECIFIED ARTIFICIAL HIP JOINT: Chronic | ICD-10-CM

## 2023-07-22 DIAGNOSIS — Z95.0 PRESENCE OF CARDIAC PACEMAKER: Chronic | ICD-10-CM

## 2023-07-22 DIAGNOSIS — R56.9 UNSPECIFIED CONVULSIONS: ICD-10-CM

## 2023-07-22 DIAGNOSIS — Z90.710 ACQUIRED ABSENCE OF BOTH CERVIX AND UTERUS: Chronic | ICD-10-CM

## 2023-07-22 LAB
ALBUMIN SERPL ELPH-MCNC: 4.1 G/DL — SIGNIFICANT CHANGE UP (ref 3.3–5.2)
ALP SERPL-CCNC: 77 U/L — SIGNIFICANT CHANGE UP (ref 40–120)
ALT FLD-CCNC: 6 U/L — SIGNIFICANT CHANGE UP
ANION GAP SERPL CALC-SCNC: 20 MMOL/L — HIGH (ref 5–17)
APPEARANCE UR: CLEAR — SIGNIFICANT CHANGE UP
APTT BLD: 31.8 SEC — SIGNIFICANT CHANGE UP (ref 27.5–35.5)
AST SERPL-CCNC: 18 U/L — SIGNIFICANT CHANGE UP
BACTERIA # UR AUTO: NEGATIVE — SIGNIFICANT CHANGE UP
BASE EXCESS BLDV CALC-SCNC: -10.9 MMOL/L — LOW (ref -2–3)
BASE EXCESS BLDV CALC-SCNC: -9 MMOL/L — LOW (ref -2–3)
BASOPHILS # BLD AUTO: 0.02 K/UL — SIGNIFICANT CHANGE UP (ref 0–0.2)
BASOPHILS NFR BLD AUTO: 0.2 % — SIGNIFICANT CHANGE UP (ref 0–2)
BILIRUB SERPL-MCNC: 0.2 MG/DL — LOW (ref 0.4–2)
BILIRUB UR-MCNC: NEGATIVE — SIGNIFICANT CHANGE UP
BLD GP AB SCN SERPL QL: SIGNIFICANT CHANGE UP
BUN SERPL-MCNC: 35.3 MG/DL — HIGH (ref 8–20)
CA-I SERPL-SCNC: 0.98 MMOL/L — LOW (ref 1.15–1.33)
CA-I SERPL-SCNC: 1.26 MMOL/L — SIGNIFICANT CHANGE UP (ref 1.15–1.33)
CALCIUM SERPL-MCNC: 10 MG/DL — SIGNIFICANT CHANGE UP (ref 8.4–10.5)
CHLORIDE BLDV-SCNC: 107 MMOL/L — SIGNIFICANT CHANGE UP (ref 96–108)
CHLORIDE BLDV-SCNC: 115 MMOL/L — HIGH (ref 96–108)
CHLORIDE SERPL-SCNC: 102 MMOL/L — SIGNIFICANT CHANGE UP (ref 96–108)
CO2 SERPL-SCNC: 17 MMOL/L — LOW (ref 22–29)
COLOR SPEC: YELLOW — SIGNIFICANT CHANGE UP
CREAT SERPL-MCNC: 1.65 MG/DL — HIGH (ref 0.5–1.3)
DIFF PNL FLD: NEGATIVE — SIGNIFICANT CHANGE UP
EGFR: 29 ML/MIN/1.73M2 — LOW
EOSINOPHIL # BLD AUTO: 0.1 K/UL — SIGNIFICANT CHANGE UP (ref 0–0.5)
EOSINOPHIL NFR BLD AUTO: 1.2 % — SIGNIFICANT CHANGE UP (ref 0–6)
EPI CELLS # UR: SIGNIFICANT CHANGE UP
GAS PNL BLDV: 138 MMOL/L — SIGNIFICANT CHANGE UP (ref 136–145)
GAS PNL BLDV: 140 MMOL/L — SIGNIFICANT CHANGE UP (ref 136–145)
GAS PNL BLDV: SIGNIFICANT CHANGE UP
GAS PNL BLDV: SIGNIFICANT CHANGE UP
GLUCOSE BLDV-MCNC: 109 MG/DL — HIGH (ref 70–99)
GLUCOSE BLDV-MCNC: 90 MG/DL — SIGNIFICANT CHANGE UP (ref 70–99)
GLUCOSE SERPL-MCNC: 111 MG/DL — HIGH (ref 70–99)
GLUCOSE UR QL: NEGATIVE MG/DL — SIGNIFICANT CHANGE UP
HCO3 BLDV-SCNC: 17 MMOL/L — LOW (ref 22–29)
HCO3 BLDV-SCNC: 19 MMOL/L — LOW (ref 22–29)
HCT VFR BLD CALC: 35.8 % — SIGNIFICANT CHANGE UP (ref 34.5–45)
HCT VFR BLDA CALC: 33 % — SIGNIFICANT CHANGE UP
HCT VFR BLDA CALC: 36 % — SIGNIFICANT CHANGE UP
HGB BLD CALC-MCNC: 10.9 G/DL — LOW (ref 11.7–16.1)
HGB BLD CALC-MCNC: 11.9 G/DL — SIGNIFICANT CHANGE UP (ref 11.7–16.1)
HGB BLD-MCNC: 11.4 G/DL — LOW (ref 11.5–15.5)
IMM GRANULOCYTES NFR BLD AUTO: 1.1 % — HIGH (ref 0–0.9)
INR BLD: 1.15 RATIO — SIGNIFICANT CHANGE UP (ref 0.88–1.16)
KETONES UR-MCNC: NEGATIVE — SIGNIFICANT CHANGE UP
LACTATE BLDV-MCNC: 2.8 MMOL/L — HIGH (ref 0.5–2)
LACTATE BLDV-MCNC: 6.4 MMOL/L — CRITICAL HIGH (ref 0.5–2)
LEUKOCYTE ESTERASE UR-ACNC: NEGATIVE — SIGNIFICANT CHANGE UP
LYMPHOCYTES # BLD AUTO: 0.74 K/UL — LOW (ref 1–3.3)
LYMPHOCYTES # BLD AUTO: 9.2 % — LOW (ref 13–44)
MAGNESIUM SERPL-MCNC: 2.2 MG/DL — SIGNIFICANT CHANGE UP (ref 1.8–2.6)
MCHC RBC-ENTMCNC: 30.5 PG — SIGNIFICANT CHANGE UP (ref 27–34)
MCHC RBC-ENTMCNC: 31.8 GM/DL — LOW (ref 32–36)
MCV RBC AUTO: 95.7 FL — SIGNIFICANT CHANGE UP (ref 80–100)
MONOCYTES # BLD AUTO: 0.54 K/UL — SIGNIFICANT CHANGE UP (ref 0–0.9)
MONOCYTES NFR BLD AUTO: 6.7 % — SIGNIFICANT CHANGE UP (ref 2–14)
NEUTROPHILS # BLD AUTO: 6.53 K/UL — SIGNIFICANT CHANGE UP (ref 1.8–7.4)
NEUTROPHILS NFR BLD AUTO: 81.6 % — HIGH (ref 43–77)
NITRITE UR-MCNC: NEGATIVE — SIGNIFICANT CHANGE UP
NT-PROBNP SERPL-SCNC: 687 PG/ML — HIGH (ref 0–300)
PCO2 BLDV: 41 MMHG — SIGNIFICANT CHANGE UP (ref 39–42)
PCO2 BLDV: 52 MMHG — HIGH (ref 39–42)
PH BLDV: 7.18 — CRITICAL LOW (ref 7.32–7.43)
PH BLDV: 7.22 — LOW (ref 7.32–7.43)
PH UR: 6.5 — SIGNIFICANT CHANGE UP (ref 5–8)
PLATELET # BLD AUTO: 155 K/UL — SIGNIFICANT CHANGE UP (ref 150–400)
PO2 BLDV: 80 MMHG — HIGH (ref 25–45)
PO2 BLDV: <42 MMHG — SIGNIFICANT CHANGE UP (ref 25–45)
POTASSIUM BLDV-SCNC: 3.3 MMOL/L — LOW (ref 3.5–5.1)
POTASSIUM BLDV-SCNC: 4.3 MMOL/L — SIGNIFICANT CHANGE UP (ref 3.5–5.1)
POTASSIUM SERPL-MCNC: 4.1 MMOL/L — SIGNIFICANT CHANGE UP (ref 3.5–5.3)
POTASSIUM SERPL-SCNC: 4.1 MMOL/L — SIGNIFICANT CHANGE UP (ref 3.5–5.3)
PROT SERPL-MCNC: 8.4 G/DL — SIGNIFICANT CHANGE UP (ref 6.6–8.7)
PROT UR-MCNC: 300 MG/DL — SIGNIFICANT CHANGE UP
PROTHROM AB SERPL-ACNC: 13.4 SEC — SIGNIFICANT CHANGE UP (ref 10.5–13.4)
RAPID RVP RESULT: SIGNIFICANT CHANGE UP
RBC # BLD: 3.74 M/UL — LOW (ref 3.8–5.2)
RBC # FLD: 13.5 % — SIGNIFICANT CHANGE UP (ref 10.3–14.5)
RBC CASTS # UR COMP ASSIST: NEGATIVE /HPF — SIGNIFICANT CHANGE UP (ref 0–4)
SAO2 % BLDV: 64.5 % — SIGNIFICANT CHANGE UP
SAO2 % BLDV: 97.8 % — SIGNIFICANT CHANGE UP
SARS-COV-2 RNA SPEC QL NAA+PROBE: SIGNIFICANT CHANGE UP
SODIUM SERPL-SCNC: 139 MMOL/L — SIGNIFICANT CHANGE UP (ref 135–145)
SP GR SPEC: 1.01 — SIGNIFICANT CHANGE UP (ref 1.01–1.02)
TROPONIN T SERPL-MCNC: <0.01 NG/ML — SIGNIFICANT CHANGE UP (ref 0–0.06)
UROBILINOGEN FLD QL: NEGATIVE MG/DL — SIGNIFICANT CHANGE UP
WBC # BLD: 8.02 K/UL — SIGNIFICANT CHANGE UP (ref 3.8–10.5)
WBC # FLD AUTO: 8.02 K/UL — SIGNIFICANT CHANGE UP (ref 3.8–10.5)
WBC UR QL: NEGATIVE /HPF — SIGNIFICANT CHANGE UP (ref 0–5)

## 2023-07-22 PROCEDURE — 99223 1ST HOSP IP/OBS HIGH 75: CPT

## 2023-07-22 PROCEDURE — 99221 1ST HOSP IP/OBS SF/LOW 40: CPT | Mod: FS

## 2023-07-22 PROCEDURE — 93010 ELECTROCARDIOGRAM REPORT: CPT

## 2023-07-22 PROCEDURE — 99497 ADVNCD CARE PLAN 30 MIN: CPT | Mod: 25

## 2023-07-22 PROCEDURE — 99285 EMERGENCY DEPT VISIT HI MDM: CPT | Mod: GC

## 2023-07-22 PROCEDURE — 70450 CT HEAD/BRAIN W/O DYE: CPT | Mod: 26,MA

## 2023-07-22 PROCEDURE — 71045 X-RAY EXAM CHEST 1 VIEW: CPT | Mod: 26

## 2023-07-22 RX ORDER — LABETALOL HCL 100 MG
10 TABLET ORAL ONCE
Refills: 0 | Status: COMPLETED | OUTPATIENT
Start: 2023-07-22 | End: 2023-07-22

## 2023-07-22 RX ORDER — SODIUM CHLORIDE 9 MG/ML
1000 INJECTION, SOLUTION INTRAVENOUS ONCE
Refills: 0 | Status: COMPLETED | OUTPATIENT
Start: 2023-07-22 | End: 2023-07-22

## 2023-07-22 RX ORDER — SODIUM CHLORIDE 9 MG/ML
500 INJECTION, SOLUTION INTRAVENOUS ONCE
Refills: 0 | Status: DISCONTINUED | OUTPATIENT
Start: 2023-07-22 | End: 2023-07-22

## 2023-07-22 RX ORDER — SODIUM CHLORIDE 9 MG/ML
500 INJECTION INTRAMUSCULAR; INTRAVENOUS; SUBCUTANEOUS ONCE
Refills: 0 | Status: COMPLETED | OUTPATIENT
Start: 2023-07-22 | End: 2023-07-22

## 2023-07-22 RX ORDER — VALPROIC ACID (AS SODIUM SALT) 250 MG/5ML
500 SOLUTION, ORAL ORAL ONCE
Refills: 0 | Status: COMPLETED | OUTPATIENT
Start: 2023-07-22 | End: 2023-07-22

## 2023-07-22 RX ORDER — HYDRALAZINE HCL 50 MG
5 TABLET ORAL ONCE
Refills: 0 | Status: COMPLETED | OUTPATIENT
Start: 2023-07-22 | End: 2023-07-22

## 2023-07-22 RX ADMIN — SODIUM CHLORIDE 1000 MILLILITER(S): 9 INJECTION, SOLUTION INTRAVENOUS at 15:35

## 2023-07-22 RX ADMIN — SODIUM CHLORIDE 500 MILLILITER(S): 9 INJECTION INTRAMUSCULAR; INTRAVENOUS; SUBCUTANEOUS at 18:47

## 2023-07-22 RX ADMIN — Medication 10 MILLIGRAM(S): at 16:40

## 2023-07-22 RX ADMIN — Medication 55 MILLIGRAM(S): at 21:53

## 2023-07-22 RX ADMIN — Medication 5 MILLIGRAM(S): at 17:40

## 2023-07-22 NOTE — CONSULT NOTE ADULT - SUBJECTIVE AND OBJECTIVE BOX
89 year old female with CKD II, A.fib not on AC due to GI bleeding, advanced dementia, AAA, on Topamax presented to ED today after 1x witnessed GTC in her NH, received versed 5 IM in EMS and was transferred to [facility] for work-up and monitoring. Labs on admission notable for pH 7.18, Lactate 6.4. recieved 1 L bolus, PANCx sent. pH improved to 7.2 and lactate improved to 2.8. Neuro ICU asked to consult due to metabolic abnormalities and persistent ams.     Upon my evaluation, pt is grimacing and moaning to noxious, weakly localizing on B/L UE and WD on lowers. Resists eye opening, pinpoint pupils    Discussed with son at bedside, at baseline pt is confused, requires full assistance for all ADLs  (does not ambulate, needs someone to feed her). She awakens to eat however sleeps most of the day otherwise and occasionally falls asleep mid meal.     Unable to obtain ROS due to MS.    PMHx:   HTN (hypertension)  Diabetes mellitus  Hypothyroid  Dementia  GI bleed  Neuropathy  GERD (gastroesophageal reflux disease)  Abdominal aortic aneurysm  Stage 3 chronic kidney disease  S/P hysterectomy  S/P hip replacement  Pacemaker    REVIEW OF SYSTEMS  Negative except as noted in HPI  CONSTITUTIONAL: No fever, weight loss, or fatigue  EYES: No eye pain, visual disturbances, or discharge  ENMT:  No difficulty hearing, tinnitus, vertigo; No sinus or throat pain  NECK: No pain or stiffness  BREASTS: No pain, masses, or nipple discharge  RESPIRATORY: No cough, wheezing, chills or hemoptysis; No shortness of breath  CARDIOVASCULAR: No chest pain, palpitations, dizziness, or leg swelling  GASTROINTESTINAL: No abdominal or epigastric pain. No nausea, vomiting, or hematemesis; No diarrhea or constipation. No melena or hematochezia.  GENITOURINARY: No dysuria, frequency, hematuria, or incontinence  NEUROLOGICAL: No headaches, memory loss, loss of strength, numbness, or tremors  SKIN: No itching, burning, rashes, or lesions   LYMPH NODES: No enlarged glands  ENDOCRINE: No heat or cold intolerance; No hair loss  MUSCULOSKELETAL: No joint pain or swelling; No muscle, back, or extremity pain  PSYCHIATRIC: No depression, anxiety, mood swings, or difficulty sleeping  HEME/LYMPH: No easy bruising, or bleeding gums  ALLERY AND IMMUNOLOGIC: No hives or eczema    HOME MEDICATIONS:  Home Medications:  acetaminophen 325 mg oral tablet: 2 tab(s) orally every 6 hours, As needed, Temp greater or equal to 38C (100.4F), Mild Pain (1 - 3) (2023 16:36)  Aricept 10 mg oral tablet: 1 tab(s) orally once a day (at bedtime) (2023 16:36)  dorzolamide 2% ophthalmic solution: 1 drop(s) to each affected eye 2 times a day (2023 16:36)  ferrous sulfate 325 mg (65 mg elemental iron) oral tablet: 1 tab(s) orally once a day (2023 16:38)  gemfibrozil 600 mg oral tablet: 1 tab(s) orally 2 times a day (2023 16:36)  metoprolol tartrate 25 mg oral tablet: 1 tab(s) orally 2 times a day (2023 16:36)  pantoprazole 40 mg oral delayed release tablet: 1 tab(s) orally every 12 hours (2023 08:04)  SEROquel 100 mg oral tablet: 1 tab(s) orally once a day (at bedtime) (2023 16:36)  Synthroid 150 mcg (0.15 mg) oral tablet: 1 tab(s) orally once a day (2023 16:36)  tamsulosin 0.4 mg oral capsule: 1 cap(s) orally once a day (at bedtime) (2023 16:36)  topiramate 50 mg oral tablet: 1 tab(s) orally once a day (2023 16:36)    MEDICATIONS:  Antibiotics:    Neuro:    Anticoagulation:    OTHER:    IVF:  lactated ringers Bolus 500 milliLiter(s) IV Bolus once      Vital Signs Last 24 Hrs  T(C): --  T(F): --  HR: 67 (2023 16:02) (67 - 91)  BP: 201/96 (2023 16:02) (131/70 - 201/96)  BP(mean): --  RR: 20 (2023 16:02) (16 - 20)  SpO2: 98% (2023 16:02) (95% - 98%)    Parameters below as of 2023 16:02  Patient On (Oxygen Delivery Method): nasal cannula, 2LNC  O2 Flow (L/min): 2    Physical Exam:  Constitutional: NAD, resting in stretcher. Contracted on B/L UE and LE  Neuro  * Mental Status:  Grimacing and moaning to noxious, no FC  * Cranial Nerves: resists eye opening, pinpoint pupils, gaze midline, face grossly symmetric   * Motor: Contracted x4, UE weakly localizing. Lifts LUE purposefully to head at end of examination. B/L LE wd  * Sensory: Sensation grossly intact to light touch  * Reflexes: not assessed   Cardiovascular:  S1, S2 no murmurs appreciated.  Regular rate and rhythm.    LABS:                        11.4   8.02  )-----------( 155      ( 2023 12:42 )             35.8         139  |  102  |  35.3<H>  ----------------------------<  111<H>  4.1   |  17.0<L>  |  1.65<H>    Ca    10.0      2023 12:42  Mg     2.2         TPro  8.4  /  Alb  4.1  /  TBili  0.2<L>  /  DBili  x   /  AST  18  /  ALT  6   /  AlkPhos  77      PT/INR - ( 2023 12:42 )   PT: 13.4 sec;   INR: 1.15 ratio         PTT - ( 2023 12:42 )  PTT:31.8 sec  Urinalysis Basic - ( 2023 13:45 )    Color: Yellow / Appearance: Clear / S.010 / pH: x  Gluc: x / Ketone: Negative  / Bili: Negative / Urobili: Negative mg/dL   Blood: x / Protein: 300 mg/dL / Nitrite: Negative   Leuk Esterase: Negative / RBC: Negative /HPF / WBC Negative /HPF   Sq Epi: x / Non Sq Epi: x / Bacteria: Negative    CULTURES:  BCx: pending  UCx: pending    RADIOLOGY & ADDITIONAL STUDIES:  CT Head No Cont (23 @ 12:25)   INTERPRETATION:  CLINICAL INDICATION: first time seizure.  TECHNIQUE: CT of the head was performed without the administration of intravenous contrast.  COMPARISON: CT head 2022.  FINDINGS:  No acute transcortical infarction or acute intracranial hemorrhage.White matter hypoattenuating foci are noted, compatible with small vessel  disease. No hydrocephalus. No extra-axial fluid collections.    The visualized intraorbital contents are unremarkable. The imaged portions of the paranasal sinuses are clear. The mastoid air cells are clear.  The visualized soft tissues and osseous structures appear normal.    IMPRESSION:  -No acute intracranial findings.  -White matter small vessel changes.     89 year old female with CKD II, A.fib not on AC due to GI bleeding, advanced dementia, AAA, on Topamax however not for seizures per ED staff, presented to ED today after 1x witnessed GTC in her NH, received versed 5 IM in EMS and was transferred to [facility] for work-up and monitoring. Labs on admission notable for pH 7.18, Lactate 6.4. recieved 1 L bolus, PANCx sent. pH improved to 7.2 and lactate improved to 2.8. Neuro ICU asked to consult due to metabolic abnormalities and persistent ams.     Upon my evaluation, pt is grimacing and moaning to noxious, weakly localizing on B/L UE and WD on lowers. Resists eye opening, pinpoint pupils    Discussed with son at bedside, at baseline pt is confused, requires full assistance for all ADLs  (does not ambulate, needs someone to feed her). She awakens to eat however sleeps most of the day otherwise and occasionally falls asleep mid meal.     Unable to obtain ROS due to MS.    PMHx:   HTN (hypertension)  Diabetes mellitus  Hypothyroid  Dementia  GI bleed  Neuropathy  GERD (gastroesophageal reflux disease)  Abdominal aortic aneurysm  Stage 3 chronic kidney disease  S/P hysterectomy  S/P hip replacement  Pacemaker    REVIEW OF SYSTEMS  Negative except as noted in HPI  CONSTITUTIONAL: No fever, weight loss, or fatigue  EYES: No eye pain, visual disturbances, or discharge  ENMT:  No difficulty hearing, tinnitus, vertigo; No sinus or throat pain  NECK: No pain or stiffness  BREASTS: No pain, masses, or nipple discharge  RESPIRATORY: No cough, wheezing, chills or hemoptysis; No shortness of breath  CARDIOVASCULAR: No chest pain, palpitations, dizziness, or leg swelling  GASTROINTESTINAL: No abdominal or epigastric pain. No nausea, vomiting, or hematemesis; No diarrhea or constipation. No melena or hematochezia.  GENITOURINARY: No dysuria, frequency, hematuria, or incontinence  NEUROLOGICAL: No headaches, memory loss, loss of strength, numbness, or tremors  SKIN: No itching, burning, rashes, or lesions   LYMPH NODES: No enlarged glands  ENDOCRINE: No heat or cold intolerance; No hair loss  MUSCULOSKELETAL: No joint pain or swelling; No muscle, back, or extremity pain  PSYCHIATRIC: No depression, anxiety, mood swings, or difficulty sleeping  HEME/LYMPH: No easy bruising, or bleeding gums  ALLERY AND IMMUNOLOGIC: No hives or eczema    HOME MEDICATIONS:  Home Medications:  acetaminophen 325 mg oral tablet: 2 tab(s) orally every 6 hours, As needed, Temp greater or equal to 38C (100.4F), Mild Pain (1 - 3) (2023 16:36)  Aricept 10 mg oral tablet: 1 tab(s) orally once a day (at bedtime) (2023 16:36)  dorzolamide 2% ophthalmic solution: 1 drop(s) to each affected eye 2 times a day (2023 16:36)  ferrous sulfate 325 mg (65 mg elemental iron) oral tablet: 1 tab(s) orally once a day (2023 16:38)  gemfibrozil 600 mg oral tablet: 1 tab(s) orally 2 times a day (2023 16:36)  metoprolol tartrate 25 mg oral tablet: 1 tab(s) orally 2 times a day (2023 16:36)  pantoprazole 40 mg oral delayed release tablet: 1 tab(s) orally every 12 hours (2023 08:04)  SEROquel 100 mg oral tablet: 1 tab(s) orally once a day (at bedtime) (2023 16:36)  Synthroid 150 mcg (0.15 mg) oral tablet: 1 tab(s) orally once a day (2023 16:36)  tamsulosin 0.4 mg oral capsule: 1 cap(s) orally once a day (at bedtime) (2023 16:36)  topiramate 50 mg oral tablet: 1 tab(s) orally once a day (2023 16:36)    MEDICATIONS:  Antibiotics:    Neuro:    Anticoagulation:    OTHER:    IVF:  lactated ringers Bolus 500 milliLiter(s) IV Bolus once      Vital Signs Last 24 Hrs  T(C): --  T(F): --  HR: 67 (2023 16:02) (67 - 91)  BP: 201/96 (2023 16:02) (131/70 - 201/96)  BP(mean): --  RR: 20 (2023 16:02) (16 - 20)  SpO2: 98% (2023 16:02) (95% - 98%)    Parameters below as of 2023 16:02  Patient On (Oxygen Delivery Method): nasal cannula, 2LNC  O2 Flow (L/min): 2    Physical Exam:  Constitutional: NAD, resting in stretcher. Contracted on B/L UE and LE  Neuro  * Mental Status:  Grimacing and moaning to noxious, no FC  * Cranial Nerves: resists eye opening, pinpoint pupils, gaze midline, face grossly symmetric   * Motor: Contracted x4, UE weakly localizing. Lifts LUE purposefully to head at end of examination. B/L LE wd  * Sensory: Sensation grossly intact to light touch  * Reflexes: not assessed   Cardiovascular:  S1, S2 no murmurs appreciated.  Regular rate and rhythm.    LABS:                        11.4   8.02  )-----------( 155      ( 2023 12:42 )             35.8         139  |  102  |  35.3<H>  ----------------------------<  111<H>  4.1   |  17.0<L>  |  1.65<H>    Ca    10.0      2023 12:42  Mg     2.2         TPro  8.4  /  Alb  4.1  /  TBili  0.2<L>  /  DBili  x   /  AST  18  /  ALT  6   /  AlkPhos  77      PT/INR - ( 2023 12:42 )   PT: 13.4 sec;   INR: 1.15 ratio         PTT - ( 2023 12:42 )  PTT:31.8 sec  Urinalysis Basic - ( 2023 13:45 )    Color: Yellow / Appearance: Clear / S.010 / pH: x  Gluc: x / Ketone: Negative  / Bili: Negative / Urobili: Negative mg/dL   Blood: x / Protein: 300 mg/dL / Nitrite: Negative   Leuk Esterase: Negative / RBC: Negative /HPF / WBC Negative /HPF   Sq Epi: x / Non Sq Epi: x / Bacteria: Negative    CULTURES:  BCx: pending  UCx: pending    RADIOLOGY & ADDITIONAL STUDIES:  CT Head No Cont (23 @ 12:25)   INTERPRETATION:  CLINICAL INDICATION: first time seizure.  TECHNIQUE: CT of the head was performed without the administration of intravenous contrast.  COMPARISON: CT head 2022.  FINDINGS:  No acute transcortical infarction or acute intracranial hemorrhage.White matter hypoattenuating foci are noted, compatible with small vessel  disease. No hydrocephalus. No extra-axial fluid collections.    The visualized intraorbital contents are unremarkable. The imaged portions of the paranasal sinuses are clear. The mastoid air cells are clear.  The visualized soft tissues and osseous structures appear normal.    IMPRESSION:  -No acute intracranial findings.  -White matter small vessel changes.     89 year old female with CKD II, A.fib not on AC due to GI bleeding, advanced dementia, AAA, on Topamax however not for seizures per ED staff, presented to ED today after 1x witnessed GTC in her NH, received versed 5 IM in EMS and was transferred to [facility] for work-up and monitoring. Labs on admission notable for pH 7.18, Lactate 6.4. recieved 1 L bolus, PANCx sent. pH improved to 7.2 and lactate improved to 2.8. Neuro ICU asked to consult due to metabolic abnormalities and persistent ams.     Upon my evaluation, pt is grimacing and moaning to noxious, weakly localizing on B/L UE and WD on lowers. Resists eye opening, pinpoint pupils    Discussed with son at bedside, at baseline pt is confused, requires full assistance for all ADLs  (does not ambulate, needs someone to feed her). She awakens to eat however sleeps most of the day otherwise and occasionally falls asleep mid meal.     Unable to obtain ROS due to MS.    PMHx:   HTN (hypertension)  Diabetes mellitus  Hypothyroid  Dementia  GI bleed  Neuropathy  GERD (gastroesophageal reflux disease)  Abdominal aortic aneurysm  Stage 3 chronic kidney disease  S/P hysterectomy  S/P hip replacement  Pacemaker    REVIEW OF SYSTEMS  Negative except as noted in HPI  CONSTITUTIONAL: No fever, weight loss, or fatigue  EYES: No eye pain, visual disturbances, or discharge  ENMT:  No difficulty hearing, tinnitus, vertigo; No sinus or throat pain  NECK: No pain or stiffness  BREASTS: No pain, masses, or nipple discharge  RESPIRATORY: No cough, wheezing, chills or hemoptysis; No shortness of breath  CARDIOVASCULAR: No chest pain, palpitations, dizziness, or leg swelling  GASTROINTESTINAL: No abdominal or epigastric pain. No nausea, vomiting, or hematemesis; No diarrhea or constipation. No melena or hematochezia.  GENITOURINARY: No dysuria, frequency, hematuria, or incontinence  NEUROLOGICAL: No headaches, memory loss, loss of strength, numbness, or tremors  SKIN: No itching, burning, rashes, or lesions   LYMPH NODES: No enlarged glands  ENDOCRINE: No heat or cold intolerance; No hair loss  MUSCULOSKELETAL: No joint pain or swelling; No muscle, back, or extremity pain  PSYCHIATRIC: No depression, anxiety, mood swings, or difficulty sleeping  HEME/LYMPH: No easy bruising, or bleeding gums  ALLERY AND IMMUNOLOGIC: No hives or eczema    HOME MEDICATIONS:  Home Medications:  acetaminophen 325 mg oral tablet: 2 tab(s) orally every 6 hours, As needed, Temp greater or equal to 38C (100.4F), Mild Pain (1 - 3) (2023 16:36)  Aricept 10 mg oral tablet: 1 tab(s) orally once a day (at bedtime) (2023 16:36)  dorzolamide 2% ophthalmic solution: 1 drop(s) to each affected eye 2 times a day (2023 16:36)  ferrous sulfate 325 mg (65 mg elemental iron) oral tablet: 1 tab(s) orally once a day (2023 16:38)  gemfibrozil 600 mg oral tablet: 1 tab(s) orally 2 times a day (2023 16:36)  metoprolol tartrate 25 mg oral tablet: 1 tab(s) orally 2 times a day (2023 16:36)  pantoprazole 40 mg oral delayed release tablet: 1 tab(s) orally every 12 hours (2023 08:04)  SEROquel 100 mg oral tablet: 1 tab(s) orally once a day (at bedtime) (2023 16:36)  Synthroid 150 mcg (0.15 mg) oral tablet: 1 tab(s) orally once a day (2023 16:36)  tamsulosin 0.4 mg oral capsule: 1 cap(s) orally once a day (at bedtime) (2023 16:36)  topiramate 50 mg oral tablet: 1 tab(s) orally once a day (2023 16:36)    MEDICATIONS:  Antibiotics:    Neuro:    Anticoagulation:    OTHER:    IVF:  lactated ringers Bolus 500 milliLiter(s) IV Bolus once      Vital Signs Last 24 Hrs  T(C): --  T(F): --  HR: 67 (2023 16:02) (67 - 91)  BP: 201/96 (2023 16:02) (131/70 - 201/96)  BP(mean): --  RR: 20 (2023 16:02) (16 - 20)  SpO2: 98% (2023 16:02) (95% - 98%)    Parameters below as of 2023 16:02  Patient On (Oxygen Delivery Method): nasal cannula, 2LNC  O2 Flow (L/min): 2    Physical Exam:  Constitutional: NAD, resting in stretcher. Contracted on B/L UE and LE  Neuro  * Mental Status:  Grimacing and moaning to noxious, no FC  * Cranial Nerves: resists eye opening, pinpoint pupils, gaze midline, face grossly symmetric   * Motor: Contracted x4, UE weakly localizing. Lifts LUE purposefully to head at end of examination. B/L LE wd  * Sensory: Sensation grossly intact to light touch  * Reflexes: not assessed     LABS:                        11.4   8.02  )-----------( 155      ( 2023 12:42 )             35.8         139  |  102  |  35.3<H>  ----------------------------<  111<H>  4.1   |  17.0<L>  |  1.65<H>    Ca    10.0      2023 12:42  Mg     2.2         TPro  8.4  /  Alb  4.1  /  TBili  0.2<L>  /  DBili  x   /  AST  18  /  ALT  6   /  AlkPhos  77      PT/INR - ( 2023 12:42 )   PT: 13.4 sec;   INR: 1.15 ratio         PTT - ( 2023 12:42 )  PTT:31.8 sec  Urinalysis Basic - ( 2023 13:45 )    Color: Yellow / Appearance: Clear / S.010 / pH: x  Gluc: x / Ketone: Negative  / Bili: Negative / Urobili: Negative mg/dL   Blood: x / Protein: 300 mg/dL / Nitrite: Negative   Leuk Esterase: Negative / RBC: Negative /HPF / WBC Negative /HPF   Sq Epi: x / Non Sq Epi: x / Bacteria: Negative    CULTURES:  BCx: pending  UCx: pending    RADIOLOGY & ADDITIONAL STUDIES:  CT Head No Cont (23 @ 12:25)   INTERPRETATION:  CLINICAL INDICATION: first time seizure.  TECHNIQUE: CT of the head was performed without the administration of intravenous contrast.  COMPARISON: CT head 2022.  FINDINGS:  No acute transcortical infarction or acute intracranial hemorrhage.White matter hypoattenuating foci are noted, compatible with small vessel  disease. No hydrocephalus. No extra-axial fluid collections.    The visualized intraorbital contents are unremarkable. The imaged portions of the paranasal sinuses are clear. The mastoid air cells are clear.  The visualized soft tissues and osseous structures appear normal.    IMPRESSION:  -No acute intracranial findings.  -White matter small vessel changes.

## 2023-07-22 NOTE — ED PROVIDER NOTE - ATTENDING CONTRIBUTION TO CARE
Dr. Alicia, Attending Physician-  I performed a face to face bedside interview with patient regarding history of present illness, review of symptoms and past medical history. I completed an independent physical exam.  I have discussed patient's plan of care with the resident.    89 year old female with PMHx HFpEF, AF (not on AC 2/2 GIB in past), nontraumatic SAH,  neuropathy, advanced dementia, AAA, HTN, CKD III, hypothyroid BIBA from [place] for seizure  pe awake alert heent ncat neck supple cor s1s2 lung clear abd soft nontender neuro nonfocal dx  seizure;

## 2023-07-22 NOTE — ED ADULT NURSE REASSESSMENT NOTE - NS ED NURSE REASSESS COMMENT FT1
kandis at bedside for eval. pt continues to be HTN, MD ordonez aware
Assumed pt care 1530 pt mentation unchanged from arrival BP noted >200 systolic Dr. Suh notified and assessed pt pending orders at this time NAD fall and safety precautions in in place
pt stable bp responding to medications no change in pt condition NAD vss on RA fall and safety precautions in place

## 2023-07-22 NOTE — CONSULT NOTE ADULT - ASSESSMENT
89 year old female with multiple medical comorbidities presents with first time witnessed GTC s/p Versed and metabolic acidosis     Plan:  - Recommend 500 cc bolus and repeat labs 1 hour following  - EEG, if tech gone for the evening can consider Ceribell   - ok for SD status from NSICU standpoint for now  - Case discussed with Dr. Meyer 89 year old female with multiple medical comorbidities presents with first time witnessed GTC s/p Versed and metabolic acidosis     Plan:  - Recommend 500 cc bolus and repeat VBG and lactate 1 hour following  - EEG, if tech gone for the evening can consider Ceribell   - ok for SD status from NSICU standpoint for now  - Case discussed with Dr. Meyer 89 year old female with multiple medical comorbidities presents with first time witnessed GTC s/p Versed and metabolic acidosis     Plan:  - Recommend 500 cc bolus and repeat VBG and lactate 1 hour following  - recommend starting AED given concern for seizure, load  followed by 250 bid  - EEG, if tech gone for the evening can consider Ceribell   - ok for SD status from NSICU standpoint for now  - Case discussed with Dr. Meyer

## 2023-07-22 NOTE — ED PROVIDER NOTE - OBJECTIVE STATEMENT
89 year old female with PMHx HFpEF, AF (not on AC 2/2 GIB in past), nontraumatic SAH,  neuropathy, advanced dementia, AAA, HTN, CKD III, hypothyroid BIBA from [place] for seizure. Per EMS patient LKW 0930, sitting up in wheelchair talking, then 1045 noted by staff to have generalized convulsions, persistent on EMS arrival at 1105, given Versed 5mg IM with break in seizure. Per EMS patient noted to desat to 88% during episode, not on baseline O2. Patient unresponsive since then. FSBS 120. No history of seizures. Has MOLST form: DNR/DNI

## 2023-07-22 NOTE — ED ADULT TRIAGE NOTE - CHIEF COMPLAINT QUOTE
Pt BIBA from [facility] s/p witnessed seizure lasting approximately 20minutes. 5mg versed admin by ems.  No history of seizure.  PMH HTN, HLD and SAH.

## 2023-07-22 NOTE — ED ADULT NURSE NOTE - CHIEF COMPLAINT QUOTE
Pt BIBA from [facility] s/p witnessed seizure lasting approximately 20minutes. 5mg versed admin by ems.  No history of seizure.  PMH HTN, HLD and SAH.

## 2023-07-22 NOTE — ED PROVIDER NOTE - CLINICAL SUMMARY MEDICAL DECISION MAKING FREE TEXT BOX
89 year old female 89 year old female BIBA from [place] for evaluation of seizure. No prior history of seizures. Work up without acute findings. Discussed with neurology- rec EEG, possible MRI. Discussed with hospitalist, Topamax is on discharge med rec from last admission - possible medication noncompliance. 89 year old female BIBA from [place] for evaluation of seizure. No prior history of seizures. Work up without acute findings. Patient with mild improvement in mental status in ED- spontaneous movements and withdrawing more to pain. Discussed with neurology- rec EEG, possible MRI. Discussed with hospitalist, recommended ICU consult. Patient evaluated by neuro ICU, recommendations appreciated. Depakote 500mg load ordered. Admit to SDU.

## 2023-07-22 NOTE — H&P ADULT - NSHPLABSRESULTS_GEN_ALL_CORE
07-23    136  |  101  |  33.1<H>  ----------------------------<  150<H>  5.6<H>   |  19.0<L>  |  1.34<H>    Ca    10.0      23 Jul 2023 00:36  Mg     2.2     07-22    TPro  8.4  /  Alb  4.1  /  TBili  0.2<L>  /  DBili  x   /  AST  18  /  ALT  6   /  AlkPhos  77  07-22                            12.1   11.53 )-----------( 150      ( 23 Jul 2023 00:36 )             38.2

## 2023-07-22 NOTE — H&P ADULT - ASSESSMENT
89yoF hx dementia, Afib not on AC due to GI bleeding, CKD, HTN, prior hx of nontraumatic SAH, sent from [facility] for new onset seizure with persistent metabolic encephalopathy     New seizure with persistent post ictal encephalopathy   -CT head w/out acute abnormality, shows small white vessel changes  -Seen by NICU, deemed not candidate for ICU level of care  -Possibly that pt still sedated from receiving IM Versed by EMS  -NICU recommended valproic acid 500mg x 1 and 250mg BID  -NPO until mental status improves  -Start D5 + NS at 75cc/hr x 20hr while NPO  -Hypoglycemia protocol with FS monitoring  -Aspiration and seizure precautions    Elevated lactate with anion gap metabolic acidosis  -?Elevated lactate from seizure activity  -Unclear why transient rise in lactate after initial decline from IVF  -UA negative, CXR w/ chronic opacity and small pleural effusion in L-base  -Respiratory therapist unable to obtain ABG overnight, will trend VBG  -No clear infectious source at this time  -Blood and urine cx pending  -Monitor off abx for now, but will start if develops SIRS (fever, worsening leukocytosis)   -To start maintenance IVF as above    RHONDA on CKD with hyperkalemia  -Baseline Cr 1.2, admission Cr 1.6, improving Cr with IVF  -Mild, K+ 5.6 on repeat labs  -Temporizing measure with D5/insulin and NaHCO3  -Check EKG  -Repeat BMP in AM  -Telemetry     Hx Afib  -Not on AC due to GI bleed in the past  -On metoprolol 25mg BID  -Change to IV metoprolol 5mg q6hr    Hx HTN  -Initially with HTN urgency, received IV BP agents in ED with improvement  -IV hydralazine 5mg PRN SBP >170 and/or DBP >110  -Started on gentle IVF for above     Hx dementia  -Per son at baseline, pt confused, sleeps often, requires full assistance with ADLs   -Hold donepezil and topiramate while NPO    Hx hypothyroidism   -Start IV levothyroxine 75mcg daily    Hx HLD  -Hold gemfibrozil while NPO    Prophylactic measure  -Heparin 5000units q8hr 89yoF hx dementia, Afib not on AC due to GI bleeding, CKD, HTN, prior hx of nontraumatic SAH, sent from [facility] for new onset seizure with persistent metabolic encephalopathy     New seizure with persistent post ictal encephalopathy   -CT head w/out acute abnormality, shows small white vessel changes  -Seen by NICU, deemed not candidate for ICU level of care  -Possibly that pt still sedated from receiving IM Versed by EMS  -NICU recommended valproic acid 500mg x 1 and 250mg BID  -NPO until mental status improves  -Start D5 + NS at 75cc/hr x 20hr while NPO  -Hypoglycemia protocol with FS monitoring  -Aspiration and seizure precautions    Elevated lactate with anion gap metabolic acidosis  -?Elevated lactate from seizure activity  -Unclear why transient rise in lactate after initial decline from IVF  -UA negative, CXR w/ chronic opacity and small pleural effusion in L-base  -Respiratory therapist unable to obtain ABG overnight, will trend VBG  -No clear infectious source at this time  -Blood and urine cx pending  -Monitor off abx for now, but will start if develops SIRS (fever, worsening leukocytosis)   -To start maintenance IVF as above    RHONDA on CKD with hyperkalemia  -Baseline Cr 1.2, admission Cr 1.6, improving Cr with IVF  -Mild, K+ 5.6 on repeat labs  -Temporizing measure with D5/insulin and NaHCO3  -Check EKG  -Repeat BMP in AM  -Telemetry     Hx Afib  -Not on AC due to GI bleed in the past  -On metoprolol 25mg BID  -Change to IV metoprolol 5mg q6hr while NPO    Hx HTN  -Initially with HTN urgency, received IV BP agents in ED with improvement  -IV hydralazine 5mg PRN SBP >170 and/or DBP >110  -Started on gentle IVF for above     Hx dementia  -Per son at baseline, pt confused, sleeps often, requires full assistance with ADLs   -Hold donepezil and topiramate while NPO    Hx hypothyroidism   -Start IV levothyroxine 75mcg daily    Hx HLD  -Hold gemfibrozil while NPO    Prophylactic measure  -Heparin 5000units q8hr

## 2023-07-22 NOTE — H&P ADULT - CONVERSATION DETAILS
Pt has MOLST form from 5/12/22 signed by son Theo that states pt is DNR/DNI.  Confirmed code status with son via phone. Pt has hx of dementia, poor historian, currently with poor mental status.  Pt has MOLST form from 5/12/22 signed by dorina Venegas that states pt is DNR/DNI.  Confirmed code status with dorina Venegas via phone.

## 2023-07-22 NOTE — ED ADULT NURSE NOTE - OBJECTIVE STATEMENT
Patient comes to the ER from [facility] s/p witnessed seizure lasting approximately 20minutes. Patient was 5mg versed admin by ems.  No history of seizure.  PMH HTN, HLD and SAH. Patient is currently lethargic but is more arousal's then on arrival. Patients family cane in and stated that patient is less awake then per her normal presentation

## 2023-07-22 NOTE — CONSULT NOTE ADULT - TIME BILLING
89 year old female, presented with change in mental status and seizure-like activity at NH, received Versed 5 mg IVp.  RHONDA on CKD vs worsening CKD; HAGMA due to lactate elevation.  PMH of HFpEF, AF (not on AC 2/2 GIB in past), nontraumatic SAH,  neuropathy, advanced dementia, AAA, HTN, CKD III, hypothyroid.  At baseline - confused, minimally mobile, needs full assistance with ADLS - spends most time in bed sleeping, uses wheel chair.  Has MOLST form: DNR/DNI.    Upon assessment, pt on RA, protects her airways, hemodynamically stable.  Non-verbal but screams with attempts to examine her, no EO, resists exam, PERRL 2 mm, SANCHEZ spont.    Plan:  recommended SDU level of care, Medicine involvement would be appreciated  recommend  mg IV now followed by 250 IV q12hrs, check level with LFT in am  start EEG monitoring (consider Ceribell), Neurology notified  check TSH  cautious IV hydration, recheck VBG  case discussed with ICU and ED teams    Time spent included review of relevant history, clinical examination, review of data and images, discussion of treatment with the multidisciplinary team and any consultants involved in this patient’s care as well as family discussion (son at the bedside).

## 2023-07-22 NOTE — ED PROVIDER NOTE - PHYSICAL EXAMINATION
Gen: elderly cachectic female, unresponsive  Head: normocephalic, atraumatic  EENT: b/l pupils pinpoint. no JVD  Lung: no increased work of breathing, clear to auscultation bilaterally  CV: regular rate, regular rhythm,  2+ radial pulses bilaterally  Abd: soft,  non-distended  MSK: No edema, no visible deformities  Neuro: Awake, alert, no focal neurologic deficits  Skin: No obvious rash, no jaundice. (+) diaphoresis

## 2023-07-22 NOTE — ED ADULT NURSE REASSESSMENT NOTE - NSFALLHARMRISKINTERV_ED_ALL_ED
Assistance OOB with selected safe patient handling equipment if applicable/Assistance with ambulation/Communicate risk of Fall with Harm to all staff, patient, and family/Monitor gait and stability/Monitor for mental status changes and reorient to person, place, and time, as needed/Provide visual cue: red socks, yellow wristband, yellow gown, etc/Reinforce activity limits and safety measures with patient and family/Toileting schedule using arm’s reach rule for commode and bathroom/Use of alarms - bed, stretcher, chair and/or video monitoring/Bed in lowest position, wheels locked, appropriate side rails in place/Call bell, personal items and telephone in reach/Instruct patient to call for assistance before getting out of bed/chair/stretcher/Non-slip footwear applied when patient is off stretcher/[place] to call system/Physically safe environment - no spills, clutter or unnecessary equipment/Purposeful Proactive Rounding/Room/bathroom lighting operational, light cord in reach

## 2023-07-22 NOTE — ED ADULT NURSE REASSESSMENT NOTE - NSFALLHARMRISKINTERV_ED_ALL_ED
Assistance OOB with selected safe patient handling equipment if applicable/Assistance with ambulation/Communicate risk of Fall with Harm to all staff, patient, and family/Monitor gait and stability/Provide visual cue: red socks, yellow wristband, yellow gown, etc/Reinforce activity limits and safety measures with patient and family/Bed in lowest position, wheels locked, appropriate side rails in place/Call bell, personal items and telephone in reach/Instruct patient to call for assistance before getting out of bed/chair/stretcher/Non-slip footwear applied when patient is off stretcher/[place] to call system/Physically safe environment - no spills, clutter or unnecessary equipment/Purposeful Proactive Rounding/Room/bathroom lighting operational, light cord in reach

## 2023-07-22 NOTE — H&P ADULT - HISTORY OF PRESENT ILLNESS
89yoF hx dementia, Afib not on AC due to GI bleeding, CKD, HTN, prior hx of nontraumatic SAH, sent from [facility] for new onset seizure.  Hx obtained from chart review and son Theo via phone.  Pt reportedly had generalized convulsions lasting for several minutes and was witnessed by nursing home staff.  EMS was called and reportedly gave IM Versed 5mg with cessation of seizure activity.  Son states that pt has no hx of prior seizure activity and is unsure of what type of seizure activity pt had or exactly how long it lasted.   Admission CT head w/out acute abnormality.  On admission, pt remained very lethargic and poorly responsive, noted to have elevated lactate, AGMA and NICU was called to evaluate for persistent encephalopathy.

## 2023-07-22 NOTE — CHART NOTE - NSCHARTNOTEFT_GEN_A_CORE
d/w ER. Initial ph 7.18 after reported 20 min tonic clonic seizure. history reviewed, pt was previously on Topamax which may have been missed at [facility]. 4 hours post IVF ph still 7.22 with ongoing mental status change (which may be 2/2 either post ictal vs ongoing status). ER advised to d/w intensive care and evaluation by NICU / MICU. Pending evaluations if not deemed candidate for ICU care ER to call medicine back at that time

## 2023-07-22 NOTE — H&P ADULT - NSVTERISKREFERASSESS_GEN_ALL_CORE
Initial Anesthesia Post-op Note    Patient: Joe May  Procedure(s) Performed: REDUCTION, TORSION, TESTICLE  Anesthesia type: General    Vitals Value Taken Time   Temp 36.4 °C (97.4 °F) 10/1/2019 11:06 AM   Pulse 90 10/1/2019 11:07 AM   Resp 14 10/1/2019 11:06 AM   SpO2 100 % 10/1/2019 11:05 AM   /51 10/1/2019 11:04 AM   Vitals shown include unvalidated device data.    Last 24 I/O:     Intake/Output Summary (Last 24 hours) at 10/1/2019 1107  Last data filed at 10/1/2019 1104  Gross per 24 hour   Intake 800 ml   Output 5 ml   Net 795 ml         Patient Location: PACU Phase 1  Post-op Vital Signs:stable  Level of Consciousness: responds to stimulation and sedated  Respiratory Status: spontaneous ventilation, face mask and oral airway  Cardiovascular blood pressure returned to baseline  Hydration: euvolemic  Pain Management: well controlled  Handoff: Handoff to receiving nurse was performed and questions were answered  Nausea: None  Airway Patency:oral airway  Post-op Assessment: no complications, patient tolerated procedure well with no complications, evidence of recall, dentition within defined limits and moving all extremities     Refer to the Assessment tab to view/cancel completed assessment.

## 2023-07-22 NOTE — H&P ADULT - TIME BILLING
chart review, patient encounter, collateral hx from son, chart documentation.  Plan discussed with son via phone and CDU RN.

## 2023-07-22 NOTE — H&P ADULT - NSHPPHYSICALEXAM_GEN_ALL_CORE
Vital Signs Last 24 Hrs  T(C): 36.7 (23 Jul 2023 01:00), Max: 37.3 (22 Jul 2023 19:55)  T(F): 98.1 (23 Jul 2023 01:00), Max: 99.2 (22 Jul 2023 19:55)  HR: 86 (23 Jul 2023 01:00) (60 - 91)  BP: 159/81 (23 Jul 2023 01:00) (131/70 - 201/96)  BP(mean): --  RR: 20 (23 Jul 2023 01:00) (16 - 20)  SpO2: 100% (23 Jul 2023 01:00) (95% - 100%)    Parameters below as of 23 Jul 2023 01:00  Patient On (Oxygen Delivery Method): nasal cannula  O2 Flow (L/min): 2    GENERAL: Chronically ill-lethargic elderly female, intermittently screams out during exam  EYES:  Eyes closed, intermittent opens eyes with sternal rub  ENT: Dry mucous membrane  RESP:  Non-labored breathing pattern, lungs clear to ausculation in anterior fields  CV: Regular rate and rhythm, no lower extremity edema noted  GI: Soft, non-distended  : Fleming present   NEURO: Lethargic, does not participate in neuro exam due to poor mental status, screams when examined  PSYCH: Sleeping  SKIN: No rash or lesions, warm and dry

## 2023-07-23 LAB
ALBUMIN SERPL ELPH-MCNC: 3.8 G/DL — SIGNIFICANT CHANGE UP (ref 3.3–5.2)
ALP SERPL-CCNC: 68 U/L — SIGNIFICANT CHANGE UP (ref 40–120)
ALT FLD-CCNC: 6 U/L — SIGNIFICANT CHANGE UP
ANION GAP SERPL CALC-SCNC: 16 MMOL/L — SIGNIFICANT CHANGE UP (ref 5–17)
ANION GAP SERPL CALC-SCNC: 17 MMOL/L — SIGNIFICANT CHANGE UP (ref 5–17)
AST SERPL-CCNC: 17 U/L — SIGNIFICANT CHANGE UP
BASE EXCESS BLDV CALC-SCNC: -3.2 MMOL/L — LOW (ref -2–3)
BASOPHILS # BLD AUTO: 0.01 K/UL — SIGNIFICANT CHANGE UP (ref 0–0.2)
BASOPHILS # BLD AUTO: 0.01 K/UL — SIGNIFICANT CHANGE UP (ref 0–0.2)
BASOPHILS NFR BLD AUTO: 0.1 % — SIGNIFICANT CHANGE UP (ref 0–2)
BASOPHILS NFR BLD AUTO: 0.1 % — SIGNIFICANT CHANGE UP (ref 0–2)
BILIRUB SERPL-MCNC: 0.3 MG/DL — LOW (ref 0.4–2)
BUN SERPL-MCNC: 32.5 MG/DL — HIGH (ref 8–20)
BUN SERPL-MCNC: 33.1 MG/DL — HIGH (ref 8–20)
CA-I SERPL-SCNC: 1.22 MMOL/L — SIGNIFICANT CHANGE UP (ref 1.15–1.33)
CALCIUM SERPL-MCNC: 10 MG/DL — SIGNIFICANT CHANGE UP (ref 8.4–10.5)
CALCIUM SERPL-MCNC: 9.6 MG/DL — SIGNIFICANT CHANGE UP (ref 8.4–10.5)
CHLORIDE BLDV-SCNC: 112 MMOL/L — HIGH (ref 96–108)
CHLORIDE SERPL-SCNC: 101 MMOL/L — SIGNIFICANT CHANGE UP (ref 96–108)
CHLORIDE SERPL-SCNC: 102 MMOL/L — SIGNIFICANT CHANGE UP (ref 96–108)
CO2 SERPL-SCNC: 18 MMOL/L — LOW (ref 22–29)
CO2 SERPL-SCNC: 19 MMOL/L — LOW (ref 22–29)
CREAT SERPL-MCNC: 1.25 MG/DL — SIGNIFICANT CHANGE UP (ref 0.5–1.3)
CREAT SERPL-MCNC: 1.34 MG/DL — HIGH (ref 0.5–1.3)
CULTURE RESULTS: NO GROWTH — SIGNIFICANT CHANGE UP
EGFR: 37 ML/MIN/1.73M2 — LOW
EGFR: 40 ML/MIN/1.73M2 — LOW
EOSINOPHIL # BLD AUTO: 0 K/UL — SIGNIFICANT CHANGE UP (ref 0–0.5)
EOSINOPHIL # BLD AUTO: 0 K/UL — SIGNIFICANT CHANGE UP (ref 0–0.5)
EOSINOPHIL NFR BLD AUTO: 0 % — SIGNIFICANT CHANGE UP (ref 0–6)
EOSINOPHIL NFR BLD AUTO: 0 % — SIGNIFICANT CHANGE UP (ref 0–6)
GAS PNL BLDV: 140 MMOL/L — SIGNIFICANT CHANGE UP (ref 136–145)
GAS PNL BLDV: SIGNIFICANT CHANGE UP
GLUCOSE BLDC GLUCOMTR-MCNC: 128 MG/DL — HIGH (ref 70–99)
GLUCOSE BLDC GLUCOMTR-MCNC: 184 MG/DL — HIGH (ref 70–99)
GLUCOSE BLDC GLUCOMTR-MCNC: 232 MG/DL — HIGH (ref 70–99)
GLUCOSE BLDC GLUCOMTR-MCNC: 234 MG/DL — HIGH (ref 70–99)
GLUCOSE BLDC GLUCOMTR-MCNC: 85 MG/DL — SIGNIFICANT CHANGE UP (ref 70–99)
GLUCOSE BLDC GLUCOMTR-MCNC: 95 MG/DL — SIGNIFICANT CHANGE UP (ref 70–99)
GLUCOSE BLDV-MCNC: 102 MG/DL — HIGH (ref 70–99)
GLUCOSE SERPL-MCNC: 150 MG/DL — HIGH (ref 70–99)
GLUCOSE SERPL-MCNC: 163 MG/DL — HIGH (ref 70–99)
HCO3 BLDV-SCNC: 22 MMOL/L — SIGNIFICANT CHANGE UP (ref 22–29)
HCT VFR BLD CALC: 35.6 % — SIGNIFICANT CHANGE UP (ref 34.5–45)
HCT VFR BLD CALC: 38.2 % — SIGNIFICANT CHANGE UP (ref 34.5–45)
HCT VFR BLDA CALC: 34 % — SIGNIFICANT CHANGE UP
HGB BLD CALC-MCNC: 11.3 G/DL — LOW (ref 11.7–16.1)
HGB BLD-MCNC: 11.3 G/DL — LOW (ref 11.5–15.5)
HGB BLD-MCNC: 12.1 G/DL — SIGNIFICANT CHANGE UP (ref 11.5–15.5)
IMM GRANULOCYTES NFR BLD AUTO: 0.4 % — SIGNIFICANT CHANGE UP (ref 0–0.9)
IMM GRANULOCYTES NFR BLD AUTO: 0.4 % — SIGNIFICANT CHANGE UP (ref 0–0.9)
LACTATE BLDV-MCNC: 2.5 MMOL/L — HIGH (ref 0.5–2)
LACTATE BLDV-MCNC: 5 MMOL/L — CRITICAL HIGH (ref 0.5–2)
LACTATE BLDV-MCNC: 5.5 MMOL/L — CRITICAL HIGH (ref 0.5–2)
LACTATE SERPL-SCNC: 2.5 MMOL/L — HIGH (ref 0.5–2)
LYMPHOCYTES # BLD AUTO: 0.68 K/UL — LOW (ref 1–3.3)
LYMPHOCYTES # BLD AUTO: 0.75 K/UL — LOW (ref 1–3.3)
LYMPHOCYTES # BLD AUTO: 5.9 % — LOW (ref 13–44)
LYMPHOCYTES # BLD AUTO: 8.3 % — LOW (ref 13–44)
MCHC RBC-ENTMCNC: 30.6 PG — SIGNIFICANT CHANGE UP (ref 27–34)
MCHC RBC-ENTMCNC: 31 PG — SIGNIFICANT CHANGE UP (ref 27–34)
MCHC RBC-ENTMCNC: 31.7 GM/DL — LOW (ref 32–36)
MCHC RBC-ENTMCNC: 31.7 GM/DL — LOW (ref 32–36)
MCV RBC AUTO: 96.7 FL — SIGNIFICANT CHANGE UP (ref 80–100)
MCV RBC AUTO: 97.5 FL — SIGNIFICANT CHANGE UP (ref 80–100)
MONOCYTES # BLD AUTO: 0.41 K/UL — SIGNIFICANT CHANGE UP (ref 0–0.9)
MONOCYTES # BLD AUTO: 0.86 K/UL — SIGNIFICANT CHANGE UP (ref 0–0.9)
MONOCYTES NFR BLD AUTO: 3.6 % — SIGNIFICANT CHANGE UP (ref 2–14)
MONOCYTES NFR BLD AUTO: 9.5 % — SIGNIFICANT CHANGE UP (ref 2–14)
NEUTROPHILS # BLD AUTO: 10.38 K/UL — HIGH (ref 1.8–7.4)
NEUTROPHILS # BLD AUTO: 7.38 K/UL — SIGNIFICANT CHANGE UP (ref 1.8–7.4)
NEUTROPHILS NFR BLD AUTO: 81.7 % — HIGH (ref 43–77)
NEUTROPHILS NFR BLD AUTO: 90 % — HIGH (ref 43–77)
PCO2 BLDV: 36 MMHG — LOW (ref 39–42)
PH BLDV: 7.39 — SIGNIFICANT CHANGE UP (ref 7.32–7.43)
PLATELET # BLD AUTO: 145 K/UL — LOW (ref 150–400)
PLATELET # BLD AUTO: 150 K/UL — SIGNIFICANT CHANGE UP (ref 150–400)
PO2 BLDV: 48 MMHG — HIGH (ref 25–45)
POTASSIUM BLDV-SCNC: 4.7 MMOL/L — SIGNIFICANT CHANGE UP (ref 3.5–5.1)
POTASSIUM SERPL-MCNC: 4.6 MMOL/L — SIGNIFICANT CHANGE UP (ref 3.5–5.3)
POTASSIUM SERPL-MCNC: 5.6 MMOL/L — HIGH (ref 3.5–5.3)
POTASSIUM SERPL-SCNC: 4.6 MMOL/L — SIGNIFICANT CHANGE UP (ref 3.5–5.3)
POTASSIUM SERPL-SCNC: 5.6 MMOL/L — HIGH (ref 3.5–5.3)
PROT SERPL-MCNC: 7.6 G/DL — SIGNIFICANT CHANGE UP (ref 6.6–8.7)
RBC # BLD: 3.65 M/UL — LOW (ref 3.8–5.2)
RBC # BLD: 3.95 M/UL — SIGNIFICANT CHANGE UP (ref 3.8–5.2)
RBC # FLD: 13.4 % — SIGNIFICANT CHANGE UP (ref 10.3–14.5)
RBC # FLD: 13.6 % — SIGNIFICANT CHANGE UP (ref 10.3–14.5)
SAO2 % BLDV: 82.6 % — SIGNIFICANT CHANGE UP
SODIUM SERPL-SCNC: 136 MMOL/L — SIGNIFICANT CHANGE UP (ref 135–145)
SODIUM SERPL-SCNC: 137 MMOL/L — SIGNIFICANT CHANGE UP (ref 135–145)
SPECIMEN SOURCE: SIGNIFICANT CHANGE UP
WBC # BLD: 11.53 K/UL — HIGH (ref 3.8–10.5)
WBC # BLD: 9.04 K/UL — SIGNIFICANT CHANGE UP (ref 3.8–10.5)
WBC # FLD AUTO: 11.53 K/UL — HIGH (ref 3.8–10.5)
WBC # FLD AUTO: 9.04 K/UL — SIGNIFICANT CHANGE UP (ref 3.8–10.5)

## 2023-07-23 PROCEDURE — 99223 1ST HOSP IP/OBS HIGH 75: CPT

## 2023-07-23 PROCEDURE — 95720 EEG PHY/QHP EA INCR W/VEEG: CPT

## 2023-07-23 PROCEDURE — 99233 SBSQ HOSP IP/OBS HIGH 50: CPT

## 2023-07-23 PROCEDURE — 93010 ELECTROCARDIOGRAM REPORT: CPT

## 2023-07-23 RX ORDER — DEXTROSE 50 % IN WATER 50 %
15 SYRINGE (ML) INTRAVENOUS ONCE
Refills: 0 | Status: DISCONTINUED | OUTPATIENT
Start: 2023-07-23 | End: 2023-07-25

## 2023-07-23 RX ORDER — DEXTROSE 50 % IN WATER 50 %
25 SYRINGE (ML) INTRAVENOUS ONCE
Refills: 0 | Status: DISCONTINUED | OUTPATIENT
Start: 2023-07-23 | End: 2023-07-25

## 2023-07-23 RX ORDER — PANTOPRAZOLE SODIUM 20 MG/1
40 TABLET, DELAYED RELEASE ORAL DAILY
Refills: 0 | Status: DISCONTINUED | OUTPATIENT
Start: 2023-07-23 | End: 2023-07-24

## 2023-07-23 RX ORDER — SODIUM CHLORIDE 9 MG/ML
1000 INJECTION INTRAMUSCULAR; INTRAVENOUS; SUBCUTANEOUS
Refills: 0 | Status: DISCONTINUED | OUTPATIENT
Start: 2023-07-23 | End: 2023-07-24

## 2023-07-23 RX ORDER — INSULIN HUMAN 100 [IU]/ML
5 INJECTION, SOLUTION SUBCUTANEOUS ONCE
Refills: 0 | Status: COMPLETED | OUTPATIENT
Start: 2023-07-23 | End: 2023-07-23

## 2023-07-23 RX ORDER — GLUCAGON INJECTION, SOLUTION 0.5 MG/.1ML
1 INJECTION, SOLUTION SUBCUTANEOUS ONCE
Refills: 0 | Status: DISCONTINUED | OUTPATIENT
Start: 2023-07-23 | End: 2023-07-25

## 2023-07-23 RX ORDER — DEXTROSE 50 % IN WATER 50 %
12.5 SYRINGE (ML) INTRAVENOUS ONCE
Refills: 0 | Status: DISCONTINUED | OUTPATIENT
Start: 2023-07-23 | End: 2023-07-25

## 2023-07-23 RX ORDER — SODIUM BICARBONATE 1 MEQ/ML
50 SYRINGE (ML) INTRAVENOUS ONCE
Refills: 0 | Status: COMPLETED | OUTPATIENT
Start: 2023-07-23 | End: 2023-07-23

## 2023-07-23 RX ORDER — SODIUM CHLORIDE 9 MG/ML
1000 INJECTION, SOLUTION INTRAVENOUS
Refills: 0 | Status: DISCONTINUED | OUTPATIENT
Start: 2023-07-23 | End: 2023-07-24

## 2023-07-23 RX ORDER — VALPROIC ACID (AS SODIUM SALT) 250 MG/5ML
250 SOLUTION, ORAL ORAL EVERY 12 HOURS
Refills: 0 | Status: DISCONTINUED | OUTPATIENT
Start: 2023-07-23 | End: 2023-07-25

## 2023-07-23 RX ORDER — LEVOTHYROXINE SODIUM 125 MCG
75 TABLET ORAL AT BEDTIME
Refills: 0 | Status: DISCONTINUED | OUTPATIENT
Start: 2023-07-23 | End: 2023-07-24

## 2023-07-23 RX ORDER — ONDANSETRON 8 MG/1
4 TABLET, FILM COATED ORAL EVERY 6 HOURS
Refills: 0 | Status: DISCONTINUED | OUTPATIENT
Start: 2023-07-23 | End: 2023-07-25

## 2023-07-23 RX ORDER — ACETAMINOPHEN 500 MG
650 TABLET ORAL EVERY 6 HOURS
Refills: 0 | Status: DISCONTINUED | OUTPATIENT
Start: 2023-07-23 | End: 2023-07-25

## 2023-07-23 RX ORDER — DEXTROSE 50 % IN WATER 50 %
50 SYRINGE (ML) INTRAVENOUS ONCE
Refills: 0 | Status: COMPLETED | OUTPATIENT
Start: 2023-07-23 | End: 2023-07-23

## 2023-07-23 RX ORDER — HEPARIN SODIUM 5000 [USP'U]/ML
5000 INJECTION INTRAVENOUS; SUBCUTANEOUS EVERY 8 HOURS
Refills: 0 | Status: DISCONTINUED | OUTPATIENT
Start: 2023-07-23 | End: 2023-07-25

## 2023-07-23 RX ORDER — METOPROLOL TARTRATE 50 MG
5 TABLET ORAL EVERY 6 HOURS
Refills: 0 | Status: DISCONTINUED | OUTPATIENT
Start: 2023-07-23 | End: 2023-07-24

## 2023-07-23 RX ORDER — HYDRALAZINE HCL 50 MG
5 TABLET ORAL EVERY 6 HOURS
Refills: 0 | Status: DISCONTINUED | OUTPATIENT
Start: 2023-07-23 | End: 2023-07-25

## 2023-07-23 RX ADMIN — Medication 50 MILLIEQUIVALENT(S): at 02:04

## 2023-07-23 RX ADMIN — Medication 50 MILLILITER(S): at 02:04

## 2023-07-23 RX ADMIN — Medication 52.5 MILLIGRAM(S): at 05:39

## 2023-07-23 RX ADMIN — SODIUM CHLORIDE 75 MILLILITER(S): 9 INJECTION, SOLUTION INTRAVENOUS at 09:40

## 2023-07-23 RX ADMIN — Medication 52.5 MILLIGRAM(S): at 17:00

## 2023-07-23 RX ADMIN — INSULIN HUMAN 5 UNIT(S): 100 INJECTION, SOLUTION SUBCUTANEOUS at 02:03

## 2023-07-23 RX ADMIN — HEPARIN SODIUM 5000 UNIT(S): 5000 INJECTION INTRAVENOUS; SUBCUTANEOUS at 05:40

## 2023-07-23 RX ADMIN — PANTOPRAZOLE SODIUM 40 MILLIGRAM(S): 20 TABLET, DELAYED RELEASE ORAL at 12:44

## 2023-07-23 RX ADMIN — HEPARIN SODIUM 5000 UNIT(S): 5000 INJECTION INTRAVENOUS; SUBCUTANEOUS at 13:02

## 2023-07-23 RX ADMIN — HEPARIN SODIUM 5000 UNIT(S): 5000 INJECTION INTRAVENOUS; SUBCUTANEOUS at 22:38

## 2023-07-23 RX ADMIN — SODIUM CHLORIDE 75 MILLILITER(S): 9 INJECTION, SOLUTION INTRAVENOUS at 02:04

## 2023-07-23 RX ADMIN — Medication 75 MICROGRAM(S): at 22:37

## 2023-07-23 RX ADMIN — SODIUM CHLORIDE 100 MILLILITER(S): 9 INJECTION INTRAMUSCULAR; INTRAVENOUS; SUBCUTANEOUS at 22:48

## 2023-07-23 RX ADMIN — Medication 5 MILLIGRAM(S): at 05:40

## 2023-07-23 NOTE — CONSULT NOTE ADULT - ASSESSMENT
The patient is a 89y Female who is followed by neurology because of seizure    New onset seizure  video EEG monitoring  can continue depakote for now 250 mg IV q 12h  follow depakote levels (together with albumin to correct)  concern for lactate elevation- if due to convulsive seizure alone this should be decreasing    Dementia  supportive care    discussed with Dr Soto    will follow with you    Navjot Trivedi MD PhD   [id number]

## 2023-07-23 NOTE — CONSULT NOTE ADULT - SUBJECTIVE AND OBJECTIVE BOX
[facility]                                     Ne[facility]                                 Shikha Upton, & Kristian                                  [address]                                        [place], [place], [address]                                             [phone]    CC: seizure  HPI:  The patient is a 89y Female who presented with reported convulsive activity, lasting up to 20 minutes, that was stopped when EMS gave her versed IM.  Per chart she has no previous seizure history.  She has not had report of recurrent seizure in hospital.  she had elevated lactate that decreased and is nmow rising again despite lack of continued convulsive activity.  She has what appears to be advanced dementia and cn not provide any history.  neurology is asked to evaluate.    PAST MEDICAL & SURGICAL HISTORY:  HTN (hypertension)      Diabetes mellitus      Hypothyroid      Dementia      GI bleed      Neuropathy      GERD (gastroesophageal reflux disease)      Abdominal aortic aneurysm      Stage 3 chronic kidney disease      S/P hysterectomy      S/P hip replacement      Pacemaker          MEDICATIONS  (STANDING):  dextrose 5% + sodium chloride 0.9%. 1000 milliLiter(s) (75 mL/Hr) IV Continuous <Continuous>  dextrose 50% Injectable 25 Gram(s) IV Push once  dextrose 50% Injectable 12.5 Gram(s) IV Push once  dextrose 50% Injectable 25 Gram(s) IV Push once  dextrose Oral Gel 15 Gram(s) Oral once  glucagon  Injectable 1 milliGRAM(s) IntraMuscular once  heparin   Injectable 5000 Unit(s) SubCutaneous every 8 hours  levothyroxine Injectable 75 MICROGram(s) IV Push at bedtime  metoprolol tartrate Injectable 5 milliGRAM(s) IV Push every 6 hours  pantoprazole  Injectable 40 milliGRAM(s) IV Push daily  sodium chloride 0.9%. 1000 milliLiter(s) (100 mL/Hr) IV Continuous <Continuous>  valproate sodium  IVPB 250 milliGRAM(s) IV Intermittent every 12 hours    MEDICATIONS  (PRN):  acetaminophen     Tablet .. 650 milliGRAM(s) Oral every 6 hours PRN Temp greater or equal to 38C (100.4F), Mild Pain (1 - 3), Moderate Pain (4 - 6)  hydrALAZINE Injectable 5 milliGRAM(s) IV Push every 6 hours PRN SBP >170 or/and DBP >110  ondansetron Injectable 4 milliGRAM(s) IV Push every 6 hours PRN Nausea and/or Vomiting      Allergies    No Known Allergies    Intolerances        SOCIAL HISTORY:  unable to obtain    FAMILY HISTORY:  FHx: diabetes mellitus    ROS: 14 point ROS negative other than what is present in HPI or below    Vital Signs Last 24 Hrs  T(C): 36.7 (23 Jul 2023 11:20), Max: 37.3 (22 Jul 2023 19:55)  T(F): 98 (23 Jul 2023 11:20), Max: 99.2 (22 Jul 2023 19:55)  HR: 59 (23 Jul 2023 11:20) (59 - 86)  BP: 134/63 (23 Jul 2023 11:20) (125/70 - 201/96)  BP(mean): --  RR: 18 (23 Jul 2023 11:20) (18 - 20)  SpO2: 96% (23 Jul 2023 11:20) (96% - 100%)    Parameters below as of 23 Jul 2023 11:20  Patient On (Oxygen Delivery Method): nasal cannula  O2 Flow (L/min): 2        General: NAD    Detailed Neurologic Exam:    Mental status: The patient is awake and alert and has diminished attention span.  The patient is fully oriented in 0 spheres. The patient is non verbal and only responds with sounds, not words, to stimuli.    Cranial nerves: Pupils equal and react symmetrically to light. Unable to assess visual field deficit to confrontation or extraocular motion as she forcefully closes eyes and won't allow me to open them There is no ptosis. Facial sensation is intact. Facial musculature is symmetric- she fights eye opening equally and has sym grimace.     Motor: There is normal bulk and tone.  There is no tremor.  Moves 4 ext to stimuli with diffuse symmetric weakness    Sensation: grimace/vocalization to pinch in 4 extremities    Reflexes: 1+ throughout and plantar responses are flexor.    Cerebellar: Unable to assess dysmetria on finger to nose testing.    Gait : deferred    LABS:                         11.3   9.04  )-----------( 145      ( 23 Jul 2023 06:20 )             35.6       07-23    137  |  102  |  32.5<H>  ----------------------------<  163<H>  4.6   |  18.0<L>  |  1.25    Ca    9.6      23 Jul 2023 06:20  Mg     2.2     07-22    TPro  7.6  /  Alb  3.8  /  TBili  0.3<L>  /  DBili  x   /  AST  17  /  ALT  6   /  AlkPhos  68  07-23      PT/INR - ( 22 Jul 2023 12:42 )   PT: 13.4 sec;   INR: 1.15 ratio         PTT - ( 22 Jul 2023 12:42 )  PTT:31.8 sec    Blood Gas Venous - Lactate (07.23.23 @ 06:20)    Blood Gas Venous - Lactate: 5.50:     Blood Gas Venous - Lactate (07.23.23 @ 00:36)    Blood Gas Venous - Lactate: 5.00:    Blood Gas Venous - Lactate (07.22.23 @ 16:00)    Blood Gas Venous - Lactate: 2.80:      RADIOLOGY & ADDITIONAL STUDIES (independently reviewed unless otherwise noted):  CT Head No Cont (07.22.23 @ 12:25)   IMPRESSION:    -No acute intracranial findings.    -White matter small vessel changes.

## 2023-07-23 NOTE — PATIENT PROFILE ADULT - PATIENT'S GENDER IDENTITY
I have personally seen and examined this patient.  I have fully participated in the care of this patient. I have reviewed all pertinent clinical information, including history, physical exam, plan and the Resident’s note and agree except as noted.
Female

## 2023-07-23 NOTE — CHART NOTE - NSCHARTNOTEFT_GEN_A_CORE
EEG preliminary read (not final) on the initial recording hour(s) = x 2 hours    No seizures recorded.    Moderate slowing noted, nonspecific.  Final report to follow tomorrow morning after completion of study.    Lela Del Cid MD  Epilepsy Fellow    [facility] EEG Reading Room Ph#: [phone]  Epilepsy Answering Service after 5PM and before 8:30AM: Ph#: [phone]

## 2023-07-23 NOTE — PROGRESS NOTE ADULT - ASSESSMENT
89yoF hx dementia, Afib not on AC due to GI bleeding, CKD, HTN, prior hx of nontraumatic SAH, sent from [facility] for new onset seizure with persistent metabolic encephalopathy     New seizure with persistent post ictal encephalopathy   CT head w/out acute abnormality, shows small white vessel changes  Seen by NICU, deemed not candidate for ICU level of care  s/p IM versed by EMS  After initially presenting with met acidosis suspected 2/2 lactic acidosis from seizure lactate improved. Overnight however again spiked and now again improved  Suspect pt may have been having elevated lactoic acidosis from seizures and improves post events   Continue w/u for possible alternative infectious etiologies   (should not have improved w/o any abx if 2/2 infection)   UA negative, CXR w/ chronic opacity and small pleural effusion in L-base  Continue IVF (NS)    RHONDA on CKD with hyperkalemia  Baseline Cr 1.2, admission Cr 1.6, improving Cr with IVF  Improved     Hx Afib  Not on AC due to GI bleed in the past  On metoprolol 25mg BID  Continue IV BB for now    Hx HTN  Initially with HTN urgency, received IV BP agents in ED with improvement  IV hydralazine 5mg PRN SBP >170 and/or DBP >110  Started on gentle IVF for above     Hx dementia  -Per son at baseline, pt confused, sleeps often, requires full assistance with ADLs   -Hold donepezil and topiramate while NPO    Hx hypothyroidism   IV levothyroxine 75mcg daily    Hx HLD  gemfibrozil while NPO    Prophylactic measure  Heparin 5000units q8hr

## 2023-07-23 NOTE — PATIENT PROFILE ADULT - FALL HARM RISK - HARM RISK INTERVENTIONS

## 2023-07-23 NOTE — PROGRESS NOTE ADULT - SUBJECTIVE AND OBJECTIVE BOX
[facility] Division of Hospital Medicine    Chief Complaint:  Seizure     SUBJECTIVE / OVERNIGHT EVENTS:  Pt examined lying in ER  Still minimally responsive opens eyes to tactile stimuli   ROS not obtained 2/2 mental status     MEDICATIONS  (STANDING):  dextrose 5% + sodium chloride 0.9%. 1000 milliLiter(s) (75 mL/Hr) IV Continuous <Continuous>  dextrose 50% Injectable 25 Gram(s) IV Push once  dextrose 50% Injectable 12.5 Gram(s) IV Push once  dextrose 50% Injectable 25 Gram(s) IV Push once  dextrose Oral Gel 15 Gram(s) Oral once  glucagon  Injectable 1 milliGRAM(s) IntraMuscular once  heparin   Injectable 5000 Unit(s) SubCutaneous every 8 hours  levothyroxine Injectable 75 MICROGram(s) IV Push at bedtime  metoprolol tartrate Injectable 5 milliGRAM(s) IV Push every 6 hours  pantoprazole  Injectable 40 milliGRAM(s) IV Push daily  sodium chloride 0.9%. 1000 milliLiter(s) (100 mL/Hr) IV Continuous <Continuous>  valproate sodium  IVPB 250 milliGRAM(s) IV Intermittent every 12 hours    MEDICATIONS  (PRN):  acetaminophen     Tablet .. 650 milliGRAM(s) Oral every 6 hours PRN Temp greater or equal to 38C (100.4F), Mild Pain (1 - 3), Moderate Pain (4 - 6)  hydrALAZINE Injectable 5 milliGRAM(s) IV Push every 6 hours PRN SBP >170 or/and DBP >110  ondansetron Injectable 4 milliGRAM(s) IV Push every 6 hours PRN Nausea and/or Vomiting        I&O's Summary      PHYSICAL EXAM:  Vital Signs Last 24 Hrs  T(C): 36.8 (23 Jul 2023 15:27), Max: 37.3 (22 Jul 2023 19:55)  T(F): 98.2 (23 Jul 2023 15:27), Max: 99.2 (22 Jul 2023 19:55)  HR: 61 (23 Jul 2023 15:27) (59 - 86)  BP: 150/68 (23 Jul 2023 15:27) (125/70 - 185/88)  BP(mean): --  RR: 18 (23 Jul 2023 15:27) (18 - 20)  SpO2: 100% (23 Jul 2023 15:27) (96% - 100%)    Parameters below as of 23 Jul 2023 15:27  Patient On (Oxygen Delivery Method): nasal cannula  O2 Flow (L/min): 2          CONSTITUTIONAL: Elderly female, frail, min responsive lying in bed  ENMT: Dry oral mucosa, no pharyngeal injection or exudates  RESPIRATORY: Shallow BS b/l, no rhon chi or wheezes appreciated   CARDIOVASCULAR: Regular rate and rhythm, normal S1 and S2, no murmur/rub/gallop; No lower extremity edema; Peripheral pulses are 2+ bilaterally  ABDOMEN: Nontender to palpation, normoactive bowel sounds, no rebound/guarding; No hepatosplenomegaly  MUSCLOSKELETAL:  no clubbing or cyanosis of digits; no joint swelling or tenderness to palpation  PSYCH: Unable to obtain   NEUROLOGY: Unable to participate   SKIN: No rashes; no palpable lesions    LABS:                        11.3   9.04  )-----------( 145      ( 23 Jul 2023 06:20 )             35.6     07-23    137  |  102  |  32.5<H>  ----------------------------<  163<H>  4.6   |  18.0<L>  |  1.25    Ca    9.6      23 Jul 2023 06:20  Mg     2.2     07-22    TPro  7.6  /  Alb  3.8  /  TBili  0.3<L>  /  DBili  x   /  AST  17  /  ALT  6   /  AlkPhos  68  07-23    PT/INR - ( 22 Jul 2023 12:42 )   PT: 13.4 sec;   INR: 1.15 ratio         PTT - ( 22 Jul 2023 12:42 )  PTT:31.8 sec  CARDIAC MARKERS ( 22 Jul 2023 12:42 )  x     / <0.01 ng/mL / x     / x     / x          Urinalysis Basic - ( 23 Jul 2023 06:20 )    Color: x / Appearance: x / SG: x / pH: x  Gluc: 163 mg/dL / Ketone: x  / Bili: x / Urobili: x   Blood: x / Protein: x / Nitrite: x   Leuk Esterase: x / RBC: x / WBC x   Sq Epi: x / Non Sq Epi: x / Bacteria: x        CAPILLARY BLOOD GLUCOSE      POCT Blood Glucose.: 85 mg/dL (23 Jul 2023 16:01)  POCT Blood Glucose.: 95 mg/dL (23 Jul 2023 09:10)  POCT Blood Glucose.: 184 mg/dL (23 Jul 2023 05:50)  POCT Blood Glucose.: 232 mg/dL (23 Jul 2023 04:26)  POCT Blood Glucose.: 234 mg/dL (23 Jul 2023 03:00)  POCT Blood Glucose.: 128 mg/dL (23 Jul 2023 02:02)        RADIOLOGY & ADDITIONAL TESTS:  Results Reviewed:   Imaging Personally Reviewed:  Electrocardiogram Personally Reviewed:

## 2023-07-23 NOTE — PATIENT PROFILE ADULT - NSPROPTRIGHTSUPPORTPERSON_GEN_A_NUR
"  [facility]  [address] [place] [address]    Outpatient Speech Language Pathology   Pediatric Speech and Language Treatment Note      Today's Visit Information         Patient Name: Manuela Graves      : 2021      MRN: [id number]           Visit Date: 3/29/2023          Visit Dx:  (Q90.9) Down syndrome    (F80.9) Speech delay    (F80.2) Mixed receptive-expressive language disorder    (F80.9) Speech and language deficits     Subjective    • Manuela was seen for speech and language therapy on today's date. Manuela was accompanied to the session by her mother. She transitioned to go with the therapist without difficulty. Mother remains in vehicle.        • Behavior(s) observed this date: alert, awake, cooperative, required consistent physical prompts and redirection, poor attention/distractible, delayed response, frustrated, happy, sad and crying.      Objective    • Planned Interventions: play based interventions, sing song stimuli, basic concepts, sensory gym stimuli, sensory light room stimuli, outdoor play and puzzles      Speech Goals    Long Term Goals:     1. Pt will improve overall receptive language skills to functional level to communicate w/ others.   2. Pt will improve overall expressive language skills to functional level to communicate w/ others.   3. Pt will improve overall social pragmatic language skills to functional level to communicate w/ others.          Short Term Goals:  1. Child will verbally produce early developing sounds in all word positions (M, N, B, P, Y, H, D, W) in 8/10 opp w/ min cues over 3 consecutive sessions.  *child produces vocal play of jargon and babbling. She verbalizes /m/ \"ma\" \"ball\" /b/ \"bye\" \"bubble\" \"baby\" \"babydoll\" \"pop\" and shakes head \"no\" and waves \"hi/bye\"; auditory bombardment from SLP across entire session for early developmental sounds and sequences. Increased vocal play this session of unintelligible " "productions. Increased vocalizations during mirror play and during bubbles.       2. Child will respond to spoken name productions in 4/5 opp w/ min cues over 3 consecutive sessions.  *child produces smile response intermittently to SLP stating child's name approx 50% opp this session. She waves and babbles at self in mirror play.     3. Child will id age-appropriate basic concepts in 8/10 opp w/ min cues over 3 consecutive sessions  *auditory bombardment for colors, numbers, 1-2-3, ready-set-go, etc. w/ sensory gym stimuli and w/ play based stimuli. Child looks at all stimuli after delayed response. She does seek holding stimuli or throwing items. Does seek mouthing and licking items. She enjoys bubbles this session and swinging on platform swing.      4. Child will indicate item desired via gesture or verbal approximation in 3/5 opp accuracy given mod cues over 3 consecutive sessions  *child produces vocal play of jargon and babbling w/ increased sounds when child playing w/ similar age peer or during mirror play. She verbalizes /m/ \"ma\" \"ball\" /b/ \"bye\" \"bubble\" \"baby\" and shakes head \"no\" and waves \"hi/bye\"; auditory bombardment from SLP across entire session for early developmental sounds and sequences. Increased vocal play this session of unintelligible productions. Increased vocalizations during mirror play and sensory wall.        5. Child will follow simple age-appropriate 1-step directions in 3/5 opp w/ min cues  *direct assistance from SLP for all activities. Limited safety awareness.       6. Child will correct receptively/expressively identify item/object FO2 w/ min cues over 3 consecutive session  *child produces vocal play of jargon and babbling. She verbalizes /m/ \"ma\" \"ball\" /b/ \"bye\" \"bubble\" \"baby\" \"babydoll\" \"pop\" and shakes head \"no\" and waves \"hi/bye\"; auditory bombardment from SLP across entire session for early developmental sounds and sequences. Increased vocal play this session of " unintelligible productions. Increased vocalizations during mirror play and during bubbles.                Early screening for diagnosis and treatment will be utilized.          Assessment     • Manuela presents with mild to moderately delayed receptive language skills (understanding what is said to her) and expressive language skills (communicating their wants and needs to others with gestures, AAC or spoken language) as characterized by today's evaluation and mother's report. This is impacting her ability to communicate effectively with medical professionals and communication partners in all activities of daily living across all settings.      Plan     • It is recommended that Manuela continue speech and language therapy to allow for improved independence communicating wants and needs during ADLs per patient's plan of care.        •    Plan of Care: Continue Speech Therapy 1 time(s) per week for 12 weeks.         Planned Interventions: play based interventions, sing song stimuli, basic concepts, sensory gym stimuli, sensory light room stimuli, outdoor play and puzzles            Billed Treatment Time    o Total Time Calculation: 45 minutes        Planned CPT Codes: Speech/Language 16836          Referring Provider:  Leela Sorenson Md  [address] [place] [address]   NPI: [id number]          Today's Treatment Provided by:  Thank you for allowing me to participate in the care of your patient-      Liliya Kim M.A.,CCC-SLP        3/29/2023    Speech-Language Pathologist  [address], [address]  Office [phone] ext. [phone]   Fax [phone] [place] License Number: [id number]  [facility] Licence Number: [id number]     Electronically Signed      same name as above

## 2023-07-24 LAB
BASE EXCESS BLDV CALC-SCNC: -5.2 MMOL/L — LOW (ref -2–3)
CA-I SERPL-SCNC: 1.18 MMOL/L — SIGNIFICANT CHANGE UP (ref 1.15–1.33)
CHLORIDE BLDV-SCNC: 112 MMOL/L — HIGH (ref 96–108)
GAS PNL BLDV: 140 MMOL/L — SIGNIFICANT CHANGE UP (ref 136–145)
GAS PNL BLDV: SIGNIFICANT CHANGE UP
GLUCOSE BLDC GLUCOMTR-MCNC: 100 MG/DL — HIGH (ref 70–99)
GLUCOSE BLDC GLUCOMTR-MCNC: 113 MG/DL — HIGH (ref 70–99)
GLUCOSE BLDC GLUCOMTR-MCNC: 81 MG/DL — SIGNIFICANT CHANGE UP (ref 70–99)
GLUCOSE BLDV-MCNC: 90 MG/DL — SIGNIFICANT CHANGE UP (ref 70–99)
HCO3 BLDV-SCNC: 20 MMOL/L — LOW (ref 22–29)
HCT VFR BLD CALC: 35.1 % — SIGNIFICANT CHANGE UP (ref 34.5–45)
HCT VFR BLDA CALC: 34 % — SIGNIFICANT CHANGE UP
HGB BLD CALC-MCNC: 11.4 G/DL — LOW (ref 11.7–16.1)
HGB BLD-MCNC: 10.5 G/DL — LOW (ref 11.5–15.5)
LACTATE BLDV-MCNC: 2.3 MMOL/L — HIGH (ref 0.5–2)
MCHC RBC-ENTMCNC: 29.9 GM/DL — LOW (ref 32–36)
MCHC RBC-ENTMCNC: 30.2 PG — SIGNIFICANT CHANGE UP (ref 27–34)
MCV RBC AUTO: 100.9 FL — HIGH (ref 80–100)
PCO2 BLDV: 37 MMHG — LOW (ref 39–42)
PH BLDV: 7.35 — SIGNIFICANT CHANGE UP (ref 7.32–7.43)
PLATELET # BLD AUTO: 114 K/UL — LOW (ref 150–400)
PO2 BLDV: 107 MMHG — HIGH (ref 25–45)
POTASSIUM BLDV-SCNC: 4.3 MMOL/L — SIGNIFICANT CHANGE UP (ref 3.5–5.1)
PROCALCITONIN SERPL-MCNC: 0.1 NG/ML — SIGNIFICANT CHANGE UP (ref 0.02–0.1)
RBC # BLD: 3.48 M/UL — LOW (ref 3.8–5.2)
RBC # FLD: 13.8 % — SIGNIFICANT CHANGE UP (ref 10.3–14.5)
SAO2 % BLDV: 100 % — SIGNIFICANT CHANGE UP
WBC # BLD: 5.02 K/UL — SIGNIFICANT CHANGE UP (ref 3.8–10.5)
WBC # FLD AUTO: 5.02 K/UL — SIGNIFICANT CHANGE UP (ref 3.8–10.5)

## 2023-07-24 PROCEDURE — 95718 EEG PHYS/QHP 2-12 HR W/VEEG: CPT

## 2023-07-24 PROCEDURE — 99232 SBSQ HOSP IP/OBS MODERATE 35: CPT

## 2023-07-24 RX ORDER — TAMSULOSIN HYDROCHLORIDE 0.4 MG/1
0.4 CAPSULE ORAL AT BEDTIME
Refills: 0 | Status: DISCONTINUED | OUTPATIENT
Start: 2023-07-24 | End: 2023-07-25

## 2023-07-24 RX ORDER — FERROUS SULFATE 325(65) MG
325 TABLET ORAL DAILY
Refills: 0 | Status: DISCONTINUED | OUTPATIENT
Start: 2023-07-24 | End: 2023-07-25

## 2023-07-24 RX ORDER — DONEPEZIL HYDROCHLORIDE 10 MG/1
10 TABLET, FILM COATED ORAL AT BEDTIME
Refills: 0 | Status: DISCONTINUED | OUTPATIENT
Start: 2023-07-24 | End: 2023-07-25

## 2023-07-24 RX ORDER — LEVOTHYROXINE SODIUM 125 MCG
150 TABLET ORAL DAILY
Refills: 0 | Status: DISCONTINUED | OUTPATIENT
Start: 2023-07-24 | End: 2023-07-25

## 2023-07-24 RX ORDER — CHLORHEXIDINE GLUCONATE 213 G/1000ML
1 SOLUTION TOPICAL
Refills: 0 | Status: DISCONTINUED | OUTPATIENT
Start: 2023-07-24 | End: 2023-07-25

## 2023-07-24 RX ORDER — METOPROLOL TARTRATE 50 MG
25 TABLET ORAL
Refills: 0 | Status: DISCONTINUED | OUTPATIENT
Start: 2023-07-24 | End: 2023-07-25

## 2023-07-24 RX ORDER — GEMFIBROZIL 600 MG
600 TABLET ORAL
Refills: 0 | Status: DISCONTINUED | OUTPATIENT
Start: 2023-07-24 | End: 2023-07-25

## 2023-07-24 RX ORDER — PANTOPRAZOLE SODIUM 20 MG/1
40 TABLET, DELAYED RELEASE ORAL
Refills: 0 | Status: DISCONTINUED | OUTPATIENT
Start: 2023-07-24 | End: 2023-07-25

## 2023-07-24 RX ADMIN — PANTOPRAZOLE SODIUM 40 MILLIGRAM(S): 20 TABLET, DELAYED RELEASE ORAL at 18:31

## 2023-07-24 RX ADMIN — PANTOPRAZOLE SODIUM 40 MILLIGRAM(S): 20 TABLET, DELAYED RELEASE ORAL at 11:46

## 2023-07-24 RX ADMIN — Medication 25 MILLIGRAM(S): at 18:30

## 2023-07-24 RX ADMIN — Medication 52.5 MILLIGRAM(S): at 18:30

## 2023-07-24 RX ADMIN — TAMSULOSIN HYDROCHLORIDE 0.4 MILLIGRAM(S): 0.4 CAPSULE ORAL at 23:01

## 2023-07-24 RX ADMIN — Medication 600 MILLIGRAM(S): at 18:31

## 2023-07-24 RX ADMIN — SODIUM CHLORIDE 100 MILLILITER(S): 9 INJECTION INTRAMUSCULAR; INTRAVENOUS; SUBCUTANEOUS at 05:49

## 2023-07-24 RX ADMIN — DONEPEZIL HYDROCHLORIDE 10 MILLIGRAM(S): 10 TABLET, FILM COATED ORAL at 23:01

## 2023-07-24 RX ADMIN — HEPARIN SODIUM 5000 UNIT(S): 5000 INJECTION INTRAVENOUS; SUBCUTANEOUS at 23:02

## 2023-07-24 RX ADMIN — HEPARIN SODIUM 5000 UNIT(S): 5000 INJECTION INTRAVENOUS; SUBCUTANEOUS at 05:49

## 2023-07-24 RX ADMIN — CHLORHEXIDINE GLUCONATE 1 APPLICATION(S): 213 SOLUTION TOPICAL at 11:46

## 2023-07-24 RX ADMIN — Medication 325 MILLIGRAM(S): at 18:31

## 2023-07-24 RX ADMIN — Medication 52.5 MILLIGRAM(S): at 05:48

## 2023-07-24 RX ADMIN — HEPARIN SODIUM 5000 UNIT(S): 5000 INJECTION INTRAVENOUS; SUBCUTANEOUS at 14:26

## 2023-07-24 RX ADMIN — Medication 5 MILLIGRAM(S): at 11:46

## 2023-07-24 NOTE — PROGRESS NOTE ADULT - SUBJECTIVE AND OBJECTIVE BOX
[facility] Division of Hospital Medicine    Chief Complaint:  Seizure     SUBJECTIVE / OVERNIGHT EVENTS:  Pt examined lying in ER  Still minimally responsive opens eyes to tactile stimuli   ROS not obtained 2/2 mental status     MEDICATIONS  (STANDING):  dextrose 5% + sodium chloride 0.9%. 1000 milliLiter(s) (75 mL/Hr) IV Continuous <Continuous>  dextrose 50% Injectable 25 Gram(s) IV Push once  dextrose 50% Injectable 12.5 Gram(s) IV Push once  dextrose 50% Injectable 25 Gram(s) IV Push once  dextrose Oral Gel 15 Gram(s) Oral once  glucagon  Injectable 1 milliGRAM(s) IntraMuscular once  heparin   Injectable 5000 Unit(s) SubCutaneous every 8 hours  levothyroxine Injectable 75 MICROGram(s) IV Push at bedtime  metoprolol tartrate Injectable 5 milliGRAM(s) IV Push every 6 hours  pantoprazole  Injectable 40 milliGRAM(s) IV Push daily  sodium chloride 0.9%. 1000 milliLiter(s) (100 mL/Hr) IV Continuous <Continuous>  valproate sodium  IVPB 250 milliGRAM(s) IV Intermittent every 12 hours    MEDICATIONS  (PRN):  acetaminophen     Tablet .. 650 milliGRAM(s) Oral every 6 hours PRN Temp greater or equal to 38C (100.4F), Mild Pain (1 - 3), Moderate Pain (4 - 6)  hydrALAZINE Injectable 5 milliGRAM(s) IV Push every 6 hours PRN SBP >170 or/and DBP >110  ondansetron Injectable 4 milliGRAM(s) IV Push every 6 hours PRN Nausea and/or Vomiting        I&O's Summary      PHYSICAL EXAM:  Vital Signs Last 24 Hrs  T(C): 36.5 (24 Jul 2023 11:37), Max: 36.8 (23 Jul 2023 15:27)  T(F): 97.7 (24 Jul 2023 11:37), Max: 98.2 (23 Jul 2023 15:27)  HR: 63 (24 Jul 2023 11:37) (59 - 63)  BP: 151/72 (24 Jul 2023 11:37) (134/81 - 155/74)  BP(mean): 99 (24 Jul 2023 11:37) (99 - 99)  RR: 18 (24 Jul 2023 07:45) (16 - 18)  SpO2: 95% (24 Jul 2023 07:45) (94% - 100%)    Parameters below as of 24 Jul 2023 07:45  Patient On (Oxygen Delivery Method): nasal cannula  O2 Flow (L/min): 2            CONSTITUTIONAL: Elderly female, frail, min responsive lying in bed  ENMT: Dry oral mucosa, no pharyngeal injection or exudates  RESPIRATORY: Shallow BS b/l, no rhonchi or wheezes appreciated   CARDIOVASCULAR: Regular rate and rhythm, normal S1 and S2, no murmur/rub/gallop; No lower extremity edema; Peripheral pulses are 2+ bilaterally  ABDOMEN: Nontender to palpation, normoactive bowel sounds, no rebound/guarding; No hepatosplenomegaly  MUSCLOSKELETAL:  no clubbing or cyanosis of digits; no joint swelling or tenderness to palpation  PSYCH: Unable to obtain   NEUROLOGY: Unable to participate   SKIN: No rashes; no palpable lesions    LABS:                                   10.5   5.02  )-----------( 114      ( 24 Jul 2023 04:40 )             35.1   07-23    137  |  102  |  32.5<H>  ----------------------------<  163<H>  4.6   |  18.0<L>  |  1.25    Ca    9.6      23 Jul 2023 06:20    TPro  7.6  /  Alb  3.8  /  TBili  0.3<L>  /  DBili  x   /  AST  17  /  ALT  6   /  AlkPhos  68  07-23    Urinalysis Basic - ( 23 Jul 2023 06:20 )    Color: x / Appearance: x / SG: x / pH: x  Gluc: 163 mg/dL / Ketone: x  / Bili: x / Urobili: x   Blood: x / Protein: x / Nitrite: x   Leuk Esterase: x / RBC: x / WBC x   Sq Epi: x / Non Sq Epi: x / Bacteria: x        CAPILLARY BLOOD GLUCOSE      POCT Blood Glucose.: 85 mg/dL (23 Jul 2023 16:01)  POCT Blood Glucose.: 95 mg/dL (23 Jul 2023 09:10)  POCT Blood Glucose.: 184 mg/dL (23 Jul 2023 05:50)  POCT Blood Glucose.: 232 mg/dL (23 Jul 2023 04:26)  POCT Blood Glucose.: 234 mg/dL (23 Jul 2023 03:00)  POCT Blood Glucose.: 128 mg/dL (23 Jul 2023 02:02)        RADIOLOGY & ADDITIONAL TESTS:  Results Reviewed:   Imaging Personally Reviewed:  Electrocardiogram Personally Reviewed:                                           [facility] Division of Hospital Medicine    Chief Complaint:  Seizure     SUBJECTIVE / OVERNIGHT EVENTS:  Pt examined lying in bed  Awake and alert, interactive but unable to give any meaningful history or ROS due to confusion   ROS not obtained 2/2 mental status     MEDICATIONS  (STANDING):  dextrose 5% + sodium chloride 0.9%. 1000 milliLiter(s) (75 mL/Hr) IV Continuous <Continuous>  dextrose 50% Injectable 25 Gram(s) IV Push once  dextrose 50% Injectable 12.5 Gram(s) IV Push once  dextrose 50% Injectable 25 Gram(s) IV Push once  dextrose Oral Gel 15 Gram(s) Oral once  glucagon  Injectable 1 milliGRAM(s) IntraMuscular once  heparin   Injectable 5000 Unit(s) SubCutaneous every 8 hours  levothyroxine Injectable 75 MICROGram(s) IV Push at bedtime  metoprolol tartrate Injectable 5 milliGRAM(s) IV Push every 6 hours  pantoprazole  Injectable 40 milliGRAM(s) IV Push daily  sodium chloride 0.9%. 1000 milliLiter(s) (100 mL/Hr) IV Continuous <Continuous>  valproate sodium  IVPB 250 milliGRAM(s) IV Intermittent every 12 hours    MEDICATIONS  (PRN):  acetaminophen     Tablet .. 650 milliGRAM(s) Oral every 6 hours PRN Temp greater or equal to 38C (100.4F), Mild Pain (1 - 3), Moderate Pain (4 - 6)  hydrALAZINE Injectable 5 milliGRAM(s) IV Push every 6 hours PRN SBP >170 or/and DBP >110  ondansetron Injectable 4 milliGRAM(s) IV Push every 6 hours PRN Nausea and/or Vomiting        I&O's Summary      PHYSICAL EXAM:  Vital Signs Last 24 Hrs  T(C): 36.6 (24 Jul 2023 15:31), Max: 36.6 (24 Jul 2023 15:31)  T(F): 97.8 (24 Jul 2023 15:31), Max: 97.8 (24 Jul 2023 15:31)  HR: 60 (24 Jul 2023 15:31) (59 - 63)  BP: 148/73 (24 Jul 2023 15:31) (134/81 - 155/74)  BP(mean): 98 (24 Jul 2023 15:31) (98 - 99)  RR: 18 (24 Jul 2023 15:31) (16 - 18)  SpO2: 98% (24 Jul 2023 15:31) (94% - 98%)    Parameters below as of 24 Jul 2023 15:31  Patient On (Oxygen Delivery Method): room air          CONSTITUTIONAL: Elderly female, frail, min responsive lying in bed  ENMT: Dry oral mucosa, no pharyngeal injection or exudates  RESPIRATORY: Shallow BS b/l, no rhonchi or wheezes appreciated   CARDIOVASCULAR: Regular rate and rhythm, normal S1 and S2, no murmur/rub/gallop; No lower extremity edema; Peripheral pulses are 2+ bilaterally  ABDOMEN: Nontender to palpation, normoactive bowel sounds, no rebound/guarding; No hepatosplenomegaly  MUSCLOSKELETAL:  no clubbing or cyanosis of digits; no joint swelling or tenderness to palpation  PSYCH: Unable to obtain   NEUROLOGY: Unable to participate   SKIN: No rashes; no palpable lesions    LABS:                                              10.5   5.02  )-----------( 114      ( 24 Jul 2023 04:40 )             35.1   07-23    137  |  102  |  32.5<H>  ----------------------------<  163<H>  4.6   |  18.0<L>  |  1.25    Ca    9.6      23 Jul 2023 06:20    TPro  7.6  /  Alb  3.8  /  TBili  0.3<L>  /  DBili  x   /  AST  17  /  ALT  6   /  AlkPhos  68  07-23    Color: x / Appearance: x / SG: x / pH: x  Gluc: 163 mg/dL / Ketone: x  / Bili: x / Urobili: x   Blood: x / Protein: x / Nitrite: x   Leuk Esterase: x / RBC: x / WBC x   Sq Epi: x / Non Sq Epi: x / Bacteria: x        CAPILLARY BLOOD GLUCOSE      POCT Blood Glucose.: 85 mg/dL (23 Jul 2023 16:01)  POCT Blood Glucose.: 95 mg/dL (23 Jul 2023 09:10)  POCT Blood Glucose.: 184 mg/dL (23 Jul 2023 05:50)  POCT Blood Glucose.: 232 mg/dL (23 Jul 2023 04:26)  POCT Blood Glucose.: 234 mg/dL (23 Jul 2023 03:00)  POCT Blood Glucose.: 128 mg/dL (23 Jul 2023 02:02)        RADIOLOGY & ADDITIONAL TESTS:  Results Reviewed:   Imaging Personally Reviewed:  Electrocardiogram Personally Reviewed:

## 2023-07-24 NOTE — PROGRESS NOTE ADULT - ASSESSMENT
89y Female who is followed by neurology because of seizure    New onset seizure  EEG with sharp activity.  Will continue depakote for now 250 mg IV q 12h  Concern for lactate elevation- if due to convulsive seizure alone this should be decreasing    Dementia  Supportive care.    Case discussed with Dr Soto.

## 2023-07-24 NOTE — PROGRESS NOTE ADULT - SUBJECTIVE AND OBJECTIVE BOX
[facility]                                        Ne[facility] [place]                                 Shikha Upton, & Kristian                                  [address]                                        [place], [place], [address]                                             [phone]        CC: seizure     HPI:   The patient is a 89y Female who presented with reported convulsive activity, lasting up to 20 minutes, that was stopped when EMS gave her versed IM.  Per chart she has no previous seizure history.  She has not had report of recurrent seizure in hospital.  she had elevated lactate that decreased and is nmow rising again despite lack of continued convulsive activity.  She has what appears to be advanced dementia and cn not provide any history.  neurology is asked to evaluate.    Interim history:  Remains in ER awaiting bed.     ROS:   Denies headache or dizziness.  Denies chest pain.  Denies shortness of breath.    MEDICATIONS  (STANDING):  chlorhexidine 2% Cloths 1 Application(s) Topical <User Schedule>  dextrose 5% + sodium chloride 0.9%. 1000 milliLiter(s) (75 mL/Hr) IV Continuous <Continuous>  dextrose 50% Injectable 25 Gram(s) IV Push once  dextrose 50% Injectable 12.5 Gram(s) IV Push once  dextrose 50% Injectable 25 Gram(s) IV Push once  dextrose Oral Gel 15 Gram(s) Oral once  glucagon  Injectable 1 milliGRAM(s) IntraMuscular once  heparin   Injectable 5000 Unit(s) SubCutaneous every 8 hours  levothyroxine Injectable 75 MICROGram(s) IV Push at bedtime  metoprolol tartrate Injectable 5 milliGRAM(s) IV Push every 6 hours  pantoprazole  Injectable 40 milliGRAM(s) IV Push daily  sodium chloride 0.9%. 1000 milliLiter(s) (100 mL/Hr) IV Continuous <Continuous>  valproate sodium  IVPB 250 milliGRAM(s) IV Intermittent every 12 hours    Vital Signs Last 24 Hrs  T(C): 36.3 (24 Jul 2023 07:45), Max: 36.8 (23 Jul 2023 15:27)  T(F): 97.3 (24 Jul 2023 07:45), Max: 98.2 (23 Jul 2023 15:27)  HR: 61 (24 Jul 2023 07:45) (59 - 61)  BP: 155/74 (24 Jul 2023 07:45) (134/63 - 155/74)  RR: 18 (24 Jul 2023 07:45) (16 - 18)  SpO2: 95% (24 Jul 2023 07:45) (94% - 100%)    Parameters below as of 24 Jul 2023 07:45  Patient On (Oxygen Delivery Method): nasal cannula  O2 Flow (L/min): 2    Detailed Neurologic Exam:    Mental status: The patient is awake and alert. Disoriented. Speaks in short phrases. Not following instructions reliably.    Cranial nerves: Pupils equal and react symmetrically to light. There is no visual field deficit to threat. Extraocular motion is full with no nystagmus. Facial sensation is intact. Facial musculature is symmetric. Palate elevates symmetrically. Tongue is midline.    Motor: There is normal bulk and tone.  There is no tremor.  Strength grossly symmetric although not following formal manual motor testing.    Sensation: Grossly intact to light touch and pin.    Reflexes: 1+ throughout and plantar responses are flexor.    Cerebellar: Unable to assess.    Labs:     07-23    137  |  102  |  32.5<H>  ----------------------------<  163<H>  4.6   |  18.0<L>  |  1.25    Ca    9.6      23 Jul 2023 06:20  Mg     2.2     07-22    TPro  7.6  /  Alb  3.8  /  TBili  0.3<L>  /  DBili  x   /  AST  17  /  ALT  6   /  AlkPhos  68  07-23                            10.5   5.02  )-----------( 114      ( 24 Jul 2023 04:40 )             35.1       EEG:   Abnormal EEG in the awake, drowsy, and asleep states.   -Frequent left frontotemporal sharp waves  -Mild-moderate diffuse slowing  -No electrographic seizures

## 2023-07-24 NOTE — EEG REPORT - NS EEG TEXT BOX
RAGHAVENDRA WALLACE [id number]     Study Date: 07/23/23 12:13 - 07/24/23 08:00  Duration: 19 hr 46 min  --------------------------------------------------------------------------------------------------  History:  CC/ HPI Patient is a 89y old  Female who presents with a chief complaint of New onset seizure (23 Jul 2023 17:24)    MEDICATIONS  (STANDING):  dextrose 5% + sodium chloride 0.9%. 1000 milliLiter(s) (75 mL/Hr) IV Continuous <Continuous>  dextrose 50% Injectable 25 Gram(s) IV Push once  dextrose 50% Injectable 12.5 Gram(s) IV Push once  dextrose 50% Injectable 25 Gram(s) IV Push once  dextrose Oral Gel 15 Gram(s) Oral once  glucagon  Injectable 1 milliGRAM(s) IntraMuscular once  heparin   Injectable 5000 Unit(s) SubCutaneous every 8 hours  levothyroxine Injectable 75 MICROGram(s) IV Push at bedtime  metoprolol tartrate Injectable 5 milliGRAM(s) IV Push every 6 hours  pantoprazole  Injectable 40 milliGRAM(s) IV Push daily  sodium chloride 0.9%. 1000 milliLiter(s) (100 mL/Hr) IV Continuous <Continuous>  valproate sodium  IVPB 250 milliGRAM(s) IV Intermittent every 12 hours    --------------------------------------------------------------------------------------------------  Study Interpretation:    [[[Abbreviation Key:  PDR=alpha rhythm/posterior dominant rhythm. A-P=anterior posterior.  Amplitude: ‘very low’:<20; ‘low’:20-49; ‘medium’:; ‘high’:>150uV.  Persistence for periodic/rhythmic patterns (% of epoch) ‘rare’:<1%; ‘occasional’:1-10%; ‘frequent’:10-50%; ‘abundant’:50-90%; ‘continuous’:>90%.  Persistence for sporadic discharges: ‘rare’:<1/hr; ‘occasional’:1/min-1/hr; ‘frequent’:>1/min; ‘abundant’:>1/10 sec.  RPP=rhythmic and periodic patterns; GRDA=generalized rhythmic delta activity; FIRDA=frontal intermittent GRDA; LRDA=lateralized rhythmic delta activity; TIRDA=temporal intermittent rhythmic delta activity;  LPD=PLED=lateralized periodic discharges; GPD=generalized periodic discharges; BIPDs =bilateral independent periodic discharges; Mf=multifocal; SIRPDs=stimulus induced rhythmic, periodic, or ictal appearing discharges; BIRDs=brief potentially ictal rhythmic discharges >4 Hz, lasting .5-10s; PFA (paroxysmal bursts >13 Hz or =8 Hz <10s).  Modifiers: +F=with fast component; +S=with spike component; +R=with rhythmic component.  S-B=burst suppression pattern.  Max=maximal. N1-drowsy; N2-stage II sleep; N3-slow wave sleep. SSS/BETS=small sharp spikes/benign epileptiform transients of sleep. HV=hyperventilation; PS=photic stimulation]]]    Daily EEG Visual Analysis    FINDINGS:      Background:  Continuity: Continuous  Symmetry: Symmetric  Posterior dominant rhythm (PDR): 6-6.5 Hz, reactive to eye closure. Symmetric low-amplitude frontal beta in wakefulness.  State Change: Present  Voltage: Normal  Anterior Posterior Gradient: Present  Other background findings: Intermittent diffuse polymorphic theta and delta slowing in wakefulness  Breach: Absent    Background Slowing:  Generalized slowing: As above  Focal slowing: None    State Changes:   Drowsiness is characterized by fragmentation, attenuation, and slowing of the background activity.  Stage 2 sleep is characterized by symmetric K complexes and sleep spindles.     Interictal Findings:  Frequent left frontotemporal (F7) sharp waves    Electrographic and Electroclinical seizures:  None    Other Clinical Events:  None    Activation Procedures:   Hyperventilation is not performed.    Photic stimulation is not performed.    Artifacts:  Intermittent myogenic and movement artifacts are present.    EKG:  Single-lead EKG shows sinus rhythm with frequent PACs.    EEG Classification / Summary:  Abnormal EEG in the awake, drowsy, and asleep states.   -Frequent left frontotemporal sharp waves  -Mild-moderate diffuse slowing  -No electrographic seizures    Clinical Impression:  -Potentially epileptogenic focus in the left frontotemporal region  -Mild-moderate diffuse cerebral dysfunction is nonspecific in etiology.           -------------------------------------------------------------------------------------------------------  [facility] EEG Reading Room Ph#: [phone]  Epilepsy Answering Service after 5PM and before 8:30AM: Ph#: [phone]    Kelly Green MD  Attending Physician, [facility]   RAGHAVENDRA WALLACE [id number]     Study Date: 07/23/23 12:13 - 07/24/23 11:42  Duration: 23 hr 27 min  --------------------------------------------------------------------------------------------------  History:  CC/ HPI Patient is a 89y old  Female who presents with a chief complaint of New onset seizure (23 Jul 2023 17:24)    MEDICATIONS  (STANDING):  dextrose 5% + sodium chloride 0.9%. 1000 milliLiter(s) (75 mL/Hr) IV Continuous <Continuous>  dextrose 50% Injectable 25 Gram(s) IV Push once  dextrose 50% Injectable 12.5 Gram(s) IV Push once  dextrose 50% Injectable 25 Gram(s) IV Push once  dextrose Oral Gel 15 Gram(s) Oral once  glucagon  Injectable 1 milliGRAM(s) IntraMuscular once  heparin   Injectable 5000 Unit(s) SubCutaneous every 8 hours  levothyroxine Injectable 75 MICROGram(s) IV Push at bedtime  metoprolol tartrate Injectable 5 milliGRAM(s) IV Push every 6 hours  pantoprazole  Injectable 40 milliGRAM(s) IV Push daily  sodium chloride 0.9%. 1000 milliLiter(s) (100 mL/Hr) IV Continuous <Continuous>  valproate sodium  IVPB 250 milliGRAM(s) IV Intermittent every 12 hours    --------------------------------------------------------------------------------------------------  Study Interpretation:    [[[Abbreviation Key:  PDR=alpha rhythm/posterior dominant rhythm. A-P=anterior posterior.  Amplitude: ‘very low’:<20; ‘low’:20-49; ‘medium’:; ‘high’:>150uV.  Persistence for periodic/rhythmic patterns (% of epoch) ‘rare’:<1%; ‘occasional’:1-10%; ‘frequent’:10-50%; ‘abundant’:50-90%; ‘continuous’:>90%.  Persistence for sporadic discharges: ‘rare’:<1/hr; ‘occasional’:1/min-1/hr; ‘frequent’:>1/min; ‘abundant’:>1/10 sec.  RPP=rhythmic and periodic patterns; GRDA=generalized rhythmic delta activity; FIRDA=frontal intermittent GRDA; LRDA=lateralized rhythmic delta activity; TIRDA=temporal intermittent rhythmic delta activity;  LPD=PLED=lateralized periodic discharges; GPD=generalized periodic discharges; BIPDs =bilateral independent periodic discharges; Mf=multifocal; SIRPDs=stimulus induced rhythmic, periodic, or ictal appearing discharges; BIRDs=brief potentially ictal rhythmic discharges >4 Hz, lasting .5-10s; PFA (paroxysmal bursts >13 Hz or =8 Hz <10s).  Modifiers: +F=with fast component; +S=with spike component; +R=with rhythmic component.  S-B=burst suppression pattern.  Max=maximal. N1-drowsy; N2-stage II sleep; N3-slow wave sleep. SSS/BETS=small sharp spikes/benign epileptiform transients of sleep. HV=hyperventilation; PS=photic stimulation]]]    Daily EEG Visual Analysis    FINDINGS:      Background:  Continuity: Continuous  Symmetry: Symmetric  Posterior dominant rhythm (PDR): 6-6.5 Hz, reactive to eye closure. Symmetric low-amplitude frontal beta in wakefulness.  State Change: Present  Voltage: Normal  Anterior Posterior Gradient: Present  Other background findings: Intermittent diffuse polymorphic theta and delta slowing in wakefulness  Breach: Absent    Background Slowing:  Generalized slowing: As above  Focal slowing: None    State Changes:   Drowsiness is characterized by fragmentation, attenuation, and slowing of the background activity.  Stage 2 sleep is characterized by symmetric K complexes and sleep spindles.     Interictal Findings:  Frequent left frontotemporal (F7) sharp waves in drowsiness and stage 2 sleep    Electrographic and Electroclinical seizures:  None    Other Clinical Events:  None    Activation Procedures:   Hyperventilation is not performed.    Photic stimulation is not performed.    Artifacts:  Intermittent myogenic and movement artifacts are present. Some electrodes are disconnected toward the end of the study.    EKG:  Single-lead EKG shows sinus rhythm with frequent PACs.    EEG Classification / Summary:  Abnormal EEG in the awake, drowsy, and asleep states.   -Frequent left frontotemporal sharp waves in drowsiness and stage 2 sleep  -Mild-moderate diffuse slowing  -No electrographic seizures    Clinical Impression:  -Potentially epileptogenic focus in the left frontotemporal region  -Mild-moderate diffuse cerebral dysfunction is nonspecific in etiology.       ADDENDUM:  Report updated to reflect completion of recording at time stated above.  Originally reported up to 7/24/23 08:00.  No significant change in findings.      -------------------------------------------------------------------------------------------------------  [facility] EEG Reading Room Ph#: [phone]  Epilepsy Answering Service after 5PM and before 8:30AM: Ph#: [phone]    Kelly Green MD  Attending Physician, [facility]

## 2023-07-24 NOTE — PROGRESS NOTE ADULT - ASSESSMENT
89yoF hx dementia, Afib not on AC due to GI bleeding, CKD, HTN, prior hx of nontraumatic SAH, sent from [facility] for new onset seizure with persistent metabolic encephalopathy     New seizure with persistent post ictal encephalopathy   CT head w/out acute abnormality, shows small white vessel changes  Seen by NICU, deemed not candidate for ICU level of care  s/p IM versed by EMS  After initially presenting with met acidosis suspected 2/2 lactic acidosis from seizure lactate improved. Overnight however again spiked and now again improved  Suspect pt may have been having elevated lactoic acidosis from seizures and improves post events   Continue w/u for possible alternative infectious etiologies   (should not have improved w/o any abx if 2/2 infection)   UA negative, CXR w/ chronic opacity and small pleural effusion in L-base  Continue IVF (NS)    RHONDA on CKD with hyperkalemia  Baseline Cr 1.2, admission Cr 1.6, improving Cr with IVF  Improved     Hx Afib  Not on AC due to GI bleed in the past  On metoprolol 25mg BID  Continue IV BB for now    Hx HTN  Initially with HTN urgency, received IV BP agents in ED with improvement  IV hydralazine 5mg PRN SBP >170 and/or DBP >110  Started on gentle IVF for above     Hx dementia  -Per son at baseline, pt confused, sleeps often, requires full assistance with ADLs   -Hold donepezil and topiramate while NPO    Hx hypothyroidism   IV levothyroxine 75mcg daily    Hx HLD  gemfibrozil while NPO    Prophylactic measure  Heparin 5000units q8hr 89yoF hx dementia, Afib not on AC due to GI bleeding, CKD, HTN, prior hx of nontraumatic SAH, sent from [facility] for new onset seizure with persistent metabolic encephalopathy     Seizure with persistent post ictal encephalopathy   s/p IM versed by EMS  Significant lactic acidosis on presentation likely due to tonic clonic seizures  Now improved, continue Keppra   EEG reviewed with Neuro     RHONDA on CKD with hyperkalemia  Baseline Cr 1.2, admission Cr 1.6, improving Cr with IVF   Improved     H/o Afib  Not on AC due to GI bleed in the past  On metoprolol 25mg BID  Continue IV BB for now    h/o HTN  Initially with HTN urgency, received IV BP agents in ED with improvement  IV hydralazine 5mg PRN SBP >170 and/or DBP >11    Advanced dementia  Returning to baseline per son. Has worsened significantly over past 1 year    Hypothyroidism   IV levothyroxine 75mcg daily    HLD  Gemfibrozil while NPO    Prophylactic measure  Heparin 5000units q8hr

## 2023-07-25 ENCOUNTER — TRANSCRIPTION ENCOUNTER (OUTPATIENT)
Age: 88
End: 2023-07-25

## 2023-07-25 VITALS
RESPIRATION RATE: 18 BRPM | OXYGEN SATURATION: 97 % | DIASTOLIC BLOOD PRESSURE: 72 MMHG | HEART RATE: 83 BPM | SYSTOLIC BLOOD PRESSURE: 144 MMHG | TEMPERATURE: 98 F

## 2023-07-25 LAB
ANION GAP SERPL CALC-SCNC: 13 MMOL/L — SIGNIFICANT CHANGE UP (ref 5–17)
BUN SERPL-MCNC: 30.2 MG/DL — HIGH (ref 8–20)
CALCIUM SERPL-MCNC: 8.8 MG/DL — SIGNIFICANT CHANGE UP (ref 8.4–10.5)
CHLORIDE SERPL-SCNC: 104 MMOL/L — SIGNIFICANT CHANGE UP (ref 96–108)
CO2 SERPL-SCNC: 19 MMOL/L — LOW (ref 22–29)
CREAT SERPL-MCNC: 1.01 MG/DL — SIGNIFICANT CHANGE UP (ref 0.5–1.3)
EGFR: 52 ML/MIN/1.73M2 — LOW
GLUCOSE BLDC GLUCOMTR-MCNC: 100 MG/DL — HIGH (ref 70–99)
GLUCOSE BLDC GLUCOMTR-MCNC: 122 MG/DL — HIGH (ref 70–99)
GLUCOSE BLDC GLUCOMTR-MCNC: 95 MG/DL — SIGNIFICANT CHANGE UP (ref 70–99)
GLUCOSE SERPL-MCNC: 94 MG/DL — SIGNIFICANT CHANGE UP (ref 70–99)
HCT VFR BLD CALC: 30.2 % — LOW (ref 34.5–45)
HGB BLD-MCNC: 9.6 G/DL — LOW (ref 11.5–15.5)
MCHC RBC-ENTMCNC: 30.9 PG — SIGNIFICANT CHANGE UP (ref 27–34)
MCHC RBC-ENTMCNC: 31.8 GM/DL — LOW (ref 32–36)
MCV RBC AUTO: 97.1 FL — SIGNIFICANT CHANGE UP (ref 80–100)
MRSA PCR RESULT.: SIGNIFICANT CHANGE UP
PLATELET # BLD AUTO: 97 K/UL — LOW (ref 150–400)
POTASSIUM SERPL-MCNC: 4.2 MMOL/L — SIGNIFICANT CHANGE UP (ref 3.5–5.3)
POTASSIUM SERPL-SCNC: 4.2 MMOL/L — SIGNIFICANT CHANGE UP (ref 3.5–5.3)
RBC # BLD: 3.11 M/UL — LOW (ref 3.8–5.2)
RBC # FLD: 13.7 % — SIGNIFICANT CHANGE UP (ref 10.3–14.5)
S AUREUS DNA NOSE QL NAA+PROBE: SIGNIFICANT CHANGE UP
SARS-COV-2 RNA SPEC QL NAA+PROBE: SIGNIFICANT CHANGE UP
SODIUM SERPL-SCNC: 136 MMOL/L — SIGNIFICANT CHANGE UP (ref 135–145)
WBC # BLD: 7.17 K/UL — SIGNIFICANT CHANGE UP (ref 3.8–10.5)
WBC # FLD AUTO: 7.17 K/UL — SIGNIFICANT CHANGE UP (ref 3.8–10.5)

## 2023-07-25 PROCEDURE — 99232 SBSQ HOSP IP/OBS MODERATE 35: CPT

## 2023-07-25 PROCEDURE — 86901 BLOOD TYPING SEROLOGIC RH(D): CPT

## 2023-07-25 PROCEDURE — 95700 EEG CONT REC W/VID EEG TECH: CPT

## 2023-07-25 PROCEDURE — 86900 BLOOD TYPING SEROLOGIC ABO: CPT

## 2023-07-25 PROCEDURE — 85025 COMPLETE CBC W/AUTO DIFF WBC: CPT

## 2023-07-25 PROCEDURE — 85018 HEMOGLOBIN: CPT

## 2023-07-25 PROCEDURE — 80053 COMPREHEN METABOLIC PANEL: CPT

## 2023-07-25 PROCEDURE — 83735 ASSAY OF MAGNESIUM: CPT

## 2023-07-25 PROCEDURE — 87040 BLOOD CULTURE FOR BACTERIA: CPT

## 2023-07-25 PROCEDURE — 84295 ASSAY OF SERUM SODIUM: CPT

## 2023-07-25 PROCEDURE — 85014 HEMATOCRIT: CPT

## 2023-07-25 PROCEDURE — 87640 STAPH A DNA AMP PROBE: CPT

## 2023-07-25 PROCEDURE — 87635 SARS-COV-2 COVID-19 AMP PRB: CPT

## 2023-07-25 PROCEDURE — 84484 ASSAY OF TROPONIN QUANT: CPT

## 2023-07-25 PROCEDURE — 96375 TX/PRO/DX INJ NEW DRUG ADDON: CPT

## 2023-07-25 PROCEDURE — 99239 HOSP IP/OBS DSCHRG MGMT >30: CPT

## 2023-07-25 PROCEDURE — 85027 COMPLETE CBC AUTOMATED: CPT

## 2023-07-25 PROCEDURE — 83605 ASSAY OF LACTIC ACID: CPT

## 2023-07-25 PROCEDURE — 83880 ASSAY OF NATRIURETIC PEPTIDE: CPT

## 2023-07-25 PROCEDURE — 84132 ASSAY OF SERUM POTASSIUM: CPT

## 2023-07-25 PROCEDURE — 86850 RBC ANTIBODY SCREEN: CPT

## 2023-07-25 PROCEDURE — 84145 PROCALCITONIN (PCT): CPT

## 2023-07-25 PROCEDURE — 82962 GLUCOSE BLOOD TEST: CPT

## 2023-07-25 PROCEDURE — 81001 URINALYSIS AUTO W/SCOPE: CPT

## 2023-07-25 PROCEDURE — 82803 BLOOD GASES ANY COMBINATION: CPT

## 2023-07-25 PROCEDURE — 85730 THROMBOPLASTIN TIME PARTIAL: CPT

## 2023-07-25 PROCEDURE — 36415 COLL VENOUS BLD VENIPUNCTURE: CPT

## 2023-07-25 PROCEDURE — 82435 ASSAY OF BLOOD CHLORIDE: CPT

## 2023-07-25 PROCEDURE — 0225U NFCT DS DNA&RNA 21 SARSCOV2: CPT

## 2023-07-25 PROCEDURE — 71045 X-RAY EXAM CHEST 1 VIEW: CPT

## 2023-07-25 PROCEDURE — 93005 ELECTROCARDIOGRAM TRACING: CPT

## 2023-07-25 PROCEDURE — 95714 VEEG EA 12-26 HR UNMNTR: CPT

## 2023-07-25 PROCEDURE — 82947 ASSAY GLUCOSE BLOOD QUANT: CPT

## 2023-07-25 PROCEDURE — 87086 URINE CULTURE/COLONY COUNT: CPT

## 2023-07-25 PROCEDURE — 87641 MR-STAPH DNA AMP PROBE: CPT

## 2023-07-25 PROCEDURE — 95813 EEG EXTND MNTR 61-119 MIN: CPT

## 2023-07-25 PROCEDURE — 96374 THER/PROPH/DIAG INJ IV PUSH: CPT

## 2023-07-25 PROCEDURE — 99285 EMERGENCY DEPT VISIT HI MDM: CPT

## 2023-07-25 PROCEDURE — 70450 CT HEAD/BRAIN W/O DYE: CPT | Mod: MA

## 2023-07-25 PROCEDURE — 80048 BASIC METABOLIC PNL TOTAL CA: CPT

## 2023-07-25 PROCEDURE — 82330 ASSAY OF CALCIUM: CPT

## 2023-07-25 PROCEDURE — 85610 PROTHROMBIN TIME: CPT

## 2023-07-25 RX ORDER — TOPIRAMATE 25 MG
1 TABLET ORAL
Qty: 0 | Refills: 0 | DISCHARGE

## 2023-07-25 RX ORDER — QUETIAPINE FUMARATE 200 MG/1
1 TABLET, FILM COATED ORAL
Qty: 0 | Refills: 0 | DISCHARGE

## 2023-07-25 RX ORDER — VALPROIC ACID (AS SODIUM SALT) 250 MG/5ML
5 SOLUTION, ORAL ORAL
Qty: 300 | Refills: 0
Start: 2023-07-25 | End: 2023-08-23

## 2023-07-25 RX ADMIN — Medication 650 MILLIGRAM(S): at 12:14

## 2023-07-25 RX ADMIN — Medication 325 MILLIGRAM(S): at 12:12

## 2023-07-25 RX ADMIN — Medication 600 MILLIGRAM(S): at 06:19

## 2023-07-25 RX ADMIN — Medication 25 MILLIGRAM(S): at 06:19

## 2023-07-25 RX ADMIN — CHLORHEXIDINE GLUCONATE 1 APPLICATION(S): 213 SOLUTION TOPICAL at 06:35

## 2023-07-25 RX ADMIN — Medication 52.5 MILLIGRAM(S): at 06:21

## 2023-07-25 RX ADMIN — HEPARIN SODIUM 5000 UNIT(S): 5000 INJECTION INTRAVENOUS; SUBCUTANEOUS at 06:20

## 2023-07-25 RX ADMIN — Medication 650 MILLIGRAM(S): at 13:15

## 2023-07-25 RX ADMIN — Medication 150 MICROGRAM(S): at 06:20

## 2023-07-25 RX ADMIN — PANTOPRAZOLE SODIUM 40 MILLIGRAM(S): 20 TABLET, DELAYED RELEASE ORAL at 06:14

## 2023-07-25 RX ADMIN — HEPARIN SODIUM 5000 UNIT(S): 5000 INJECTION INTRAVENOUS; SUBCUTANEOUS at 14:25

## 2023-07-25 NOTE — PROGRESS NOTE ADULT - ASSESSMENT
89y Female with dementia who is followed by neurology because of seizure    New onset seizure  EEG with sharp activity.  Will continue depakote for now 250 mg IV q 12h  Lactate decreasing.    Dementia  Supportive care.

## 2023-07-25 NOTE — DISCHARGE NOTE PROVIDER - NSDCCPCAREPLAN_GEN_ALL_CORE_FT
PRINCIPAL DISCHARGE DIAGNOSIS  Diagnosis: Seizure  Assessment and Plan of Treatment: s/p EEG, continue new anti seizure medications as prescribed

## 2023-07-25 NOTE — PROGRESS NOTE ADULT - SUBJECTIVE AND OBJECTIVE BOX
[facility]                                        Neurology at [place]                                 Shikha Upton, & Kristian                                  [address]                                        [place], [place], [address]                                             [phone]        CC: seizure     HPI:   The patient is a 89y Female who presented with reported convulsive activity, lasting up to 20 minutes, that was stopped when EMS gave her versed IM.  Per chart she has no previous seizure history.  She has not had report of recurrent seizure in hospital.  she had elevated lactate that decreased and is nmow rising again despite lack of continued convulsive activity.  She has what appears to be advanced dementia and cn not provide any history.  neurology is asked to evaluate. (AR).    Interim history:  Remains in ER awaiting bed.     ROS:   Denies headache or dizziness.  Denies chest pain.  Denies shortness of breath.    MEDICATIONS  (STANDING):  chlorhexidine 2% Cloths 1 Application(s) Topical <User Schedule>  dextrose 50% Injectable 25 Gram(s) IV Push once  dextrose 50% Injectable 12.5 Gram(s) IV Push once  dextrose 50% Injectable 25 Gram(s) IV Push once  dextrose Oral Gel 15 Gram(s) Oral once  donepezil 10 milliGRAM(s) Oral at bedtime  ferrous    sulfate 325 milliGRAM(s) Oral daily  gemfibrozil 600 milliGRAM(s) Oral two times a day  glucagon  Injectable 1 milliGRAM(s) IntraMuscular once  heparin   Injectable 5000 Unit(s) SubCutaneous every 8 hours  levothyroxine 150 MICROGram(s) Oral daily  metoprolol tartrate 25 milliGRAM(s) Oral two times a day  pantoprazole    Tablet 40 milliGRAM(s) Oral before breakfast  tamsulosin 0.4 milliGRAM(s) Oral at bedtime  valproate sodium  IVPB 250 milliGRAM(s) IV Intermittent every 12 hours    Vital Signs Last 24 Hrs  T(C): 36.4 (25 Jul 2023 10:47), Max: 36.7 (25 Jul 2023 08:06)  T(F): 97.5 (25 Jul 2023 10:47), Max: 98.1 (25 Jul 2023 08:06)  HR: 70 (25 Jul 2023 10:47) (59 - 70)  BP: 157/83 (25 Jul 2023 10:47) (141/63 - 167/74)  BP(mean): 98 (24 Jul 2023 15:31) (98 - 98)  RR: 18 (25 Jul 2023 10:47) (18 - 18)  SpO2: 95% (25 Jul 2023 10:47) (94% - 98%)    Parameters below as of 25 Jul 2023 10:47  Patient On (Oxygen Delivery Method): room air    Detailed Neurologic Exam:    Mental status: The patient is awake and alert. Disoriented. Speaks in short phrases. Not following instructions reliably.    Cranial nerves: Pupils equal and react symmetrically to light. There is no visual field deficit to threat. Extraocular motion is full with no nystagmus. Facial sensation is intact. Facial musculature is symmetric. Palate elevates symmetrically. Tongue is midline.    Motor: There is normal bulk and tone.  There is no tremor.  Strength grossly symmetric although not following formal manual motor testing.    Sensation: Grossly intact to light touch and pin.    Reflexes: 1+ throughout and plantar responses are flexor.    Cerebellar: Unable to assess.    Labs:     07-25    136  |  104  |  30.2<H>  ----------------------------<  94  4.2   |  19.0<L>  |  1.01    Ca    8.8      25 Jul 2023 03:40                              9.6    7.17  )-----------( 97       ( 25 Jul 2023 03:40 )             30.2       EEG:   Abnormal EEG in the awake, drowsy, and asleep states.   -Frequent left frontotemporal sharp waves  -Mild-moderate diffuse slowing  -No electrographic seizures

## 2023-07-25 NOTE — CHART NOTE - NSCHARTNOTEFT_GEN_A_CORE
Rosette: p/c to [facility](aKri ) to inform her pt ready for d/c, pt can return d/c and signed SCREEN to be sent asap. [facility] transport called (megan lan arranged,ETA first available . Package/NEAF-billing letter left at [facility] nursing station , Rosette: p/c to [facility](Kari ) to inform her pt ready for d/c, pt can return d/c and signed SCREEN sent via [facility] .[facility] transport called (Soo) edyta arranged,ETA first available . Package /NEAF-billing letter left with pt's RN. Rosette: p/c to [facility](Kari ) to inform her pt ready for d/c, pt can return d/c and signed SCREEN sent via [facility] . Pt's son (Theo) made aware, understod, in agreement of dispo plan. [facility] transport called (Jamari lan arranged,ETA first available . Package /NEAF-billing letter left with pt's RN.

## 2023-07-25 NOTE — PHYSICAL THERAPY INITIAL EVALUATION ADULT - PERTINENT HX OF CURRENT PROBLEM, REHAB EVAL
89yoF hx dementia, Afib not on AC due to GI bleeding, CKD, HTN, prior hx of nontraumatic SAH, sent from [facility] for new onset seizure with persistent metabolic encephalopathy

## 2023-07-25 NOTE — DISCHARGE NOTE NURSING/CASE MANAGEMENT/SOCIAL WORK - PATIENT PORTAL LINK FT
You can access the FollowMyHealth Patient Portal offered by [facility] by registering at the following website: [email or web address]. By joining [facility]’s FollowMyHealth portal, you will also be able to view your health information using other applications (apps) compatible with our system.

## 2023-07-25 NOTE — PHYSICAL THERAPY INITIAL EVALUATION ADULT - ADDITIONAL COMMENTS
Pt is LTC resident at [facility]. Dependent at baseline, requires assist for all ADLs and Mobility, gets OOB to chair via stand pivot transfer

## 2023-07-25 NOTE — DISCHARGE NOTE PROVIDER - ATTENDING DISCHARGE PHYSICAL EXAMINATION:
CONSTITUTIONAL: Elderly female, frail, min responsive lying in bed  ENMT: Dry oral mucosa, no pharyngeal injection or exudates  RESPIRATORY: Shallow BS b/l, no rhonchi or wheezes appreciated   CARDIOVASCULAR: Regular rate and rhythm, normal S1 and S2, no murmur/rub/gallop; No lower extremity edema; Peripheral pulses are 2+ bilaterally  ABDOMEN: Nontender to palpation, normoactive bowel sounds, no rebound/guarding; No hepatosplenomegaly  MUSCLOSKELETAL:  no clubbing or cyanosis of digits; no joint swelling or tenderness to palpation  PSYCH: Unable to obtain   NEUROLOGY: AAO1  SKIN: No rashes; no palpable lesions

## 2023-07-25 NOTE — DISCHARGE NOTE NURSING/CASE MANAGEMENT/SOCIAL WORK - NSDCPEFALRISK_GEN_ALL_CORE
For information on Fall & Injury Prevention, visit: [email or web address] OR  [email or web address] OR  https://www.cdc.gov/steadi/patient.html

## 2023-07-25 NOTE — DISCHARGE NOTE PROVIDER - NSDCMRMEDTOKEN_GEN_ALL_CORE_FT
acetaminophen 325 mg oral tablet: 2 tab(s) orally every 6 hours, As needed, Temp greater or equal to 38C (100.4F), Mild Pain (1 - 3)  Aricept 10 mg oral tablet: 1 tab(s) orally once a day (at bedtime)  dorzolamide 2% ophthalmic solution: 1 drop(s) to each affected eye 2 times a day  ferrous sulfate 325 mg (65 mg elemental iron) oral tablet: 1 tab(s) orally once a day  gemfibrozil 600 mg oral tablet: 1 tab(s) orally 2 times a day  metoprolol tartrate 25 mg oral tablet: 1 tab(s) orally 2 times a day  pantoprazole 40 mg oral delayed release tablet: 1 tab(s) orally every 12 hours  Synthroid 150 mcg (0.15 mg) oral tablet: 1 tab(s) orally once a day  tamsulosin 0.4 mg oral capsule: 1 cap(s) orally once a day (at bedtime)  valproic acid 250 mg/5 mL oral liquid: 5 milliliter(s) orally every 12 hours

## 2023-07-25 NOTE — PHYSICAL THERAPY INITIAL EVALUATION ADULT - GENERAL OBSERVATIONS, REHAB EVAL
Pt received in bed, + IV Loc, + sotelo + family at bedside, breathing on RA, [place], confused, agreeable to PT eval

## 2023-07-25 NOTE — DISCHARGE NOTE PROVIDER - HOSPITAL COURSE
89yoF hx dementia, Afib not on AC due to GI bleeding, CKD, HTN, prior hx of nontraumatic SAH, sent from [facility] for new onset seizure with persistent metabolic encephalopathy. Lactic acidosis resolved with IVF, EEG done. Area of frequent left frontotemporal sharp waves noted. Mental status returned to baseline (AAO 1) with Keppra. At this time she is medically stable for dsc back to [facility]

## 2023-08-01 ENCOUNTER — INPATIENT (INPATIENT)
Facility: HOSPITAL | Age: 88
LOS: 7 days | Discharge: HOME | DRG: 56 | End: 2023-08-09
Attending: STUDENT IN AN ORGANIZED HEALTH CARE EDUCATION/TRAINING PROGRAM | Admitting: STUDENT IN AN ORGANIZED HEALTH CARE EDUCATION/TRAINING PROGRAM
Payer: MEDICARE

## 2023-08-01 VITALS
TEMPERATURE: 98 F | SYSTOLIC BLOOD PRESSURE: 137 MMHG | HEIGHT: 62 IN | RESPIRATION RATE: 16 BRPM | HEART RATE: 72 BPM | OXYGEN SATURATION: 95 % | DIASTOLIC BLOOD PRESSURE: 85 MMHG | WEIGHT: 145.06 LBS

## 2023-08-01 DIAGNOSIS — I35.0 NONRHEUMATIC AORTIC (VALVE) STENOSIS: ICD-10-CM

## 2023-08-01 DIAGNOSIS — Z90.710 ACQUIRED ABSENCE OF BOTH CERVIX AND UTERUS: Chronic | ICD-10-CM

## 2023-08-01 DIAGNOSIS — I10 ESSENTIAL (PRIMARY) HYPERTENSION: ICD-10-CM

## 2023-08-01 DIAGNOSIS — E86.0 DEHYDRATION: ICD-10-CM

## 2023-08-01 DIAGNOSIS — Z95.0 PRESENCE OF CARDIAC PACEMAKER: Chronic | ICD-10-CM

## 2023-08-01 DIAGNOSIS — N39.0 URINARY TRACT INFECTION, SITE NOT SPECIFIED: ICD-10-CM

## 2023-08-01 DIAGNOSIS — Z96.649 PRESENCE OF UNSPECIFIED ARTIFICIAL HIP JOINT: Chronic | ICD-10-CM

## 2023-08-01 DIAGNOSIS — G40.919 EPILEPSY, UNSPECIFIED, INTRACTABLE, WITHOUT STATUS EPILEPTICUS: ICD-10-CM

## 2023-08-01 LAB
ALBUMIN SERPL ELPH-MCNC: 3.9 G/DL — SIGNIFICANT CHANGE UP (ref 3.3–5.2)
ALP SERPL-CCNC: 65 U/L — SIGNIFICANT CHANGE UP (ref 40–120)
ALT FLD-CCNC: 6 U/L — SIGNIFICANT CHANGE UP
ANION GAP SERPL CALC-SCNC: 12 MMOL/L — SIGNIFICANT CHANGE UP (ref 5–17)
ANION GAP SERPL CALC-SCNC: 23 MMOL/L — HIGH (ref 5–17)
APPEARANCE UR: CLEAR — SIGNIFICANT CHANGE UP
APTT BLD: 23.1 SEC — LOW (ref 24.5–35.6)
AST SERPL-CCNC: 14 U/L — SIGNIFICANT CHANGE UP
BACTERIA # UR AUTO: ABNORMAL
BASOPHILS # BLD AUTO: 0.06 K/UL — SIGNIFICANT CHANGE UP (ref 0–0.2)
BASOPHILS NFR BLD AUTO: 0.9 % — SIGNIFICANT CHANGE UP (ref 0–2)
BILIRUB SERPL-MCNC: 0.2 MG/DL — LOW (ref 0.4–2)
BILIRUB UR-MCNC: NEGATIVE — SIGNIFICANT CHANGE UP
BUN SERPL-MCNC: 23.7 MG/DL — HIGH (ref 8–20)
BUN SERPL-MCNC: 26.2 MG/DL — HIGH (ref 8–20)
BURR CELLS BLD QL SMEAR: PRESENT — SIGNIFICANT CHANGE UP
CALCIUM SERPL-MCNC: 10.1 MG/DL — SIGNIFICANT CHANGE UP (ref 8.4–10.5)
CALCIUM SERPL-MCNC: 9.2 MG/DL — SIGNIFICANT CHANGE UP (ref 8.4–10.5)
CHLORIDE SERPL-SCNC: 100 MMOL/L — SIGNIFICANT CHANGE UP (ref 96–108)
CHLORIDE SERPL-SCNC: 105 MMOL/L — SIGNIFICANT CHANGE UP (ref 96–108)
CO2 SERPL-SCNC: 16 MMOL/L — LOW (ref 22–29)
CO2 SERPL-SCNC: 26 MMOL/L — SIGNIFICANT CHANGE UP (ref 22–29)
COLOR SPEC: YELLOW — SIGNIFICANT CHANGE UP
CREAT SERPL-MCNC: 1.24 MG/DL — SIGNIFICANT CHANGE UP (ref 0.5–1.3)
CREAT SERPL-MCNC: 1.4 MG/DL — HIGH (ref 0.5–1.3)
DIFF PNL FLD: ABNORMAL
EGFR: 35 ML/MIN/1.73M2 — LOW
EGFR: 41 ML/MIN/1.73M2 — LOW
ELLIPTOCYTES BLD QL SMEAR: SIGNIFICANT CHANGE UP
EOSINOPHIL # BLD AUTO: 0.22 K/UL — SIGNIFICANT CHANGE UP (ref 0–0.5)
EOSINOPHIL NFR BLD AUTO: 3.4 % — SIGNIFICANT CHANGE UP (ref 0–6)
EPI CELLS # UR: SIGNIFICANT CHANGE UP
GLUCOSE SERPL-MCNC: 175 MG/DL — HIGH (ref 70–99)
GLUCOSE SERPL-MCNC: 90 MG/DL — SIGNIFICANT CHANGE UP (ref 70–99)
GLUCOSE UR QL: NEGATIVE — SIGNIFICANT CHANGE UP
HCT VFR BLD CALC: 36.3 % — SIGNIFICANT CHANGE UP (ref 34.5–45)
HGB BLD-MCNC: 11.5 G/DL — SIGNIFICANT CHANGE UP (ref 11.5–15.5)
INR BLD: 1.08 RATIO — SIGNIFICANT CHANGE UP (ref 0.85–1.18)
KETONES UR-MCNC: NEGATIVE — SIGNIFICANT CHANGE UP
LEUKOCYTE ESTERASE UR-ACNC: ABNORMAL
LYMPHOCYTES # BLD AUTO: 0.84 K/UL — LOW (ref 1–3.3)
LYMPHOCYTES # BLD AUTO: 12.9 % — LOW (ref 13–44)
MANUAL SMEAR VERIFICATION: SIGNIFICANT CHANGE UP
MCHC RBC-ENTMCNC: 30.6 PG — SIGNIFICANT CHANGE UP (ref 27–34)
MCHC RBC-ENTMCNC: 31.7 GM/DL — LOW (ref 32–36)
MCV RBC AUTO: 96.5 FL — SIGNIFICANT CHANGE UP (ref 80–100)
METAMYELOCYTES # FLD: 0.9 % — HIGH (ref 0–0)
MONOCYTES # BLD AUTO: 0.51 K/UL — SIGNIFICANT CHANGE UP (ref 0–0.9)
MONOCYTES NFR BLD AUTO: 7.8 % — SIGNIFICANT CHANGE UP (ref 2–14)
NEUTROPHILS # BLD AUTO: 4.81 K/UL — SIGNIFICANT CHANGE UP (ref 1.8–7.4)
NEUTROPHILS NFR BLD AUTO: 74.1 % — SIGNIFICANT CHANGE UP (ref 43–77)
NITRITE UR-MCNC: POSITIVE
OVALOCYTES BLD QL SMEAR: SIGNIFICANT CHANGE UP
PH UR: 6 — SIGNIFICANT CHANGE UP (ref 5–8)
PLAT MORPH BLD: NORMAL — SIGNIFICANT CHANGE UP
PLATELET # BLD AUTO: 143 K/UL — LOW (ref 150–400)
POIKILOCYTOSIS BLD QL AUTO: SIGNIFICANT CHANGE UP
POTASSIUM SERPL-MCNC: 4.1 MMOL/L — SIGNIFICANT CHANGE UP (ref 3.5–5.3)
POTASSIUM SERPL-MCNC: 5 MMOL/L — SIGNIFICANT CHANGE UP (ref 3.5–5.3)
POTASSIUM SERPL-SCNC: 4.1 MMOL/L — SIGNIFICANT CHANGE UP (ref 3.5–5.3)
POTASSIUM SERPL-SCNC: 5 MMOL/L — SIGNIFICANT CHANGE UP (ref 3.5–5.3)
PROT SERPL-MCNC: 8.2 G/DL — SIGNIFICANT CHANGE UP (ref 6.6–8.7)
PROT UR-MCNC: 300 MG/DL — SIGNIFICANT CHANGE UP
PROTHROM AB SERPL-ACNC: 12 SEC — SIGNIFICANT CHANGE UP (ref 9.5–13)
RBC # BLD: 3.76 M/UL — LOW (ref 3.8–5.2)
RBC # FLD: 14 % — SIGNIFICANT CHANGE UP (ref 10.3–14.5)
RBC BLD AUTO: ABNORMAL
RBC CASTS # UR COMP ASSIST: ABNORMAL /HPF (ref 0–4)
SODIUM SERPL-SCNC: 139 MMOL/L — SIGNIFICANT CHANGE UP (ref 135–145)
SODIUM SERPL-SCNC: 143 MMOL/L — SIGNIFICANT CHANGE UP (ref 135–145)
SP GR SPEC: 1.01 — SIGNIFICANT CHANGE UP (ref 1.01–1.02)
UROBILINOGEN FLD QL: NEGATIVE — SIGNIFICANT CHANGE UP
VALPROATE SERPL-MCNC: 9.7 UG/ML — LOW (ref 50–100)
WBC # BLD: 6.49 K/UL — SIGNIFICANT CHANGE UP (ref 3.8–10.5)
WBC # FLD AUTO: 6.49 K/UL — SIGNIFICANT CHANGE UP (ref 3.8–10.5)
WBC UR QL: >50 /HPF (ref 0–5)

## 2023-08-01 PROCEDURE — 70450 CT HEAD/BRAIN W/O DYE: CPT | Mod: 26,MA

## 2023-08-01 PROCEDURE — 99291 CRITICAL CARE FIRST HOUR: CPT | Mod: GC

## 2023-08-01 PROCEDURE — 99223 1ST HOSP IP/OBS HIGH 75: CPT

## 2023-08-01 PROCEDURE — 93010 ELECTROCARDIOGRAM REPORT: CPT

## 2023-08-01 PROCEDURE — 71045 X-RAY EXAM CHEST 1 VIEW: CPT | Mod: 26

## 2023-08-01 RX ORDER — LEVOTHYROXINE SODIUM 125 MCG
150 TABLET ORAL DAILY
Refills: 0 | Status: DISCONTINUED | OUTPATIENT
Start: 2023-08-01 | End: 2023-08-09

## 2023-08-01 RX ORDER — TAMSULOSIN HYDROCHLORIDE 0.4 MG/1
0.4 CAPSULE ORAL AT BEDTIME
Refills: 0 | Status: DISCONTINUED | OUTPATIENT
Start: 2023-08-01 | End: 2023-08-09

## 2023-08-01 RX ORDER — VALPROIC ACID (AS SODIUM SALT) 250 MG/5ML
250 SOLUTION, ORAL ORAL ONCE
Refills: 0 | Status: COMPLETED | OUTPATIENT
Start: 2023-08-01 | End: 2023-08-01

## 2023-08-01 RX ORDER — FERROUS SULFATE 325(65) MG
325 TABLET ORAL DAILY
Refills: 0 | Status: DISCONTINUED | OUTPATIENT
Start: 2023-08-01 | End: 2023-08-09

## 2023-08-01 RX ORDER — CEFTRIAXONE 500 MG/1
1000 INJECTION, POWDER, FOR SOLUTION INTRAMUSCULAR; INTRAVENOUS ONCE
Refills: 0 | Status: DISCONTINUED | OUTPATIENT
Start: 2023-08-01 | End: 2023-08-01

## 2023-08-01 RX ORDER — LABETALOL HCL 100 MG
10 TABLET ORAL ONCE
Refills: 0 | Status: COMPLETED | OUTPATIENT
Start: 2023-08-01 | End: 2023-08-01

## 2023-08-01 RX ORDER — PANTOPRAZOLE SODIUM 20 MG/1
40 TABLET, DELAYED RELEASE ORAL EVERY 12 HOURS
Refills: 0 | Status: DISCONTINUED | OUTPATIENT
Start: 2023-08-01 | End: 2023-08-09

## 2023-08-01 RX ORDER — SODIUM CHLORIDE 9 MG/ML
500 INJECTION INTRAMUSCULAR; INTRAVENOUS; SUBCUTANEOUS ONCE
Refills: 0 | Status: COMPLETED | OUTPATIENT
Start: 2023-08-01 | End: 2023-08-01

## 2023-08-01 RX ORDER — METOPROLOL TARTRATE 50 MG
25 TABLET ORAL ONCE
Refills: 0 | Status: DISCONTINUED | OUTPATIENT
Start: 2023-08-01 | End: 2023-08-01

## 2023-08-01 RX ORDER — DONEPEZIL HYDROCHLORIDE 10 MG/1
10 TABLET, FILM COATED ORAL AT BEDTIME
Refills: 0 | Status: DISCONTINUED | OUTPATIENT
Start: 2023-08-01 | End: 2023-08-09

## 2023-08-01 RX ORDER — VALPROIC ACID (AS SODIUM SALT) 250 MG/5ML
250 SOLUTION, ORAL ORAL
Refills: 0 | Status: DISCONTINUED | OUTPATIENT
Start: 2023-08-01 | End: 2023-08-03

## 2023-08-01 RX ORDER — GEMFIBROZIL 600 MG
600 TABLET ORAL
Refills: 0 | Status: DISCONTINUED | OUTPATIENT
Start: 2023-08-01 | End: 2023-08-09

## 2023-08-01 RX ORDER — METOPROLOL TARTRATE 50 MG
25 TABLET ORAL
Refills: 0 | Status: DISCONTINUED | OUTPATIENT
Start: 2023-08-01 | End: 2023-08-06

## 2023-08-01 RX ORDER — CEFTRIAXONE 500 MG/1
1000 INJECTION, POWDER, FOR SOLUTION INTRAMUSCULAR; INTRAVENOUS ONCE
Refills: 0 | Status: COMPLETED | OUTPATIENT
Start: 2023-08-01 | End: 2023-08-01

## 2023-08-01 RX ORDER — SODIUM CHLORIDE 9 MG/ML
1000 INJECTION, SOLUTION INTRAVENOUS ONCE
Refills: 0 | Status: COMPLETED | OUTPATIENT
Start: 2023-08-01 | End: 2023-08-01

## 2023-08-01 RX ORDER — DORZOLAMIDE HYDROCHLORIDE 20 MG/ML
1 SOLUTION/ DROPS OPHTHALMIC
Refills: 0 | Status: DISCONTINUED | OUTPATIENT
Start: 2023-08-01 | End: 2023-08-09

## 2023-08-01 RX ORDER — SODIUM CHLORIDE 9 MG/ML
500 INJECTION INTRAMUSCULAR; INTRAVENOUS; SUBCUTANEOUS
Refills: 0 | Status: COMPLETED | OUTPATIENT
Start: 2023-08-01 | End: 2023-08-02

## 2023-08-01 RX ORDER — CEFTRIAXONE 500 MG/1
1000 INJECTION, POWDER, FOR SOLUTION INTRAMUSCULAR; INTRAVENOUS EVERY 24 HOURS
Refills: 0 | Status: COMPLETED | OUTPATIENT
Start: 2023-08-02 | End: 2023-08-08

## 2023-08-01 RX ORDER — HEPARIN SODIUM 5000 [USP'U]/ML
5000 INJECTION INTRAVENOUS; SUBCUTANEOUS EVERY 12 HOURS
Refills: 0 | Status: DISCONTINUED | OUTPATIENT
Start: 2023-08-01 | End: 2023-08-09

## 2023-08-01 RX ORDER — HYDRALAZINE HCL 50 MG
5 TABLET ORAL ONCE
Refills: 0 | Status: COMPLETED | OUTPATIENT
Start: 2023-08-01 | End: 2023-08-01

## 2023-08-01 RX ADMIN — SODIUM CHLORIDE 1000 MILLILITER(S): 9 INJECTION, SOLUTION INTRAVENOUS at 11:00

## 2023-08-01 RX ADMIN — SODIUM CHLORIDE 1000 MILLILITER(S): 9 INJECTION, SOLUTION INTRAVENOUS at 10:00

## 2023-08-01 RX ADMIN — Medication 5 MILLIGRAM(S): at 21:55

## 2023-08-01 RX ADMIN — Medication 10 MILLIGRAM(S): at 13:15

## 2023-08-01 RX ADMIN — SODIUM CHLORIDE 1000 MILLILITER(S): 9 INJECTION INTRAMUSCULAR; INTRAVENOUS; SUBCUTANEOUS at 15:33

## 2023-08-01 RX ADMIN — Medication 52.5 MILLIGRAM(S): at 13:15

## 2023-08-01 RX ADMIN — CEFTRIAXONE 1000 MILLIGRAM(S): 500 INJECTION, POWDER, FOR SOLUTION INTRAMUSCULAR; INTRAVENOUS at 18:14

## 2023-08-01 NOTE — H&P ADULT - NSICDXPASTMEDICALHX_GEN_ALL_CORE_FT
PAST MEDICAL HISTORY:  Abdominal aortic aneurysm     Dementia     Diabetes mellitus     GERD (gastroesophageal reflux disease)     GI bleed     HTN (hypertension)     Hypothyroid     Neuropathy     Stage 3 chronic kidney disease      PAST MEDICAL HISTORY:  Abdominal aortic aneurysm     Aortic stenosis     Dementia     Diabetes mellitus     GERD (gastroesophageal reflux disease)     GI bleed     HTN (hypertension)     Hypothyroid     Neuropathy     Stage 3 chronic kidney disease

## 2023-08-01 NOTE — ED PROVIDER NOTE - CLINICAL SUMMARY MEDICAL DECISION MAKING FREE TEXT BOX
89yoF hx dementia, Afib not on AC due to GI bleeding, CKD, HTN, prior hx of nontraumatic SAH, sent from [facility] for seizure. DNR/DNI. Labs, CT head, CXR, urine, EKG ordered.

## 2023-08-01 NOTE — H&P ADULT - PROBLEM SELECTOR PLAN 3
clinically and labs on arrival indicating dehydration, got 1500 ml ns and repeat labs improved, michelle resolved. clinically and labs on arrival indicating dehydration, got 1500 ml iv fluids and repeat labs improved, michelle resolved.  will give another 500 cc gently

## 2023-08-01 NOTE — ED PROVIDER NOTE - CRITICAL CARE ATTENDING CONTRIBUTION TO CARE
89 F recently diagnosed with seizures 1 week ago, started on valproic acid, was found to be having generalized tonic-clonic activity this morning witnessed by nursing staff and EMS on arrival.  Received 5 mg of midazolam.    GEN obtunded but spontaneously breathing  Resp no tachypnea, hypoxic requiring 4 L NC  Cards distal pulses intact, irregularly irregular pulse  Abd soft, NT/ND  Neuro pupils 3 mm sluggish, not responding to central painful stimulus, eyes closed, GCS 3    Patient seen has a poor neurological status, she is DNR/DNI, she has poor protection of airway however oxygenating well on nasal cannula.  We will obtain CT imaging of head, give IV fluids and obtain basic labs including valproic acid level.

## 2023-08-01 NOTE — ED PROVIDER NOTE - OBJECTIVE STATEMENT
89yoF hx dementia, Afib not on AC due to GI bleeding, CKD, HTN, prior hx of nontraumatic SAH, sent from [facility] for seizure. Recent seizure diagnosis and admission 1 week ago. Started on depakote. Pt got versed IM by EMS w/ resolution of apparent seizure activity. On arrival pt is somnolent, saturating mid 90s on nasal cannula, minimally responsive to sternal rub. Pt is DNR/DNI.

## 2023-08-01 NOTE — ED ADULT TRIAGE NOTE - CHIEF COMPLAINT QUOTE
Hx of vascular dementia from [facility], recently Dx w/ epilepsy last week, started on Valproic Acid, missed AM dose today. appx 8min witnessed tonic clonic seizure, medicated with 5mg IVP versed via EMS. postictal and hypoxic to 88% in triage. MD at bedside

## 2023-08-01 NOTE — ED ADULT NURSE NOTE - OBJECTIVE STATEMENT
Assumed care of pt at 0854 in CC. Pt from [facility] for siezures this AM. As per RN at [facility], pt seizing for approx 30 min, EMS states pt seized as they arrived. 5MG IV Versed given PTA., SPO2 86% on RA, pt placed on 3LC NC, CM PAced. Newly diagnosed with seizures as of last week as per EMS. MD at bedside CT ordered.

## 2023-08-01 NOTE — H&P ADULT - ASSESSMENT
pt. is a 89yoF hx dementia, Afib not on AC due to GI bleeding, CKD, HTN, prior hx of nontraumatic SAH, seizure , sent from [facility] for seizure. Recent seizure diagnosis and admission 1 week ago. Started on depakote. Pt got versed IM by EMS with resolution of apparent seizure activity. pt. opens eyes to verbal command but does not follow instructions, does not give any history. baseline ? saturating mid 90s on 2 L nasal cannula. In the Er pt's urine suggestive of uti. ct head no acute findings. no seizure activity in the ER.

## 2023-08-01 NOTE — ED ADULT NURSE REASSESSMENT NOTE - NS ED NURSE REASSESS COMMENT FT1
Assumed care of pt at this time.  PT lethargic remained sleeping throughout exam.  PT found to by hypertensive.  MD Varela made aware.  Pending orders for BP meds.

## 2023-08-01 NOTE — H&P ADULT - HISTORY OF PRESENT ILLNESS
pt. is a 89yoF hx dementia, Afib not on AC due to GI bleeding, CKD, HTN, prior hx of nontraumatic SAH, seizure , sent from [facility] for seizure. Recent seizure diagnosis and admission 1 week ago. Started on depakote. Pt got versed IM by EMS with resolution of apparent seizure activity. pt. opens eyes to verbal command but does not follow instructions, baseline ? saturating mid 90s on 2 L nasal cannula. In the Er pt's urine suggestive of uti. ct head no acute findings. Pt is DNR/DNI. pt. is a 89yoF hx dementia, Afib not on AC due to GI bleeding, CKD, HTN, prior hx of nontraumatic SAH, seizure , sent from [facility] for seizure. Recent seizure diagnosis and admission 1 week ago. Started on depakote. Pt got versed IM by EMS with resolution of apparent seizure activity. pt. opens eyes to verbal command but does not follow instructions, does not give any history. baseline ? saturating mid 90s on 2 L nasal cannula. In the Er pt's urine suggestive of uti. ct head no acute findings. no seizure activity in the ER.  Pt is DNR/DNI.

## 2023-08-01 NOTE — ED PROVIDER NOTE - PHYSICAL EXAMINATION
Gen: Somnolent  Head: NC/AT  Neck: trachea midline  Card: regular rate and rhythm  Resp:  CTAB  Abd: soft, non-distended, non-tender  Ext: no deformities above reported baseline  Neuro:  Somnolent, minimal response to painful stimuli  Skin:  Warm and dry as visualized  Psych:  Normal affect and mood

## 2023-08-01 NOTE — H&P ADULT - PROBLEM SELECTOR PLAN 1
seizure activity per pt's facility  pt. got versed by EMS   ct head no acute findings  pt. got iv Depakote 250 mg in the ER, no seizure like activity in the ER  depakote level low  will continue Depakote 250 mg bid for now as no seizure activity in the ER , pt. somewhat lethargic  will request neurology consult for pt's anti seizure dose adjustment, change or if any addition needed.   iv ativan prn for any seizure activity

## 2023-08-01 NOTE — H&P ADULT - NSHPPHYSICALEXAM_GEN_ALL_CORE
Vital Signs Last 24 Hrs  T(C): 36.5 (01 Aug 2023 15:36), Max: 36.9 (01 Aug 2023 08:44)  T(F): 97.7 (01 Aug 2023 15:36), Max: 98.5 (01 Aug 2023 08:44)  HR: 60 (01 Aug 2023 20:48) (59 - 72)  BP: 190/83 (01 Aug 2023 20:48) (137/85 - 198/88)  BP(mean): --  RR: 18 (01 Aug 2023 20:48) (14 - 21)  SpO2: 100% (01 Aug 2023 20:48) (95% - 100%)    Parameters below as of 01 Aug 2023 20:48  Patient On (Oxygen Delivery Method): nasal cannula  O2 Flow (L/min): 3 Vital Signs Last 24 Hrs  T(C): 36.5 (01 Aug 2023 15:36), Max: 36.9 (01 Aug 2023 08:44)  T(F): 97.7 (01 Aug 2023 15:36), Max: 98.5 (01 Aug 2023 08:44)  HR: 60 (01 Aug 2023 20:48) (59 - 72)  BP: 190/83 (01 Aug 2023 20:48) (137/85 - 198/88)  BP(mean): --  RR: 18 (01 Aug 2023 20:48) (14 - 21)  SpO2: 100% (01 Aug 2023 20:48) (95% - 100%)    Parameters below as of 01 Aug 2023 20:48  Patient On (Oxygen Delivery Method): nasal cannula  O2 Flow (L/min): 2    General: pt. in bed not in distress  HEENT: AT, NC. PERRL. oral  mucosa appears somewhat dry  Neck: supple. no JVD.   Chest: air entry fair  bilaterally  Heart: S1,S2. RRR. + systolic murmur, no edema.   Abdomen: soft. no tenderness appreciated. non-distended. + BS.   Ext: no calf swelling noted, moves ext. slowly.   vascular : distal pulses palpable  Neuro: pt. opens eyes to verbal command, Babinski not present, not following commands baseline dementia,   Skin: warm and dry  psych : no agitation or restlessness noted.

## 2023-08-01 NOTE — ED ADULT NURSE NOTE - CHIEF COMPLAINT QUOTE
Hx of vascular dementia from [facility], recently Dx w/ epilepsy last week, started on Valproic Acid, missed AM dose today. appx 8min witnessed tonic clonic seizure, medicated with 5mg IVP versed via EMS. postictal and hypoxic to 88% in triage. MD at bedside

## 2023-08-02 LAB
ANION GAP SERPL CALC-SCNC: 16 MMOL/L — SIGNIFICANT CHANGE UP (ref 5–17)
BASOPHILS # BLD AUTO: 0.01 K/UL — SIGNIFICANT CHANGE UP (ref 0–0.2)
BASOPHILS NFR BLD AUTO: 0.1 % — SIGNIFICANT CHANGE UP (ref 0–2)
BUN SERPL-MCNC: 22.5 MG/DL — HIGH (ref 8–20)
CALCIUM SERPL-MCNC: 9.7 MG/DL — SIGNIFICANT CHANGE UP (ref 8.4–10.5)
CHLORIDE SERPL-SCNC: 102 MMOL/L — SIGNIFICANT CHANGE UP (ref 96–108)
CO2 SERPL-SCNC: 22 MMOL/L — SIGNIFICANT CHANGE UP (ref 22–29)
CREAT SERPL-MCNC: 1.02 MG/DL — SIGNIFICANT CHANGE UP (ref 0.5–1.3)
EGFR: 51 ML/MIN/1.73M2 — LOW
EOSINOPHIL # BLD AUTO: 0.08 K/UL — SIGNIFICANT CHANGE UP (ref 0–0.5)
EOSINOPHIL NFR BLD AUTO: 1.1 % — SIGNIFICANT CHANGE UP (ref 0–6)
GLUCOSE SERPL-MCNC: 110 MG/DL — HIGH (ref 70–99)
HCT VFR BLD CALC: 35.7 % — SIGNIFICANT CHANGE UP (ref 34.5–45)
HGB BLD-MCNC: 11.1 G/DL — LOW (ref 11.5–15.5)
IMM GRANULOCYTES NFR BLD AUTO: 0.7 % — SIGNIFICANT CHANGE UP (ref 0–0.9)
LYMPHOCYTES # BLD AUTO: 0.49 K/UL — LOW (ref 1–3.3)
LYMPHOCYTES # BLD AUTO: 6.5 % — LOW (ref 13–44)
MCHC RBC-ENTMCNC: 30.3 PG — SIGNIFICANT CHANGE UP (ref 27–34)
MCHC RBC-ENTMCNC: 31.1 GM/DL — LOW (ref 32–36)
MCV RBC AUTO: 97.5 FL — SIGNIFICANT CHANGE UP (ref 80–100)
MONOCYTES # BLD AUTO: 0.62 K/UL — SIGNIFICANT CHANGE UP (ref 0–0.9)
MONOCYTES NFR BLD AUTO: 8.2 % — SIGNIFICANT CHANGE UP (ref 2–14)
NEUTROPHILS # BLD AUTO: 6.34 K/UL — SIGNIFICANT CHANGE UP (ref 1.8–7.4)
NEUTROPHILS NFR BLD AUTO: 83.4 % — HIGH (ref 43–77)
PLATELET # BLD AUTO: 139 K/UL — LOW (ref 150–400)
POTASSIUM SERPL-MCNC: 4.4 MMOL/L — SIGNIFICANT CHANGE UP (ref 3.5–5.3)
POTASSIUM SERPL-SCNC: 4.4 MMOL/L — SIGNIFICANT CHANGE UP (ref 3.5–5.3)
RBC # BLD: 3.66 M/UL — LOW (ref 3.8–5.2)
RBC # FLD: 14.1 % — SIGNIFICANT CHANGE UP (ref 10.3–14.5)
SODIUM SERPL-SCNC: 140 MMOL/L — SIGNIFICANT CHANGE UP (ref 135–145)
WBC # BLD: 7.59 K/UL — SIGNIFICANT CHANGE UP (ref 3.8–10.5)
WBC # FLD AUTO: 7.59 K/UL — SIGNIFICANT CHANGE UP (ref 3.8–10.5)

## 2023-08-02 PROCEDURE — 99222 1ST HOSP IP/OBS MODERATE 55: CPT

## 2023-08-02 PROCEDURE — 99232 SBSQ HOSP IP/OBS MODERATE 35: CPT

## 2023-08-02 PROCEDURE — 36000 PLACE NEEDLE IN VEIN: CPT

## 2023-08-02 RX ADMIN — HEPARIN SODIUM 5000 UNIT(S): 5000 INJECTION INTRAVENOUS; SUBCUTANEOUS at 05:46

## 2023-08-02 RX ADMIN — SODIUM CHLORIDE 70 MILLILITER(S): 9 INJECTION INTRAMUSCULAR; INTRAVENOUS; SUBCUTANEOUS at 06:08

## 2023-08-02 RX ADMIN — HEPARIN SODIUM 5000 UNIT(S): 5000 INJECTION INTRAVENOUS; SUBCUTANEOUS at 18:54

## 2023-08-02 RX ADMIN — DORZOLAMIDE HYDROCHLORIDE 1 DROP(S): 20 SOLUTION/ DROPS OPHTHALMIC at 21:16

## 2023-08-02 RX ADMIN — DONEPEZIL HYDROCHLORIDE 10 MILLIGRAM(S): 10 TABLET, FILM COATED ORAL at 21:16

## 2023-08-02 RX ADMIN — Medication 52.5 MILLIGRAM(S): at 05:46

## 2023-08-02 RX ADMIN — TAMSULOSIN HYDROCHLORIDE 0.4 MILLIGRAM(S): 0.4 CAPSULE ORAL at 00:42

## 2023-08-02 RX ADMIN — Medication 600 MILLIGRAM(S): at 18:44

## 2023-08-02 RX ADMIN — Medication 52.5 MILLIGRAM(S): at 00:43

## 2023-08-02 RX ADMIN — PANTOPRAZOLE SODIUM 40 MILLIGRAM(S): 20 TABLET, DELAYED RELEASE ORAL at 18:44

## 2023-08-02 RX ADMIN — Medication 600 MILLIGRAM(S): at 05:48

## 2023-08-02 RX ADMIN — CEFTRIAXONE 1000 MILLIGRAM(S): 500 INJECTION, POWDER, FOR SOLUTION INTRAMUSCULAR; INTRAVENOUS at 21:16

## 2023-08-02 RX ADMIN — Medication 25 MILLIGRAM(S): at 05:45

## 2023-08-02 RX ADMIN — Medication 25 MILLIGRAM(S): at 18:43

## 2023-08-02 RX ADMIN — DONEPEZIL HYDROCHLORIDE 10 MILLIGRAM(S): 10 TABLET, FILM COATED ORAL at 00:42

## 2023-08-02 RX ADMIN — Medication 52.5 MILLIGRAM(S): at 18:41

## 2023-08-02 RX ADMIN — DORZOLAMIDE HYDROCHLORIDE 1 DROP(S): 20 SOLUTION/ DROPS OPHTHALMIC at 09:46

## 2023-08-02 RX ADMIN — Medication 150 MICROGRAM(S): at 05:45

## 2023-08-02 RX ADMIN — TAMSULOSIN HYDROCHLORIDE 0.4 MILLIGRAM(S): 0.4 CAPSULE ORAL at 21:16

## 2023-08-02 RX ADMIN — PANTOPRAZOLE SODIUM 40 MILLIGRAM(S): 20 TABLET, DELAYED RELEASE ORAL at 05:45

## 2023-08-02 NOTE — PATIENT PROFILE ADULT - FALL HARM RISK - HARM RISK INTERVENTIONS
Assistance with ambulation/Assistance OOB with selected safe patient handling equipment/Communicate Risk of Fall with Harm to all staff/Discuss with provider need for PT consult/Monitor gait and stability/Reinforce activity limits and safety measures with patient and family/Tailored Fall Risk Interventions/Visual Cue: Yellow wristband and red socks/Bed in lowest position, wheels locked, appropriate side rails in place/Call bell, personal items and telephone in reach/Instruct patient to call for assistance before getting out of bed or chair/Non-slip footwear when patient is out of bed/[place] to call system/Physically safe environment - no spills, clutter or unnecessary equipment/Purposeful Proactive Rounding/Room/bathroom lighting operational, light cord in reach

## 2023-08-02 NOTE — CONSULT NOTE ADULT - ASSESSMENT
IMPRESSION:  - History seizure.  R/O breakthrough seizure    ASSESSMENT/ PLAN:     - Admit to inpatient hospitalist service.  - General Neuro checks and vital signs Q 4 hours.  - SBP goal keep normotensive.  - Seizure precautions.  - Check Valproate trough level in am ( ordered)  - Lipitor for LDL goal of < 70 .  - Telemetry monitoring.  - cEEG monitoring.  - CT Head  -images and reports were reviewed.   - PT OT SLP evaluation.  - SCD/ SQ Lovenox for DVT prophylaxis.         IMPRESSION:  - Dementia  - History seizure.  R/O breakthrough seizure    ASSESSMENT/ PLAN:     - Admit to inpatient hospitalist service.  - General Neuro checks and vital signs Q 4 hours.  - SBP goal keep normotensive.  - Seizure precautions.  - Check Valproate trough level in am ( ordered)  - Lipitor for LDL goal of < 70 .  - Telemetry monitoring.  - cEEG monitoring.  - CT Head  -images and reports were reviewed.   - PT OT SLP evaluation.  - SCD/ SQ Lovenox for DVT prophylaxis.

## 2023-08-02 NOTE — CONSULT NOTE ADULT - SUBJECTIVE AND OBJECTIVE BOX
Neurology consult    RAGHAVENDRA JOSHUAJUSTINWISHIRA 89y Female     HPI:  pt. is a 89yoF hx dementia, Afib not on AC due to GI bleeding, CKD, HTN, prior hx of nontraumatic SAH, seizure , sent from [facility] for seizure. Recent seizure diagnosis and admission 1 week ago. Started on depakote. Pt got versed IM by EMS with resolution of apparent seizure activity. pt. opens eyes to verbal command but does not follow instructions, does not give any history. baseline ? saturating mid 90s on 2 L nasal cannula. In the Er pt's urine suggestive of uti. ct head no acute findings. no seizure activity in the ER.  Pt is DNR/DNI. (01 Aug 2023 20:47)      PMH: HTN (hypertension)    Diabetes mellitus    Hypothyroid    Dementia    GI bleed    Neuropathy    GERD (gastroesophageal reflux disease)    Abdominal aortic aneurysm    Stage 3 chronic kidney disease    Aortic stenosis       PSH: S/P hysterectomy    S/P hip replacement    Pacemaker      FAMILY HISTORY:  FHx: diabetes mellitus    SOCIAL HISTORY:  No history of tobacco or alcohol use     Allergies    No Known Allergies    Intolerances    Vital Signs Last 24 Hrs  T(C): 36.7 (02 Aug 2023 11:20), Max: 36.8 (02 Aug 2023 07:36)  T(F): 98.1 (02 Aug 2023 11:20), Max: 98.2 (02 Aug 2023 07:36)  HR: 66 (02 Aug 2023 11:20) (58 - 71)  BP: 144/70 (02 Aug 2023 11:20) (144/70 - 181/71)  BP(mean): --  RR: 18 (02 Aug 2023 11:20) (18 - 21)  SpO2: 97% (02 Aug 2023 11:20) (97% - 100%)    Parameters below as of 02 Aug 2023 11:20  Patient On (Oxygen Delivery Method): room air      MEDICATIONS    cefTRIAXone Injectable. 1000 milliGRAM(s) IV Push every 24 hours  donepezil 10 milliGRAM(s) Oral at bedtime  dorzolamide 2% Ophthalmic Solution 1 Drop(s) Both EYES <User Schedule>  ferrous    sulfate 325 milliGRAM(s) Oral daily  gemfibrozil 600 milliGRAM(s) Oral two times a day  heparin   Injectable 5000 Unit(s) SubCutaneous every 12 hours  levothyroxine 150 MICROGram(s) Oral daily  LORazepam   Injectable 1 milliGRAM(s) IV Push once PRN  metoprolol tartrate 25 milliGRAM(s) Oral two times a day  pantoprazole    Tablet 40 milliGRAM(s) Oral every 12 hours  tamsulosin 0.4 milliGRAM(s) Oral at bedtime  valproate sodium  IVPB 250 milliGRAM(s) IV Intermittent two times a day    LABS:  CBC Full  -  ( 02 Aug 2023 06:49 )  WBC Count : 7.59 K/uL  RBC Count : 3.66 M/uL  Hemoglobin : 11.1 g/dL  Hematocrit : 35.7 %  Platelet Count - Automated : 139 K/uL  Mean Cell Volume : 97.5 fl  Mean Cell Hemoglobin : 30.3 pg  Mean Cell Hemoglobin Concentration : 31.1 gm/dL  Auto Neutrophil # : 6.34 K/uL  Auto Lymphocyte # : 0.49 K/uL  Auto Monocyte # : 0.62 K/uL  Auto Eosinophil # : 0.08 K/uL  Auto Basophil # : 0.01 K/uL  Auto Neutrophil % : 83.4 %  Auto Lymphocyte % : 6.5 %  Auto Monocyte % : 8.2 %  Auto Eosinophil % : 1.1 %  Auto Basophil % : 0.1 %    Urinalysis Basic - ( 02 Aug 2023 06:49 )    Color: x / Appearance: x / SG: x / pH: x  Gluc: 110 mg/dL / Ketone: x  / Bili: x / Urobili: x   Blood: x / Protein: x / Nitrite: x   Leuk Esterase: x / RBC: x / WBC x   Sq Epi: x / Non Sq Epi: x / Bacteria: x      08-02    140  |  102  |  22.5<H>  ----------------------------<  110<H>  4.4   |  22.0  |  1.02    Ca    9.7      02 Aug 2023 06:49    TPro  8.2  /  Alb  3.9  /  TBili  0.2<L>  /  DBili  x   /  AST  14  /  ALT  6   /  AlkPhos  65  08-01    LIVER FUNCTIONS - ( 01 Aug 2023 08:51 )  Alb: 3.9 g/dL / Pro: 8.2 g/dL / ALK PHOS: 65 U/L / ALT: 6 U/L / AST: 14 U/L / GGT: x           Hemoglobin A1C:       PT/INR - ( 01 Aug 2023 08:51 )   PT: 12.0 sec;   INR: 1.08 ratio         PTT - ( 01 Aug 2023 08:51 )  PTT:23.1 sec      On Neurological Examination:    Mental Status - Patient is  awake, alert and oriented X2.  Speech is fluent. Patient can name some objects,  and follow simple commands however was not able to recall or identify her son next to her. She perseverates that it is Dr Siddiqui. ( Son's name is Glenn)  There is no dysarthria.    Cranial Nerves - PERRL, EOMI,  Visual fields are full to finger counting, no gross facial asymmetry, tongue/uvula midline    Motor Exam -   Right upper ---5/5 No drift  Left upper ---5/5 No drift  Right lower ---5/5 No drift  Left lower  ---5/5 No drift     nml bulk/tone    Sensory    Intact to light touch and pinprick bilaterally    Coord: FTN intact bilaterally     Gait -  Not assessed.   Patient was last seen well at  Stroke code was activated at   Patient was seen by me at  CT head was read by me at                                  NIHSS******  Premorbid MRS: ******    RADIOLOGY ( All neurological imaging studies were independently reviewed and interpreted by me)  CTH   CTA  CTP        MRI:  TTE                 Neurology consult    RAGHAVENDRA JOSHUAJUSTINWISHIRA 89y Female     HPI:  pt. is a 89yoF hx dementia, Afib not on AC due to GI bleeding, CKD, HTN, prior hx of nontraumatic SAH, seizure , sent from [facility] for seizure. Recent seizure diagnosis and admission 1 week ago. Started on depakote. Pt got versed IM by EMS with resolution of apparent seizure activity. pt. opens eyes to verbal command but does not follow instructions, does not give any history. baseline ? saturating mid 90s on 2 L nasal cannula. In the Er pt's urine suggestive of uti. ct head no acute findings. no seizure activity in the ER.  Pt is DNR/DNI. (01 Aug 2023 20:47)      PMH: HTN (hypertension)    Diabetes mellitus    Hypothyroid    Dementia    GI bleed    Neuropathy    GERD (gastroesophageal reflux disease)    Abdominal aortic aneurysm    Stage 3 chronic kidney disease    Aortic stenosis       PSH: S/P hysterectomy    S/P hip replacement    Pacemaker      FAMILY HISTORY:  FHx: diabetes mellitus    SOCIAL HISTORY:  No history of tobacco or alcohol use     Allergies    No Known Allergies    Intolerances    Vital Signs Last 24 Hrs  T(C): 36.7 (02 Aug 2023 11:20), Max: 36.8 (02 Aug 2023 07:36)  T(F): 98.1 (02 Aug 2023 11:20), Max: 98.2 (02 Aug 2023 07:36)  HR: 66 (02 Aug 2023 11:20) (58 - 71)  BP: 144/70 (02 Aug 2023 11:20) (144/70 - 181/71)  BP(mean): --  RR: 18 (02 Aug 2023 11:20) (18 - 21)  SpO2: 97% (02 Aug 2023 11:20) (97% - 100%)    Parameters below as of 02 Aug 2023 11:20  Patient On (Oxygen Delivery Method): room air      MEDICATIONS    cefTRIAXone Injectable. 1000 milliGRAM(s) IV Push every 24 hours  donepezil 10 milliGRAM(s) Oral at bedtime  dorzolamide 2% Ophthalmic Solution 1 Drop(s) Both EYES <User Schedule>  ferrous    sulfate 325 milliGRAM(s) Oral daily  gemfibrozil 600 milliGRAM(s) Oral two times a day  heparin   Injectable 5000 Unit(s) SubCutaneous every 12 hours  levothyroxine 150 MICROGram(s) Oral daily  LORazepam   Injectable 1 milliGRAM(s) IV Push once PRN  metoprolol tartrate 25 milliGRAM(s) Oral two times a day  pantoprazole    Tablet 40 milliGRAM(s) Oral every 12 hours  tamsulosin 0.4 milliGRAM(s) Oral at bedtime  valproate sodium  IVPB 250 milliGRAM(s) IV Intermittent two times a day    LABS:  CBC Full  -  ( 02 Aug 2023 06:49 )  WBC Count : 7.59 K/uL  RBC Count : 3.66 M/uL  Hemoglobin : 11.1 g/dL  Hematocrit : 35.7 %  Platelet Count - Automated : 139 K/uL  Mean Cell Volume : 97.5 fl  Mean Cell Hemoglobin : 30.3 pg  Mean Cell Hemoglobin Concentration : 31.1 gm/dL  Auto Neutrophil # : 6.34 K/uL  Auto Lymphocyte # : 0.49 K/uL  Auto Monocyte # : 0.62 K/uL  Auto Eosinophil # : 0.08 K/uL  Auto Basophil # : 0.01 K/uL  Auto Neutrophil % : 83.4 %  Auto Lymphocyte % : 6.5 %  Auto Monocyte % : 8.2 %  Auto Eosinophil % : 1.1 %  Auto Basophil % : 0.1 %    Urinalysis Basic - ( 02 Aug 2023 06:49 )    Color: x / Appearance: x / SG: x / pH: x  Gluc: 110 mg/dL / Ketone: x  / Bili: x / Urobili: x   Blood: x / Protein: x / Nitrite: x   Leuk Esterase: x / RBC: x / WBC x   Sq Epi: x / Non Sq Epi: x / Bacteria: x      08-02    140  |  102  |  22.5<H>  ----------------------------<  110<H>  4.4   |  22.0  |  1.02    Ca    9.7      02 Aug 2023 06:49    TPro  8.2  /  Alb  3.9  /  TBili  0.2<L>  /  DBili  x   /  AST  14  /  ALT  6   /  AlkPhos  65  08-01    LIVER FUNCTIONS - ( 01 Aug 2023 08:51 )  Alb: 3.9 g/dL / Pro: 8.2 g/dL / ALK PHOS: 65 U/L / ALT: 6 U/L / AST: 14 U/L / GGT: x           Hemoglobin A1C:       PT/INR - ( 01 Aug 2023 08:51 )   PT: 12.0 sec;   INR: 1.08 ratio         PTT - ( 01 Aug 2023 08:51 )  PTT:23.1 sec      On Neurological Examination:    Mental Status - Patient is  awake, alert and oriented X2.  Speech is fluent. Patient can name some objects,  and follow simple commands however was not able to recall or identify her son next to her. She perseverates that it is Dr Siddiqui. ( Son's name is Glenn)  There is no dysarthria.    Cranial Nerves - PERRL, EOMI,  Visual fields are full to finger counting, no gross facial asymmetry, tongue/uvula midline    Motor Exam -   Both UE are at least 4/5  with no drift.  Both LE are antigravity.    Sensory    Intact to light touch and pinprick bilaterally    Coord: FTN intact bilaterally     Gait -  Not assessed.   RADIOLOGY ( All neurological imaging studies were independently reviewed and interpreted by me)  CTH     CT Head No Cont (08.01.23 @ 10:40) >    IMPRESSION:  Limited exam due to motion    No acute intracranial hemorrhage or acute territorial infarct.  If   symptoms persist, follow-up MRI exam recommended.    BE DEVINE MD; Attending Radiologist      CTA  CTP    MRI:  TTE                 Neurology consult    RAGHAVENDRA JOSHUAJUSTINWISHIRA 89y Female     HPI:  pt. is a 89yoF hx dementia, Afib not on AC due to GI bleeding, CKD, HTN, prior hx of nontraumatic SAH, seizure , sent from [facility] for seizure. Recent seizure diagnosis and admission 1 week ago. Started on depakote. Pt got versed IM by EMS with resolution of apparent seizure activity. pt. opens eyes to verbal command but does not follow instructions, does not give any history. baseline ? saturating mid 90s on 2 L nasal cannula. In the Er pt's urine suggestive of uti. ct head no acute findings. no seizure activity in the ER.  Pt is DNR/DNI. (01 Aug 2023 20:47)      PMH: HTN (hypertension)    Diabetes mellitus    Hypothyroid    Dementia    GI bleed    Neuropathy    GERD (gastroesophageal reflux disease)    Abdominal aortic aneurysm    Stage 3 chronic kidney disease    Aortic stenosis       PSH: S/P hysterectomy    S/P hip replacement    Pacemaker      FAMILY HISTORY:  FHx: diabetes mellitus    SOCIAL HISTORY:  No history of tobacco or alcohol use     Allergies    No Known Allergies    Intolerances    Vital Signs Last 24 Hrs  T(C): 36.7 (02 Aug 2023 11:20), Max: 36.8 (02 Aug 2023 07:36)  T(F): 98.1 (02 Aug 2023 11:20), Max: 98.2 (02 Aug 2023 07:36)  HR: 66 (02 Aug 2023 11:20) (58 - 71)  BP: 144/70 (02 Aug 2023 11:20) (144/70 - 181/71)  BP(mean): --  RR: 18 (02 Aug 2023 11:20) (18 - 21)  SpO2: 97% (02 Aug 2023 11:20) (97% - 100%)    Parameters below as of 02 Aug 2023 11:20  Patient On (Oxygen Delivery Method): room air      MEDICATIONS    cefTRIAXone Injectable. 1000 milliGRAM(s) IV Push every 24 hours  donepezil 10 milliGRAM(s) Oral at bedtime  dorzolamide 2% Ophthalmic Solution 1 Drop(s) Both EYES <User Schedule>  ferrous    sulfate 325 milliGRAM(s) Oral daily  gemfibrozil 600 milliGRAM(s) Oral two times a day  heparin   Injectable 5000 Unit(s) SubCutaneous every 12 hours  levothyroxine 150 MICROGram(s) Oral daily  LORazepam   Injectable 1 milliGRAM(s) IV Push once PRN  metoprolol tartrate 25 milliGRAM(s) Oral two times a day  pantoprazole    Tablet 40 milliGRAM(s) Oral every 12 hours  tamsulosin 0.4 milliGRAM(s) Oral at bedtime  valproate sodium  IVPB 250 milliGRAM(s) IV Intermittent two times a day    LABS:  CBC Full  -  ( 02 Aug 2023 06:49 )  WBC Count : 7.59 K/uL  RBC Count : 3.66 M/uL  Hemoglobin : 11.1 g/dL  Hematocrit : 35.7 %  Platelet Count - Automated : 139 K/uL  Mean Cell Volume : 97.5 fl  Mean Cell Hemoglobin : 30.3 pg  Mean Cell Hemoglobin Concentration : 31.1 gm/dL  Auto Neutrophil # : 6.34 K/uL  Auto Lymphocyte # : 0.49 K/uL  Auto Monocyte # : 0.62 K/uL  Auto Eosinophil # : 0.08 K/uL  Auto Basophil # : 0.01 K/uL  Auto Neutrophil % : 83.4 %  Auto Lymphocyte % : 6.5 %  Auto Monocyte % : 8.2 %  Auto Eosinophil % : 1.1 %  Auto Basophil % : 0.1 %    Urinalysis Basic - ( 02 Aug 2023 06:49 )    Color: x / Appearance: x / SG: x / pH: x  Gluc: 110 mg/dL / Ketone: x  / Bili: x / Urobili: x   Blood: x / Protein: x / Nitrite: x   Leuk Esterase: x / RBC: x / WBC x   Sq Epi: x / Non Sq Epi: x / Bacteria: x      08-02    140  |  102  |  22.5<H>  ----------------------------<  110<H>  4.4   |  22.0  |  1.02    Ca    9.7      02 Aug 2023 06:49    TPro  8.2  /  Alb  3.9  /  TBili  0.2<L>  /  DBili  x   /  AST  14  /  ALT  6   /  AlkPhos  65  08-01    LIVER FUNCTIONS - ( 01 Aug 2023 08:51 )  Alb: 3.9 g/dL / Pro: 8.2 g/dL / ALK PHOS: 65 U/L / ALT: 6 U/L / AST: 14 U/L / GGT: x           Hemoglobin A1C:       PT/INR - ( 01 Aug 2023 08:51 )   PT: 12.0 sec;   INR: 1.08 ratio         PTT - ( 01 Aug 2023 08:51 )  PTT:23.1 sec      On Neurological Examination:    Mental Status - Patient is  awake, alert and oriented X2.  Speech is fluent. Patient can name some objects,  and follow simple commands however was not able to recall or identify her son next to her. She perseverates that it is Dr Siddiqui. ( Son's name is Glenn)  There is no dysarthria.    Cranial Nerves - PERRL, EOMI,  Visual fields are full to finger counting, no gross facial asymmetry, tongue/uvula midline    Motor Exam -   Both UE are at least 4/5  with no drift.  Both LE are antigravity.    Sensory    Intact to light touch and pinprick bilaterally    Coord: FTN intact bilaterally     Gait -  Not assessed.   RADIOLOGY ( All neurological imaging studies were independently reviewed and interpreted by me)  CTH     CT Head No Cont (08.01.23 @ 10:40) >    IMPRESSION:  Limited exam due to motion    No acute intracranial hemorrhage or acute territorial infarct.  If   symptoms persist, follow-up MRI exam recommended.    BE DEVINE MD; Attending Radiologist    MRI:      TTE

## 2023-08-02 NOTE — PATIENT PROFILE ADULT - DEAF OR HARD OF HEARING?
HISTORY OF PRESENT ILLNESS  Brooklyn Morris is a 79 y.o. female. HPI  NEW patient consult referred by Dr. Daniella Barber for BILATERAL breast masses. Patient had noticed LEFT central breast swelling over the last month. It had been draining after patient squeezed the area. The RIGHT breast mass has not been bothering her. She went to the [facility] IN THE [facility] ER and was prescribed Keflex. Pain and swelling now diminished after course of abx. Denies other changes. Family History: Father, prostate cancer, dx early 76s                 [facility] Results (most recent):  Results from Hospital Encounter encounter on 06/20/22     [facility] MAMMO BI SCREENING INCL CAD     Narrative  STUDY: Bilateral digital screening mammogram     INDICATION:  Screening. COMPARISON: Most recent 2021     BREAST COMPOSITION:  There are scattered areas of fibroglandular density. FINDINGS: Bilateral digital screening mammography was performed and is  interpreted in conjunction with a computer assisted detection (CAD) system. No  suspicious masses or calcifications are identified. There has been no  significant change. Impression  BI-RADS 1: Negative. No mammographic evidence of malignancy. RECOMMENDATIONS:  Next screening mammogram is recommended in one year. The patient will be notified of these results. Review of Systems   HENT:  Positive for hearing loss and tinnitus. Gastrointestinal:  Positive for diarrhea. Musculoskeletal:  Positive for joint pain. Neurological:  Positive for dizziness.                     Past Medical History:   Diagnosis Date    Abscess      Arthritis      Headache(784.0)      Hypercholesterolemia                 Past Surgical History:   Procedure Laterality Date    HX COLONOSCOPY        HX GYN         tubes tied    HX ORTHOPAEDIC         left shoulder         Social History            Socioeconomic History    Marital status:        Spouse name: Not on file    Number of children: Not on file    Years of education: Not on file    Highest education level: Not on file   Occupational History    Not on file   Tobacco Use    Smoking status: Former    Smokeless tobacco: Never   Substance and Sexual Activity    Alcohol use: No    Drug use: No    Sexual activity: Not on file   Other Topics Concern    Not on file   Social History Narrative    Not on file      Social Determinants of Health      Financial Resource Strain: Not on file   Food Insecurity: Not on file   Transportation Needs: Not on file   Physical Activity: Not on file   Stress: Not on file   Social Connections: Not on file   Intimate Partner Violence: Not on file   Housing Stability: Not on file                Current Outpatient Medications on File Prior to Visit   Medication Sig Dispense Refill    b complex-vitamin c-folic acid 0.8 mg (NEPHRO-HERMAN) 0.8 mg tab tablet Take 1 Tablet by mouth daily. omega 3-DHA-EPA-fish oil 1,000 mg (120 mg-180 mg) capsule Take 1 Capsule by mouth daily. tumeric-ging-olive-oreg-capryl 100 mg-150 mg- 50 mg-150 mg cap Take  by mouth.        cinnamon bark 500 mg cap Take  by mouth. cholecalciferol (VITAMIN D3) (1000 Units /25 mcg) tablet Take 1,000 Units by mouth daily. No current facility-administered medications on file prior to visit. Allergies   Allergen Reactions    Nembutal Other (comments)       Makes her climb the walls         OB History    No obstetric history on file. ROS        Physical Exam  Exam conducted with a chaperone present. Constitutional:       Appearance: She is well-developed. She is not diaphoretic. HENT:      Head: Normocephalic and atraumatic. Right Ear: External ear normal.      Left Ear: External ear normal.   Eyes:      General: No scleral icterus. Right eye: No discharge. Left eye: No discharge. Pupils: Pupils are equal, round, and reactive to light. Neck:      Thyroid: No thyromegaly.       Vascular: No JVD. Trachea: No tracheal deviation. Cardiovascular:      Rate and Rhythm: Normal rate and regular rhythm. Heart sounds: Normal heart sounds. Pulmonary:      Effort: Pulmonary effort is normal. No tachypnea, accessory muscle usage or respiratory distress. Breath sounds: Normal breath sounds. No stridor. Chest:   Breasts:     Breasts are symmetrical.      Right: No inverted nipple, mass, nipple discharge, skin change or tenderness. Left: No inverted nipple, mass, nipple discharge, skin change or tenderness. Abdominal:      General: There is no distension. Palpations: Abdomen is soft. There is no mass. Tenderness: There is no abdominal tenderness. Musculoskeletal:         General: Normal range of motion. Cervical back: Normal range of motion and neck supple. Lymphadenopathy:      Cervical: No cervical adenopathy. Skin:     General: Skin is warm and dry. Neurological:      Mental Status: She is alert and oriented to person, place, and time. Psychiatric:         Speech: Speech normal.         Behavior: Behavior normal.         Thought Content: Thought content normal.         Judgment: Judgment normal.               ASSESSMENT and PLAN      ICD-10-CM ICD-9-CM     1. Sebaceous cyst of breast, left  N60.82 610.8         2. Sebaceous cyst of breast, right  N60.81 610.8            New patient presents for evaluation of BILATERAL breast masses, and is doing well overall. Upon physical examination of BILATERAL breasts noted sebaceous cysts, with the LEFT breast cysts slightly inflamed. Will prescribe doxycycline for another course of antibiotics. Will schedule for excision. Follow up as needed. This plan was reviewed with the patient and patient agrees. All questions were answered. yes

## 2023-08-02 NOTE — PROGRESS NOTE ADULT - SUBJECTIVE AND OBJECTIVE BOX
[facility] Division of Hospital Medicine    Chief Complaint:      SUBJECTIVE / OVERNIGHT EVENTS:    Patient seen and examined at bedside, admitted overnight with seizure, UTI, AMS, and RHONDA. Patient currently appears at baseline per son and daughter at bedside, patient denies any new complaints. Seen by neurology in AM who recommend cEEG and monitoring of depakote level in AM. will continue to monitor at this time, giving Abx for UTI and fluid for RHONDA.      MEDICATIONS  (STANDING):  cefTRIAXone Injectable. 1000 milliGRAM(s) IV Push every 24 hours  donepezil 10 milliGRAM(s) Oral at bedtime  dorzolamide 2% Ophthalmic Solution 1 Drop(s) Both EYES <User Schedule>  ferrous    sulfate 325 milliGRAM(s) Oral daily  gemfibrozil 600 milliGRAM(s) Oral two times a day  heparin   Injectable 5000 Unit(s) SubCutaneous every 12 hours  levothyroxine 150 MICROGram(s) Oral daily  metoprolol tartrate 25 milliGRAM(s) Oral two times a day  pantoprazole    Tablet 40 milliGRAM(s) Oral every 12 hours  tamsulosin 0.4 milliGRAM(s) Oral at bedtime  valproate sodium  IVPB 250 milliGRAM(s) IV Intermittent two times a day    MEDICATIONS  (PRN):  LORazepam   Injectable 1 milliGRAM(s) IV Push once PRN for any seizure activity        I&O's Summary      PHYSICAL EXAM:  Vital Signs Last 24 Hrs  T(C): 36.7 (02 Aug 2023 11:20), Max: 36.8 (02 Aug 2023 07:36)  T(F): 98.1 (02 Aug 2023 11:20), Max: 98.2 (02 Aug 2023 07:36)  HR: 66 (02 Aug 2023 11:20) (58 - 71)  BP: 144/70 (02 Aug 2023 11:20) (144/70 - 181/71)  BP(mean): --  RR: 18 (02 Aug 2023 11:20) (18 - 20)  SpO2: 97% (02 Aug 2023 11:20) (97% - 100%)    Parameters below as of 02 Aug 2023 11:20  Patient On (Oxygen Delivery Method): room air    General: pt. in bed not in distress  HEENT: AT, NC. PERRL. oral  mucosa appears somewhat dry  Neck: supple. no JVD.   Chest: air entry fair  bilaterally  Heart: S1,S2. RRR. + systolic murmur, no edema.   Abdomen: soft. no tenderness appreciated. non-distended. + BS.   Ext: no calf swelling noted, moves ext. slowly.   vascular : distal pulses palpable  Neuro: Patient awake and alert, follows most commands, AxO to person, partially place, sensation intact.    Skin: warm and dry  psych : no agitation or restlessness noted. at baseline mentation per son at bedside   LABS:                        11.1   7.59  )-----------( 139      ( 02 Aug 2023 06:49 )             35.7     08-02    140  |  102  |  22.5<H>  ----------------------------<  110<H>  4.4   |  22.0  |  1.02    Ca    9.7      02 Aug 2023 06:49    TPro  8.2  /  Alb  3.9  /  TBili  0.2<L>  /  DBili  x   /  AST  14  /  ALT  6   /  AlkPhos  65  08-01    PT/INR - ( 01 Aug 2023 08:51 )   PT: 12.0 sec;   INR: 1.08 ratio         PTT - ( 01 Aug 2023 08:51 )  PTT:23.1 sec      Urinalysis Basic - ( 02 Aug 2023 06:49 )    Color: x / Appearance: x / SG: x / pH: x  Gluc: 110 mg/dL / Ketone: x  / Bili: x / Urobili: x   Blood: x / Protein: x / Nitrite: x   Leuk Esterase: x / RBC: x / WBC x   Sq Epi: x / Non Sq Epi: x / Bacteria: x        CAPILLARY BLOOD GLUCOSE            RADIOLOGY & ADDITIONAL TESTS:  Results Reviewed:   Imaging Personally Reviewed:  Electrocardiogram Personally Reviewed:

## 2023-08-02 NOTE — PROGRESS NOTE ADULT - TIME BILLING
Elderly patient with advanced dementia with seizures, explained in detail to son and daughter likely causation of seizures, factors that may lower seizure threshold, need for continued medication and monitoring.

## 2023-08-03 LAB
ANION GAP SERPL CALC-SCNC: 14 MMOL/L — SIGNIFICANT CHANGE UP (ref 5–17)
BUN SERPL-MCNC: 24.9 MG/DL — HIGH (ref 8–20)
CALCIUM SERPL-MCNC: 9.7 MG/DL — SIGNIFICANT CHANGE UP (ref 8.4–10.5)
CHLORIDE SERPL-SCNC: 103 MMOL/L — SIGNIFICANT CHANGE UP (ref 96–108)
CO2 SERPL-SCNC: 19 MMOL/L — LOW (ref 22–29)
CREAT SERPL-MCNC: 1.06 MG/DL — SIGNIFICANT CHANGE UP (ref 0.5–1.3)
EGFR: 49 ML/MIN/1.73M2 — LOW
GLUCOSE SERPL-MCNC: 96 MG/DL — SIGNIFICANT CHANGE UP (ref 70–99)
MRSA PCR RESULT.: SIGNIFICANT CHANGE UP
POTASSIUM SERPL-MCNC: 5.1 MMOL/L — SIGNIFICANT CHANGE UP (ref 3.5–5.3)
POTASSIUM SERPL-SCNC: 5.1 MMOL/L — SIGNIFICANT CHANGE UP (ref 3.5–5.3)
S AUREUS DNA NOSE QL NAA+PROBE: SIGNIFICANT CHANGE UP
SODIUM SERPL-SCNC: 136 MMOL/L — SIGNIFICANT CHANGE UP (ref 135–145)
VALPROATE SERPL-MCNC: 29.7 UG/ML — LOW (ref 50–100)

## 2023-08-03 PROCEDURE — 99232 SBSQ HOSP IP/OBS MODERATE 35: CPT

## 2023-08-03 PROCEDURE — 95720 EEG PHY/QHP EA INCR W/VEEG: CPT

## 2023-08-03 RX ORDER — GEMFIBROZIL 600 MG
1 TABLET ORAL
Qty: 0 | Refills: 0 | DISCHARGE

## 2023-08-03 RX ORDER — CHLORHEXIDINE GLUCONATE 213 G/1000ML
1 SOLUTION TOPICAL
Refills: 0 | Status: DISCONTINUED | OUTPATIENT
Start: 2023-08-03 | End: 2023-08-09

## 2023-08-03 RX ORDER — VALPROIC ACID (AS SODIUM SALT) 250 MG/5ML
500 SOLUTION, ORAL ORAL ONCE
Refills: 0 | Status: COMPLETED | OUTPATIENT
Start: 2023-08-03 | End: 2023-08-03

## 2023-08-03 RX ORDER — VALPROIC ACID (AS SODIUM SALT) 250 MG/5ML
500 SOLUTION, ORAL ORAL THREE TIMES A DAY
Refills: 0 | Status: DISCONTINUED | OUTPATIENT
Start: 2023-08-03 | End: 2023-08-06

## 2023-08-03 RX ORDER — AMLODIPINE BESYLATE 2.5 MG/1
5 TABLET ORAL DAILY
Refills: 0 | Status: DISCONTINUED | OUTPATIENT
Start: 2023-08-03 | End: 2023-08-04

## 2023-08-03 RX ORDER — FERROUS SULFATE 325(65) MG
1 TABLET ORAL
Refills: 0 | DISCHARGE

## 2023-08-03 RX ORDER — DORZOLAMIDE HYDROCHLORIDE 20 MG/ML
1 SOLUTION/ DROPS OPHTHALMIC
Qty: 0 | Refills: 0 | DISCHARGE

## 2023-08-03 RX ORDER — LEVOTHYROXINE SODIUM 125 MCG
1 TABLET ORAL
Qty: 0 | Refills: 0 | DISCHARGE

## 2023-08-03 RX ORDER — VALPROIC ACID (AS SODIUM SALT) 250 MG/5ML
500 SOLUTION, ORAL ORAL
Refills: 0 | Status: DISCONTINUED | OUTPATIENT
Start: 2023-08-03 | End: 2023-08-03

## 2023-08-03 RX ORDER — DONEPEZIL HYDROCHLORIDE 10 MG/1
1 TABLET, FILM COATED ORAL
Qty: 0 | Refills: 0 | DISCHARGE

## 2023-08-03 RX ADMIN — Medication 25 MILLIGRAM(S): at 17:07

## 2023-08-03 RX ADMIN — HEPARIN SODIUM 5000 UNIT(S): 5000 INJECTION INTRAVENOUS; SUBCUTANEOUS at 17:07

## 2023-08-03 RX ADMIN — DORZOLAMIDE HYDROCHLORIDE 1 DROP(S): 20 SOLUTION/ DROPS OPHTHALMIC at 21:40

## 2023-08-03 RX ADMIN — PANTOPRAZOLE SODIUM 40 MILLIGRAM(S): 20 TABLET, DELAYED RELEASE ORAL at 17:07

## 2023-08-03 RX ADMIN — Medication 52.5 MILLIGRAM(S): at 05:50

## 2023-08-03 RX ADMIN — Medication 55 MILLIGRAM(S): at 18:33

## 2023-08-03 RX ADMIN — AMLODIPINE BESYLATE 5 MILLIGRAM(S): 2.5 TABLET ORAL at 13:15

## 2023-08-03 RX ADMIN — Medication 600 MILLIGRAM(S): at 17:07

## 2023-08-03 RX ADMIN — Medication 150 MICROGRAM(S): at 05:40

## 2023-08-03 RX ADMIN — HEPARIN SODIUM 5000 UNIT(S): 5000 INJECTION INTRAVENOUS; SUBCUTANEOUS at 05:40

## 2023-08-03 RX ADMIN — TAMSULOSIN HYDROCHLORIDE 0.4 MILLIGRAM(S): 0.4 CAPSULE ORAL at 21:51

## 2023-08-03 RX ADMIN — Medication 25 MILLIGRAM(S): at 05:40

## 2023-08-03 RX ADMIN — CEFTRIAXONE 1000 MILLIGRAM(S): 500 INJECTION, POWDER, FOR SOLUTION INTRAMUSCULAR; INTRAVENOUS at 21:41

## 2023-08-03 RX ADMIN — Medication 55 MILLIGRAM(S): at 13:15

## 2023-08-03 RX ADMIN — Medication 325 MILLIGRAM(S): at 13:15

## 2023-08-03 RX ADMIN — Medication 600 MILLIGRAM(S): at 05:39

## 2023-08-03 RX ADMIN — CHLORHEXIDINE GLUCONATE 1 APPLICATION(S): 213 SOLUTION TOPICAL at 13:16

## 2023-08-03 RX ADMIN — PANTOPRAZOLE SODIUM 40 MILLIGRAM(S): 20 TABLET, DELAYED RELEASE ORAL at 05:39

## 2023-08-03 RX ADMIN — DONEPEZIL HYDROCHLORIDE 10 MILLIGRAM(S): 10 TABLET, FILM COATED ORAL at 21:50

## 2023-08-03 RX ADMIN — Medication 55 MILLIGRAM(S): at 17:13

## 2023-08-03 RX ADMIN — DORZOLAMIDE HYDROCHLORIDE 1 DROP(S): 20 SOLUTION/ DROPS OPHTHALMIC at 05:41

## 2023-08-03 NOTE — PROGRESS NOTE ADULT - SUBJECTIVE AND OBJECTIVE BOX
Neurology     RAGHAVENDRA WALLACE 89y Female     HPI:  pt. is a 89yoF hx dementia, Afib not on AC due to GI bleeding, CKD, HTN, prior hx of nontraumatic SAH, seizure , sent from [facility] for seizure. Recent seizure diagnosis and admission 1 week ago. Started on depakote. Pt got versed IM by EMS with resolution of apparent seizure activity. pt. opens eyes to verbal command but does not follow instructions, does not give any history. baseline ? saturating mid 90s on 2 L nasal cannula. In the Er pt's urine suggestive of uti. ct head no acute findings. no seizure activity in the ER.  Pt is DNR/DNI. (01 Aug 2023 20:47)      PMH: HTN (hypertension)    Diabetes mellitus    Hypothyroid    Dementia    GI bleed    Neuropathy    GERD (gastroesophageal reflux disease)    Abdominal aortic aneurysm    Stage 3 chronic kidney disease    Aortic stenosis       PSH: S/P hysterectomy    S/P hip replacement    Pacemaker      FAMILY HISTORY:  FHx: diabetes mellitus    SOCIAL HISTORY:  No history of tobacco or alcohol use     Allergies    No Known Allergies    Intolerances        Vital Signs Last 24 Hrs  T(C): 36.7 (03 Aug 2023 16:36), Max: 36.9 (02 Aug 2023 22:34)  T(F): 98 (03 Aug 2023 16:36), Max: 98.5 (03 Aug 2023 05:40)  HR: 70 (03 Aug 2023 16:55) (60 - 78)  BP: 178/82 (03 Aug 2023 16:55) (140/80 - 188/89)  BP(mean): --  RR: 18 (03 Aug 2023 16:36) (17 - 18)  SpO2: 96% (03 Aug 2023 16:36) (93% - 98%)    Parameters below as of 03 Aug 2023 16:36  Patient On (Oxygen Delivery Method): room air        MEDICATIONS    amLODIPine   Tablet 5 milliGRAM(s) Oral daily  cefTRIAXone Injectable. 1000 milliGRAM(s) IV Push every 24 hours  chlorhexidine 2% Cloths 1 Application(s) Topical <User Schedule>  donepezil 10 milliGRAM(s) Oral at bedtime  dorzolamide 2% Ophthalmic Solution 1 Drop(s) Both EYES <User Schedule>  ferrous    sulfate 325 milliGRAM(s) Oral daily  gemfibrozil 600 milliGRAM(s) Oral two times a day  heparin   Injectable 5000 Unit(s) SubCutaneous every 12 hours  levothyroxine 150 MICROGram(s) Oral daily  LORazepam   Injectable 1 milliGRAM(s) IV Push once PRN  metoprolol tartrate 25 milliGRAM(s) Oral two times a day  pantoprazole    Tablet 40 milliGRAM(s) Oral every 12 hours  tamsulosin 0.4 milliGRAM(s) Oral at bedtime  valproate sodium  IVPB 500 milliGRAM(s) IV Intermittent three times a day         LABS:  CBC Full  -  ( 02 Aug 2023 06:49 )  WBC Count : 7.59 K/uL  RBC Count : 3.66 M/uL  Hemoglobin : 11.1 g/dL  Hematocrit : 35.7 %  Platelet Count - Automated : 139 K/uL  Mean Cell Volume : 97.5 fl  Mean Cell Hemoglobin : 30.3 pg  Mean Cell Hemoglobin Concentration : 31.1 gm/dL  Auto Neutrophil # : 6.34 K/uL  Auto Lymphocyte # : 0.49 K/uL  Auto Monocyte # : 0.62 K/uL  Auto Eosinophil # : 0.08 K/uL  Auto Basophil # : 0.01 K/uL  Auto Neutrophil % : 83.4 %  Auto Lymphocyte % : 6.5 %  Auto Monocyte % : 8.2 %  Auto Eosinophil % : 1.1 %  Auto Basophil % : 0.1 %    Urinalysis Basic - ( 03 Aug 2023 06:35 )    Color: x / Appearance: x / SG: x / pH: x  Gluc: 96 mg/dL / Ketone: x  / Bili: x / Urobili: x   Blood: x / Protein: x / Nitrite: x   Leuk Esterase: x / RBC: x / WBC x   Sq Epi: x / Non Sq Epi: x / Bacteria: x      08-03    136  |  103  |  24.9<H>  ----------------------------<  96  5.1   |  19.0<L>  |  1.06    Ca    9.7      03 Aug 2023 06:35    Valproic Acid Level, Serum: 29.7 ug/mL.    On Neurological Examination:    Mental Status - Patient is  awake, alert and oriented X2.  Speech is fluent. Patient can name some objects,  and follow simple commands. Today she was able to  identify her son and daughter sitting next to her. She perseverates that my name is Dr Siddiqui. ( Son's name is Glenn). Appears delusional.  There is no dysarthria.    Cranial Nerves - PERRL, EOMI,  Visual fields are full to finger counting, no gross facial asymmetry, tongue/uvula midline    Motor Exam -   Both UE are at least 4/5  with no drift.  Both LE are antigravity.    Sensory    Intact to light touch and pinprick bilaterally    Coord: FTN intact bilaterally     Gait -  Not assessed.   RADIOLOGY ( All neurological imaging studies were independently reviewed and interpreted by me)  CT     CT Head No Cont (08.01.23 @ 10:40) >    IMPRESSION:  Limited exam due to motion    No acute intracranial hemorrhage or acute territorial infarct.  If   symptoms persist, follow-up MRI exam recommended.    BE DEVINE MD; Attending Radiologist    MRI:      TTE      EEG preliminary read (not final) on the initial recording hour(s) = <8 hr  Reviewed from: 08:00-15:52    2 right frontotemporal focal-onset seizures with spread to the right hemisphere.  No clinical correlate seen on video. Patient appears to be talking to a visitor during the first seizure.  Times: 12:23, 15:27    Occasional right and rare left frontotemporal epileptiform discharges indicate risk of focal-onset seizures from these regions.    Final report to follow tomorrow morning after completion of study.    [facility] EEG Reading Room Ph#: [phone]  Epilepsy Answering Service after 5PM and before 8:30AM: Ph#: [phone].      Electronic Signatures:  Kelly Green)  (Signed 03-Aug-2023 16:02)  	Authored: Note Type, Note

## 2023-08-03 NOTE — SWALLOW BEDSIDE ASSESSMENT ADULT - ORAL PHASE
oral swishing observed, however consistent with pt baseline and deemed as functional./Within functional limits

## 2023-08-03 NOTE — SWALLOW BEDSIDE ASSESSMENT ADULT - SLP GENERAL OBSERVATIONS
Pt rcvd A&A, Ox1, cooperative, dementia, reduced cognition, +EEG, suspected hearing difficulties at baseline, NAD, tolerating RA, pain 0/10 pre/post, daughter at bedside.

## 2023-08-03 NOTE — PROGRESS NOTE ADULT - SUBJECTIVE AND OBJECTIVE BOX
[facility] Division of Hospital Medicine    Chief Complaint:      SUBJECTIVE / OVERNIGHT EVENTS:    Patient seen and examined at bedside, no acute events overnight, patient denies any new complaints. Remains pleasant and AxO to person but not time or place. cEEG ongoing, Discussed with neurology depakote level 29.7 this AM, recommended to bolus 500 mg Depakote now (x2) and continue on increased dose of 500 TID, and will see level in AM. Patients EEG showed 2 R. frontotemporal focal onset seizures with spread to R. hemisphere however no correlation on video as patient was talking during this time. Will continue to monitor, consider DC back to facility tomorrow if remains seizure free.      MEDICATIONS  (STANDING):  amLODIPine   Tablet 5 milliGRAM(s) Oral daily  cefTRIAXone Injectable. 1000 milliGRAM(s) IV Push every 24 hours  chlorhexidine 2% Cloths 1 Application(s) Topical <User Schedule>  donepezil 10 milliGRAM(s) Oral at bedtime  dorzolamide 2% Ophthalmic Solution 1 Drop(s) Both EYES <User Schedule>  ferrous    sulfate 325 milliGRAM(s) Oral daily  gemfibrozil 600 milliGRAM(s) Oral two times a day  heparin   Injectable 5000 Unit(s) SubCutaneous every 12 hours  levothyroxine 150 MICROGram(s) Oral daily  metoprolol tartrate 25 milliGRAM(s) Oral two times a day  pantoprazole    Tablet 40 milliGRAM(s) Oral every 12 hours  tamsulosin 0.4 milliGRAM(s) Oral at bedtime  valproate sodium  IVPB 500 milliGRAM(s) IV Intermittent two times a day    MEDICATIONS  (PRN):  LORazepam   Injectable 1 milliGRAM(s) IV Push once PRN for any seizure activity        I&O's Summary    02 Aug 2023 07:01  -  03 Aug 2023 07:00  --------------------------------------------------------  IN: 0 mL / OUT: 1500 mL / NET: -1500 mL    03 Aug 2023 07:01  -  03 Aug 2023 16:08  --------------------------------------------------------  IN: 710 mL / OUT: 575 mL / NET: 135 mL        PHYSICAL EXAM:  Vital Signs Last 24 Hrs  T(C): 36.6 (03 Aug 2023 13:30), Max: 36.9 (02 Aug 2023 22:34)  T(F): 97.8 (03 Aug 2023 13:30), Max: 98.5 (03 Aug 2023 05:40)  HR: 78 (03 Aug 2023 13:30) (59 - 78)  BP: 140/80 (03 Aug 2023 13:30) (140/80 - 188/89)  BP(mean): --  RR: 17 (03 Aug 2023 13:30) (17 - 18)  SpO2: 97% (03 Aug 2023 13:30) (93% - 99%)    Parameters below as of 03 Aug 2023 13:30  Patient On (Oxygen Delivery Method): room air        General: pt. in bed not in distress  HEENT: AT, NC. PERRL. oral  mucosa appears somewhat dry  Neck: supple. no JVD.   Chest: air entry fair  bilaterally  Heart: S1,S2. RRR. + systolic murmur, no edema.   Abdomen: soft. no tenderness appreciated. non-distended. + BS.   Ext: no calf swelling noted, moves ext. slowly.   vascular : distal pulses palpable  Neuro: Patient awake and alert, follows most commands, AxO to person, partially place, sensation intact.    Skin: warm and dry  psych : no agitation or restlessness noted. at baseline mentation per son at bedside   LABS:    LABS:                        11.1   7.59  )-----------( 139      ( 02 Aug 2023 06:49 )             35.7     08-03    136  |  103  |  24.9<H>  ----------------------------<  96  5.1   |  19.0<L>  |  1.06    Ca    9.7      03 Aug 2023 06:35            Urinalysis Basic - ( 03 Aug 2023 06:35 )    Color: x / Appearance: x / SG: x / pH: x  Gluc: 96 mg/dL / Ketone: x  / Bili: x / Urobili: x   Blood: x / Protein: x / Nitrite: x   Leuk Esterase: x / RBC: x / WBC x   Sq Epi: x / Non Sq Epi: x / Bacteria: x        Culture - Urine (collected 01 Aug 2023 16:17)  Source: Clean Catch Clean Catch (Midstream)  Preliminary Report (03 Aug 2023 14:51):    >100,000 CFU/ml Escherichia coli      CAPILLARY BLOOD GLUCOSE            RADIOLOGY & ADDITIONAL TESTS:  Results Reviewed:   Imaging Personally Reviewed:  Electrocardiogram Personally Reviewed:

## 2023-08-03 NOTE — SWALLOW BEDSIDE ASSESSMENT ADULT - COMMENTS
Per MD note, "pt. is a 89yoF hx dementia, Afib not on AC due to GI bleeding, CKD, HTN, prior hx of nontraumatic SAH, seizure , sent from [facility] for seizure. Recent seizure diagnosis and admission 1 week ago. Started on depakote. Pt got versed IM by EMS with resolution of apparent seizure activity. pt. opens eyes to verbal command but does not follow instructions, does not give any history. baseline ? saturating mid 90s on 2 L nasal cannula. In the Er pt's urine suggestive of uti. ct head no acute findings. no seizure activity in the ER.  Pt is DNR/DNI."

## 2023-08-03 NOTE — SWALLOW BEDSIDE ASSESSMENT ADULT - SLP PERTINENT HISTORY OF CURRENT PROBLEM
Pt previously known to this service. Last seen May 2023 with subsequent rx for puree/thin, which was pt baseline diet.

## 2023-08-03 NOTE — EEG REPORT - NS EEG TEXT BOX
RAGHAVENDRA WALLACE MRANGELA-[id number]     Study Date: 08-03-23  Duration:   --------------------------------------------------------------------------------------------------  History:  CC/ HPI Patient is a 89y old  Female who presents with a chief complaint of Breakthrough seizure / uti (02 Aug 2023 15:44)    MEDICATIONS  (STANDING):  cefTRIAXone Injectable. 1000 milliGRAM(s) IV Push every 24 hours  donepezil 10 milliGRAM(s) Oral at bedtime  dorzolamide 2% Ophthalmic Solution 1 Drop(s) Both EYES <User Schedule>  ferrous    sulfate 325 milliGRAM(s) Oral daily  gemfibrozil 600 milliGRAM(s) Oral two times a day  heparin   Injectable 5000 Unit(s) SubCutaneous every 12 hours  levothyroxine 150 MICROGram(s) Oral daily  metoprolol tartrate 25 milliGRAM(s) Oral two times a day  pantoprazole    Tablet 40 milliGRAM(s) Oral every 12 hours  tamsulosin 0.4 milliGRAM(s) Oral at bedtime  valproate sodium  IVPB 250 milliGRAM(s) IV Intermittent two times a day    --------------------------------------------------------------------------------------------------  Study Interpretation:    [[[Abbreviation Key:  PDR=alpha rhythm/posterior dominant rhythm. A-P=anterior posterior.  Amplitude: ‘very low’:<20; ‘low’:20-49; ‘medium’:; ‘high’:>150uV.  Persistence for periodic/rhythmic patterns (% of epoch) ‘rare’:<1%; ‘occasional’:1-10%; ‘frequent’:10-50%; ‘abundant’:50-90%; ‘continuous’:>90%.  Persistence for sporadic discharges: ‘rare’:<1/hr; ‘occasional’:1/min-1/hr; ‘frequent’:>1/min; ‘abundant’:>1/10 sec.  RPP=rhythmic and periodic patterns; GRDA=generalized rhythmic delta activity; FIRDA=frontal intermittent GRDA; LRDA=lateralized rhythmic delta activity; TIRDA=temporal intermittent rhythmic delta activity;  LPD=PLED=lateralized periodic discharges; GPD=generalized periodic discharges; BIPDs =bilateral independent periodic discharges; Mf=multifocal; SIRPDs=stimulus induced rhythmic, periodic, or ictal appearing discharges; BIRDs=brief potentially ictal rhythmic discharges >4 Hz, lasting .5-10s; PFA (paroxysmal bursts >13 Hz or =8 Hz <10s).  Modifiers: +F=with fast component; +S=with spike component; +R=with rhythmic component.  S-B=burst suppression pattern.  Max=maximal. N1-drowsy; N2-stage II sleep; N3-slow wave sleep. SSS/BETS=small sharp spikes/benign epileptiform transients of sleep. HV=hyperventilation; PS=photic stimulation]]]    Daily EEG Visual Analysis    FINDINGS:      Background:  Continuity: Continuous  Symmetry: Asymmetric  Posterior dominant rhythm (PDR): 6.5 Hz on the left, poorly formed on the right, reactive to eye closure. Symmetric low-amplitude frontal beta in wakefulness.  State Change: Present  Voltage: Normal  Anterior Posterior Gradient: Present  Other background findings: None  Breach: Absent    Background Slowing:  Generalized slowing: As above  Focal slowing: Continuous right hemispheric focal polymorphic delta and theta slowing    State Changes:   Drowsiness is characterized by fragmentation, attenuation, and slowing of the background activity.  Stage 2 sleep is characterized by symmetric K complexes and sleep spindles.     Interictal Findings:  Occasional left (F7, at times with field to Fp1) and right (F8) frontotemporal spike/sharp-wave discharges in drowsiness and stage 2 sleep.    Electrographic and Electroclinical seizures:  One left frontotemporal focal-onset electrographic seizure:  Time: 8/3/23 01:44  Duration: Approximately 30 seconds  Clinical: No clinical change is seen on video. The patient is having the EEG head wrap placed.  Electrographic: Onset is characterized by the appearance of left frontotemporal (F7) sharp waves, evolving to spike-wave discharges and spreading throughout the left hemisphere, increasing in amplitude and decreasing in frequency. Assessment somewhat limited by motion artifact.  EKG changes: None    Other Clinical Events:  None    Activation Procedures:   Hyperventilation is not performed.     Photic stimulation is performed and does not elicit any abnormalities.      Artifacts:  Intermittent myogenic and movement artifacts are present.    EKG:  Single-lead EKG shows regular rhythm with frequent premature narrow complexes.    EEG Classification / Summary:  Abnormal EEG in the awake, drowsy, and asleep states.   -One left frontotemporal focal-onset electrographic seizure with spread throughout the left hemisphere  -Occasional left and right frontotemporal spike/sharp-wave discharges in drowsiness and stage 2 sleep  -Continuous right hemispheric focal slowing  -Mild diffuse slowing    Clinical Impression:  -One left frontotemporal focal seizure with no clinical correlate seen on video  -Risk of focal-onset seizures from the left and right frontotemporal regions  -Right hemispheric focal cerebral dysfunction can be structural and/or functional (such as post-ictal) in etiology.   -Mild diffuse cerebral dysfunction is nonspecific in etiology.       -------------------------------------------------------------------------------------------------------  [facility] EEG Reading Room Ph#: [phone]  Epilepsy Answering Service after 5PM and before 8:30AM: Ph#: [phone]    Kelly Green MD  Attending Physician, [facility]   RAGHAVENDRA WALLACE ANGELA-[id number]     Study Date: 08/03/23 01:39-08:00  Duration: 6 hr 20 min  --------------------------------------------------------------------------------------------------  History:  CC/ HPI Patient is a 89y old  Female who presents with a chief complaint of Breakthrough seizure / uti (02 Aug 2023 15:44)    MEDICATIONS  (STANDING):  cefTRIAXone Injectable. 1000 milliGRAM(s) IV Push every 24 hours  donepezil 10 milliGRAM(s) Oral at bedtime  dorzolamide 2% Ophthalmic Solution 1 Drop(s) Both EYES <User Schedule>  ferrous    sulfate 325 milliGRAM(s) Oral daily  gemfibrozil 600 milliGRAM(s) Oral two times a day  heparin   Injectable 5000 Unit(s) SubCutaneous every 12 hours  levothyroxine 150 MICROGram(s) Oral daily  metoprolol tartrate 25 milliGRAM(s) Oral two times a day  pantoprazole    Tablet 40 milliGRAM(s) Oral every 12 hours  tamsulosin 0.4 milliGRAM(s) Oral at bedtime  valproate sodium  IVPB 250 milliGRAM(s) IV Intermittent two times a day    --------------------------------------------------------------------------------------------------  Study Interpretation:    [[[Abbreviation Key:  PDR=alpha rhythm/posterior dominant rhythm. A-P=anterior posterior.  Amplitude: ‘very low’:<20; ‘low’:20-49; ‘medium’:; ‘high’:>150uV.  Persistence for periodic/rhythmic patterns (% of epoch) ‘rare’:<1%; ‘occasional’:1-10%; ‘frequent’:10-50%; ‘abundant’:50-90%; ‘continuous’:>90%.  Persistence for sporadic discharges: ‘rare’:<1/hr; ‘occasional’:1/min-1/hr; ‘frequent’:>1/min; ‘abundant’:>1/10 sec.  RPP=rhythmic and periodic patterns; GRDA=generalized rhythmic delta activity; FIRDA=frontal intermittent GRDA; LRDA=lateralized rhythmic delta activity; TIRDA=temporal intermittent rhythmic delta activity;  LPD=PLED=lateralized periodic discharges; GPD=generalized periodic discharges; BIPDs =bilateral independent periodic discharges; Mf=multifocal; SIRPDs=stimulus induced rhythmic, periodic, or ictal appearing discharges; BIRDs=brief potentially ictal rhythmic discharges >4 Hz, lasting .5-10s; PFA (paroxysmal bursts >13 Hz or =8 Hz <10s).  Modifiers: +F=with fast component; +S=with spike component; +R=with rhythmic component.  S-B=burst suppression pattern.  Max=maximal. N1-drowsy; N2-stage II sleep; N3-slow wave sleep. SSS/BETS=small sharp spikes/benign epileptiform transients of sleep. HV=hyperventilation; PS=photic stimulation]]]    Daily EEG Visual Analysis    FINDINGS:      Background:  Continuity: Continuous  Symmetry: Asymmetric  Posterior dominant rhythm (PDR): 6.5 Hz on the left, poorly formed on the right, reactive to eye closure. Symmetric low-amplitude frontal beta in wakefulness.  State Change: Present  Voltage: Normal  Anterior Posterior Gradient: Present  Other background findings: None  Breach: Absent    Background Slowing:  Generalized slowing: As above  Focal slowing: Continuous right hemispheric focal polymorphic delta and theta slowing    State Changes:   Drowsiness is characterized by fragmentation, attenuation, and slowing of the background activity.  Stage 2 sleep is characterized by symmetric K complexes and sleep spindles.     Interictal Findings:  Occasional left (F7, at times with field to Fp1) and right (F8) frontotemporal spike/sharp-wave discharges in drowsiness and stage 2 sleep.    Electrographic and Electroclinical seizures:  One left frontotemporal focal-onset electrographic seizure:  Time: 8/3/23 01:44  Duration: Approximately 30 seconds  Clinical: No clinical change is seen on video. The patient is having the EEG head wrap placed.  Electrographic: Onset is characterized by the appearance of left frontotemporal (F7) sharp waves, evolving to spike-wave discharges and spreading throughout the left hemisphere, increasing in amplitude and decreasing in frequency. Assessment somewhat limited by motion artifact.  EKG changes: None    Other Clinical Events:  None    Activation Procedures:   Hyperventilation is not performed.     Photic stimulation is performed and does not elicit any abnormalities.      Artifacts:  Intermittent myogenic and movement artifacts are present.    EKG:  Single-lead EKG shows regular rhythm with frequent premature narrow complexes.    EEG Classification / Summary:  Abnormal EEG in the awake, drowsy, and asleep states.   -One left frontotemporal focal-onset electrographic seizure with spread throughout the left hemisphere  -Occasional left and right frontotemporal spike/sharp-wave discharges in drowsiness and stage 2 sleep  -Continuous right hemispheric focal slowing. On further review of prior EEG from 7/23-7/24/2023, there was occasional right temporally predominant focal slowing as well.  -Mild diffuse slowing    Clinical Impression:  -One left frontotemporal focal seizure with no clinical correlate seen on video  -Risk of focal-onset seizures from the left and right frontotemporal regions  -Right hemispheric focal cerebral dysfunction can be structural and/or functional (such as post-ictal) in etiology. On further review of prior EEG from 7/23-7/24/2023, there was occasional right temporally predominant focal cerebral dysfunction as well.  -Mild diffuse cerebral dysfunction is nonspecific in etiology.       -------------------------------------------------------------------------------------------------------  [facility] EEG Reading Room Ph#: [phone]  Epilepsy Answering Service after 5PM and before 8:30AM: Ph#: [phone]    Kelly Green MD  Attending Physician, [facility] [place]   RAGHAVENDRA WALLACE ANGELA-[id number]     Study Date: 08/03/23 01:39-08:00  Duration: 6 hr 20 min  --------------------------------------------------------------------------------------------------  History:  CC/ HPI Patient is a 89y old  Female who presents with a chief complaint of Breakthrough seizure / uti (02 Aug 2023 15:44)    MEDICATIONS  (STANDING):  cefTRIAXone Injectable. 1000 milliGRAM(s) IV Push every 24 hours  donepezil 10 milliGRAM(s) Oral at bedtime  dorzolamide 2% Ophthalmic Solution 1 Drop(s) Both EYES <User Schedule>  ferrous    sulfate 325 milliGRAM(s) Oral daily  gemfibrozil 600 milliGRAM(s) Oral two times a day  heparin   Injectable 5000 Unit(s) SubCutaneous every 12 hours  levothyroxine 150 MICROGram(s) Oral daily  metoprolol tartrate 25 milliGRAM(s) Oral two times a day  pantoprazole    Tablet 40 milliGRAM(s) Oral every 12 hours  tamsulosin 0.4 milliGRAM(s) Oral at bedtime  valproate sodium  IVPB 250 milliGRAM(s) IV Intermittent two times a day    --------------------------------------------------------------------------------------------------  Study Interpretation:    [[[Abbreviation Key:  PDR=alpha rhythm/posterior dominant rhythm. A-P=anterior posterior.  Amplitude: ‘very low’:<20; ‘low’:20-49; ‘medium’:; ‘high’:>150uV.  Persistence for periodic/rhythmic patterns (% of epoch) ‘rare’:<1%; ‘occasional’:1-10%; ‘frequent’:10-50%; ‘abundant’:50-90%; ‘continuous’:>90%.  Persistence for sporadic discharges: ‘rare’:<1/hr; ‘occasional’:1/min-1/hr; ‘frequent’:>1/min; ‘abundant’:>1/10 sec.  RPP=rhythmic and periodic patterns; GRDA=generalized rhythmic delta activity; FIRDA=frontal intermittent GRDA; LRDA=lateralized rhythmic delta activity; TIRDA=temporal intermittent rhythmic delta activity;  LPD=PLED=lateralized periodic discharges; GPD=generalized periodic discharges; BIPDs =bilateral independent periodic discharges; Mf=multifocal; SIRPDs=stimulus induced rhythmic, periodic, or ictal appearing discharges; BIRDs=brief potentially ictal rhythmic discharges >4 Hz, lasting .5-10s; PFA (paroxysmal bursts >13 Hz or =8 Hz <10s).  Modifiers: +F=with fast component; +S=with spike component; +R=with rhythmic component.  S-B=burst suppression pattern.  Max=maximal. N1-drowsy; N2-stage II sleep; N3-slow wave sleep. SSS/BETS=small sharp spikes/benign epileptiform transients of sleep. HV=hyperventilation; PS=photic stimulation]]]    Daily EEG Visual Analysis    FINDINGS:      Background:  Continuity: Continuous  Symmetry: Asymmetric  Posterior dominant rhythm (PDR): 6.5 Hz on the left, poorly formed on the right, reactive to eye closure. Symmetric low-amplitude frontal beta in wakefulness.  State Change: Present  Voltage: Normal  Anterior Posterior Gradient: Present  Other background findings: None  Breach: Absent    Background Slowing:  Generalized slowing: As above  Focal slowing: Continuous right hemispheric focal polymorphic delta and theta slowing    State Changes:   Drowsiness is characterized by fragmentation, attenuation, and slowing of the background activity.  Stage 2 sleep is characterized by symmetric K complexes and sleep spindles.     Interictal Findings:  Occasional left (F7, at times with field to Fp1) and right (F8) frontotemporal spike/sharp-wave discharges in drowsiness and stage 2 sleep.    Electrographic and Electroclinical seizures:  One left frontotemporal focal-onset electrographic seizure:  Time: 8/3/23 01:44  Duration: Approximately 30 seconds  Clinical: No clinical change is seen on video. The patient is having the EEG head wrap placed.  Electrographic: Onset is characterized by the appearance of left frontotemporal (F7) sharp waves, evolving to spike-wave discharges and spreading throughout the left hemisphere, increasing in amplitude and decreasing in frequency. Assessment somewhat limited by motion artifact.  EKG changes: None    Other Clinical Events:  None    Activation Procedures:   Hyperventilation is not performed.     Photic stimulation is performed and does not elicit any abnormalities.      Artifacts:  Intermittent myogenic and movement artifacts are present.    EKG:  Single-lead EKG shows regular rhythm with frequent premature narrow complexes.    EEG Classification / Summary:  Abnormal EEG in the awake, drowsy, and asleep states.   -One left frontotemporal focal-onset electrographic seizure with spread throughout the left hemisphere  -Occasional left and right frontotemporal spike/sharp-wave discharges in drowsiness and stage 2 sleep  -Continuous right hemispheric focal slowing. On further review of prior EEG from 7/23-7/24/2023, there was occasional right temporally predominant focal slowing as well.  -Mild diffuse slowing    Clinical Impression:  -One left frontotemporal focal seizure with no clinical correlate seen on video  -Risk of focal-onset seizures from the left and right frontotemporal regions  -Right hemispheric focal cerebral dysfunction can be structural and/or functional (such as post-ictal) in etiology. On further review of prior EEG from 7/23-7/24/2023, there was occasional right temporally predominant focal cerebral dysfunction as well.  -Mild diffuse cerebral dysfunction is nonspecific in etiology.     Neurology was informed of seizure.    -------------------------------------------------------------------------------------------------------  [facility] [place] EEG Reading Room Ph#: [phone]  Epilepsy Answering Service after 5PM and before 8:30AM: Ph#: [phone]    Kelly Green MD  Attending Physician, [facility] [place]

## 2023-08-03 NOTE — SWALLOW BEDSIDE ASSESSMENT ADULT - SWALLOW EVAL: DIAGNOSIS
Overall presentation impacted by cognition. Mild oral dysphagia characterized by oral swishing with both thin liquids and puree, however consistent with pt baseline and functional. Pharyngeal phase of swallow clinically judged to be WFL for given trials. No overt s/s of penetration/aspiration noted.

## 2023-08-04 LAB
-  AMIKACIN: SIGNIFICANT CHANGE UP
-  AMIKACIN: SIGNIFICANT CHANGE UP
-  AMOXICILLIN/CLAVULANIC ACID: SIGNIFICANT CHANGE UP
-  AMOXICILLIN/CLAVULANIC ACID: SIGNIFICANT CHANGE UP
-  AMPICILLIN/SULBACTAM: SIGNIFICANT CHANGE UP
-  AMPICILLIN/SULBACTAM: SIGNIFICANT CHANGE UP
-  AMPICILLIN: SIGNIFICANT CHANGE UP
-  AMPICILLIN: SIGNIFICANT CHANGE UP
-  AZTREONAM: SIGNIFICANT CHANGE UP
-  AZTREONAM: SIGNIFICANT CHANGE UP
-  CEFAZOLIN: SIGNIFICANT CHANGE UP
-  CEFAZOLIN: SIGNIFICANT CHANGE UP
-  CEFEPIME: SIGNIFICANT CHANGE UP
-  CEFEPIME: SIGNIFICANT CHANGE UP
-  CEFOXITIN: SIGNIFICANT CHANGE UP
-  CEFOXITIN: SIGNIFICANT CHANGE UP
-  CEFTRIAXONE: SIGNIFICANT CHANGE UP
-  CEFTRIAXONE: SIGNIFICANT CHANGE UP
-  CEFUROXIME: SIGNIFICANT CHANGE UP
-  CEFUROXIME: SIGNIFICANT CHANGE UP
-  CIPROFLOXACIN: SIGNIFICANT CHANGE UP
-  CIPROFLOXACIN: SIGNIFICANT CHANGE UP
-  ERTAPENEM: SIGNIFICANT CHANGE UP
-  ERTAPENEM: SIGNIFICANT CHANGE UP
-  GENTAMICIN: SIGNIFICANT CHANGE UP
-  GENTAMICIN: SIGNIFICANT CHANGE UP
-  IMIPENEM: SIGNIFICANT CHANGE UP
-  LEVOFLOXACIN: SIGNIFICANT CHANGE UP
-  LEVOFLOXACIN: SIGNIFICANT CHANGE UP
-  MEROPENEM: SIGNIFICANT CHANGE UP
-  MEROPENEM: SIGNIFICANT CHANGE UP
-  NITROFURANTOIN: SIGNIFICANT CHANGE UP
-  NITROFURANTOIN: SIGNIFICANT CHANGE UP
-  PIPERACILLIN/TAZOBACTAM: SIGNIFICANT CHANGE UP
-  PIPERACILLIN/TAZOBACTAM: SIGNIFICANT CHANGE UP
-  TOBRAMYCIN: SIGNIFICANT CHANGE UP
-  TOBRAMYCIN: SIGNIFICANT CHANGE UP
-  TRIMETHOPRIM/SULFAMETHOXAZOLE: SIGNIFICANT CHANGE UP
-  TRIMETHOPRIM/SULFAMETHOXAZOLE: SIGNIFICANT CHANGE UP
ANION GAP SERPL CALC-SCNC: 16 MMOL/L — SIGNIFICANT CHANGE UP (ref 5–17)
BUN SERPL-MCNC: 23.3 MG/DL — HIGH (ref 8–20)
CALCIUM SERPL-MCNC: 9.5 MG/DL — SIGNIFICANT CHANGE UP (ref 8.4–10.5)
CHLORIDE SERPL-SCNC: 103 MMOL/L — SIGNIFICANT CHANGE UP (ref 96–108)
CO2 SERPL-SCNC: 22 MMOL/L — SIGNIFICANT CHANGE UP (ref 22–29)
CREAT SERPL-MCNC: 1.05 MG/DL — SIGNIFICANT CHANGE UP (ref 0.5–1.3)
CULTURE RESULTS: SIGNIFICANT CHANGE UP
EGFR: 50 ML/MIN/1.73M2 — LOW
GLUCOSE SERPL-MCNC: 91 MG/DL — SIGNIFICANT CHANGE UP (ref 70–99)
METHOD TYPE: SIGNIFICANT CHANGE UP
METHOD TYPE: SIGNIFICANT CHANGE UP
ORGANISM # SPEC MICROSCOPIC CNT: SIGNIFICANT CHANGE UP
POTASSIUM SERPL-MCNC: 4.4 MMOL/L — SIGNIFICANT CHANGE UP (ref 3.5–5.3)
POTASSIUM SERPL-SCNC: 4.4 MMOL/L — SIGNIFICANT CHANGE UP (ref 3.5–5.3)
SODIUM SERPL-SCNC: 141 MMOL/L — SIGNIFICANT CHANGE UP (ref 135–145)
SPECIMEN SOURCE: SIGNIFICANT CHANGE UP
VALPROATE SERPL-MCNC: 60.9 UG/ML — SIGNIFICANT CHANGE UP (ref 50–100)
VALPROATE SERPL-MCNC: 71.2 UG/ML — SIGNIFICANT CHANGE UP (ref 50–100)

## 2023-08-04 PROCEDURE — 95720 EEG PHY/QHP EA INCR W/VEEG: CPT

## 2023-08-04 PROCEDURE — 99232 SBSQ HOSP IP/OBS MODERATE 35: CPT

## 2023-08-04 RX ORDER — AMLODIPINE BESYLATE 2.5 MG/1
5 TABLET ORAL ONCE
Refills: 0 | Status: COMPLETED | OUTPATIENT
Start: 2023-08-04 | End: 2023-08-04

## 2023-08-04 RX ORDER — LEVETIRACETAM 250 MG/1
2000 TABLET, FILM COATED ORAL ONCE
Refills: 0 | Status: COMPLETED | OUTPATIENT
Start: 2023-08-04 | End: 2023-08-04

## 2023-08-04 RX ORDER — LEVETIRACETAM 250 MG/1
1000 TABLET, FILM COATED ORAL
Refills: 0 | Status: DISCONTINUED | OUTPATIENT
Start: 2023-08-04 | End: 2023-08-09

## 2023-08-04 RX ORDER — AMLODIPINE BESYLATE 2.5 MG/1
10 TABLET ORAL DAILY
Refills: 0 | Status: DISCONTINUED | OUTPATIENT
Start: 2023-08-05 | End: 2023-08-09

## 2023-08-04 RX ADMIN — CHLORHEXIDINE GLUCONATE 1 APPLICATION(S): 213 SOLUTION TOPICAL at 06:21

## 2023-08-04 RX ADMIN — Medication 55 MILLIGRAM(S): at 13:56

## 2023-08-04 RX ADMIN — HEPARIN SODIUM 5000 UNIT(S): 5000 INJECTION INTRAVENOUS; SUBCUTANEOUS at 05:31

## 2023-08-04 RX ADMIN — HEPARIN SODIUM 5000 UNIT(S): 5000 INJECTION INTRAVENOUS; SUBCUTANEOUS at 17:30

## 2023-08-04 RX ADMIN — Medication 55 MILLIGRAM(S): at 09:31

## 2023-08-04 RX ADMIN — PANTOPRAZOLE SODIUM 40 MILLIGRAM(S): 20 TABLET, DELAYED RELEASE ORAL at 17:30

## 2023-08-04 RX ADMIN — AMLODIPINE BESYLATE 5 MILLIGRAM(S): 2.5 TABLET ORAL at 09:31

## 2023-08-04 RX ADMIN — DONEPEZIL HYDROCHLORIDE 10 MILLIGRAM(S): 10 TABLET, FILM COATED ORAL at 22:06

## 2023-08-04 RX ADMIN — Medication 25 MILLIGRAM(S): at 05:32

## 2023-08-04 RX ADMIN — Medication 55 MILLIGRAM(S): at 22:05

## 2023-08-04 RX ADMIN — Medication 325 MILLIGRAM(S): at 17:31

## 2023-08-04 RX ADMIN — AMLODIPINE BESYLATE 5 MILLIGRAM(S): 2.5 TABLET ORAL at 05:31

## 2023-08-04 RX ADMIN — Medication 55 MILLIGRAM(S): at 02:09

## 2023-08-04 RX ADMIN — TAMSULOSIN HYDROCHLORIDE 0.4 MILLIGRAM(S): 0.4 CAPSULE ORAL at 22:06

## 2023-08-04 RX ADMIN — Medication 600 MILLIGRAM(S): at 17:30

## 2023-08-04 RX ADMIN — PANTOPRAZOLE SODIUM 40 MILLIGRAM(S): 20 TABLET, DELAYED RELEASE ORAL at 05:33

## 2023-08-04 RX ADMIN — LEVETIRACETAM 600 MILLIGRAM(S): 250 TABLET, FILM COATED ORAL at 13:13

## 2023-08-04 RX ADMIN — CEFTRIAXONE 1000 MILLIGRAM(S): 500 INJECTION, POWDER, FOR SOLUTION INTRAMUSCULAR; INTRAVENOUS at 22:06

## 2023-08-04 RX ADMIN — Medication 150 MICROGRAM(S): at 05:31

## 2023-08-04 RX ADMIN — Medication 600 MILLIGRAM(S): at 05:31

## 2023-08-04 RX ADMIN — DORZOLAMIDE HYDROCHLORIDE 1 DROP(S): 20 SOLUTION/ DROPS OPHTHALMIC at 22:06

## 2023-08-04 RX ADMIN — DORZOLAMIDE HYDROCHLORIDE 1 DROP(S): 20 SOLUTION/ DROPS OPHTHALMIC at 09:31

## 2023-08-04 RX ADMIN — Medication 25 MILLIGRAM(S): at 17:31

## 2023-08-04 RX ADMIN — LEVETIRACETAM 1000 MILLIGRAM(S): 250 TABLET, FILM COATED ORAL at 17:30

## 2023-08-04 NOTE — DIETITIAN INITIAL EVALUATION ADULT - ADD RECOMMEND
1) Glucerna BID (+440cal, 20g pro), HBV protein foods/ snacks (greek yogurt, ensure pudding, gelatin) to increase pro/rodolfo intakes   2) Encourage po intake, monitor diet tolerance, and provide assistance at meals as needed  Obtain daily weights to monitor trends  Monitor nutrition related labs, renal

## 2023-08-04 NOTE — DIETITIAN INITIAL EVALUATION ADULT - PROBLEM SELECTOR PLAN 5
May 2022 echo showed severe AS, continue to monitor , does not appear to be a good surgical candidate but that can be discussed later when pt. stable and more alert.

## 2023-08-04 NOTE — DIETITIAN INITIAL EVALUATION ADULT - OTHER INFO
pt. is a 89yoF hx dementia, Afib not on AC due to GI bleeding, CKD, HTN, prior hx of nontraumatic SAH, seizure , sent from [facility] for seizure. Recent seizure diagnosis and admission 1 week ago. Started on depakote. Pt got versed IM by EMS with resolution of apparent seizure activity. Patient noted to have UTI with cultures preliminarily showing E. coli, awaiting sensitivities. Patient now at mental baseline per family, RHONDA resolved. Depakote level noted to be low and increased by Neurology continuing to monitor.

## 2023-08-04 NOTE — EEG REPORT - NS EEG TEXT BOX
JOSHUANADINERAGHAVENDRA ANGELA-[id number]     Study Date: 08/03/23 08:00 - 08/04/23 08:00  Duration:   --------------------------------------------------------------------------------------------------  History:  CC/ HPI Patient is a 89y old  Female who presents with a chief complaint of Breakthrough seizure / uti (03 Aug 2023 19:17)    MEDICATIONS  (STANDING):  cefTRIAXone Injectable. 1000 milliGRAM(s) IV Push every 24 hours  chlorhexidine 2% Cloths 1 Application(s) Topical <User Schedule>  donepezil 10 milliGRAM(s) Oral at bedtime  dorzolamide 2% Ophthalmic Solution 1 Drop(s) Both EYES <User Schedule>  ferrous    sulfate 325 milliGRAM(s) Oral daily  gemfibrozil 600 milliGRAM(s) Oral two times a day  heparin   Injectable 5000 Unit(s) SubCutaneous every 12 hours  levothyroxine 150 MICROGram(s) Oral daily  metoprolol tartrate 25 milliGRAM(s) Oral two times a day  pantoprazole    Tablet 40 milliGRAM(s) Oral every 12 hours  tamsulosin 0.4 milliGRAM(s) Oral at bedtime  valproate sodium  IVPB 500 milliGRAM(s) IV Intermittent three times a day    --------------------------------------------------------------------------------------------------  Study Interpretation:    [[[Abbreviation Key:  PDR=alpha rhythm/posterior dominant rhythm. A-P=anterior posterior.  Amplitude: ‘very low’:<20; ‘low’:20-49; ‘medium’:; ‘high’:>150uV.  Persistence for periodic/rhythmic patterns (% of epoch) ‘rare’:<1%; ‘occasional’:1-10%; ‘frequent’:10-50%; ‘abundant’:50-90%; ‘continuous’:>90%.  Persistence for sporadic discharges: ‘rare’:<1/hr; ‘occasional’:1/min-1/hr; ‘frequent’:>1/min; ‘abundant’:>1/10 sec.  RPP=rhythmic and periodic patterns; GRDA=generalized rhythmic delta activity; FIRDA=frontal intermittent GRDA; LRDA=lateralized rhythmic delta activity; TIRDA=temporal intermittent rhythmic delta activity;  LPD=PLED=lateralized periodic discharges; GPD=generalized periodic discharges; BIPDs =bilateral independent periodic discharges; Mf=multifocal; SIRPDs=stimulus induced rhythmic, periodic, or ictal appearing discharges; BIRDs=brief potentially ictal rhythmic discharges >4 Hz, lasting .5-10s; PFA (paroxysmal bursts >13 Hz or =8 Hz <10s).  Modifiers: +F=with fast component; +S=with spike component; +R=with rhythmic component.  S-B=burst suppression pattern.  Max=maximal. N1-drowsy; N2-stage II sleep; N3-slow wave sleep. SSS/BETS=small sharp spikes/benign epileptiform transients of sleep. HV=hyperventilation; PS=photic stimulation]]]    Daily EEG Visual Analysis    FINDINGS:    Background:  Continuity: Continuous  Symmetry: Asymmetric  Posterior dominant rhythm (PDR): 6.5-7 Hz on the left, intermittent 6.5-7 Hz on the right, reactive to eye closure. Symmetric low-amplitude frontal beta in wakefulness.  State Change: Present  Voltage: Normal  Anterior Posterior Gradient: Present  Other background findings: None  Breach: Absent    Background Slowing:  Generalized slowing: As above  Focal slowing: Continuous right hemispheric focal polymorphic delta and theta slowing    State Changes:   Drowsiness is characterized by fragmentation, attenuation, and slowing of the background activity.  Stage 2 sleep is characterized by symmetric K complexes and sleep spindles.     Interictal Findings:  Occasional left (F7, at times with field to Fp1) and right (F8/T8) frontotemporal spike/sharp-wave discharges in drowsiness and stage 2 sleep.    Electrographic and Electroclinical seizures:  5 right frontotemporal focal-onset electrographic seizures:  Time: 8/3/23 12:23, 15:27, 18:31, 21:38; 8/4/23 02:10  Duration: 45 sec - 1 min 10 sec  Clinical: No clinical change is seen on video. During the seizure at 12:23, the patient is awake, looking at and speaking to a visitor who is sitting on the patient's left. During the seizure at 18:31, she is using both hands to adjust a wire, then speaks to someone who is standing on her right.  Electrographic: Onset is characterized by the appearance of right frontotemporal (F8) sharp waves, at times as a herald sharp wave followed by a few seconds of diffuse attenuation, with sharp waves then increasing in amplitude, at times evolving to spike/polyspike-wave discharges, at times with appearance of intermixed fast activity, and spreading throughout the right hemisphere, decreasing in frequency. At times there is post-ictal right hemispheric focal slowing.  EKG changes: None    Other Clinical Events:  None    Activation Procedures:   Hyperventilation is not performed.     Photic stimulation is not performed.     Artifacts:  Intermittent myogenic and movement artifacts are present.    EKG:  Single-lead EKG shows regular rhythm with frequent premature narrow complexes.    EEG Classification / Summary:  Abnormal EEG in the awake, drowsy, and asleep states.   -5 right frontotemporal focal-onset electrographic seizures with spread throughout the right hemisphere  -Occasional left and right frontotemporal spike/sharp-wave discharges in drowsiness and stage 2 sleep  -Continuous right hemispheric focal slowing.  -Mild diffuse slowing    Clinical Impression:  -5 right frontotemporal focal seizures with no clinical correlate seen on video. During some seizures, the patient is seen on video speaking to people who are on her left and on her right and/or making purposeful movements with both hands.  -The last seizure is captured on 8/4/23 02:10  -Risk of focal-onset seizures from the left and right frontotemporal regions  -Right hemispheric focal cerebral dysfunction can be structural and/or functional (such as post-ictal) in etiology.  -Mild diffuse cerebral dysfunction is nonspecific in etiology.     Neurology was informed of seizures.    -------------------------------------------------------------------------------------------------------  [facility] EEG Reading Room Ph#: [phone]  Epilepsy Answering Service after 5PM and before 8:30AM: Ph#: [phone]    Kelly Green MD  Attending Physician, [facility] Epilepsy [place]   RAGHAVENDRA WALLACE ANGELA-[id number]     Study Date: 08/03/23 08:00 - 08/04/23 08:00  Duration: 23 hr 52 min  --------------------------------------------------------------------------------------------------  History:  CC/ HPI Patient is a 89y old  Female who presents with a chief complaint of Breakthrough seizure / uti (03 Aug 2023 19:17)    MEDICATIONS  (STANDING):  cefTRIAXone Injectable. 1000 milliGRAM(s) IV Push every 24 hours  chlorhexidine 2% Cloths 1 Application(s) Topical <User Schedule>  donepezil 10 milliGRAM(s) Oral at bedtime  dorzolamide 2% Ophthalmic Solution 1 Drop(s) Both EYES <User Schedule>  ferrous    sulfate 325 milliGRAM(s) Oral daily  gemfibrozil 600 milliGRAM(s) Oral two times a day  heparin   Injectable 5000 Unit(s) SubCutaneous every 12 hours  levothyroxine 150 MICROGram(s) Oral daily  metoprolol tartrate 25 milliGRAM(s) Oral two times a day  pantoprazole    Tablet 40 milliGRAM(s) Oral every 12 hours  tamsulosin 0.4 milliGRAM(s) Oral at bedtime  valproate sodium  IVPB 500 milliGRAM(s) IV Intermittent three times a day    --------------------------------------------------------------------------------------------------  Study Interpretation:    [[[Abbreviation Key:  PDR=alpha rhythm/posterior dominant rhythm. A-P=anterior posterior.  Amplitude: ‘very low’:<20; ‘low’:20-49; ‘medium’:; ‘high’:>150uV.  Persistence for periodic/rhythmic patterns (% of epoch) ‘rare’:<1%; ‘occasional’:1-10%; ‘frequent’:10-50%; ‘abundant’:50-90%; ‘continuous’:>90%.  Persistence for sporadic discharges: ‘rare’:<1/hr; ‘occasional’:1/min-1/hr; ‘frequent’:>1/min; ‘abundant’:>1/10 sec.  RPP=rhythmic and periodic patterns; GRDA=generalized rhythmic delta activity; FIRDA=frontal intermittent GRDA; LRDA=lateralized rhythmic delta activity; TIRDA=temporal intermittent rhythmic delta activity;  LPD=PLED=lateralized periodic discharges; GPD=generalized periodic discharges; BIPDs =bilateral independent periodic discharges; Mf=multifocal; SIRPDs=stimulus induced rhythmic, periodic, or ictal appearing discharges; BIRDs=brief potentially ictal rhythmic discharges >4 Hz, lasting .5-10s; PFA (paroxysmal bursts >13 Hz or =8 Hz <10s).  Modifiers: +F=with fast component; +S=with spike component; +R=with rhythmic component.  S-B=burst suppression pattern.  Max=maximal. N1-drowsy; N2-stage II sleep; N3-slow wave sleep. SSS/BETS=small sharp spikes/benign epileptiform transients of sleep. HV=hyperventilation; PS=photic stimulation]]]    Daily EEG Visual Analysis    FINDINGS:    Background:  Continuity: Continuous  Symmetry: Asymmetric  Posterior dominant rhythm (PDR): 6.5-7 Hz on the left, intermittent 6.5-7 Hz on the right, reactive to eye closure. Symmetric low-amplitude frontal beta in wakefulness.  State Change: Present  Voltage: Normal  Anterior Posterior Gradient: Present  Other background findings: None  Breach: Absent    Background Slowing:  Generalized slowing: As above  Focal slowing: Continuous right hemispheric focal polymorphic delta and theta slowing    State Changes:   Drowsiness is characterized by fragmentation, attenuation, and slowing of the background activity.  Stage 2 sleep is characterized by symmetric K complexes and sleep spindles.     Interictal Findings:  Occasional left (F7, at times with field to Fp1) and right (F8/T8) frontotemporal spike/sharp-wave discharges in drowsiness and stage 2 sleep.    Electrographic and Electroclinical seizures:  5 right frontotemporal focal-onset electrographic seizures:  Time: 8/3/23 12:23, 15:27, 18:31, 21:38; 8/4/23 02:10  Duration: 45 sec - 1 min 10 sec  Clinical: No clinical change is seen on video. During the seizure at 12:23, the patient is awake, looking at and speaking to a visitor who is sitting on the patient's left. During the seizure at 18:31, she is using both hands to adjust a wire, then speaks to someone who is standing on her right.  Electrographic: Onset is characterized by the appearance of right frontotemporal (F8) sharp waves, at times as a herald sharp wave followed by a few seconds of diffuse attenuation, with sharp waves then increasing in amplitude, at times evolving to spike/polyspike-wave discharges, at times with appearance of intermixed fast activity, and spreading throughout the right hemisphere, decreasing in frequency. At times there is post-ictal right hemispheric focal slowing.  EKG changes: None    Other Clinical Events:  None    Activation Procedures:   Hyperventilation is not performed.     Photic stimulation is not performed.     Artifacts:  Intermittent myogenic and movement artifacts are present.    EKG:  Single-lead EKG shows regular rhythm with frequent premature narrow complexes.    EEG Classification / Summary:  Abnormal EEG in the awake, drowsy, and asleep states.   -5 right frontotemporal focal-onset electrographic seizures with spread throughout the right hemisphere  -Occasional left and right frontotemporal spike/sharp-wave discharges in drowsiness and stage 2 sleep  -Continuous right hemispheric focal slowing.  -Mild diffuse slowing    Clinical Impression:  -5 right frontotemporal focal seizures with no clinical correlate seen on video. During some seizures, the patient is seen on video speaking to people who are on her left and on her right and/or making purposeful movements with both hands.  -The last seizure is captured on 8/4/23 02:10  -Risk of focal-onset seizures from the left and right frontotemporal regions  -Right hemispheric focal cerebral dysfunction can be structural and/or functional (such as post-ictal) in etiology.  -Mild diffuse cerebral dysfunction is nonspecific in etiology.     Neurology was informed of seizures.    -------------------------------------------------------------------------------------------------------  [facility] EEG Reading Room Ph#: [phone]  Epilepsy Answering Service after 5PM and before 8:30AM: Ph#: [phone]    Kelly Green MD  Attending Physician, [facility] Epilepsy [place]

## 2023-08-04 NOTE — PROGRESS NOTE ADULT - SUBJECTIVE AND OBJECTIVE BOX
Neurology     RAGHAVENDRA WALLACE 89y Female     HPI:  pt. is a 89yoF hx dementia, Afib not on AC due to GI bleeding, CKD, HTN, prior hx of nontraumatic SAH, seizure , sent from [facility] for seizure. Recent seizure diagnosis and admission 1 week ago. Started on depakote. Pt got versed IM by EMS with resolution of apparent seizure activity. pt. opens eyes to verbal command but does not follow instructions, does not give any history. baseline ? saturating mid 90s on 2 L nasal cannula. In the Er pt's urine suggestive of uti. ct head no acute findings. no seizure activity in the ER.  Pt is DNR/DNI. (01 Aug 2023 20:47)      PMH: HTN (hypertension)    Diabetes mellitus    Hypothyroid    Dementia    GI bleed    Neuropathy    GERD (gastroesophageal reflux disease)    Abdominal aortic aneurysm    Stage 3 chronic kidney disease    Aortic stenosis       PSH: S/P hysterectomy    S/P hip replacement    Pacemaker      FAMILY HISTORY:  FHx: diabetes mellitus    SOCIAL HISTORY:  No history of tobacco or alcohol use     Allergies    No Known Allergies    Intolerances          Vital Signs Last 24 Hrs  T(C): 36.6 (04 Aug 2023 07:58), Max: 36.7 (03 Aug 2023 16:36)  T(F): 97.9 (04 Aug 2023 07:58), Max: 98.1 (03 Aug 2023 21:35)  HR: 63 (04 Aug 2023 07:58) (60 - 78)  BP: 178/83 (04 Aug 2023 07:58) (140/80 - 185/87)  BP(mean): --  RR: 18 (04 Aug 2023 07:58) (16 - 18)  SpO2: 97% (04 Aug 2023 07:58) (94% - 97%)    Parameters below as of 04 Aug 2023 07:58  Patient On (Oxygen Delivery Method): room air        MEDICATIONS    cefTRIAXone Injectable. 1000 milliGRAM(s) IV Push every 24 hours  chlorhexidine 2% Cloths 1 Application(s) Topical <User Schedule>  donepezil 10 milliGRAM(s) Oral at bedtime  dorzolamide 2% Ophthalmic Solution 1 Drop(s) Both EYES <User Schedule>  ferrous    sulfate 325 milliGRAM(s) Oral daily  gemfibrozil 600 milliGRAM(s) Oral two times a day  heparin   Injectable 5000 Unit(s) SubCutaneous every 12 hours  levETIRAcetam 1000 milliGRAM(s) Oral two times a day  levETIRAcetam  IVPB 2000 milliGRAM(s) IV Intermittent once  levothyroxine 150 MICROGram(s) Oral daily  LORazepam   Injectable 1 milliGRAM(s) IV Push once PRN  metoprolol tartrate 25 milliGRAM(s) Oral two times a day  pantoprazole    Tablet 40 milliGRAM(s) Oral every 12 hours  tamsulosin 0.4 milliGRAM(s) Oral at bedtime  valproate sodium  IVPB 500 milliGRAM(s) IV Intermittent three times a day         LABS:    Urinalysis Basic - ( 04 Aug 2023 06:11 )    Color: x / Appearance: x / SG: x / pH: x  Gluc: 91 mg/dL / Ketone: x  / Bili: x / Urobili: x   Blood: x / Protein: x / Nitrite: x   Leuk Esterase: x / RBC: x / WBC x   Sq Epi: x / Non Sq Epi: x / Bacteria: x      08-04    141  |  103  |  23.3<H>  ----------------------------<  91  4.4   |  22.0  |  1.05    Ca    9.5      04 Aug 2023 06:11    Hemoglobin A1C:     Valproic Acid Level, Serum: 29.7 ug/mL.    Valproic Acid Level, Serum: 71.2 ug/mL (08.04.23 @ 06:11)     On Neurological Examination:    Mental Status - Patient is  awake, slightly drowsy and oriented X1 today. Patient can name some objects,  and follow simple commands.  She perseverates that my name is  Artur. Appears delusional.  There is no dysarthria.    Cranial Nerves - PERRL, EOMI,  Visual fields are full to finger counting, no gross facial asymmetry, tongue/uvula midline    Motor Exam -   Both UE are at least 4/5  with no drift.  Both LE are antigravity.    Sensory    Intact to light touch and pinprick bilaterally    Coord: FTN intact bilaterally     Gait -  Not assessed.   RADIOLOGY ( All neurological imaging studies were independently reviewed and interpreted by me)  CT     CT Head No Cont (08.01.23 @ 10:40) >    IMPRESSION:  Limited exam due to motion    No acute intracranial hemorrhage or acute territorial infarct.  If   symptoms persist, follow-up MRI exam recommended.    BE DEVINE MD; Attending Radiologist    MRI:      TTE      EEG preliminary read (not final) on the initial recording hour(s) = <8 hr  Reviewed from: 08:00-15:52    2 right frontotemporal focal-onset seizures with spread to the right hemisphere.  No clinical correlate seen on video. Patient appears to be talking to a visitor during the first seizure.  Times: 12:23, 15:27    Occasional right and rare left frontotemporal epileptiform discharges indicate risk of focal-onset seizures from these regions.    Final report to follow tomorrow morning after completion of study.    [facility] EEG Reading Room Ph#: [phone]  Epilepsy Answering Service after 5PM and before 8:30AM: Ph#: [phone].      EEG  08-.  EEG Classification / Summary:  Abnormal EEG in the awake, drowsy, and asleep states.   -5 right frontotemporal focal-onset electrographic seizures with spread throughout the right hemisphere  -Occasional left and right frontotemporal spike/sharp-wave discharges in drowsiness and stage 2 sleep  -Continuous right hemispheric focal slowing.  -Mild diffuse slowing    Clinical Impression:  -5 right frontotemporal focal seizures with no clinical correlate seen on video. During some seizures, the patient is seen on video speaking to people who are on her left and on her right and/or making purposeful movements with both hands.  -The last seizure is captured on 8/4/23 02:10  -Risk of focal-onset seizures from the left and right frontotemporal regions  -Right hemispheric focal cerebral dysfunction can be structural and/or functional (such as post-ictal) in etiology.  -Mild diffuse cerebral dysfunction is nonspecific in etiology.     Neurology was informed of seizures.    -Kelly Green MD  Attending Physician, [facility] Epilepsy [place]

## 2023-08-04 NOTE — PROGRESS NOTE ADULT - NS ATTEST RISK PROBLEM GEN_ALL_CORE FT
Elderly patient with advanced dementia and breakthrough seizures. Continued seizures noted on cEEG however non clinical, recommendation to increase depakote level requiring close observation of depakote level. Treatment for E. coli UTI
Elderly patient with advanced dementia and breakthrough seizures. Continued seizures noted on cEEG however non clinical, recommendation to now start Keppra on top of Depakote, review levels of Depakote needed.  Treatment for E. coli UT

## 2023-08-04 NOTE — DIETITIAN INITIAL EVALUATION ADULT - PROBLEM SELECTOR PLAN 3
clinically and labs on arrival indicating dehydration, got 1500 ml iv fluids and repeat labs improved, michelle resolved.  will give another 500 cc gently

## 2023-08-04 NOTE — DIETITIAN INITIAL EVALUATION ADULT - ORAL INTAKE PTA/DIET HISTORY
RD consult attempted, pt sleeping unable to arouse. Pt A&Ox1 education not appropriate. RD to remain available

## 2023-08-04 NOTE — PROGRESS NOTE ADULT - SUBJECTIVE AND OBJECTIVE BOX
[facility] Division of Hospital Medicine    Chief Complaint:      SUBJECTIVE / OVERNIGHT EVENTS:    Patient seen and examined at bedside, no acute events overnight, patient noted to have persistent epileptic activity on cEEG, given 2 gm Keppra and will continue Keppra 1 gm BID. Discussed plan with neurology, and family, will obtain depakote trough in AM, level this AM therapeutic however probably mid level draw not trough.     MEDICATIONS  (STANDING):  cefTRIAXone Injectable. 1000 milliGRAM(s) IV Push every 24 hours  chlorhexidine 2% Cloths 1 Application(s) Topical <User Schedule>  donepezil 10 milliGRAM(s) Oral at bedtime  dorzolamide 2% Ophthalmic Solution 1 Drop(s) Both EYES <User Schedule>  ferrous    sulfate 325 milliGRAM(s) Oral daily  gemfibrozil 600 milliGRAM(s) Oral two times a day  heparin   Injectable 5000 Unit(s) SubCutaneous every 12 hours  levETIRAcetam 1000 milliGRAM(s) Oral two times a day  levothyroxine 150 MICROGram(s) Oral daily  metoprolol tartrate 25 milliGRAM(s) Oral two times a day  pantoprazole    Tablet 40 milliGRAM(s) Oral every 12 hours  tamsulosin 0.4 milliGRAM(s) Oral at bedtime  valproate sodium  IVPB 500 milliGRAM(s) IV Intermittent three times a day    MEDICATIONS  (PRN):  LORazepam   Injectable 1 milliGRAM(s) IV Push once PRN for any seizure activity        I&O's Summary    03 Aug 2023 07:01  -  04 Aug 2023 07:00  --------------------------------------------------------  IN: 1060 mL / OUT: 2025 mL / NET: -965 mL        PHYSICAL EXAM:  Vital Signs Last 24 Hrs  T(C): 36.6 (04 Aug 2023 15:50), Max: 36.7 (03 Aug 2023 16:36)  T(F): 97.9 (04 Aug 2023 15:50), Max: 98.1 (03 Aug 2023 21:35)  HR: 60 (04 Aug 2023 15:50) (60 - 70)  BP: 169/83 (04 Aug 2023 15:50) (145/78 - 185/87)  BP(mean): --  RR: 18 (04 Aug 2023 15:50) (16 - 18)  SpO2: 98% (04 Aug 2023 15:50) (94% - 98%)    Parameters below as of 04 Aug 2023 15:50  Patient On (Oxygen Delivery Method): room air            General: pt. in bed not in distress  HEENT: AT, NC. PERRL. oral  mucosa appears somewhat dry  Neck: supple. no JVD.   Chest: air entry fair  bilaterally  Heart: S1,S2. RRR. + systolic murmur, no edema.   Abdomen: soft. no tenderness appreciated. non-distended. + BS.   Ext: no calf swelling noted, moves ext. slowly.   vascular : distal pulses palpable  Neuro: Patient awake and alert, follows most commands, AxO to person, partially place, sensation intact.    Skin: warm and dry  psych : no agitation or restlessness noted. at baseline mentation per son at bedside   LABS:    08-04    141  |  103  |  23.3<H>  ----------------------------<  91  4.4   |  22.0  |  1.05    Ca    9.5      04 Aug 2023 06:11            Urinalysis Basic - ( 04 Aug 2023 06:11 )    Color: x / Appearance: x / SG: x / pH: x  Gluc: 91 mg/dL / Ketone: x  / Bili: x / Urobili: x   Blood: x / Protein: x / Nitrite: x   Leuk Esterase: x / RBC: x / WBC x   Sq Epi: x / Non Sq Epi: x / Bacteria: x        CAPILLARY BLOOD GLUCOSE            RADIOLOGY & ADDITIONAL TESTS:  Results Reviewed:   Imaging Personally Reviewed:  Electrocardiogram Personally Reviewed:

## 2023-08-04 NOTE — DIETITIAN INITIAL EVALUATION ADULT - PERTINENT MEDS FT
MEDICATIONS  (STANDING):  cefTRIAXone Injectable. 1000 milliGRAM(s) IV Push every 24 hours  chlorhexidine 2% Cloths 1 Application(s) Topical <User Schedule>  donepezil 10 milliGRAM(s) Oral at bedtime  dorzolamide 2% Ophthalmic Solution 1 Drop(s) Both EYES <User Schedule>  ferrous    sulfate 325 milliGRAM(s) Oral daily  gemfibrozil 600 milliGRAM(s) Oral two times a day  heparin   Injectable 5000 Unit(s) SubCutaneous every 12 hours  levETIRAcetam 1000 milliGRAM(s) Oral two times a day  levETIRAcetam  IVPB 2000 milliGRAM(s) IV Intermittent once  levothyroxine 150 MICROGram(s) Oral daily  metoprolol tartrate 25 milliGRAM(s) Oral two times a day  pantoprazole    Tablet 40 milliGRAM(s) Oral every 12 hours  tamsulosin 0.4 milliGRAM(s) Oral at bedtime  valproate sodium  IVPB 500 milliGRAM(s) IV Intermittent three times a day    MEDICATIONS  (PRN):  LORazepam   Injectable 1 milliGRAM(s) IV Push once PRN for any seizure activity

## 2023-08-04 NOTE — DIETITIAN INITIAL EVALUATION ADULT - NSICDXPASTMEDICALHX_GEN_ALL_CORE_FT
PAST MEDICAL HISTORY:  Abdominal aortic aneurysm     Aortic stenosis     Dementia     Diabetes mellitus     GERD (gastroesophageal reflux disease)     GI bleed     HTN (hypertension)     Hypothyroid     Neuropathy     Stage 3 chronic kidney disease

## 2023-08-05 LAB
ANION GAP SERPL CALC-SCNC: 13 MMOL/L — SIGNIFICANT CHANGE UP (ref 5–17)
BUN SERPL-MCNC: 23.4 MG/DL — HIGH (ref 8–20)
CALCIUM SERPL-MCNC: 9.2 MG/DL — SIGNIFICANT CHANGE UP (ref 8.4–10.5)
CHLORIDE SERPL-SCNC: 106 MMOL/L — SIGNIFICANT CHANGE UP (ref 96–108)
CO2 SERPL-SCNC: 23 MMOL/L — SIGNIFICANT CHANGE UP (ref 22–29)
CREAT SERPL-MCNC: 1.03 MG/DL — SIGNIFICANT CHANGE UP (ref 0.5–1.3)
EGFR: 51 ML/MIN/1.73M2 — LOW
GLUCOSE SERPL-MCNC: 98 MG/DL — SIGNIFICANT CHANGE UP (ref 70–99)
POTASSIUM SERPL-MCNC: 3.9 MMOL/L — SIGNIFICANT CHANGE UP (ref 3.5–5.3)
POTASSIUM SERPL-SCNC: 3.9 MMOL/L — SIGNIFICANT CHANGE UP (ref 3.5–5.3)
SODIUM SERPL-SCNC: 142 MMOL/L — SIGNIFICANT CHANGE UP (ref 135–145)
VALPROATE SERPL-MCNC: 95.7 UG/ML — SIGNIFICANT CHANGE UP (ref 50–100)

## 2023-08-05 PROCEDURE — 99232 SBSQ HOSP IP/OBS MODERATE 35: CPT

## 2023-08-05 PROCEDURE — 95720 EEG PHY/QHP EA INCR W/VEEG: CPT

## 2023-08-05 RX ADMIN — HEPARIN SODIUM 5000 UNIT(S): 5000 INJECTION INTRAVENOUS; SUBCUTANEOUS at 17:34

## 2023-08-05 RX ADMIN — HEPARIN SODIUM 5000 UNIT(S): 5000 INJECTION INTRAVENOUS; SUBCUTANEOUS at 05:37

## 2023-08-05 RX ADMIN — CEFTRIAXONE 1000 MILLIGRAM(S): 500 INJECTION, POWDER, FOR SOLUTION INTRAMUSCULAR; INTRAVENOUS at 21:35

## 2023-08-05 RX ADMIN — Medication 25 MILLIGRAM(S): at 05:36

## 2023-08-05 RX ADMIN — DONEPEZIL HYDROCHLORIDE 10 MILLIGRAM(S): 10 TABLET, FILM COATED ORAL at 21:34

## 2023-08-05 RX ADMIN — Medication 55 MILLIGRAM(S): at 05:36

## 2023-08-05 RX ADMIN — Medication 325 MILLIGRAM(S): at 13:28

## 2023-08-05 RX ADMIN — Medication 150 MICROGRAM(S): at 05:36

## 2023-08-05 RX ADMIN — Medication 600 MILLIGRAM(S): at 17:38

## 2023-08-05 RX ADMIN — LEVETIRACETAM 1000 MILLIGRAM(S): 250 TABLET, FILM COATED ORAL at 05:36

## 2023-08-05 RX ADMIN — LEVETIRACETAM 1000 MILLIGRAM(S): 250 TABLET, FILM COATED ORAL at 17:34

## 2023-08-05 RX ADMIN — Medication 55 MILLIGRAM(S): at 21:34

## 2023-08-05 RX ADMIN — DORZOLAMIDE HYDROCHLORIDE 1 DROP(S): 20 SOLUTION/ DROPS OPHTHALMIC at 21:34

## 2023-08-05 RX ADMIN — AMLODIPINE BESYLATE 10 MILLIGRAM(S): 2.5 TABLET ORAL at 05:36

## 2023-08-05 RX ADMIN — DORZOLAMIDE HYDROCHLORIDE 1 DROP(S): 20 SOLUTION/ DROPS OPHTHALMIC at 08:00

## 2023-08-05 RX ADMIN — Medication 25 MILLIGRAM(S): at 17:43

## 2023-08-05 RX ADMIN — CHLORHEXIDINE GLUCONATE 1 APPLICATION(S): 213 SOLUTION TOPICAL at 05:37

## 2023-08-05 RX ADMIN — TAMSULOSIN HYDROCHLORIDE 0.4 MILLIGRAM(S): 0.4 CAPSULE ORAL at 21:34

## 2023-08-05 RX ADMIN — PANTOPRAZOLE SODIUM 40 MILLIGRAM(S): 20 TABLET, DELAYED RELEASE ORAL at 17:42

## 2023-08-05 RX ADMIN — Medication 600 MILLIGRAM(S): at 05:36

## 2023-08-05 RX ADMIN — Medication 55 MILLIGRAM(S): at 13:26

## 2023-08-05 RX ADMIN — PANTOPRAZOLE SODIUM 40 MILLIGRAM(S): 20 TABLET, DELAYED RELEASE ORAL at 05:36

## 2023-08-05 NOTE — PROGRESS NOTE ADULT - SUBJECTIVE AND OBJECTIVE BOX
Neurology     RAGHAVENDRA WALLACE 89y Female     HPI:  pt. is a 89yoF hx dementia, Afib not on AC due to GI bleeding, CKD, HTN, prior hx of nontraumatic SAH, seizure , sent from [facility] for seizure. Recent seizure diagnosis and admission 1 week ago. Started on depakote. Pt got versed IM by EMS with resolution of apparent seizure activity. pt. opens eyes to verbal command but does not follow instructions, does not give any history. baseline ? saturating mid 90s on 2 L nasal cannula. In the Er pt's urine suggestive of uti. ct head no acute findings. no seizure activity in the ER.  Pt is DNR/DNI. (01 Aug 2023 20:47)      PMH: HTN (hypertension)    Diabetes mellitus    Hypothyroid    Dementia    GI bleed    Neuropathy    GERD (gastroesophageal reflux disease)    Abdominal aortic aneurysm    Stage 3 chronic kidney disease    Aortic stenosis       PSH: S/P hysterectomy    S/P hip replacement    Pacemaker      FAMILY HISTORY:  FHx: diabetes mellitus    SOCIAL HISTORY:  No history of tobacco or alcohol use     Allergies    No Known Allergies    Intolerances          Vital Signs Last 24 Hrs  T(C): 36.4 (05 Aug 2023 16:30), Max: 36.6 (04 Aug 2023 22:10)  T(F): 97.6 (05 Aug 2023 16:30), Max: 97.9 (04 Aug 2023 22:10)  HR: 60 (05 Aug 2023 16:30) (60 - 65)  BP: 152/81 (05 Aug 2023 16:30) (152/81 - 176/83)  BP(mean): --  RR: 18 (05 Aug 2023 16:30) (18 - 18)  SpO2: 95% (05 Aug 2023 09:41) (92% - 95%)    Parameters below as of 05 Aug 2023 16:30  Patient On (Oxygen Delivery Method): room air        MEDICATIONS    amLODIPine   Tablet 10 milliGRAM(s) Oral daily  cefTRIAXone Injectable. 1000 milliGRAM(s) IV Push every 24 hours  chlorhexidine 2% Cloths 1 Application(s) Topical <User Schedule>  donepezil 10 milliGRAM(s) Oral at bedtime  dorzolamide 2% Ophthalmic Solution 1 Drop(s) Both EYES <User Schedule>  ferrous    sulfate 325 milliGRAM(s) Oral daily  gemfibrozil 600 milliGRAM(s) Oral two times a day  heparin   Injectable 5000 Unit(s) SubCutaneous every 12 hours  levETIRAcetam 1000 milliGRAM(s) Oral two times a day  levothyroxine 150 MICROGram(s) Oral daily  LORazepam   Injectable 1 milliGRAM(s) IV Push once PRN  metoprolol tartrate 25 milliGRAM(s) Oral two times a day  pantoprazole    Tablet 40 milliGRAM(s) Oral every 12 hours  tamsulosin 0.4 milliGRAM(s) Oral at bedtime  valproate sodium  IVPB 500 milliGRAM(s) IV Intermittent three times a day         LABS:    Urinalysis Basic - ( 05 Aug 2023 06:39 )    Color: x / Appearance: x / SG: x / pH: x  Gluc: 98 mg/dL / Ketone: x  / Bili: x / Urobili: x   Blood: x / Protein: x / Nitrite: x   Leuk Esterase: x / RBC: x / WBC x   Sq Epi: x / Non Sq Epi: x / Bacteria: x      08-05    142  |  106  |  23.4<H>  ----------------------------<  98  3.9   |  23.0  |  1.03    Ca    9.2      05 Aug 2023 06:39        Hemoglobin A1C:       Valproic Acid Level, Serum: 29.7 ug/mL.    Valproic Acid Level, Serum: 71.2 ug/mL (08.04.23 @ 06:11)  Valproic Acid Level, Serum: 95.7 ug/mL (08.05.23 @ 06:39)    On Neurological Examination:    Mental Status - Patient is  drowsy but arousable and oriented X1 today. Patient can  follow simple commands with some persuasion.  She perseverates that my name is Dr Siddiqui. There is no dysarthria.    Cranial Nerves - PERRL, EOMI,  Visual fields BTT bilaterally. No gross facial asymmetry.    Motor Exam -   Both UE are at least 4/5  with no drift.  Both LE withdraws.    Sensory    Intact to pinprick bilaterally    Coord: FTN not performed.     Gait -  Not assessed.   RADIOLOGY ( All neurological imaging studies were independently reviewed and interpreted by me)  CTH     CT Head No Cont (08.01.23 @ 10:40) >    IMPRESSION:  Limited exam due to motion    No acute intracranial hemorrhage or acute territorial infarct.  If   symptoms persist, follow-up MRI exam recommended.    BE DEVINE MD; Attending Radiologist    MRI:      TTE      EEG preliminary read (not final) on the initial recording hour(s) = <8 hr  Reviewed from: 08:00-15:52    2 right frontotemporal focal-onset seizures with spread to the right hemisphere.  No clinical correlate seen on video. Patient appears to be talking to a visitor during the first seizure.  Times: 12:23, 15:27    Occasional right and rare left frontotemporal epileptiform discharges indicate risk of focal-onset seizures from these regions.    Final report to follow tomorrow morning after completion of study.    [facility] EEG Reading Room Ph#: [phone]  Epilepsy Answering Service after 5PM and before 8:30AM: Ph#: [phone].      EEG  08-.  EEG Classification / Summary:  Abnormal EEG in the awake, drowsy, and asleep states.   -5 right frontotemporal focal-onset electrographic seizures with spread throughout the right hemisphere  -Occasional left and right frontotemporal spike/sharp-wave discharges in drowsiness and stage 2 sleep  -Continuous right hemispheric focal slowing.  -Mild diffuse slowing    Clinical Impression:  -5 right frontotemporal focal seizures with no clinical correlate seen on video. During some seizures, the patient is seen on video speaking to people who are on her left and on her right and/or making purposeful movements with both hands.  -The last seizure is captured on 8/4/23 02:10  -Risk of focal-onset seizures from the left and right frontotemporal regions  -Right hemispheric focal cerebral dysfunction can be structural and/or functional (such as post-ictal) in etiology.  -Mild diffuse cerebral dysfunction is nonspecific in etiology.     Neurology was informed of seizures.    -Kelly Green MD  Attending Physician, [facility]    EEG 08/05/2023.     Classification / Summary:  Abnormal EEG in the awake, drowsy, and asleep states.   -1 Right frontotemporal focal-onset electrographic seizures with spread throughout the right hemisphere  -Occasional right temporal spike/sharp-wave discharges in drowsiness and stage 2 sleep  -Continuous right hemispheric focal slowing.  -Mild diffuse slowing    Clinical Impression:  -1 Right temporal focal seizure with no clinical correlate seen on video.   -The last seizure is captured on 8/4/23 11:08  -Risk of focal-onset seizures from the right frontotemporal regions  -Right hemispheric focal cerebral dysfunction can be structural and/or functional in etiology.  -Mild diffuse cerebral dysfunction is nonspecific in etiology.       MD FALLON Carver  Director, Continuous EEG Monitoring Program, [facility]   and Epilepsy Fellowship ,   Department of Neurology, [facility]

## 2023-08-05 NOTE — EEG REPORT - NS EEG TEXT BOX
RAGHAVENDRA WALLACE ANGELA-[id number]     Study Date: 08/04/23 08:00 - 08/05/23 08:00  Duration: 24 hr  --------------------------------------------------------------------------------------------------  History:  CC/ HPI Patient is a 89y old  Female who presents with a chief complaint of Breakthrough seizure / uti (03 Aug 2023 19:17)    MEDICATIONS  (STANDING):  amLODIPine   Tablet 10 milliGRAM(s) Oral daily  cefTRIAXone Injectable. 1000 milliGRAM(s) IV Push every 24 hours  chlorhexidine 2% Cloths 1 Application(s) Topical <User Schedule>  donepezil 10 milliGRAM(s) Oral at bedtime  dorzolamide 2% Ophthalmic Solution 1 Drop(s) Both EYES <User Schedule>  ferrous    sulfate 325 milliGRAM(s) Oral daily  gemfibrozil 600 milliGRAM(s) Oral two times a day  heparin   Injectable 5000 Unit(s) SubCutaneous every 12 hours  levETIRAcetam 1000 milliGRAM(s) Oral two times a day  levothyroxine 150 MICROGram(s) Oral daily  metoprolol tartrate 25 milliGRAM(s) Oral two times a day  pantoprazole    Tablet 40 milliGRAM(s) Oral every 12 hours  tamsulosin 0.4 milliGRAM(s) Oral at bedtime  valproate sodium  IVPB 500 milliGRAM(s) IV Intermittent three times a day    --------------------------------------------------------------------------------------------------  Study Interpretation:    [[[Abbreviation Key:  PDR=alpha rhythm/posterior dominant rhythm. A-P=anterior posterior.  Amplitude: ‘very low’:<20; ‘low’:20-49; ‘medium’:; ‘high’:>150uV.  Persistence for periodic/rhythmic patterns (% of epoch) ‘rare’:<1%; ‘occasional’:1-10%; ‘frequent’:10-50%; ‘abundant’:50-90%; ‘continuous’:>90%.  Persistence for sporadic discharges: ‘rare’:<1/hr; ‘occasional’:1/min-1/hr; ‘frequent’:>1/min; ‘abundant’:>1/10 sec.  RPP=rhythmic and periodic patterns; GRDA=generalized rhythmic delta activity; FIRDA=frontal intermittent GRDA; LRDA=lateralized rhythmic delta activity; TIRDA=temporal intermittent rhythmic delta activity;  LPD=PLED=lateralized periodic discharges; GPD=generalized periodic discharges; BIPDs =bilateral independent periodic discharges; Mf=multifocal; SIRPDs=stimulus induced rhythmic, periodic, or ictal appearing discharges; BIRDs=brief potentially ictal rhythmic discharges >4 Hz, lasting .5-10s; PFA (paroxysmal bursts >13 Hz or =8 Hz <10s).  Modifiers: +F=with fast component; +S=with spike component; +R=with rhythmic component.  S-B=burst suppression pattern.  Max=maximal. N1-drowsy; N2-stage II sleep; N3-slow wave sleep. SSS/BETS=small sharp spikes/benign epileptiform transients of sleep. HV=hyperventilation; PS=photic stimulation]]]    Daily EEG Visual Analysis    FINDINGS:    Background:  Continuity: Continuous  Symmetry: Asymmetric  Posterior dominant rhythm (PDR): 6.5-7 Hz on the left, intermittent 6.5-7 Hz on the right, reactive to eye closure. Symmetric low-amplitude frontal beta in wakefulness.  State Change: Present  Voltage: Normal  Anterior Posterior Gradient: Present  Other background findings: None  Breach: Absent    Background Slowing:  Generalized slowing: As above  Focal slowing: Continuous right hemispheric focal polymorphic delta and theta slowing    State Changes:   Drowsiness is characterized by fragmentation, attenuation, and slowing of the background activity.  Stage 2 sleep is characterized by symmetric K complexes and sleep spindles.     Interictal Findings:  Occasional right (F8/T8) frontotemporal spike/sharp-wave discharges in drowsiness and stage 2 sleep.    Electrographic and Electroclinical seizures:  1 Right frontotemporal focal-onset electrographic seizures:  Time: 8/4/23 11:08 x 1min   Clinical: No clinical change is seen on video. During the seizure the patient is drowsy.  Electrographic: Onset is characterized by the appearance of right frontotemporal (F8) sharp waves, at times as a herald sharp wave followed by a few seconds of diffuse attenuation, with sharp waves then increasing in amplitude, at times evolving to rhythmic theta, at times with appearance of intermixed fast activity, and spreading throughout the right hemisphere, decreasing in frequency. Post-ictal right hemispheric focal slowing.  EKG changes: None    Other Clinical Events:  None    Activation Procedures:   Hyperventilation is not performed.     Photic stimulation is not performed.     Artifacts:  Intermittent myogenic and movement artifacts are present.    EKG:  Single-lead EKG shows regular rhythm with frequent premature narrow complexes.    EEG Classification / Summary:  Abnormal EEG in the awake, drowsy, and asleep states.   -1 Right frontotemporal focal-onset electrographic seizures with spread throughout the right hemisphere  -Occasional right temporal spike/sharp-wave discharges in drowsiness and stage 2 sleep  -Continuous right hemispheric focal slowing.  -Mild diffuse slowing    Clinical Impression:  -1 Right temporal focal seizure with no clinical correlate seen on video.   -The last seizure is captured on 8/4/23 11:08  -Risk of focal-onset seizures from the right frontotemporal regions  -Right hemispheric focal cerebral dysfunction can be structural and/or functional in etiology.  -Mild diffuse cerebral dysfunction is nonspecific in etiology.       MD FALLON Carver  Director, Continuous EEG Monitoring Program, [facility]   and Epilepsy Fellowship ,   Department of Neurology, [facility] [facility] [employer]    [facility] EEG Reading Room Ph#: [phone]  Epilepsy Answering Service after 5PM and before 8:30AM: Ph#: [phone]

## 2023-08-05 NOTE — PROGRESS NOTE ADULT - SUBJECTIVE AND OBJECTIVE BOX
[facility] Division of Medicine    Chief Complaint:      SUBJECTIVE / OVERNIGHT EVENTS: Pt seen at the bedside. States they are feeling improved from day prior.  Patient denies chest pain, SOB, abd pain, N/V, fever, chills, dysuria or any other complaints. All remainder ROS negative.     MEDICATIONS  (STANDING):  amLODIPine   Tablet 10 milliGRAM(s) Oral daily  cefTRIAXone Injectable. 1000 milliGRAM(s) IV Push every 24 hours  chlorhexidine 2% Cloths 1 Application(s) Topical <User Schedule>  donepezil 10 milliGRAM(s) Oral at bedtime  dorzolamide 2% Ophthalmic Solution 1 Drop(s) Both EYES <User Schedule>  ferrous    sulfate 325 milliGRAM(s) Oral daily  gemfibrozil 600 milliGRAM(s) Oral two times a day  heparin   Injectable 5000 Unit(s) SubCutaneous every 12 hours  levETIRAcetam 1000 milliGRAM(s) Oral two times a day  levothyroxine 150 MICROGram(s) Oral daily  metoprolol tartrate 25 milliGRAM(s) Oral two times a day  pantoprazole    Tablet 40 milliGRAM(s) Oral every 12 hours  tamsulosin 0.4 milliGRAM(s) Oral at bedtime  valproate sodium  IVPB 500 milliGRAM(s) IV Intermittent three times a day    MEDICATIONS  (PRN):  LORazepam   Injectable 1 milliGRAM(s) IV Push once PRN for any seizure activity      I&O's Summary    04 Aug 2023 07:01  -  05 Aug 2023 07:00  --------------------------------------------------------  IN: 240 mL / OUT: 1300 mL / NET: -1060 mL    05 Aug 2023 07:01  -  05 Aug 2023 18:03  --------------------------------------------------------  IN: 240 mL / OUT: 300 mL / NET: -60 mL        PHYSICAL EXAM:  Vital Signs Last 24 Hrs  T(C): 36.4 (05 Aug 2023 09:41), Max: 36.6 (04 Aug 2023 22:10)  T(F): 97.6 (05 Aug 2023 09:41), Max: 97.9 (04 Aug 2023 22:10)  HR: 60 (05 Aug 2023 09:41) (60 - 65)  BP: 176/83 (05 Aug 2023 09:41) (161/82 - 176/83)  BP(mean): --  RR: 18 (05 Aug 2023 09:41) (18 - 18)  SpO2: 95% (05 Aug 2023 09:41) (92% - 95%)    Parameters below as of 05 Aug 2023 09:41  Patient On (Oxygen Delivery Method): room air        CONSTITUTIONAL: NAD  HEENT: PERRL, EOMI, moist oral mucosa, no pharyngeal injection or exudates, no cervica LAD  RESPIRATORY: Normal respiratory effort; lungs are clear to auscultation bilaterally  CARDIOVASCULAR: Regular rate and rhythm, normal S1 and S2. No lower extremity edema; Peripheral pulses are 2+ bilaterally  ABDOMEN: Nontender to palpation, normoactive bowel sounds, no rebound/guarding;  MUSCLOSKELETAL:  no joint swelling or tenderness to palpation, ROM intact in bilateral UE and LE  PSYCH: A+O to person, place, and time; affect appropriate  NEUROLOGY: CN 2-12 are intact and symmetric; no gross sensory deficits;   SKIN: No rashes    LABS:    08-05    142  |  106  |  23.4<H>  ----------------------------<  98  3.9   |  23.0  |  1.03    Ca    9.2      05 Aug 2023 06:39            Urinalysis Basic - ( 05 Aug 2023 06:39 )    Color: x / Appearance: x / SG: x / pH: x  Gluc: 98 mg/dL / Ketone: x  / Bili: x / Urobili: x   Blood: x / Protein: x / Nitrite: x   Leuk Esterase: x / RBC: x / WBC x   Sq Epi: x / Non Sq Epi: x / Bacteria: x                                   [facility] Division of Medicine    Chief Complaint:  seizure    SUBJECTIVE / OVERNIGHT EVENTS: Pt seen at the bedside.    MEDICATIONS  (STANDING):  amLODIPine   Tablet 10 milliGRAM(s) Oral daily  cefTRIAXone Injectable. 1000 milliGRAM(s) IV Push every 24 hours  chlorhexidine 2% Cloths 1 Application(s) Topical <User Schedule>  donepezil 10 milliGRAM(s) Oral at bedtime  dorzolamide 2% Ophthalmic Solution 1 Drop(s) Both EYES <User Schedule>  ferrous    sulfate 325 milliGRAM(s) Oral daily  gemfibrozil 600 milliGRAM(s) Oral two times a day  heparin   Injectable 5000 Unit(s) SubCutaneous every 12 hours  levETIRAcetam 1000 milliGRAM(s) Oral two times a day  levothyroxine 150 MICROGram(s) Oral daily  metoprolol tartrate 25 milliGRAM(s) Oral two times a day  pantoprazole    Tablet 40 milliGRAM(s) Oral every 12 hours  tamsulosin 0.4 milliGRAM(s) Oral at bedtime  valproate sodium  IVPB 500 milliGRAM(s) IV Intermittent three times a day    MEDICATIONS  (PRN):  LORazepam   Injectable 1 milliGRAM(s) IV Push once PRN for any seizure activity      I&O's Summary    04 Aug 2023 07:01  -  05 Aug 2023 07:00  --------------------------------------------------------  IN: 240 mL / OUT: 1300 mL / NET: -1060 mL    05 Aug 2023 07:01  -  05 Aug 2023 18:03  --------------------------------------------------------  IN: 240 mL / OUT: 300 mL / NET: -60 mL        PHYSICAL EXAM:  Vital Signs Last 24 Hrs  T(C): 36.4 (05 Aug 2023 09:41), Max: 36.6 (04 Aug 2023 22:10)  T(F): 97.6 (05 Aug 2023 09:41), Max: 97.9 (04 Aug 2023 22:10)  HR: 60 (05 Aug 2023 09:41) (60 - 65)  BP: 176/83 (05 Aug 2023 09:41) (161/82 - 176/83)  BP(mean): --  RR: 18 (05 Aug 2023 09:41) (18 - 18)  SpO2: 95% (05 Aug 2023 09:41) (92% - 95%)    Parameters below as of 05 Aug 2023 09:41  Patient On (Oxygen Delivery Method): room air      General: pt. in bed not in distress  HEENT: AT, NC. PERRL. oral  mucosa appears somewhat dry  Neck: supple. no JVD.   Chest: air entry fair  bilaterally  Heart: S1,S2. RRR. + systolic murmur, no edema.   Abdomen: soft. no tenderness appreciated. non-distended. + BS.   Ext: no calf swelling noted, moves ext. slowly.   vascular : distal pulses palpable  Neuro: Patient awake and alert, follows most commands, AxO to person, partially place, sensation intact.    Skin: warm and dry  psych : no agitation or restlessness noted. at baseline mentation per son at bedside     LABS:    08-05    142  |  106  |  23.4<H>  ----------------------------<  98  3.9   |  23.0  |  1.03    Ca    9.2      05 Aug 2023 06:39            Urinalysis Basic - ( 05 Aug 2023 06:39 )    Color: x / Appearance: x / SG: x / pH: x  Gluc: 98 mg/dL / Ketone: x  / Bili: x / Urobili: x   Blood: x / Protein: x / Nitrite: x   Leuk Esterase: x / RBC: x / WBC x   Sq Epi: x / Non Sq Epi: x / Bacteria: x

## 2023-08-06 LAB
ALBUMIN SERPL ELPH-MCNC: 3.6 G/DL — SIGNIFICANT CHANGE UP (ref 3.3–5.2)
ALP SERPL-CCNC: 59 U/L — SIGNIFICANT CHANGE UP (ref 40–120)
ALT FLD-CCNC: 5 U/L — SIGNIFICANT CHANGE UP
ANION GAP SERPL CALC-SCNC: 17 MMOL/L — SIGNIFICANT CHANGE UP (ref 5–17)
AST SERPL-CCNC: 11 U/L — SIGNIFICANT CHANGE UP
BILIRUB SERPL-MCNC: 0.3 MG/DL — LOW (ref 0.4–2)
BUN SERPL-MCNC: 21.4 MG/DL — HIGH (ref 8–20)
CALCIUM SERPL-MCNC: 9.7 MG/DL — SIGNIFICANT CHANGE UP (ref 8.4–10.5)
CHLORIDE SERPL-SCNC: 103 MMOL/L — SIGNIFICANT CHANGE UP (ref 96–108)
CO2 SERPL-SCNC: 20 MMOL/L — LOW (ref 22–29)
CREAT SERPL-MCNC: 0.97 MG/DL — SIGNIFICANT CHANGE UP (ref 0.5–1.3)
EGFR: 54 ML/MIN/1.73M2 — LOW
GLUCOSE SERPL-MCNC: 85 MG/DL — SIGNIFICANT CHANGE UP (ref 70–99)
MAGNESIUM SERPL-MCNC: 2.1 MG/DL — SIGNIFICANT CHANGE UP (ref 1.6–2.6)
POTASSIUM SERPL-MCNC: 4.1 MMOL/L — SIGNIFICANT CHANGE UP (ref 3.5–5.3)
POTASSIUM SERPL-SCNC: 4.1 MMOL/L — SIGNIFICANT CHANGE UP (ref 3.5–5.3)
PROT SERPL-MCNC: 7.9 G/DL — SIGNIFICANT CHANGE UP (ref 6.6–8.7)
SODIUM SERPL-SCNC: 140 MMOL/L — SIGNIFICANT CHANGE UP (ref 135–145)
VALPROATE SERPL-MCNC: 103.5 UG/ML — HIGH (ref 50–100)

## 2023-08-06 PROCEDURE — 99233 SBSQ HOSP IP/OBS HIGH 50: CPT

## 2023-08-06 PROCEDURE — 95720 EEG PHY/QHP EA INCR W/VEEG: CPT

## 2023-08-06 PROCEDURE — 99232 SBSQ HOSP IP/OBS MODERATE 35: CPT

## 2023-08-06 RX ORDER — HYDRALAZINE HCL 50 MG
10 TABLET ORAL
Refills: 0 | Status: DISCONTINUED | OUTPATIENT
Start: 2023-08-06 | End: 2023-08-06

## 2023-08-06 RX ORDER — METOPROLOL TARTRATE 50 MG
25 TABLET ORAL ONCE
Refills: 0 | Status: COMPLETED | OUTPATIENT
Start: 2023-08-06 | End: 2023-08-06

## 2023-08-06 RX ORDER — METOPROLOL TARTRATE 50 MG
25 TABLET ORAL
Refills: 0 | Status: DISCONTINUED | OUTPATIENT
Start: 2023-08-06 | End: 2023-08-09

## 2023-08-06 RX ORDER — DIVALPROEX SODIUM 500 MG/1
500 TABLET, DELAYED RELEASE ORAL
Refills: 0 | Status: DISCONTINUED | OUTPATIENT
Start: 2023-08-06 | End: 2023-08-08

## 2023-08-06 RX ORDER — HYDRALAZINE HCL 50 MG
25 TABLET ORAL THREE TIMES A DAY
Refills: 0 | Status: DISCONTINUED | OUTPATIENT
Start: 2023-08-06 | End: 2023-08-09

## 2023-08-06 RX ADMIN — CHLORHEXIDINE GLUCONATE 1 APPLICATION(S): 213 SOLUTION TOPICAL at 05:31

## 2023-08-06 RX ADMIN — Medication 25 MILLIGRAM(S): at 21:39

## 2023-08-06 RX ADMIN — DORZOLAMIDE HYDROCHLORIDE 1 DROP(S): 20 SOLUTION/ DROPS OPHTHALMIC at 08:26

## 2023-08-06 RX ADMIN — PANTOPRAZOLE SODIUM 40 MILLIGRAM(S): 20 TABLET, DELAYED RELEASE ORAL at 17:40

## 2023-08-06 RX ADMIN — HEPARIN SODIUM 5000 UNIT(S): 5000 INJECTION INTRAVENOUS; SUBCUTANEOUS at 17:40

## 2023-08-06 RX ADMIN — Medication 600 MILLIGRAM(S): at 05:32

## 2023-08-06 RX ADMIN — TAMSULOSIN HYDROCHLORIDE 0.4 MILLIGRAM(S): 0.4 CAPSULE ORAL at 21:39

## 2023-08-06 RX ADMIN — CEFTRIAXONE 1000 MILLIGRAM(S): 500 INJECTION, POWDER, FOR SOLUTION INTRAMUSCULAR; INTRAVENOUS at 21:39

## 2023-08-06 RX ADMIN — PANTOPRAZOLE SODIUM 40 MILLIGRAM(S): 20 TABLET, DELAYED RELEASE ORAL at 05:32

## 2023-08-06 RX ADMIN — Medication 325 MILLIGRAM(S): at 17:40

## 2023-08-06 RX ADMIN — HEPARIN SODIUM 5000 UNIT(S): 5000 INJECTION INTRAVENOUS; SUBCUTANEOUS at 05:31

## 2023-08-06 RX ADMIN — DONEPEZIL HYDROCHLORIDE 10 MILLIGRAM(S): 10 TABLET, FILM COATED ORAL at 21:39

## 2023-08-06 RX ADMIN — Medication 25 MILLIGRAM(S): at 05:32

## 2023-08-06 RX ADMIN — Medication 55 MILLIGRAM(S): at 05:31

## 2023-08-06 RX ADMIN — LEVETIRACETAM 1000 MILLIGRAM(S): 250 TABLET, FILM COATED ORAL at 05:32

## 2023-08-06 RX ADMIN — Medication 150 MICROGRAM(S): at 05:32

## 2023-08-06 RX ADMIN — Medication 25 MILLIGRAM(S): at 17:39

## 2023-08-06 RX ADMIN — LEVETIRACETAM 1000 MILLIGRAM(S): 250 TABLET, FILM COATED ORAL at 17:40

## 2023-08-06 RX ADMIN — DORZOLAMIDE HYDROCHLORIDE 1 DROP(S): 20 SOLUTION/ DROPS OPHTHALMIC at 21:39

## 2023-08-06 RX ADMIN — Medication 600 MILLIGRAM(S): at 17:39

## 2023-08-06 RX ADMIN — Medication 25 MILLIGRAM(S): at 18:27

## 2023-08-06 RX ADMIN — AMLODIPINE BESYLATE 10 MILLIGRAM(S): 2.5 TABLET ORAL at 05:32

## 2023-08-06 NOTE — EEG REPORT - NS EEG TEXT BOX
RAGHAVENDRA WALLACE ANGELA-[id number]     Study Date: 08/05/23 08:00 - 08/06/23 08:00  Duration: 24 hr  --------------------------------------------------------------------------------------------------  History:  CC/ HPI Patient is a 89y old  Female who presents with a chief complaint of Breakthrough seizure / uti (03 Aug 2023 19:17)    MEDICATIONS  (STANDING):  amLODIPine   Tablet 10 milliGRAM(s) Oral daily  cefTRIAXone Injectable. 1000 milliGRAM(s) IV Push every 24 hours  chlorhexidine 2% Cloths 1 Application(s) Topical <User Schedule>  donepezil 10 milliGRAM(s) Oral at bedtime  dorzolamide 2% Ophthalmic Solution 1 Drop(s) Both EYES <User Schedule>  ferrous    sulfate 325 milliGRAM(s) Oral daily  gemfibrozil 600 milliGRAM(s) Oral two times a day  heparin   Injectable 5000 Unit(s) SubCutaneous every 12 hours  levETIRAcetam 1000 milliGRAM(s) Oral two times a day  levothyroxine 150 MICROGram(s) Oral daily  metoprolol tartrate 25 milliGRAM(s) Oral two times a day  pantoprazole    Tablet 40 milliGRAM(s) Oral every 12 hours  tamsulosin 0.4 milliGRAM(s) Oral at bedtime  valproate sodium  IVPB 500 milliGRAM(s) IV Intermittent three times a day    --------------------------------------------------------------------------------------------------  Study Interpretation:    [[[Abbreviation Key:  PDR=alpha rhythm/posterior dominant rhythm. A-P=anterior posterior.  Amplitude: ‘very low’:<20; ‘low’:20-49; ‘medium’:; ‘high’:>150uV.  Persistence for periodic/rhythmic patterns (% of epoch) ‘rare’:<1%; ‘occasional’:1-10%; ‘frequent’:10-50%; ‘abundant’:50-90%; ‘continuous’:>90%.  Persistence for sporadic discharges: ‘rare’:<1/hr; ‘occasional’:1/min-1/hr; ‘frequent’:>1/min; ‘abundant’:>1/10 sec.  RPP=rhythmic and periodic patterns; GRDA=generalized rhythmic delta activity; FIRDA=frontal intermittent GRDA; LRDA=lateralized rhythmic delta activity; TIRDA=temporal intermittent rhythmic delta activity;  LPD=PLED=lateralized periodic discharges; GPD=generalized periodic discharges; BIPDs =bilateral independent periodic discharges; Mf=multifocal; SIRPDs=stimulus induced rhythmic, periodic, or ictal appearing discharges; BIRDs=brief potentially ictal rhythmic discharges >4 Hz, lasting .5-10s; PFA (paroxysmal bursts >13 Hz or =8 Hz <10s).  Modifiers: +F=with fast component; +S=with spike component; +R=with rhythmic component.  S-B=burst suppression pattern.  Max=maximal. N1-drowsy; N2-stage II sleep; N3-slow wave sleep. SSS/BETS=small sharp spikes/benign epileptiform transients of sleep. HV=hyperventilation; PS=photic stimulation]]]    Daily EEG Visual Analysis    FINDINGS:    Background:  Continuity: Continuous  Symmetry: Asymmetric  Posterior dominant rhythm (PDR): 6.5 Hz on the left, intermittent 6.5 Hz on the right, reactive to eye closure. Symmetric low-amplitude frontal beta in wakefulness.  State Change: Present  Voltage: Normal  Anterior Posterior Gradient: Present  Other background findings: None  Breach: Absent    Background Slowing:  Generalized slowing: As diffuse theta/delta  Focal slowing: Continuous right hemispheric focal polymorphic delta and theta slowing    State Changes:   Drowsiness is characterized by fragmentation, attenuation, and slowing of the background activity.  Stage 2 sleep is characterized by symmetric K complexes and sleep spindles.     Interictal Findings:  Occasional right (F8/T8) frontotemporal spike/sharp-wave discharges in drowsiness and stage 2 sleep.    Electrographic and Electroclinical seizures:  None    Other Clinical Events:  None    Activation Procedures:   Hyperventilation is not performed.     Photic stimulation is not performed.     Artifacts:  Intermittent myogenic and movement artifacts are present.    EKG:  Single-lead EKG shows regular rhythm with frequent premature narrow complexes.    EEG Classification / Summary:  Abnormal EEG in the awake, drowsy, and asleep states.   -Infrequent right temporal spike/sharp-wave discharges in drowsiness and stage 2 sleep  -Continuous right hemispheric focal slowing.  -Moderate diffuse slowing    Clinical Impression:  -Risk of focal-onset seizures from the right frontotemporal regions  -Right hemispheric focal cerebral dysfunction can be structural and/or functional in etiology.  -Moderate diffuse cerebral dysfunction is nonspecific in etiology.   -The last seizure is captured on 8/4/23 11:08      MD LISA CarverES  Director, Continuous EEG Monitoring Program, [facility]   and Epilepsy Fellowship ,   Department of Neurology, [facility]    [facility] EEG Reading Room Ph#: [phone]  Epilepsy Answering Service after 5PM and before 8:30AM: Ph#: [phone]

## 2023-08-06 NOTE — PROGRESS NOTE ADULT - SUBJECTIVE AND OBJECTIVE BOX
[facility] Division of Medicine    Chief Complaint:      SUBJECTIVE / OVERNIGHT EVENTS: Pt seen at the bedside. States they are feeling improved from day prior.  Patient denies chest pain, SOB, abd pain, N/V, fever, chills, dysuria or any other complaints. All remainder ROS negative.     MEDICATIONS  (STANDING):  amLODIPine   Tablet 10 milliGRAM(s) Oral daily  cefTRIAXone Injectable. 1000 milliGRAM(s) IV Push every 24 hours  chlorhexidine 2% Cloths 1 Application(s) Topical <User Schedule>  donepezil 10 milliGRAM(s) Oral at bedtime  dorzolamide 2% Ophthalmic Solution 1 Drop(s) Both EYES <User Schedule>  ferrous    sulfate 325 milliGRAM(s) Oral daily  gemfibrozil 600 milliGRAM(s) Oral two times a day  heparin   Injectable 5000 Unit(s) SubCutaneous every 12 hours  levETIRAcetam 1000 milliGRAM(s) Oral two times a day  levothyroxine 150 MICROGram(s) Oral daily  metoprolol tartrate 25 milliGRAM(s) Oral two times a day  pantoprazole    Tablet 40 milliGRAM(s) Oral every 12 hours  tamsulosin 0.4 milliGRAM(s) Oral at bedtime  valproate sodium  IVPB 500 milliGRAM(s) IV Intermittent three times a day    MEDICATIONS  (PRN):  LORazepam   Injectable 1 milliGRAM(s) IV Push once PRN for any seizure activity      I&O's Summary    05 Aug 2023 07:01  -  06 Aug 2023 07:00  --------------------------------------------------------  IN: 660 mL / OUT: 1080 mL / NET: -420 mL    06 Aug 2023 07:01  -  06 Aug 2023 15:43  --------------------------------------------------------  IN: 240 mL / OUT: 0 mL / NET: 240 mL        PHYSICAL EXAM:  Vital Signs Last 24 Hrs  T(C): 36.3 (06 Aug 2023 08:38), Max: 36.6 (05 Aug 2023 21:00)  T(F): 97.3 (06 Aug 2023 08:38), Max: 97.8 (05 Aug 2023 21:00)  HR: 60 (06 Aug 2023 08:38) (60 - 64)  BP: 178/90 (06 Aug 2023 08:38) (145/87 - 178/90)  BP(mean): --  RR: 18 (06 Aug 2023 05:00) (18 - 18)  SpO2: 96% (06 Aug 2023 08:38) (95% - 96%)    Parameters below as of 06 Aug 2023 08:38  Patient On (Oxygen Delivery Method): room air        CONSTITUTIONAL: NAD  HEENT: PERRL, EOMI, moist oral mucosa, no pharyngeal injection or exudates, no cervica LAD  RESPIRATORY: Normal respiratory effort; lungs are clear to auscultation bilaterally  CARDIOVASCULAR: Regular rate and rhythm, normal S1 and S2. No lower extremity edema; Peripheral pulses are 2+ bilaterally  ABDOMEN: Nontender to palpation, normoactive bowel sounds, no rebound/guarding;  MUSCLOSKELETAL:  no joint swelling or tenderness to palpation, ROM intact in bilateral UE and LE  PSYCH: A+O to person, place, and time; affect appropriate  NEUROLOGY: CN 2-12 are intact and symmetric; no gross sensory deficits;   SKIN: No rashes    LABS:    08-06    140  |  103  |  21.4<H>  ----------------------------<  85  4.1   |  20.0<L>  |  0.97    Ca    9.7      06 Aug 2023 07:18  Mg     2.1     08-06    TPro  7.9  /  Alb  3.6  /  TBili  0.3<L>  /  DBili  x   /  AST  11  /  ALT  5   /  AlkPhos  59  08-06          Urinalysis Basic - ( 06 Aug 2023 07:18 )    Color: x / Appearance: x / SG: x / pH: x  Gluc: 85 mg/dL / Ketone: x  / Bili: x / Urobili: x   Blood: x / Protein: x / Nitrite: x   Leuk Esterase: x / RBC: x / WBC x   Sq Epi: x / Non Sq Epi: x / Bacteria: x        CAPILLARY BLOOD GLUCOSE          IMAGING:                                   [facility] Division of Medicine    Chief Complaint:  seizure    SUBJECTIVE / OVERNIGHT EVENTS: Pt seen at the bedside. not very talkative, denies pain or trouble breathing. But does state she is hungry. nurse reports pt eating well and gets assistance with meals.    MEDICATIONS  (STANDING):  amLODIPine   Tablet 10 milliGRAM(s) Oral daily  cefTRIAXone Injectable. 1000 milliGRAM(s) IV Push every 24 hours  chlorhexidine 2% Cloths 1 Application(s) Topical <User Schedule>  donepezil 10 milliGRAM(s) Oral at bedtime  dorzolamide 2% Ophthalmic Solution 1 Drop(s) Both EYES <User Schedule>  ferrous    sulfate 325 milliGRAM(s) Oral daily  gemfibrozil 600 milliGRAM(s) Oral two times a day  heparin   Injectable 5000 Unit(s) SubCutaneous every 12 hours  levETIRAcetam 1000 milliGRAM(s) Oral two times a day  levothyroxine 150 MICROGram(s) Oral daily  metoprolol tartrate 25 milliGRAM(s) Oral two times a day  pantoprazole    Tablet 40 milliGRAM(s) Oral every 12 hours  tamsulosin 0.4 milliGRAM(s) Oral at bedtime  valproate sodium  IVPB 500 milliGRAM(s) IV Intermittent three times a day    MEDICATIONS  (PRN):  LORazepam   Injectable 1 milliGRAM(s) IV Push once PRN for any seizure activity      I&O's Summary    05 Aug 2023 07:01  -  06 Aug 2023 07:00  --------------------------------------------------------  IN: 660 mL / OUT: 1080 mL / NET: -420 mL    06 Aug 2023 07:01  -  06 Aug 2023 15:43  --------------------------------------------------------  IN: 240 mL / OUT: 0 mL / NET: 240 mL        PHYSICAL EXAM:  Vital Signs Last 24 Hrs  T(C): 36.3 (06 Aug 2023 08:38), Max: 36.6 (05 Aug 2023 21:00)  T(F): 97.3 (06 Aug 2023 08:38), Max: 97.8 (05 Aug 2023 21:00)  HR: 60 (06 Aug 2023 08:38) (60 - 64)  BP: 178/90 (06 Aug 2023 08:38) (145/87 - 178/90)  BP(mean): --  RR: 18 (06 Aug 2023 05:00) (18 - 18)  SpO2: 96% (06 Aug 2023 08:38) (95% - 96%)    Parameters below as of 06 Aug 2023 08:38  Patient On (Oxygen Delivery Method): room air        CONSTITUTIONAL: NAD  HEENT: PERRL, EOMI, moist oral mucosa  RESPIRATORY: Normal respiratory effort; lungs are clear to auscultation bilaterally  CARDIOVASCULAR: Regular rate and rhythm, normal S1 and S2. No lower extremity edema; Peripheral pulses are 2+ bilaterally  ABDOMEN: Nontender to palpation, normoactive bowel sounds, no rebound/guarding;  MUSCLOSKELETAL:  no joint swelling or tenderness to palpation  PSYCH: A&O to person, sometimes place  SKIN: No rashes    LABS:    08-06    140  |  103  |  21.4<H>  ----------------------------<  85  4.1   |  20.0<L>  |  0.97    Ca    9.7      06 Aug 2023 07:18  Mg     2.1     08-06    TPro  7.9  /  Alb  3.6  /  TBili  0.3<L>  /  DBili  x   /  AST  11  /  ALT  5   /  AlkPhos  59  08-06          Urinalysis Basic - ( 06 Aug 2023 07:18 )    Color: x / Appearance: x / SG: x / pH: x  Gluc: 85 mg/dL / Ketone: x  / Bili: x / Urobili: x   Blood: x / Protein: x / Nitrite: x   Leuk Esterase: x / RBC: x / WBC x   Sq Epi: x / Non Sq Epi: x / Bacteria: x        CAPILLARY BLOOD GLUCOSE          IMAGING:

## 2023-08-06 NOTE — PROGRESS NOTE ADULT - SUBJECTIVE AND OBJECTIVE BOX
Neurology     RAGHAVENDRA WALLACE 89y Female     HPI:  pt. is a 89yoF hx dementia, Afib not on AC due to GI bleeding, CKD, HTN, prior hx of nontraumatic SAH, seizure , sent from [facility] for seizure. Recent seizure diagnosis and admission 1 week ago. Started on depakote. Pt got versed IM by EMS with resolution of apparent seizure activity. pt. opens eyes to verbal command but does not follow instructions, does not give any history. baseline ? saturating mid 90s on 2 L nasal cannula. In the Er pt's urine suggestive of uti. ct head no acute findings. no seizure activity in the ER.  Pt is DNR/DNI. (01 Aug 2023 20:47)      PMH: HTN (hypertension)    Diabetes mellitus    Hypothyroid    Dementia    GI bleed    Neuropathy    GERD (gastroesophageal reflux disease)    Abdominal aortic aneurysm    Stage 3 chronic kidney disease    Aortic stenosis       PSH: S/P hysterectomy    S/P hip replacement    Pacemaker      FAMILY HISTORY:  FHx: diabetes mellitus    SOCIAL HISTORY:  No history of tobacco or alcohol use     Allergies    No Known Allergies    Intolerances        Vital Signs Last 24 Hrs  T(C): 36.3 (06 Aug 2023 21:45), Max: 36.5 (06 Aug 2023 16:30)  T(F): 97.3 (06 Aug 2023 21:45), Max: 97.7 (06 Aug 2023 16:30)  HR: 64 (06 Aug 2023 21:45) (60 - 64)  BP: 199/99 (06 Aug 2023 21:45) (152/84 - 199/99)  BP(mean): 133 (06 Aug 2023 21:45) (133 - 133)  RR: 18 (06 Aug 2023 21:45) (17 - 18)  SpO2: 98% (06 Aug 2023 21:45) (96% - 98%)    Parameters below as of 06 Aug 2023 21:45  Patient On (Oxygen Delivery Method): room air        MEDICATIONS    amLODIPine   Tablet 10 milliGRAM(s) Oral daily  cefTRIAXone Injectable. 1000 milliGRAM(s) IV Push every 24 hours  chlorhexidine 2% Cloths 1 Application(s) Topical <User Schedule>  donepezil 10 milliGRAM(s) Oral at bedtime  dorzolamide 2% Ophthalmic Solution 1 Drop(s) Both EYES <User Schedule>  ferrous    sulfate 325 milliGRAM(s) Oral daily  gemfibrozil 600 milliGRAM(s) Oral two times a day  heparin   Injectable 5000 Unit(s) SubCutaneous every 12 hours  hydrALAZINE 25 milliGRAM(s) Oral three times a day  levETIRAcetam 1000 milliGRAM(s) Oral two times a day  levothyroxine 150 MICROGram(s) Oral daily  LORazepam   Injectable 1 milliGRAM(s) IV Push once PRN  metoprolol tartrate 25 milliGRAM(s) Oral two times a day  pantoprazole    Tablet 40 milliGRAM(s) Oral every 12 hours  tamsulosin 0.4 milliGRAM(s) Oral at bedtime         LABS:    Urinalysis Basic - ( 06 Aug 2023 07:18 )    Color: x / Appearance: x / SG: x / pH: x  Gluc: 85 mg/dL / Ketone: x  / Bili: x / Urobili: x   Blood: x / Protein: x / Nitrite: x   Leuk Esterase: x / RBC: x / WBC x   Sq Epi: x / Non Sq Epi: x / Bacteria: x      08-06    140  |  103  |  21.4<H>  ----------------------------<  85  4.1   |  20.0<L>  |  0.97    Ca    9.7      06 Aug 2023 07:18  Mg     2.1     08-06    TPro  7.9  /  Alb  3.6  /  TBili  0.3<L>  /  DBili  x   /  AST  11  /  ALT  5   /  AlkPhos  59  08-06    LIVER FUNCTIONS - ( 06 Aug 2023 07:18 )  Alb: 3.6 g/dL / Pro: 7.9 g/dL / ALK PHOS: 59 U/L / ALT: 5 U/L / AST: 11 U/L / GGT: x           Hemoglobin A1C:                       xValproic Acid Level, Serum: 29.7 ug/mL.    Valproic Acid Level, Serum: 71.2 ug/mL (08.04.23 @ 06:11)  Valproic Acid Level, Serum: 95.7 ug/mL (08.05.23 @ 06:39)   Valproic Acid Level, Serum: 103.5 ug/mL (08.06.23 @ 07:18)      On Neurological Examination:    Mental Status - Patient is  very drowsy but arousable and oriented X1 today. Patient can  follow simple commands with some persuasion.  She perseverates that my name is  Artur.     Cranial Nerves - PERRL, EOMI,  Visual fields BTT bilaterally. No gross facial asymmetry.    Motor Exam -   Both UE moves spontaneously and is antigravity.  Both LE withdraws.    Sensory    Intact to pinprick bilaterally    Coord: FTN not performed.     Gait -  Not assessed.   RADIOLOGY ( All neurological imaging studies were independently reviewed and interpreted by me)  CTH     CT Head No Cont (08.01.23 @ 10:40) >    IMPRESSION:  Limited exam due to motion    No acute intracranial hemorrhage or acute territorial infarct.  If   symptoms persist, follow-up MRI exam recommended.    BE DEVINE MD; Attending Radiologist    MRI:      TTE      EEG preliminary read (not final) on the initial recording hour(s) = <8 hr  Reviewed from: 08:00-15:52    2 right frontotemporal focal-onset seizures with spread to the right hemisphere.  No clinical correlate seen on video. Patient appears to be talking to a visitor during the first seizure.  Times: 12:23, 15:27    Occasional right and rare left frontotemporal epileptiform discharges indicate risk of focal-onset seizures from these regions.    Final report to follow tomorrow morning after completion of study.    [facility] EEG Reading Room Ph#: [phone]  Epilepsy Answering Service after 5PM and before 8:30AM: Ph#: [phone].      EEG  08-.  EEG Classification / Summary:  Abnormal EEG in the awake, drowsy, and asleep states.   -5 right frontotemporal focal-onset electrographic seizures with spread throughout the right hemisphere  -Occasional left and right frontotemporal spike/sharp-wave discharges in drowsiness and stage 2 sleep  -Continuous right hemispheric focal slowing.  -Mild diffuse slowing    Clinical Impression:  -5 right frontotemporal focal seizures with no clinical correlate seen on video. During some seizures, the patient is seen on video speaking to people who are on her left and on her right and/or making purposeful movements with both hands.  -The last seizure is captured on 8/4/23 02:10  -Risk of focal-onset seizures from the left and right frontotemporal regions  -Right hemispheric focal cerebral dysfunction can be structural and/or functional (such as post-ictal) in etiology.  -Mild diffuse cerebral dysfunction is nonspecific in etiology.     Neurology was informed of seizures.    -Kelly Green MD  Attending Physician, [facility]    EEG 08/05/2023.     Classification / Summary:  Abnormal EEG in the awake, drowsy, and asleep states.   -1 Right frontotemporal focal-onset electrographic seizures with spread throughout the right hemisphere  -Occasional right temporal spike/sharp-wave discharges in drowsiness and stage 2 sleep  -Continuous right hemispheric focal slowing.  -Mild diffuse slowing    Clinical Impression:  -1 Right temporal focal seizure with no clinical correlate seen on video.   -The last seizure is captured on 8/4/23 11:08  -Risk of focal-onset seizures from the right frontotemporal regions  -Right hemispheric focal cerebral dysfunction can be structural and/or functional in etiology.  -Mild diffuse cerebral dysfunction is nonspecific in etiology.       MD FALLON Carver  Director, Continuous EEG Monitoring Program, [facility]   and Epilepsy Fellowship ,   Department of Neurology, [facility]    EEG 08/06/2023  EEG Classification / Summary:  Abnormal EEG in the awake, drowsy, and asleep states.   -Infrequent right temporal spike/sharp-wave discharges in drowsiness and stage 2 sleep  -Continuous right hemispheric focal slowing.  -Moderate diffuse slowing    Clinical Impression:  -Risk of focal-onset seizures from the right frontotemporal regions  -Right hemispheric focal cerebral dysfunction can be structural and/or functional in etiology.  -Moderate diffuse cerebral dysfunction is nonspecific in etiology.   -The last seizure is captured on 8/4/23 11:08      MD FALLON Carver

## 2023-08-07 LAB
ALBUMIN SERPL ELPH-MCNC: 3.8 G/DL — SIGNIFICANT CHANGE UP (ref 3.3–5.2)
ALP SERPL-CCNC: 60 U/L — SIGNIFICANT CHANGE UP (ref 40–120)
ALT FLD-CCNC: <5 U/L — SIGNIFICANT CHANGE UP
ANION GAP SERPL CALC-SCNC: 13 MMOL/L — SIGNIFICANT CHANGE UP (ref 5–17)
AST SERPL-CCNC: 9 U/L — SIGNIFICANT CHANGE UP
BILIRUB DIRECT SERPL-MCNC: 0.1 MG/DL — SIGNIFICANT CHANGE UP (ref 0–0.3)
BILIRUB INDIRECT FLD-MCNC: 0.1 MG/DL — LOW (ref 0.2–1)
BILIRUB SERPL-MCNC: 0.2 MG/DL — LOW (ref 0.4–2)
BUN SERPL-MCNC: 22.8 MG/DL — HIGH (ref 8–20)
CALCIUM SERPL-MCNC: 10 MG/DL — SIGNIFICANT CHANGE UP (ref 8.4–10.5)
CHLORIDE SERPL-SCNC: 101 MMOL/L — SIGNIFICANT CHANGE UP (ref 96–108)
CO2 SERPL-SCNC: 25 MMOL/L — SIGNIFICANT CHANGE UP (ref 22–29)
CREAT SERPL-MCNC: 1.17 MG/DL — SIGNIFICANT CHANGE UP (ref 0.5–1.3)
EGFR: 44 ML/MIN/1.73M2 — LOW
GLUCOSE SERPL-MCNC: 95 MG/DL — SIGNIFICANT CHANGE UP (ref 70–99)
HCT VFR BLD CALC: 39.8 % — SIGNIFICANT CHANGE UP (ref 34.5–45)
HGB BLD-MCNC: 12.5 G/DL — SIGNIFICANT CHANGE UP (ref 11.5–15.5)
MCHC RBC-ENTMCNC: 30 PG — SIGNIFICANT CHANGE UP (ref 27–34)
MCHC RBC-ENTMCNC: 31.4 GM/DL — LOW (ref 32–36)
MCV RBC AUTO: 95.4 FL — SIGNIFICANT CHANGE UP (ref 80–100)
PLATELET # BLD AUTO: 160 K/UL — SIGNIFICANT CHANGE UP (ref 150–400)
POTASSIUM SERPL-MCNC: 4.2 MMOL/L — SIGNIFICANT CHANGE UP (ref 3.5–5.3)
POTASSIUM SERPL-SCNC: 4.2 MMOL/L — SIGNIFICANT CHANGE UP (ref 3.5–5.3)
PROT SERPL-MCNC: 8.2 G/DL — SIGNIFICANT CHANGE UP (ref 6.6–8.7)
RBC # BLD: 4.17 M/UL — SIGNIFICANT CHANGE UP (ref 3.8–5.2)
RBC # FLD: 14.2 % — SIGNIFICANT CHANGE UP (ref 10.3–14.5)
SODIUM SERPL-SCNC: 139 MMOL/L — SIGNIFICANT CHANGE UP (ref 135–145)
VALPROATE SERPL-MCNC: 47.3 UG/ML — LOW (ref 50–100)
WBC # BLD: 5.04 K/UL — SIGNIFICANT CHANGE UP (ref 3.8–10.5)
WBC # FLD AUTO: 5.04 K/UL — SIGNIFICANT CHANGE UP (ref 3.8–10.5)

## 2023-08-07 PROCEDURE — 99232 SBSQ HOSP IP/OBS MODERATE 35: CPT

## 2023-08-07 PROCEDURE — 95718 EEG PHYS/QHP 2-12 HR W/VEEG: CPT

## 2023-08-07 RX ADMIN — DONEPEZIL HYDROCHLORIDE 10 MILLIGRAM(S): 10 TABLET, FILM COATED ORAL at 21:26

## 2023-08-07 RX ADMIN — Medication 600 MILLIGRAM(S): at 17:43

## 2023-08-07 RX ADMIN — HEPARIN SODIUM 5000 UNIT(S): 5000 INJECTION INTRAVENOUS; SUBCUTANEOUS at 07:44

## 2023-08-07 RX ADMIN — Medication 600 MILLIGRAM(S): at 07:33

## 2023-08-07 RX ADMIN — LEVETIRACETAM 1000 MILLIGRAM(S): 250 TABLET, FILM COATED ORAL at 17:43

## 2023-08-07 RX ADMIN — Medication 325 MILLIGRAM(S): at 13:51

## 2023-08-07 RX ADMIN — PANTOPRAZOLE SODIUM 40 MILLIGRAM(S): 20 TABLET, DELAYED RELEASE ORAL at 06:38

## 2023-08-07 RX ADMIN — Medication 150 MICROGRAM(S): at 06:38

## 2023-08-07 RX ADMIN — DORZOLAMIDE HYDROCHLORIDE 1 DROP(S): 20 SOLUTION/ DROPS OPHTHALMIC at 07:38

## 2023-08-07 RX ADMIN — CHLORHEXIDINE GLUCONATE 1 APPLICATION(S): 213 SOLUTION TOPICAL at 07:07

## 2023-08-07 RX ADMIN — Medication 25 MILLIGRAM(S): at 13:51

## 2023-08-07 RX ADMIN — TAMSULOSIN HYDROCHLORIDE 0.4 MILLIGRAM(S): 0.4 CAPSULE ORAL at 21:50

## 2023-08-07 RX ADMIN — DIVALPROEX SODIUM 500 MILLIGRAM(S): 500 TABLET, DELAYED RELEASE ORAL at 17:43

## 2023-08-07 RX ADMIN — DORZOLAMIDE HYDROCHLORIDE 1 DROP(S): 20 SOLUTION/ DROPS OPHTHALMIC at 21:25

## 2023-08-07 RX ADMIN — Medication 25 MILLIGRAM(S): at 06:37

## 2023-08-07 RX ADMIN — DIVALPROEX SODIUM 500 MILLIGRAM(S): 500 TABLET, DELAYED RELEASE ORAL at 07:32

## 2023-08-07 RX ADMIN — PANTOPRAZOLE SODIUM 40 MILLIGRAM(S): 20 TABLET, DELAYED RELEASE ORAL at 17:43

## 2023-08-07 RX ADMIN — CEFTRIAXONE 1000 MILLIGRAM(S): 500 INJECTION, POWDER, FOR SOLUTION INTRAMUSCULAR; INTRAVENOUS at 21:25

## 2023-08-07 RX ADMIN — LEVETIRACETAM 1000 MILLIGRAM(S): 250 TABLET, FILM COATED ORAL at 06:36

## 2023-08-07 RX ADMIN — AMLODIPINE BESYLATE 10 MILLIGRAM(S): 2.5 TABLET ORAL at 06:36

## 2023-08-07 RX ADMIN — Medication 25 MILLIGRAM(S): at 17:43

## 2023-08-07 RX ADMIN — Medication 25 MILLIGRAM(S): at 21:51

## 2023-08-07 RX ADMIN — HEPARIN SODIUM 5000 UNIT(S): 5000 INJECTION INTRAVENOUS; SUBCUTANEOUS at 17:43

## 2023-08-07 NOTE — PHYSICAL THERAPY INITIAL EVALUATION ADULT - GENERAL OBSERVATIONS, REHAB EVAL
Pt received in bed, + IV Loc, +Tele, + purewick + EEG monitoring, breathing on RA in NAD, minimally verbal, agreeable to PT eval

## 2023-08-07 NOTE — PHYSICAL THERAPY INITIAL EVALUATION ADULT - ADDITIONAL COMMENTS
pt unable to provide meaningful history. Per EMR, pt LTC at [facility], is a 2 person transfer to chair, non-ambulatory, dependent with ADLs.

## 2023-08-07 NOTE — OCCUPATIONAL THERAPY INITIAL EVALUATION ADULT - ADDITIONAL COMMENTS
Unable to obtain social history secondary to pt's cognitive status- as per chart, pt lives in a nursing home.

## 2023-08-07 NOTE — EEG REPORT - NS EEG TEXT BOX
RAGHAVENDRA WALLACE ANGELA-[id number]     Study Date: 08/06/23 08:00 - 08/07/23 08:00  Duration: 23 hr 58 min  --------------------------------------------------------------------------------------------------  History:  CC/ HPI Patient is a 89y old  Female who presents with a chief complaint of Breakthrough seizure / uti (06 Aug 2023 22:35)    MEDICATIONS  (STANDING):  amLODIPine   Tablet 10 milliGRAM(s) Oral daily  cefTRIAXone Injectable. 1000 milliGRAM(s) IV Push every 24 hours  chlorhexidine 2% Cloths 1 Application(s) Topical <User Schedule>  divalproex  milliGRAM(s) Oral two times a day  donepezil 10 milliGRAM(s) Oral at bedtime  dorzolamide 2% Ophthalmic Solution 1 Drop(s) Both EYES <User Schedule>  ferrous    sulfate 325 milliGRAM(s) Oral daily  gemfibrozil 600 milliGRAM(s) Oral two times a day  heparin   Injectable 5000 Unit(s) SubCutaneous every 12 hours  hydrALAZINE 25 milliGRAM(s) Oral three times a day  levETIRAcetam 1000 milliGRAM(s) Oral two times a day  levothyroxine 150 MICROGram(s) Oral daily  metoprolol tartrate 25 milliGRAM(s) Oral two times a day  pantoprazole    Tablet 40 milliGRAM(s) Oral every 12 hours  tamsulosin 0.4 milliGRAM(s) Oral at bedtime    --------------------------------------------------------------------------------------------------  Study Interpretation:    [[[Abbreviation Key:  PDR=alpha rhythm/posterior dominant rhythm. A-P=anterior posterior.  Amplitude: ‘very low’:<20; ‘low’:20-49; ‘medium’:; ‘high’:>150uV.  Persistence for periodic/rhythmic patterns (% of epoch) ‘rare’:<1%; ‘occasional’:1-10%; ‘frequent’:10-50%; ‘abundant’:50-90%; ‘continuous’:>90%.  Persistence for sporadic discharges: ‘rare’:<1/hr; ‘occasional’:1/min-1/hr; ‘frequent’:>1/min; ‘abundant’:>1/10 sec.  RPP=rhythmic and periodic patterns; GRDA=generalized rhythmic delta activity; FIRDA=frontal intermittent GRDA; LRDA=lateralized rhythmic delta activity; TIRDA=temporal intermittent rhythmic delta activity;  LPD=PLED=lateralized periodic discharges; GPD=generalized periodic discharges; BIPDs =bilateral independent periodic discharges; Mf=multifocal; SIRPDs=stimulus induced rhythmic, periodic, or ictal appearing discharges; BIRDs=brief potentially ictal rhythmic discharges >4 Hz, lasting .5-10s; PFA (paroxysmal bursts >13 Hz or =8 Hz <10s).  Modifiers: +F=with fast component; +S=with spike component; +R=with rhythmic component.  S-B=burst suppression pattern.  Max=maximal. N1-drowsy; N2-stage II sleep; N3-slow wave sleep. SSS/BETS=small sharp spikes/benign epileptiform transients of sleep. HV=hyperventilation; PS=photic stimulation]]]    Daily EEG Visual Analysis    FINDINGS:      Background:  Continuity: Continuous  Symmetry: Symmetric  Posterior dominant rhythm (PDR): 6-6.5 Hz, reactive to eye closure. Symmetric low-amplitude frontal beta in wakefulness.  State Change: Present  Voltage: Normal  Anterior Posterior Gradient: Present  Other background findings: Intermittent diffuse polymorphic delta and theta slowing in wakefulness  Breach: Absent    Background Slowing:  Generalized slowing: As above  Focal slowing: No clear focal slowing    State Changes:   Drowsiness is characterized by fragmentation, attenuation, and slowing of the background activity.  Rudimentary stage 2 sleep is characterized by spindle-like waveforms and bursts and runs of frontally predominant beta activity.    Interictal Findings:  None    Electrographic and Electroclinical seizures:  None    Other Clinical Events:  None    Activation Procedures:   Hyperventilation is not performed.    Photic stimulation is not performed.    Artifacts:  Intermittent myogenic and movement artifacts are present. Multiple loose and disconnected electrodes limit interpretation, especially in the right frontal region.    EKG:  Single-lead EKG shows regular rhythm.    EEG Classification / Summary:  Abnormal EEG in the awake, drowsy, and asleep states.   Mild-moderate diffuse slowing.  Multiple loose and disconnected electrodes limit interpretation, especially in the right frontal region.    Clinical Impression:  Mild-moderate diffuse cerebral dysfunction is nonspecific in etiology.  Artifacts limit interpretation, especially in the right frontal region.          -------------------------------------------------------------------------------------------------------  [facility] EEG Reading Room Ph#: [phone]  Epilepsy Answering Service after 5PM and before 8:30AM: Ph#: [phone]    Kelly Green MD  Attending Physician, [facility]

## 2023-08-07 NOTE — PROGRESS NOTE ADULT - SUBJECTIVE AND OBJECTIVE BOX
[facility] Division of Medicine    Chief Complaint:  seizure    SUBJECTIVE / OVERNIGHT EVENTS: Pt seen at the bedside. not very talkative but when does talk is very soft spoken and speaks in few words, denies pain or trouble breathing. But does state she is hungry. nurse reports pt eating well and gets assistance with meals.    MEDICATIONS  (STANDING):  amLODIPine   Tablet 10 milliGRAM(s) Oral daily  cefTRIAXone Injectable. 1000 milliGRAM(s) IV Push every 24 hours  chlorhexidine 2% Cloths 1 Application(s) Topical <User Schedule>  divalproex  milliGRAM(s) Oral two times a day  donepezil 10 milliGRAM(s) Oral at bedtime  dorzolamide 2% Ophthalmic Solution 1 Drop(s) Both EYES <User Schedule>  ferrous    sulfate 325 milliGRAM(s) Oral daily  gemfibrozil 600 milliGRAM(s) Oral two times a day  heparin   Injectable 5000 Unit(s) SubCutaneous every 12 hours  hydrALAZINE 25 milliGRAM(s) Oral three times a day  levETIRAcetam 1000 milliGRAM(s) Oral two times a day  levothyroxine 150 MICROGram(s) Oral daily  metoprolol tartrate 25 milliGRAM(s) Oral two times a day  pantoprazole    Tablet 40 milliGRAM(s) Oral every 12 hours  tamsulosin 0.4 milliGRAM(s) Oral at bedtime    MEDICATIONS  (PRN):  LORazepam   Injectable 1 milliGRAM(s) IV Push once PRN for any seizure activity      I&O's Summary    06 Aug 2023 07:01  -  07 Aug 2023 07:00  --------------------------------------------------------  IN: 360 mL / OUT: 900 mL / NET: -540 mL        PHYSICAL EXAM:  Vital Signs Last 24 Hrs  T(C): 36.6 (07 Aug 2023 07:39), Max: 36.6 (07 Aug 2023 07:39)  T(F): 97.8 (07 Aug 2023 07:39), Max: 97.8 (07 Aug 2023 07:39)  HR: 60 (07 Aug 2023 13:52) (60 - 96)  BP: 156/84 (07 Aug 2023 13:52) (149/82 - 199/99)  BP(mean): 133 (06 Aug 2023 21:45) (133 - 133)  RR: 18 (07 Aug 2023 13:52) (16 - 19)  SpO2: 98% (07 Aug 2023 13:52) (91% - 98%)    Parameters below as of 07 Aug 2023 13:52  Patient On (Oxygen Delivery Method): room air    CONSTITUTIONAL: NAD  HEENT: PERRL, EOMI, moist oral mucosa  RESPIRATORY: Normal respiratory effort; lungs are clear to auscultation bilaterally  CARDIOVASCULAR: Regular rate and rhythm, normal S1 and S2. No lower extremity edema; Peripheral pulses are 2+ bilaterally  ABDOMEN: Nontender to palpation, normoactive bowel sounds, no rebound/guarding;  MUSCLOSKELETAL:  no joint swelling or tenderness to palpation  PSYCH: A&O to person, sometimes place  SKIN: No rashes    LABS:                        12.5   5.04  )-----------( 160      ( 07 Aug 2023 06:51 )             39.8     08-07    139  |  101  |  22.8<H>  ----------------------------<  95  4.2   |  25.0  |  1.17    Ca    10.0      07 Aug 2023 06:51  Mg     2.1     08-06    TPro  8.2  /  Alb  3.8  /  TBili  0.2<L>  /  DBili  0.1  /  AST  9   /  ALT  <5  /  AlkPhos  60  08-07          Urinalysis Basic - ( 07 Aug 2023 06:51 )    Color: x / Appearance: x / SG: x / pH: x  Gluc: 95 mg/dL / Ketone: x  / Bili: x / Urobili: x   Blood: x / Protein: x / Nitrite: x   Leuk Esterase: x / RBC: x / WBC x   Sq Epi: x / Non Sq Epi: x / Bacteria: x        CAPILLARY BLOOD GLUCOSE          IMAGING:

## 2023-08-07 NOTE — OCCUPATIONAL THERAPY INITIAL EVALUATION ADULT - PERTINENT HX OF CURRENT PROBLEM, REHAB EVAL
As per MD note: pt. is a 89yoF hx dementia, Afib not on AC due to GI bleeding, CKD, HTN, prior hx of nontraumatic SAH, seizure , sent from [facility] for seizure. Recent seizure diagnosis and admission 1 week ago. Started on depakote. Pt got versed IM by EMS with resolution of apparent seizure activity. pt. opens eyes to verbal command but does not follow instructions, does not give any history. baseline ? saturating mid 90s on 2 L nasal cannula. In the Er pt's urine suggestive of uti. ct head no acute findings. no seizure activity in the ER.  Pt is DNR/DNI.

## 2023-08-07 NOTE — PHYSICAL THERAPY INITIAL EVALUATION ADULT - PERTINENT HX OF CURRENT PROBLEM, REHAB EVAL
89yoF hx dementia, Afib not on AC due to GI bleeding, CKD, HTN, prior hx of nontraumatic SAH, seizure , sent from [facility] for seizure. Recent seizure diagnosis and admission 1 week ago. Started on depakote. Pt got versed IM by EMS with resolution of apparent seizure activity. Patient noted to have UTI with cultures preliminarily showing E. coli, awaiting sensitivities. Patient now at mental baseline per family, RHONDA resolved. Depakote level noted to be low and increased by Neurology continuing to monitor. Depakote level being monitored, persistent EEG findings of seizure with no clinical correlation, started on keppra per neuro.

## 2023-08-08 LAB — VALPROATE SERPL-MCNC: 44.2 UG/ML — LOW (ref 50–100)

## 2023-08-08 PROCEDURE — 99232 SBSQ HOSP IP/OBS MODERATE 35: CPT

## 2023-08-08 RX ORDER — DIVALPROEX SODIUM 500 MG/1
750 TABLET, DELAYED RELEASE ORAL
Refills: 0 | Status: DISCONTINUED | OUTPATIENT
Start: 2023-08-08 | End: 2023-08-08

## 2023-08-08 RX ORDER — DIVALPROEX SODIUM 500 MG/1
750 TABLET, DELAYED RELEASE ORAL
Refills: 0 | Status: DISCONTINUED | OUTPATIENT
Start: 2023-08-08 | End: 2023-08-09

## 2023-08-08 RX ADMIN — PANTOPRAZOLE SODIUM 40 MILLIGRAM(S): 20 TABLET, DELAYED RELEASE ORAL at 17:41

## 2023-08-08 RX ADMIN — LEVETIRACETAM 1000 MILLIGRAM(S): 250 TABLET, FILM COATED ORAL at 17:41

## 2023-08-08 RX ADMIN — Medication 25 MILLIGRAM(S): at 21:44

## 2023-08-08 RX ADMIN — HEPARIN SODIUM 5000 UNIT(S): 5000 INJECTION INTRAVENOUS; SUBCUTANEOUS at 17:41

## 2023-08-08 RX ADMIN — Medication 25 MILLIGRAM(S): at 05:33

## 2023-08-08 RX ADMIN — DORZOLAMIDE HYDROCHLORIDE 1 DROP(S): 20 SOLUTION/ DROPS OPHTHALMIC at 08:28

## 2023-08-08 RX ADMIN — DORZOLAMIDE HYDROCHLORIDE 1 DROP(S): 20 SOLUTION/ DROPS OPHTHALMIC at 21:46

## 2023-08-08 RX ADMIN — Medication 325 MILLIGRAM(S): at 17:41

## 2023-08-08 RX ADMIN — DIVALPROEX SODIUM 750 MILLIGRAM(S): 500 TABLET, DELAYED RELEASE ORAL at 21:48

## 2023-08-08 RX ADMIN — TAMSULOSIN HYDROCHLORIDE 0.4 MILLIGRAM(S): 0.4 CAPSULE ORAL at 21:40

## 2023-08-08 RX ADMIN — Medication 600 MILLIGRAM(S): at 05:33

## 2023-08-08 RX ADMIN — Medication 25 MILLIGRAM(S): at 17:41

## 2023-08-08 RX ADMIN — PANTOPRAZOLE SODIUM 40 MILLIGRAM(S): 20 TABLET, DELAYED RELEASE ORAL at 05:33

## 2023-08-08 RX ADMIN — DIVALPROEX SODIUM 500 MILLIGRAM(S): 500 TABLET, DELAYED RELEASE ORAL at 17:58

## 2023-08-08 RX ADMIN — AMLODIPINE BESYLATE 10 MILLIGRAM(S): 2.5 TABLET ORAL at 05:33

## 2023-08-08 RX ADMIN — DIVALPROEX SODIUM 500 MILLIGRAM(S): 500 TABLET, DELAYED RELEASE ORAL at 05:32

## 2023-08-08 RX ADMIN — CHLORHEXIDINE GLUCONATE 1 APPLICATION(S): 213 SOLUTION TOPICAL at 05:37

## 2023-08-08 RX ADMIN — Medication 25 MILLIGRAM(S): at 13:28

## 2023-08-08 RX ADMIN — DONEPEZIL HYDROCHLORIDE 10 MILLIGRAM(S): 10 TABLET, FILM COATED ORAL at 21:44

## 2023-08-08 RX ADMIN — Medication 150 MICROGRAM(S): at 05:32

## 2023-08-08 RX ADMIN — HEPARIN SODIUM 5000 UNIT(S): 5000 INJECTION INTRAVENOUS; SUBCUTANEOUS at 05:34

## 2023-08-08 RX ADMIN — LEVETIRACETAM 1000 MILLIGRAM(S): 250 TABLET, FILM COATED ORAL at 05:32

## 2023-08-08 RX ADMIN — Medication 25 MILLIGRAM(S): at 05:34

## 2023-08-08 RX ADMIN — CEFTRIAXONE 1000 MILLIGRAM(S): 500 INJECTION, POWDER, FOR SOLUTION INTRAMUSCULAR; INTRAVENOUS at 21:39

## 2023-08-08 RX ADMIN — Medication 600 MILLIGRAM(S): at 17:40

## 2023-08-08 NOTE — PROGRESS NOTE ADULT - REASON FOR ADMISSION
Breakthrough seizure / uti

## 2023-08-08 NOTE — PROGRESS NOTE ADULT - SUBJECTIVE AND OBJECTIVE BOX
[facility] Division of Medicine    Chief Complaint:  seizures    SUBJECTIVE / OVERNIGHT EVENTS: Pt seen at the bedside with son present. Pt awake, responds appropriately and was being fed by son without issues.     MEDICATIONS  (STANDING):  amLODIPine   Tablet 10 milliGRAM(s) Oral daily  cefTRIAXone Injectable. 1000 milliGRAM(s) IV Push every 24 hours  chlorhexidine 2% Cloths 1 Application(s) Topical <User Schedule>  divalproex  milliGRAM(s) Oral two times a day  donepezil 10 milliGRAM(s) Oral at bedtime  dorzolamide 2% Ophthalmic Solution 1 Drop(s) Both EYES <User Schedule>  ferrous    sulfate 325 milliGRAM(s) Oral daily  gemfibrozil 600 milliGRAM(s) Oral two times a day  heparin   Injectable 5000 Unit(s) SubCutaneous every 12 hours  hydrALAZINE 25 milliGRAM(s) Oral three times a day  levETIRAcetam 1000 milliGRAM(s) Oral two times a day  levothyroxine 150 MICROGram(s) Oral daily  metoprolol tartrate 25 milliGRAM(s) Oral two times a day  pantoprazole    Tablet 40 milliGRAM(s) Oral every 12 hours  tamsulosin 0.4 milliGRAM(s) Oral at bedtime    MEDICATIONS  (PRN):  LORazepam   Injectable 1 milliGRAM(s) IV Push once PRN for any seizure activity      I&O's Summary    07 Aug 2023 07:01  -  08 Aug 2023 07:00  --------------------------------------------------------  IN: 0 mL / OUT: 500 mL / NET: -500 mL    08 Aug 2023 07:01  -  08 Aug 2023 15:42  --------------------------------------------------------  IN: 60 mL / OUT: 200 mL / NET: -140 mL        PHYSICAL EXAM:  Vital Signs Last 24 Hrs  T(C): 36.7 (08 Aug 2023 13:17), Max: 37.1 (08 Aug 2023 04:00)  T(F): 98.1 (08 Aug 2023 13:17), Max: 98.8 (08 Aug 2023 04:00)  HR: 63 (08 Aug 2023 13:17) (59 - 63)  BP: 137/78 (08 Aug 2023 13:17) (137/78 - 170/78)  BP(mean): 101 (07 Aug 2023 19:58) (101 - 101)  RR: 17 (08 Aug 2023 13:17) (17 - 20)  SpO2: 97% (08 Aug 2023 13:17) (94% - 97%)    Parameters below as of 08 Aug 2023 13:17  Patient On (Oxygen Delivery Method): room air      CONSTITUTIONAL: NAD  HEENT: PERRL, EOMI, moist oral mucosa  RESPIRATORY: Normal respiratory effort; lungs are clear to auscultation bilaterally  CARDIOVASCULAR: Regular rate and rhythm, normal S1 and S2. No lower extremity edema; Peripheral pulses are 2+ bilaterally  ABDOMEN: Nontender to palpation, normoactive bowel sounds, no rebound/guarding;  MUSCLOSKELETAL:  no joint swelling or tenderness to palpation  PSYCH: A&O to person, sometimes place  SKIN: No rashes    LABS:                        12.5   5.04  )-----------( 160      ( 07 Aug 2023 06:51 )             39.8     08-07    139  |  101  |  22.8<H>  ----------------------------<  95  4.2   |  25.0  |  1.17    Ca    10.0      07 Aug 2023 06:51    TPro  8.2  /  Alb  3.8  /  TBili  0.2<L>  /  DBili  0.1  /  AST  9   /  ALT  <5  /  AlkPhos  60  08-07          Urinalysis Basic - ( 07 Aug 2023 06:51 )    Color: x / Appearance: x / SG: x / pH: x  Gluc: 95 mg/dL / Ketone: x  / Bili: x / Urobili: x   Blood: x / Protein: x / Nitrite: x   Leuk Esterase: x / RBC: x / WBC x   Sq Epi: x / Non Sq Epi: x / Bacteria: x        CAPILLARY BLOOD GLUCOSE          IMAGING:

## 2023-08-08 NOTE — PROGRESS NOTE ADULT - ASSESSMENT
89yoF hx dementia, Afib not on AC due to GI bleeding, CKD, HTN, prior hx of nontraumatic SAH, seizure , sent from [facility] for seizure. Recent seizure diagnosis and admission 1 week ago. Started on depakote. Pt got versed IM by EMS with resolution of apparent seizure activity. Patient noted to have UTI with cultures preliminarily showing E. coli, awaiting sensitivities. Patient now at mental baseline per family, RHONDA resolved. Depakote level noted to be low and increased by Neurology continuing to monitor. Depakote level being monitored, persistent EEG findings of seizure with no clinical correlation, started on keppra per neuro.     1. Breakthrough Seizure   - CT head no acute findings   - on continous EEG monitoring, last captured seizure was on 8/4  - c/w keppra  - holding depakote today as level supra-therapeutic  - recheck level tomorrow  - neuro following  - IV Ativan PRN for seizure activity     2. UTI  - UA suggestive of UTI, continues on Rocephin (day 5 today)  - No WBC elevation   - clx growing proteus and ecoli, sensitive to ceftriaxone  - will complete 7 day course of abx    3. RHONDA  Improved   likely prerenal in setting of dehydration / poor oral intake   - trend BMP    4. HTN  - chronic condition   - continue patients Metoprolol     5. Aortic stenosis   May 2022 ECHO shows severe AS, patient is likely poor surgical candidate, however may continue to monitor     6. Advanced Dementia   Patient at baseline per family  - AxO to person, sometimes to place    Diet: Pureed   DVT prophylaxis: Heparin   Dispo: acute
89yoF hx dementia, Afib not on AC due to GI bleeding, CKD, HTN, prior hx of nontraumatic SAH, seizure , sent from [facility] for seizure. Recent seizure diagnosis and admission 1 week ago. Started on depakote. Pt got versed IM by EMS with resolution of apparent seizure activity. Patient noted to have UTI with cultures preliminarily showing E. coli, awaiting sensitivities. Patient now at mental baseline per family, RHONDA resolved. Depakote level noted to be low and increased by Neurology continuing to monitor. Depakote level being monitored, persistent EEG findings of seizure with no clinical correlation, started on keppra per neuro.     1. Breakthrough Seizure   - CT head no acute findings   - on continous EEG monitoring  - c/w keppra and depakote   - neuro following  - IV Ativan PRN for seizure activity     2. UTI  UA suggestive of UTI, continues on Rocephin, will follow culture   - No WBC elevation   - Prelim culture E.coli, will await final report     3. RHONDA  Improved   likely prerenal in setting of dehydration / poor oral intake   - trend BMP    4. HTN  - chronic condition   - continue patients Metoprolol     5. Aortic stenosis   May 2022 ECHO shows severe AS, patient is likely poor surgical candidate, however may continue to monitor     6. Advanced Dementia   Patient at baseline per family  - AxO to person, partially place, partially time   - mistakes provider for other people several times throughout conversation     Diet: Pureed   DVT prophylaxis: Heparin   Dispo: acute
IMPRESSION:  - Dementia  - History seizure.  with breakthrough seizure  - Mental status much improved today.   ASSESSMENT/ PLAN:     - General Neuro checks and vital signs Q 4 hours.  - SBP goal keep normotensive.  - Seizure precautions.  -   BID .  Level is 44.2  but corrected for albumin is therapeutic ( 57.8)   - Also on Keppra 1 gm BID  -  Telemetry monitoring.  - c EEG monitoring.- No further seizures noted.  - CT Head  -images and reports were reviewed.   - PT OT SLP evaluation.  - SCD/ SQ Heparin for DVT prophylaxis.        
pt. is a 89yoF hx dementia, Afib not on AC due to GI bleeding, CKD, HTN, prior hx of nontraumatic SAH, seizure , sent from [facility] for seizure. Recent seizure diagnosis and admission 1 week ago. Started on depakote. Pt got versed IM by EMS with resolution of apparent seizure activity. Patient noted to have UTI with cultures preliminarily showing E. coli, awaiting sensitivities. Patient now at mental baseline per family, RHONDA resolved. Depakote level noted to be low and increased by Neurology continuing to monitor. Depakote level being monitored, persistent EEG findings of seizure with no clinical correlation, started on keppra per neuro.     1. Breakthrough Seizure   - Seizure activity at patients facility ([facility]) as well as EMS, given Versed   - CT head no acute findings   - Given IV Depakote 250 in ER, no witnessed seizure activity   - EEG does show some seizure activity 2 R. frontotemporal focal onset seizures with spread to R. hemisphere however no correlation on video  8/4 continues to see some seizure activity on cEEG with no clinical correlation   Discussed with Neurology will add Keppra with 2 gm bolus and start 1 gm BID   - Depakote level noted to be therapeutic this AM, however may have been mid level draw, will obtain trough in AM   - Neurology recommendations appreciated, continues on cEEG   - IV Ativan PRN for seizure activity     2. UTI  UA suggestive of UTI, continues on Rocephin, will follow culture   - No WBC elevation   - Prelim culture E.coli, will await final report     3. RHONDA  Improved   likely prerenal in setting of dehydration / poor oral intake   - Given 1.5L NS in ER with improvement in labs   - given another 500 cc's of maintenance fluid     4. HTN  - chronic condition   - continue patients Metoprolol     5. Aortic stenosis   May 2022 ECHO shows severe AS, patient is likely poor surgical candidate, however may continue to monitor     6. Advanced Dementia   Patient at baseline per family  - AxO to person, partially place, partially time   - mistakes provider for other people several times throughout conversation     Diet: Pureed   DVT prophylaxis: Heparin   Dispo: monitor EEG, depakote level, no added keppra, likely DC back to [facility] in 1-2 days      
89yoF hx dementia, Afib not on AC due to GI bleeding, CKD, HTN, prior hx of nontraumatic SAH, seizure , sent from [facility] for seizure. Recent seizure diagnosis and admission 1 week ago. Started on depakote. Pt got versed IM by EMS with resolution of apparent seizure activity. Patient noted to have UTI with cultures preliminarily showing E. coli, awaiting sensitivities. Patient now at mental baseline per family, RHONDA resolved. Depakote level noted to be low and increased by Neurology continuing to monitor. Depakote level being monitored, persistent EEG findings of seizure with no clinical correlation, started on keppra per neuro.     1. Breakthrough Seizures  - CT head no acute findings   - on continous EEG monitoring, last captured seizure was on 8/4  - c/w keppra  - depakote decreased to 500mg BID day prior  - level from 103 --> 47 yesterday, pt remains without seizures  - will check level again prior to next dose and if therapeutic neurology agrees pt is safe for discharge  - neuro following  - IV Ativan PRN for seizure activity     2. UTI  - UA suggestive of UTI, continues on Rocephin (day 6 today)  - No WBC elevation   - clx growing proteus and ecoli, sensitive to ceftriaxone  - will complete 7 day course of abx    3. RHONDA  - was likely prerenal in setting of dehydration / poor oral intake   - Improved, pt likely has some component of CKD as BUN/Cr have now remained stable    - trend BMP    4. HTN  - chronic condition   - continue patients Metoprolol     5. Aortic stenosis   May 2022 ECHO shows severe AS, patient is likely poor surgical candidate, however may continue to monitor     6. Advanced Dementia   Patient at baseline per family  - AxO to person, sometimes to place    Diet: Pureed   DVT prophylaxis: Heparin   Dispo: acute
IMPRESSION:  - Dementia  - History seizure.  with breakthrough seizure    ASSESSMENT/ PLAN:     - General Neuro checks and vital signs Q 4 hours.  - SBP goal keep normotensive.  - Seizure precautions.  - VPA level 71.2  Will add Keppra 2000 mg Now and then 1 gm BID  -  Check Valproate trough level again in am .  - Lipitor for LDL goal of < 70 .  - Telemetry monitoring.  - c EEG monitoring.  - CT Head  -images and reports were reviewed.   - PT OT SLP evaluation.  - SCD/ SQ Lovenox for DVT prophylaxis.        
IMPRESSION:  - Dementia  - History seizure.  with breakthrough seizure    ASSESSMENT/ PLAN:     - General Neuro checks and vital signs Q 4 hours.  - SBP goal keep normotensive.  - Seizure precautions.  - VPA level is high therapeutic.   Also on Keppra 1 gm BID  -  Check Valproate trough level again in am .  - Lipitor for LDL goal of < 70 .  - Telemetry monitoring.  - c EEG monitoring.  - CT Head  -images and reports were reviewed.   - PT OT SLP evaluation.  - SCD/ SQ Lovenox for DVT prophylaxis.        
IMPRESSION:  - Dementia  - History seizure.  with breakthrough seizure    ASSESSMENT/ PLAN:     - General Neuro checks and vital signs Q 4 hours.  - SBP goal keep normotensive.  - Seizure precautions.  - VPA level is supra therapeutic.  - WIll decrease VPA to 500 BID and check level in am.     Also on Keppra 1 gm BID  -  Check Valproate trough level again in am .  - Lipitor for LDL goal of < 70 .  - Telemetry monitoring.  - c EEG monitoring.  - CT Head  -images and reports were reviewed.   - PT OT SLP evaluation.  - SCD/ SQ Lovenox for DVT prophylaxis.        
89yoF hx dementia, Afib not on AC due to GI bleeding, CKD, HTN, prior hx of nontraumatic SAH, seizure , sent from [facility] for seizure. Recent seizure diagnosis and admission 1 week ago. Started on depakote. Pt got versed IM by EMS with resolution of apparent seizure activity. Patient noted to have UTI with cultures preliminarily showing E. coli, awaiting sensitivities. Patient now at mental baseline per family, RHONDA resolved. Depakote level noted to be low and increased by Neurology continuing to monitor. Depakote level being monitored, persistent EEG findings of seizure with no clinical correlation, started on keppra per neuro.     1. Breakthrough Seizure   - CT head no acute findings   - on continous EEG monitoring, last captured seizure was on 8/4  - c/w keppra  - decreased depakote dose yesterday per neurology as level supra-therapeutic  - level from 103 --> 47 today  - neuro following  - IV Ativan PRN for seizure activity     2. UTI  - UA suggestive of UTI, continues on Rocephin (day 6 today)  - No WBC elevation   - clx growing proteus and ecoli, sensitive to ceftriaxone  - will complete 7 day course of abx    3. RHONDA  - was likely prerenal in setting of dehydration / poor oral intake   - Improved, pt likely has some component of CKD as BUN/Cr have now remained stable    - trend BMP    4. HTN  - chronic condition   - continue patients Metoprolol     5. Aortic stenosis   May 2022 ECHO shows severe AS, patient is likely poor surgical candidate, however may continue to monitor     6. Advanced Dementia   Patient at baseline per family  - AxO to person, sometimes to place    Diet: Pureed   DVT prophylaxis: Heparin   Dispo: acute
IMPRESSION:  - Dementia  - History seizure.  with breakthrough seizure    ASSESSMENT/ PLAN:       - General Neuro checks and vital signs Q 4 hours.  - SBP goal keep normotensive.  - Seizure precautions.  - VPA level 29 hence additional 17932 mg Given and standing dose increased to 500 TID.  -  Check Valproate trough level in am .  - Lipitor for LDL goal of < 70 .  - Telemetry monitoring.  - cEEG monitoring.  - CT Head  -images and reports were reviewed.   - PT OT SLP evaluation.  - SCD/ SQ Lovenox for DVT prophylaxis.        
pt. is a 89yoF hx dementia, Afib not on AC due to GI bleeding, CKD, HTN, prior hx of nontraumatic SAH, seizure , sent from [facility] for seizure. Recent seizure diagnosis and admission 1 week ago. Started on depakote. Pt got versed IM by EMS with resolution of apparent seizure activity. Patient noted to have UTI with cultures preliminarily showing E. coli, awaiting sensitivities. Patient now at mental baseline per family, RHONDA resolved. Depakote level noted to be low and increased by Neurology continuing to monitor.     1. Breakthrough Seizure   - Seizure activity at patients facility ([facility]) as well as EMS, given Versed   - CT head no acute findings   - Given IV Depakote 250 in ER, no witnessed seizure activity   - EEG does show some seizure activity 2 R. frontotemporal focal onset seizures with spread to R. hemisphere however no correlation on video  Discussed with Neurology will give additional Depakote bolus 500 mg (x2 throughout day) and transition dosage to 500 mg q8, will repeat level in AM  - Depakote level noted to be mildly low but improved this AM to 29.7  - Neurology following recommendations appreciated, continues on cEEG   - IV Ativan PRN for seizure activity     2. UTI  UA suggestive of UTI, continues on Rocephin, will follow culture   - No WBC elevation   - Prelim culture E.coli, will await final report     3. RHONDA  Improved   likely prerenal in setting of dehydration / poor oral intake   - Given 1.5L NS in ER with improvement in labs   - given another 500 cc's of maintenance fluid     4. HTN  - chronic condition   - continue patients Metoprolol     5. Aortic stenosis   May 2022 ECHO shows severe AS, patient is likely poor surgical candidate, however may continue to monitor     6. Advanced Dementia   Patient at baseline per family  - AxO to person, partially place, partially time   - mistakes provider for other people several times throughout conversation     Diet: Pureed   DVT prophylaxis: Heparin   Dispo: monitor EEG, depakote level, likely DC back to [facility] in 1-2 days  
pt. is a 89yoF hx dementia, Afib not on AC due to GI bleeding, CKD, HTN, prior hx of nontraumatic SAH, seizure , sent from [facility] for seizure. Recent seizure diagnosis and admission 1 week ago. Started on depakote. Pt got versed IM by EMS with resolution of apparent seizure activity. pt. opens eyes to verbal command but does not follow instructions, does not give any history. baseline ? saturating mid 90s on 2 L nasal cannula. In the Er pt's urine suggestive of uti. ct head no acute findings. no seizure activity in the ER.    1. Breakthrough Seizure   - Seizure activity at patients facility ([facility]) as well as EMS, given Versed   - CT head no acute findings   - Given IV Depakote 250 in ER, no further seizure activity   - Depakote level noted to be mildly low 9.7  - Neurology following, recommend cEEG, monitor depakote level, will decide if need for increase   - IV Ativan PRN for seizure activity     2. UTI  UA suggestive of UTI, continues on Rocephin, will follow culture   - No WBC elevation     3. RHONDA  Improved   likely prerenal in setting of dehydration / poor oral intake   - Given 1.5L NS in ER with improvement in labs   - given another 500 cc's of maintenance fluid     4. HTN  - chronic condition   - continue patients Metoprolol     5. Aortic stenosis   May 2022 ECHO shows severe AS, patient is likely poor surgical candidate, however may continue to monitor     6. Advanced Dementia   Patient at baseline per family  - AxO to person, partially place, partially time   - mistakes provider for other people several times throughout conversation     Diet: Pureed   DVT prophylaxis: Heparin   Dispo: monitor EEG, depakote level, likely DC back to [facility] in 1-2 days

## 2023-08-08 NOTE — PROGRESS NOTE ADULT - PROVIDER SPECIALTY LIST ADULT
Hospitalist
Neurology
Hospitalist
Neurology
Hospitalist
Neurology
Hospitalist
Hospitalist

## 2023-08-08 NOTE — EEG REPORT - NS EEG TEXT BOX
JOSHUATREENRIQUEMERLINERAGHAVENDRA ANGELA-[id number]     Study Date: 08/07/23 08:00-18:36  Duration: 10 hr 15 min  --------------------------------------------------------------------------------------------------  History:  CC/ HPI Patient is a 89y old  Female who presents with a chief complaint of Breakthrough seizure / uti (07 Aug 2023 15:17)    MEDICATIONS  (STANDING):  amLODIPine   Tablet 10 milliGRAM(s) Oral daily  cefTRIAXone Injectable. 1000 milliGRAM(s) IV Push every 24 hours  chlorhexidine 2% Cloths 1 Application(s) Topical <User Schedule>  divalproex  milliGRAM(s) Oral two times a day  donepezil 10 milliGRAM(s) Oral at bedtime  dorzolamide 2% Ophthalmic Solution 1 Drop(s) Both EYES <User Schedule>  ferrous    sulfate 325 milliGRAM(s) Oral daily  gemfibrozil 600 milliGRAM(s) Oral two times a day  heparin   Injectable 5000 Unit(s) SubCutaneous every 12 hours  hydrALAZINE 25 milliGRAM(s) Oral three times a day  levETIRAcetam 1000 milliGRAM(s) Oral two times a day  levothyroxine 150 MICROGram(s) Oral daily  metoprolol tartrate 25 milliGRAM(s) Oral two times a day  pantoprazole    Tablet 40 milliGRAM(s) Oral every 12 hours  tamsulosin 0.4 milliGRAM(s) Oral at bedtime    --------------------------------------------------------------------------------------------------  Study Interpretation:    [[[Abbreviation Key:  PDR=alpha rhythm/posterior dominant rhythm. A-P=anterior posterior.  Amplitude: ‘very low’:<20; ‘low’:20-49; ‘medium’:; ‘high’:>150uV.  Persistence for periodic/rhythmic patterns (% of epoch) ‘rare’:<1%; ‘occasional’:1-10%; ‘frequent’:10-50%; ‘abundant’:50-90%; ‘continuous’:>90%.  Persistence for sporadic discharges: ‘rare’:<1/hr; ‘occasional’:1/min-1/hr; ‘frequent’:>1/min; ‘abundant’:>1/10 sec.  RPP=rhythmic and periodic patterns; GRDA=generalized rhythmic delta activity; FIRDA=frontal intermittent GRDA; LRDA=lateralized rhythmic delta activity; TIRDA=temporal intermittent rhythmic delta activity;  LPD=PLED=lateralized periodic discharges; GPD=generalized periodic discharges; BIPDs =bilateral independent periodic discharges; Mf=multifocal; SIRPDs=stimulus induced rhythmic, periodic, or ictal appearing discharges; BIRDs=brief potentially ictal rhythmic discharges >4 Hz, lasting .5-10s; PFA (paroxysmal bursts >13 Hz or =8 Hz <10s).  Modifiers: +F=with fast component; +S=with spike component; +R=with rhythmic component.  S-B=burst suppression pattern.  Max=maximal. N1-drowsy; N2-stage II sleep; N3-slow wave sleep. SSS/BETS=small sharp spikes/benign epileptiform transients of sleep. HV=hyperventilation; PS=photic stimulation]]]    Daily EEG Visual Analysis    FINDINGS:      Background:  Continuity: Continuous  Symmetry: Symmetric  Posterior dominant rhythm (PDR): 6 Hz, reactive to eye closure. Intermittent symmetric low-amplitude frontal beta in wakefulness.  State Change: Present  Voltage: Normal  Anterior Posterior Gradient: Present  Other background findings: Intermittent diffuse polymorphic delta and theta slowing in wakefulness  Breach: Absent    Background Slowing:  Generalized slowing: As above  Focal slowing: Occasional right frontotemporal focal polymorphic delta slowing.    State Changes:   Drowsiness is characterized by fragmentation, attenuation, and slowing of the background activity. There are occasional bursts and runs of frontally predominant beta activity.  No clear stage 2 sleep architecture is captured.    Interictal Findings:  None    Electrographic and Electroclinical seizures:  None    Other Clinical Events:  None    Activation Procedures:   Hyperventilation is not performed.    Photic stimulation is not performed.    Artifacts:  Intermittent myogenic and movement artifacts are present. In the beginning of the study and to some extent later in the study, multiple loose and disconnected electrodes limit interpretation, especially in the right frontal region.    EKG:  Single-lead EKG shows regular rhythm with occasional premature narrow complexes.    EEG Classification / Summary:  Abnormal EEG in the awake, drowsy states.   Right frontotemporal focal slowing.  Mild-moderate diffuse slowing.  No epileptiform abnormalities.  Artifacts at times limit interpretation in the right frontal region.    Clinical Impression:  Right frontotemporal focal cerebral dysfunction can be structural or functional in etiology.   Mild-moderate diffuse cerebral dysfunction is nonspecific in etiology.  Artifacts at times limit interpretation in the right frontal region.            -------------------------------------------------------------------------------------------------------  [facility] EEG Reading Room Ph#: [phone]  Epilepsy Answering Service after 5PM and before 8:30AM: Ph#: [phone]    Kelly Green MD  Attending Physician, [facility]

## 2023-08-08 NOTE — PROGRESS NOTE ADULT - NUTRITIONAL ASSESSMENT
This patient has been assessed with a concern for Malnutrition and has been determined to have a diagnosis/diagnoses of Severe protein-calorie malnutrition.    This patient is being managed with:   Diet Pureed-  DASH/TLC {Sodium & Cholesterol Restricted} (DASH)  Entered: Aug  3 2023  2:36PM  

## 2023-08-08 NOTE — PROGRESS NOTE ADULT - SUBJECTIVE AND OBJECTIVE BOX
Neurology     RAGHAVENDRA WALLACE 89y Female     HPI:  pt. is a 89yoF hx dementia, Afib not on AC due to GI bleeding, CKD, HTN, prior hx of nontraumatic SAH, seizure , sent from [facility] for seizure. Recent seizure diagnosis and admission 1 week ago. Started on depakote. Pt got versed IM by EMS with resolution of apparent seizure activity. pt. opens eyes to verbal command but does not follow instructions, does not give any history. baseline ? saturating mid 90s on 2 L nasal cannula. In the Er pt's urine suggestive of uti. ct head no acute findings. no seizure activity in the ER.  Pt is DNR/DNI. (01 Aug 2023 20:47)      PMH: HTN (hypertension)    Diabetes mellitus    Hypothyroid    Dementia    GI bleed    Neuropathy    GERD (gastroesophageal reflux disease)    Abdominal aortic aneurysm    Stage 3 chronic kidney disease    Aortic stenosis       PSH: S/P hysterectomy    S/P hip replacement    Pacemaker      FAMILY HISTORY:  FHx: diabetes mellitus    SOCIAL HISTORY:  No history of tobacco or alcohol use     Allergies    No Known Allergies    Intolerances        Vital Signs Last 24 Hrs  T(C): 36.5 (08 Aug 2023 16:30), Max: 37.1 (08 Aug 2023 04:00)  T(F): 97.7 (08 Aug 2023 16:30), Max: 98.8 (08 Aug 2023 04:00)  HR: 65 (08 Aug 2023 16:30) (60 - 65)  BP: 174/73 (08 Aug 2023 16:30) (137/78 - 174/73)  BP(mean): 101 (07 Aug 2023 19:58) (101 - 101)  RR: 18 (08 Aug 2023 16:30) (17 - 20)  SpO2: 97% (08 Aug 2023 16:30) (95% - 97%)    Parameters below as of 08 Aug 2023 16:30  Patient On (Oxygen Delivery Method): room air        MEDICATIONS    amLODIPine   Tablet 10 milliGRAM(s) Oral daily  cefTRIAXone Injectable. 1000 milliGRAM(s) IV Push every 24 hours  chlorhexidine 2% Cloths 1 Application(s) Topical <User Schedule>  divalproex  milliGRAM(s) Oral two times a day  donepezil 10 milliGRAM(s) Oral at bedtime  dorzolamide 2% Ophthalmic Solution 1 Drop(s) Both EYES <User Schedule>  ferrous    sulfate 325 milliGRAM(s) Oral daily  gemfibrozil 600 milliGRAM(s) Oral two times a day  heparin   Injectable 5000 Unit(s) SubCutaneous every 12 hours  hydrALAZINE 25 milliGRAM(s) Oral three times a day  levETIRAcetam 1000 milliGRAM(s) Oral two times a day  levothyroxine 150 MICROGram(s) Oral daily  LORazepam   Injectable 1 milliGRAM(s) IV Push once PRN  metoprolol tartrate 25 milliGRAM(s) Oral two times a day  pantoprazole    Tablet 40 milliGRAM(s) Oral every 12 hours  tamsulosin 0.4 milliGRAM(s) Oral at bedtime         LABS:  CBC Full  -  ( 07 Aug 2023 06:51 )  WBC Count : 5.04 K/uL  RBC Count : 4.17 M/uL  Hemoglobin : 12.5 g/dL  Hematocrit : 39.8 %  Platelet Count - Automated : 160 K/uL  Mean Cell Volume : 95.4 fl  Mean Cell Hemoglobin : 30.0 pg  Mean Cell Hemoglobin Concentration : 31.4 gm/dL  Auto Neutrophil # : x  Auto Lymphocyte # : x  Auto Monocyte # : x  Auto Eosinophil # : x  Auto Basophil # : x  Auto Neutrophil % : x  Auto Lymphocyte % : x  Auto Monocyte % : x  Auto Eosinophil % : x  Auto Basophil % : x    Urinalysis Basic - ( 07 Aug 2023 06:51 )    Color: x / Appearance: x / SG: x / pH: x  Gluc: 95 mg/dL / Ketone: x  / Bili: x / Urobili: x   Blood: x / Protein: x / Nitrite: x   Leuk Esterase: x / RBC: x / WBC x   Sq Epi: x / Non Sq Epi: x / Bacteria: x      08-07    139  |  101  |  22.8<H>  ----------------------------<  95  4.2   |  25.0  |  1.17    Ca    10.0      07 Aug 2023 06:51    TPro  8.2  /  Alb  3.8  /  TBili  0.2<L>  /  DBili  0.1  /  AST  9   /  ALT  <5  /  AlkPhos  60  08-07    LIVER FUNCTIONS - ( 07 Aug 2023 06:51 )  Alb: 3.8 g/dL / Pro: 8.2 g/dL / ALK PHOS: 60 U/L / ALT: <5 U/L / AST: 9 U/L / GGT: x           Hemoglobin A1C:       Valproic Acid Level, Serum: 29.7 ug/mL.    Valproic Acid Level, Serum: 71.2 ug/mL (08.04.23 @ 06:11)  Valproic Acid Level, Serum: 95.7 ug/mL (08.05.23 @ 06:39)   Valproic Acid Level, Serum: 103.5 ug/mL (08.06.23 @ 07:18)   Valproic Acid Level, Serum: 44.2 ug/mL (08.08.23 @ 16:54)  ( Corrected for albumin is 57.8     On Neurological Examination:    Mental Status - Patient is  very drowsy but arousable and oriented X1 today. Patient can  follow simple commands with some persuasion.  She perseverates that my name is Dr Siddiqui.     Cranial Nerves - PERRL, EOMI,  Visual fields BTT bilaterally. No gross facial asymmetry.    Motor Exam -   Both UE moves spontaneously and is antigravity.  Both LE withdraws.    Sensory    Intact to pinprick bilaterally    Coord: FTN not performed.     Gait -  Not assessed.   RADIOLOGY ( All neurological imaging studies were independently reviewed and interpreted by me)  CTH     CT Head No Cont (08.01.23 @ 10:40) >    IMPRESSION:  Limited exam due to motion    No acute intracranial hemorrhage or acute territorial infarct.  If   symptoms persist, follow-up MRI exam recommended.    BE DEVINE MD; Attending Radiologist    MRI:      TTE      EEG preliminary read (not final) on the initial recording hour(s) = <8 hr  Reviewed from: 08:00-15:52    2 right frontotemporal focal-onset seizures with spread to the right hemisphere.  No clinical correlate seen on video. Patient appears to be talking to a visitor during the first seizure.  Times: 12:23, 15:27    Occasional right and rare left frontotemporal epileptiform discharges indicate risk of focal-onset seizures from these regions.    Final report to follow tomorrow morning after completion of study.    [facility] EEG Reading Room Ph#: [phone]  Epilepsy Answering Service after 5PM and before 8:30AM: Ph#: [phone].      EEG  08-.  EEG Classification / Summary:  Abnormal EEG in the awake, drowsy, and asleep states.   -5 right frontotemporal focal-onset electrographic seizures with spread throughout the right hemisphere  -Occasional left and right frontotemporal spike/sharp-wave discharges in drowsiness and stage 2 sleep  -Continuous right hemispheric focal slowing.  -Mild diffuse slowing    Clinical Impression:  -5 right frontotemporal focal seizures with no clinical correlate seen on video. During some seizures, the patient is seen on video speaking to people who are on her left and on her right and/or making purposeful movements with both hands.  -The last seizure is captured on 8/4/23 02:10  -Risk of focal-onset seizures from the left and right frontotemporal regions  -Right hemispheric focal cerebral dysfunction can be structural and/or functional (such as post-ictal) in etiology.  -Mild diffuse cerebral dysfunction is nonspecific in etiology.     Neurology was informed of seizures.    -Kelly Green MD  Attending Physician, [facility]    EEG 08/05/2023.     Classification / Summary:  Abnormal EEG in the awake, drowsy, and asleep states.   -1 Right frontotemporal focal-onset electrographic seizures with spread throughout the right hemisphere  -Occasional right temporal spike/sharp-wave discharges in drowsiness and stage 2 sleep  -Continuous right hemispheric focal slowing.  -Mild diffuse slowing    Clinical Impression:  -1 Right temporal focal seizure with no clinical correlate seen on video.   -The last seizure is captured on 8/4/23 11:08  -Risk of focal-onset seizures from the right frontotemporal regions  -Right hemispheric focal cerebral dysfunction can be structural and/or functional in etiology.  -Mild diffuse cerebral dysfunction is nonspecific in etiology.       Maxi Hope MD FAES  Director, Continuous EEG Monitoring Program, [facility]   and Epilepsy Fellowship ,   Department of Neurology, [facility]    EEG 08/06/2023  EEG Classification / Summary:  Abnormal EEG in the awake, drowsy, and asleep states.   -Infrequent right temporal spike/sharp-wave discharges in drowsiness and stage 2 sleep  -Continuous right hemispheric focal slowing.  -Moderate diffuse slowing    Clinical Impression:  -Risk of focal-onset seizures from the right frontotemporal regions  -Right hemispheric focal cerebral dysfunction can be structural and/or functional in etiology.  -Moderate diffuse cerebral dysfunction is nonspecific in etiology.   -The last seizure is captured on 8/4/23 11:08      Maxi Hope MD FAES    EEG 08/08/23    EEG Classification / Summary:  Abnormal EEG in the awake, drowsy states.   Right frontotemporal focal slowing.  Mild-moderate diffuse slowing.  No epileptiform abnormalities.  Artifacts at times limit interpretation in the right frontal region.    Clinical Impression:  Right frontotemporal focal cerebral dysfunction can be structural or functional in etiology.   Mild-moderate diffuse cerebral dysfunction is nonspecific in etiology.  Artifacts at times limit interpretation in the right frontal region.      Kelly Green MD  Attending Physician, [facility] [place]

## 2023-08-09 ENCOUNTER — TRANSCRIPTION ENCOUNTER (OUTPATIENT)
Age: 88
End: 2023-08-09

## 2023-08-09 VITALS
SYSTOLIC BLOOD PRESSURE: 116 MMHG | TEMPERATURE: 98 F | DIASTOLIC BLOOD PRESSURE: 61 MMHG | OXYGEN SATURATION: 96 % | RESPIRATION RATE: 18 BRPM | HEART RATE: 61 BPM

## 2023-08-09 LAB — VALPROATE SERPL-MCNC: 77.6 UG/ML — SIGNIFICANT CHANGE UP (ref 50–100)

## 2023-08-09 PROCEDURE — 99291 CRITICAL CARE FIRST HOUR: CPT | Mod: 25

## 2023-08-09 PROCEDURE — 93005 ELECTROCARDIOGRAM TRACING: CPT

## 2023-08-09 PROCEDURE — 87077 CULTURE AEROBIC IDENTIFY: CPT

## 2023-08-09 PROCEDURE — 95711 VEEG 2-12 HR UNMONITORED: CPT

## 2023-08-09 PROCEDURE — 83735 ASSAY OF MAGNESIUM: CPT

## 2023-08-09 PROCEDURE — 92526 ORAL FUNCTION THERAPY: CPT

## 2023-08-09 PROCEDURE — 96375 TX/PRO/DX INJ NEW DRUG ADDON: CPT

## 2023-08-09 PROCEDURE — 96361 HYDRATE IV INFUSION ADD-ON: CPT

## 2023-08-09 PROCEDURE — 87640 STAPH A DNA AMP PROBE: CPT

## 2023-08-09 PROCEDURE — 85730 THROMBOPLASTIN TIME PARTIAL: CPT

## 2023-08-09 PROCEDURE — 85610 PROTHROMBIN TIME: CPT

## 2023-08-09 PROCEDURE — 85027 COMPLETE CBC AUTOMATED: CPT

## 2023-08-09 PROCEDURE — 95714 VEEG EA 12-26 HR UNMNTR: CPT

## 2023-08-09 PROCEDURE — 96374 THER/PROPH/DIAG INJ IV PUSH: CPT

## 2023-08-09 PROCEDURE — 87086 URINE CULTURE/COLONY COUNT: CPT

## 2023-08-09 PROCEDURE — 80164 ASSAY DIPROPYLACETIC ACD TOT: CPT

## 2023-08-09 PROCEDURE — 87641 MR-STAPH DNA AMP PROBE: CPT

## 2023-08-09 PROCEDURE — 97167 OT EVAL HIGH COMPLEX 60 MIN: CPT

## 2023-08-09 PROCEDURE — 92610 EVALUATE SWALLOWING FUNCTION: CPT

## 2023-08-09 PROCEDURE — 71045 X-RAY EXAM CHEST 1 VIEW: CPT

## 2023-08-09 PROCEDURE — 80048 BASIC METABOLIC PNL TOTAL CA: CPT

## 2023-08-09 PROCEDURE — 99239 HOSP IP/OBS DSCHRG MGMT >30: CPT

## 2023-08-09 PROCEDURE — 87186 SC STD MICRODIL/AGAR DIL: CPT

## 2023-08-09 PROCEDURE — 85025 COMPLETE CBC W/AUTO DIFF WBC: CPT

## 2023-08-09 PROCEDURE — 80053 COMPREHEN METABOLIC PANEL: CPT

## 2023-08-09 PROCEDURE — 95700 EEG CONT REC W/VID EEG TECH: CPT

## 2023-08-09 PROCEDURE — 70450 CT HEAD/BRAIN W/O DYE: CPT | Mod: MA

## 2023-08-09 PROCEDURE — 81001 URINALYSIS AUTO W/SCOPE: CPT

## 2023-08-09 PROCEDURE — 36415 COLL VENOUS BLD VENIPUNCTURE: CPT

## 2023-08-09 PROCEDURE — 80076 HEPATIC FUNCTION PANEL: CPT

## 2023-08-09 RX ORDER — HYDRALAZINE HCL 50 MG
50 TABLET ORAL THREE TIMES A DAY
Refills: 0 | Status: DISCONTINUED | OUTPATIENT
Start: 2023-08-09 | End: 2023-08-09

## 2023-08-09 RX ORDER — HYDRALAZINE HCL 50 MG
1 TABLET ORAL
Qty: 0 | Refills: 0 | DISCHARGE
Start: 2023-08-09

## 2023-08-09 RX ORDER — DIVALPROEX SODIUM 500 MG/1
6 TABLET, DELAYED RELEASE ORAL
Qty: 0 | Refills: 0 | DISCHARGE
Start: 2023-08-09

## 2023-08-09 RX ORDER — AMLODIPINE BESYLATE 2.5 MG/1
1 TABLET ORAL
Qty: 0 | Refills: 0 | DISCHARGE
Start: 2023-08-09

## 2023-08-09 RX ORDER — LEVETIRACETAM 250 MG/1
1 TABLET, FILM COATED ORAL
Qty: 0 | Refills: 0 | DISCHARGE
Start: 2023-08-09

## 2023-08-09 RX ADMIN — Medication 150 MICROGRAM(S): at 05:28

## 2023-08-09 RX ADMIN — PANTOPRAZOLE SODIUM 40 MILLIGRAM(S): 20 TABLET, DELAYED RELEASE ORAL at 05:28

## 2023-08-09 RX ADMIN — HEPARIN SODIUM 5000 UNIT(S): 5000 INJECTION INTRAVENOUS; SUBCUTANEOUS at 05:27

## 2023-08-09 RX ADMIN — DORZOLAMIDE HYDROCHLORIDE 1 DROP(S): 20 SOLUTION/ DROPS OPHTHALMIC at 08:17

## 2023-08-09 RX ADMIN — CHLORHEXIDINE GLUCONATE 1 APPLICATION(S): 213 SOLUTION TOPICAL at 05:29

## 2023-08-09 RX ADMIN — Medication 25 MILLIGRAM(S): at 05:27

## 2023-08-09 RX ADMIN — Medication 600 MILLIGRAM(S): at 05:27

## 2023-08-09 RX ADMIN — DIVALPROEX SODIUM 750 MILLIGRAM(S): 500 TABLET, DELAYED RELEASE ORAL at 07:28

## 2023-08-09 RX ADMIN — AMLODIPINE BESYLATE 10 MILLIGRAM(S): 2.5 TABLET ORAL at 05:27

## 2023-08-09 RX ADMIN — LEVETIRACETAM 1000 MILLIGRAM(S): 250 TABLET, FILM COATED ORAL at 05:28

## 2023-08-09 RX ADMIN — Medication 25 MILLIGRAM(S): at 05:28

## 2023-08-09 NOTE — DISCHARGE NOTE PROVIDER - HOSPITAL COURSE
pt. is a 89yoF hx dementia, Afib not on AC due to GI bleeding, CKD, HTN, prior hx of nontraumatic SAH, seizure , sent from [facility] for seizure. Recent seizure diagnosis and admission 1 week ago. Started on depakote. Pt got versed IM by EMS with resolution of apparent seizure activity.  In the Er pt's urine suggestive of uti. ct head no acute findings. no seizure activity in the ER.  Pt is DNR/DNI. Seen in consultation by Neurology. UTI treated with IV Rocephin x 7 days.  Patient now at mental baseline per family, RHONDA resolved. Depakote level noted to be low and increased. Persistent EEG findings of seizure with no clinical correlation noted,  started on keppra per neuro. RHONDA improved with IV hydration. Depakote level within therapeutic range.  EEG monitoring with  last captured seizure was on 8/4, now discontinued. The patient is medically stable for discharge.

## 2023-08-09 NOTE — DISCHARGE NOTE PROVIDER - CARE PROVIDER_API CALL
Navjot Trivedi  Neurology  [address] [place] [address], [facility] [address]  [place] [address]  Phone: [phone]  Fax: [phone]  Follow Up Time:

## 2023-08-09 NOTE — DISCHARGE NOTE PROVIDER - ATTENDING DISCHARGE PHYSICAL EXAMINATION:
CONSTITUTIONAL: NAD  HEENT: PERRL, EOMI, moist oral mucosa  RESPIRATORY: Normal respiratory effort; lungs are clear to auscultation bilaterally  CARDIOVASCULAR: Regular rate and rhythm, normal S1 and S2. No lower extremity edema; Peripheral pulses are 2+ bilaterally  ABDOMEN: Nontender to palpation, normoactive bowel sounds, no rebound/guarding;  MUSCULOSKELETAL:  no joint swelling or tenderness to palpation  PSYCH: A&O to person, sometimes place  SKIN: No rashes

## 2023-08-09 NOTE — DISCHARGE NOTE PROVIDER - NSDCCPCAREPLAN_GEN_ALL_CORE_FT
PRINCIPAL DISCHARGE DIAGNOSIS  Diagnosis: Acute UTI  Assessment and Plan of Treatment: Completed course of IV Rocephin      SECONDARY DISCHARGE DIAGNOSES  Diagnosis: Breakthrough seizure  Assessment and Plan of Treatment: Maintain current AED's  Follow up with Neurology in 1-2 weeks, sooner if needed      Diagnosis: Hypertension  Assessment and Plan of Treatment: Continue current medications  SBP goal keep normotensive.

## 2023-08-09 NOTE — DISCHARGE NOTE PROVIDER - NSDCMRMEDTOKEN_GEN_ALL_CORE_FT
acetaminophen 325 mg oral tablet: 2 tab(s) orally every 6 hours, As needed, Temp greater or equal to 38C (100.4F), Mild Pain (1 - 3)  amLODIPine 10 mg oral tablet: 1 tab(s) orally once a day  Aricept 10 mg oral tablet: 1 tab(s) orally once a day (at bedtime)  divalproex sodium 125 mg oral delayed release capsule: 6 cap(s) orally 2 times a day  dorzolamide 2% ophthalmic solution: 1 drop(s) to each affected eye 2 times a day  ferrous sulfate 325 mg (65 mg elemental iron) oral tablet: 1 tab(s) orally once a day  gemfibrozil 600 mg oral tablet: 1 tab(s) orally 2 times a day  hydrALAZINE 50 mg oral tablet: 1 tab(s) orally 3 times a day  levETIRAcetam 1000 mg oral tablet: 1 tab(s) orally 2 times a day  metoprolol tartrate 25 mg oral tablet: 1 tab(s) orally 2 times a day  pantoprazole 40 mg oral delayed release tablet: 1 tab(s) orally every 12 hours  Synthroid 150 mcg (0.15 mg) oral tablet: 1 tab(s) orally once a day  tamsulosin 0.4 mg oral capsule: 1 cap(s) orally once a day (at bedtime)

## 2023-08-09 NOTE — DISCHARGE NOTE NURSING/CASE MANAGEMENT/SOCIAL WORK - PATIENT PORTAL LINK FT
You can access the FollowMyHealth Patient Portal offered by [facility] by registering at the following website: [email or web address]. By joining [employer]’s FollowMyHealth portal, you will also be able to view your health information using other applications (apps) compatible with our system.

## 2023-08-10 NOTE — CDI QUERY NOTE - NSCDIOTHERTXTBX_GEN_ALL_CORE_HH
Can you please clarify if seizure was related to patient’s prior history of nontraumatic subarachnoid hemorrhage (SAH).  A.	Seizure likely due to nontraumatic subarachnoid hemorrhage (SAH).  B.	Seizure not related to nontraumatic subarachnoid hemorrhage (SAH).  C.	Other, please specify      Supporting Documentation:      Progress Note Adult-Neurology Attending [Charted Location: [address] [facility]] [Authored: 05-Aug-2023 20:19]  Clinical Impression:  -5 right frontotemporal focal seizures with no clinical correlate seen on video. During some seizures, the patient is seen on video speaking to people who are on her left and on her right and/or making purposeful movements with both hands.  -The last seizure is captured on 8/4/23 02:10  -Risk of focal-onset seizures from the left and right frontotemporal regions  -Right hemispheric focal cerebral dysfunction can be structural and/or functional (such as post-ictal) in etiology.  -Mild diffuse cerebral dysfunction is nonspecific in etiology.         Progress Note Adult-Neurology Attending [Charted Location: [place] [address]] [Authored: 06-Aug-2023 22:35]  HPI:  pt. is a 89yoF hx dementia, Afib not on AC due to GI bleeding, CKD, HTN, prior hx of nontraumatic SAH, seizure , sent from [facility] for seizure. Recent seizure diagnosis and admission 1 week ago. Started on depakote. Pt got versed IM by EMS with resolution of apparent seizure activity.        Progress Note Adult-Hospitalist Attending [Charted Location: [address] [facility]] [Authored: 08-Aug-2023 15:41]  Assessment and Plan:   • Assessment	  89yoF hx dementia, Afib not on AC due to GI bleeding, CKD, HTN, prior hx of nontraumatic SAH, seizure , sent from [facility] for seizure. Recent seizure diagnosis and admission 1 week ago. Started on depakote. Pt got versed IM by EMS with resolution of apparent seizure activity. Patient noted to have UTI with cultures preliminarily showing E. coli, awaiting sensitivities. Patient now at mental baseline per family, RHONDA resolved. Depakote level noted to be low and increased by Neurology continuing to monitor. Depakote level being monitored, persistent EEG findings of seizure with no clinical correlation, started on keppra per neuro.     1. Breakthrough Seizures  - CT head no acute findings   - on continous EEG monitoring, last captured seizure was on 8/4  - c/w keppra  - depakote decreased to 500mg BID day prior  - level from 103 --> 47 yesterday, pt remains without seizures  - will check level again prior to next dose and if therapeutic neurology agrees pt is safe for discharge  - neuro following  - IV Ativan PRN for seizure activity

## 2023-08-17 PROBLEM — I35.0 NONRHEUMATIC AORTIC (VALVE) STENOSIS: Chronic | Status: ACTIVE | Noted: 2023-08-01

## 2023-09-04 NOTE — PHYSICAL THERAPY INITIAL EVALUATION ADULT - PRECAUTIONS/LIMITATIONS, REHAB EVAL
no chest pain and no edema.
[place]/aspiration precautions/fall precautions/seizure precautions

## 2023-10-03 ENCOUNTER — APPOINTMENT (OUTPATIENT)
Dept: NEUROLOGY | Facility: CLINIC | Age: 88
End: 2023-10-03
Payer: MEDICARE

## 2023-10-03 VITALS
WEIGHT: 115 LBS | OXYGEN SATURATION: 97 % | BODY MASS INDEX: 21.16 KG/M2 | SYSTOLIC BLOOD PRESSURE: 108 MMHG | HEART RATE: 65 BPM | DIASTOLIC BLOOD PRESSURE: 70 MMHG | HEIGHT: 62 IN

## 2023-10-03 PROCEDURE — 99214 OFFICE O/P EST MOD 30 MIN: CPT

## 2023-11-18 NOTE — ED ADULT NURSE NOTE - PUPIL SIZE RIGHT
2 mm PHYSICAL EXAMINATION:  GENERAL: in distress   HEAD:  Atraumatic, Normocephalic  EYES:  conjunctiva and sclera clear  NECK: Supple, No JVD, Normal thyroid  CHEST/LUNG: Clear to auscultation. Clear to percussion bilaterally; No rales, rhonchi, wheezing, or rubs  HEART: tachycardic   ABDOMEN: umbilical hernia,  abdominal breathing   NERVOUS SYSTEM:  Alert & Oriented X 1-2,    EXTREMITIES:  2+ Peripheral Pulses, No clubbing, cyanosis, or edema  SKIN: warm dry

## 2023-12-18 ENCOUNTER — APPOINTMENT (OUTPATIENT)
Dept: NEUROLOGY | Facility: CLINIC | Age: 88
End: 2023-12-18

## 2024-01-12 ENCOUNTER — APPOINTMENT (OUTPATIENT)
Dept: NEUROLOGY | Facility: CLINIC | Age: 89
End: 2024-01-12
Payer: MEDICARE

## 2024-01-12 VITALS
OXYGEN SATURATION: 95 % | BODY MASS INDEX: 21.16 KG/M2 | HEIGHT: 62 IN | HEART RATE: 59 BPM | SYSTOLIC BLOOD PRESSURE: 127 MMHG | DIASTOLIC BLOOD PRESSURE: 79 MMHG | WEIGHT: 115 LBS

## 2024-01-12 DIAGNOSIS — G40.109 LOCALIZATION-RELATED (FOCAL) (PARTIAL) SYMPTOMATIC EPILEPSY AND EPILEPTIC SYNDROMES WITH SIMPLE PARTIAL SEIZURES, NOT INTRACTABLE, W/OUT STATUS EPILEPTICUS: ICD-10-CM

## 2024-01-12 DIAGNOSIS — G30.9 ALZHEIMER'S DISEASE, UNSPECIFIED: ICD-10-CM

## 2024-01-12 DIAGNOSIS — F02.80 ALZHEIMER'S DISEASE, UNSPECIFIED: ICD-10-CM

## 2024-01-12 PROCEDURE — 99214 OFFICE O/P EST MOD 30 MIN: CPT

## 2024-01-12 RX ORDER — TAMSULOSIN HYDROCHLORIDE 0.4 MG/1
0.4 CAPSULE ORAL
Refills: 0 | Status: ACTIVE | COMMUNITY
Start: 2024-01-12

## 2024-01-12 RX ORDER — QUETIAPINE FUMARATE 100 MG/1
100 TABLET ORAL
Qty: 90 | Refills: 1 | Status: DISCONTINUED | COMMUNITY
End: 2024-01-12

## 2024-01-12 RX ORDER — DIVALPROEX SODIUM 250 MG/1
250 TABLET, DELAYED RELEASE ORAL
Qty: 84 | Refills: 0 | Status: DISCONTINUED | COMMUNITY
Start: 2018-06-21 | End: 2024-01-12

## 2024-01-12 RX ORDER — TOPIRAMATE 50 MG/1
50 TABLET, FILM COATED ORAL DAILY
Qty: 90 | Refills: 1 | Status: DISCONTINUED | COMMUNITY
End: 2024-01-12

## 2024-01-12 RX ORDER — GEMFIBROZIL 600 MG/1
600 TABLET, FILM COATED ORAL
Qty: 180 | Refills: 1 | Status: DISCONTINUED | COMMUNITY
Start: 2021-01-29 | End: 2024-01-12

## 2024-01-12 RX ORDER — DORZOLAMIDE HYDROCHLORIDE 20 MG/ML
2 SOLUTION OPHTHALMIC
Refills: 0 | Status: ACTIVE | COMMUNITY
Start: 2024-01-12

## 2024-01-12 RX ORDER — DILTIAZEM HYDROCHLORIDE 180 MG/1
180 CAPSULE, EXTENDED RELEASE ORAL DAILY
Qty: 90 | Refills: 3 | Status: DISCONTINUED | COMMUNITY
Start: 2021-06-25 | End: 2024-01-12

## 2024-01-12 RX ORDER — LEVOTHYROXINE SODIUM 0.15 MG/1
150 TABLET ORAL DAILY
Refills: 0 | Status: ACTIVE | COMMUNITY
Start: 2017-12-26

## 2024-01-12 RX ORDER — FLUTICASONE PROPIONATE 50 UG/1
50 SPRAY, METERED NASAL TWICE DAILY
Qty: 16 | Refills: 4 | Status: DISCONTINUED | COMMUNITY
Start: 2020-02-05 | End: 2024-01-12

## 2024-01-12 RX ORDER — FUROSEMIDE 40 MG/1
40 TABLET ORAL
Qty: 14 | Refills: 4 | Status: DISCONTINUED | COMMUNITY
Start: 2021-01-29 | End: 2024-01-12

## 2024-01-12 RX ORDER — AMLODIPINE BESYLATE 10 MG/1
10 TABLET ORAL DAILY
Refills: 0 | Status: ACTIVE | COMMUNITY
Start: 2024-01-12

## 2024-01-12 RX ORDER — CHLORHEXIDINE GLUCONATE 4 %
325 (65 FE) LIQUID (ML) TOPICAL
Refills: 0 | Status: ACTIVE | COMMUNITY
Start: 2024-01-12

## 2024-01-12 RX ORDER — ONDANSETRON 4 MG/1
4 TABLET, ORALLY DISINTEGRATING ORAL
Qty: 20 | Refills: 0 | Status: DISCONTINUED | COMMUNITY
Start: 2022-04-07 | End: 2024-01-12

## 2024-01-12 RX ORDER — POTASSIUM CHLORIDE 750 MG/1
10 TABLET, FILM COATED, EXTENDED RELEASE ORAL DAILY
Qty: 90 | Refills: 1 | Status: DISCONTINUED | COMMUNITY
Start: 2017-12-26 | End: 2024-01-12

## 2024-01-12 RX ORDER — GABAPENTIN 100 MG/1
100 CAPSULE ORAL TWICE DAILY
Qty: 180 | Refills: 1 | Status: DISCONTINUED | COMMUNITY
Start: 2019-11-14 | End: 2024-01-12

## 2024-01-12 RX ORDER — DIVALPROEX SODIUM 125 MG/1
125 CAPSULE, COATED PELLETS ORAL
Qty: 480 | Refills: 5 | Status: ACTIVE | COMMUNITY
Start: 2024-01-12 | End: 1900-01-01

## 2024-01-12 RX ORDER — VALPROIC ACID 250 MG/5ML
250 SOLUTION ORAL
Qty: 473 | Refills: 0 | Status: DISCONTINUED | COMMUNITY
Start: 2021-05-01 | End: 2024-01-12

## 2024-01-12 RX ORDER — SUCRALFATE 1 G/1
1 TABLET ORAL
Refills: 0 | Status: DISCONTINUED | COMMUNITY
End: 2024-01-12

## 2024-01-12 RX ORDER — METOPROLOL TARTRATE 25 MG/1
25 TABLET, FILM COATED ORAL DAILY
Refills: 0 | Status: ACTIVE | COMMUNITY
Start: 2024-01-12

## 2024-01-12 RX ORDER — GEMFIBROZIL 600 MG/1
600 TABLET, FILM COATED ORAL DAILY
Refills: 0 | Status: ACTIVE | COMMUNITY
Start: 2024-01-12

## 2024-01-12 NOTE — HISTORY OF PRESENT ILLNESS
[FreeTextEntry1] : *** 01/12/2024  *** Ms. WALLACE continues to be very sleepy and sedated despite discontinuation of levetiracetam after last visit.  She is taking high dose of divalproex 1000 q12 which may be contributing, but levels not available. No interval seizures reported.   *** 10/03/2023 *** 89 yo woman with dementia, history of SAH, Afib (not on AC due to GI bleeding), focal epilepsy, recently admitted in August 2023 due to breakthrough seizures with low VPA level on admission. Keppra was added to higher dose of VPA. Since discharge, her son reports she has been very sedated, fatigued, not participating in activities as much.  Current AEDs: VPA 1000mg BID (sprinkle) LEV 1000mg BID  Discharge summary: "89yoF hx dementia, Afib not on AC due to GI bleeding, CKD, HTN, prior hx of nontraumatic SAH, seizure, sent from [facility] for seizure. Recent seizure diagnosis and admission 1 week ago. Started on depakote. Pt got versed IM by EMS with resolution of apparent seizure activity. In the Er pt's urine suggestive of uti. ct head no acute findings. no seizure activity in the ER. Pt is DNR/DNI. Seen in consultation by Neurology. UTI treated with IV Rocephin x 7 days. Patient now at mental baseline per family, RHONDA resolved. Depakote level noted to be low and increased. Persistent EEG findings of seizure with no clinical correlation noted, started on keppra per neuro. RHONDA improved with IV hydration. Depakote level within therapeutic range. EEG monitoring with last captured seizure was on 8/4, now discontinued. The patient is medically stable for discharge."

## 2024-01-12 NOTE — PHYSICAL EXAM
[FreeTextEntry1] : Poor verbal outpute, frequent vocalizations, does not follow verbal commands.  Cranial nerves grossly intact.  Motor exam: moves all 4 extremities anti-gravity. No tremors or fasciculations.  Sensory exam: intact to LT.  Coordination: no dysmetria.  Gait: in wheelchair.

## 2024-01-12 NOTE — ASSESSMENT
[FreeTextEntry1] : 89 yo woman with advanced dementia and focal epilepsy. Family reporting sedation/fatigue, likely related to AEDs.  Plan: 1. Continue Divalproex (sprinkle) 1000mg PO q12 h for now - check level of VPA as well as CBC, CMP.  Dosing of divalproex sprinkles should be every 12 hour - not BID.  2. Seizure precautions. 3. Follow up in 1 month

## 2024-02-28 ENCOUNTER — APPOINTMENT (OUTPATIENT)
Dept: CARDIOLOGY | Facility: CLINIC | Age: 89
End: 2024-02-28
Payer: MEDICARE

## 2024-02-28 VITALS — HEART RATE: 61 BPM | SYSTOLIC BLOOD PRESSURE: 116 MMHG | HEIGHT: 62 IN | DIASTOLIC BLOOD PRESSURE: 70 MMHG

## 2024-02-28 DIAGNOSIS — I48.0 PAROXYSMAL ATRIAL FIBRILLATION: ICD-10-CM

## 2024-02-28 DIAGNOSIS — I35.2 NONRHEUMATIC AORTIC (VALVE) STENOSIS WITH INSUFFICIENCY: ICD-10-CM

## 2024-02-28 DIAGNOSIS — Z95.0 PRESENCE OF CARDIAC PACEMAKER: ICD-10-CM

## 2024-02-28 PROCEDURE — 93000 ELECTROCARDIOGRAM COMPLETE: CPT

## 2024-02-28 PROCEDURE — 93288 INTERROG EVL PM/LDLS PM IP: CPT

## 2024-02-28 PROCEDURE — 99214 OFFICE O/P EST MOD 30 MIN: CPT

## 2024-02-28 NOTE — REASON FOR VISIT
[FreeTextEntry1] : RAGHAVENDRA MADISON is a 89 year-old F with hx of HTN, TAA, PPM, dementia AS/AI, moderate MR, HLD, DM presents here for cardiac follow-up.

## 2024-02-28 NOTE — PHYSICAL EXAM
[Normal Conjunctiva] : normal conjunctiva [No Carotid Bruit] : no carotid bruit [Normal Venous Pressure] : normal venous pressure [No Rub] : no rub [No Gallop] : no gallop [Murmur] : murmur [Clear Lung Fields] : clear lung fields [Good Air Entry] : good air entry [No Respiratory Distress] : no respiratory distress  [Soft] : abdomen soft [Non Tender] : non-tender [No Edema] : no edema [No Masses/organomegaly] : no masses/organomegaly [No Cyanosis] : no cyanosis [No Clubbing] : no clubbing [No Rash] : no rash [No Skin Lesions] : no skin lesions [No Focal Deficits] : no focal deficits [Moves all extremities] : moves all extremities [Normal Speech] : normal speech [Alert and Oriented] : alert and oriented [No Acute Distress] : no acute distress [Frail] : frail [de-identified] : Grade III/VI harsh systolic murmur at left sternal border [de-identified] : Irregular S1, preserved S2 [de-identified] : in a wheelchair

## 2024-02-28 NOTE — DISCUSSION/SUMMARY
[FreeTextEntry1] : Patient is a 89 yo F with HTN, TAA, PPM, dementia AS/AI, moderate MR, HLD, DM here for cardiac follow up. Has advanced dementia and unable to provide any hx.   -Also remains poor surgical candidate for TAA repair. Family has not wanted intervention in the past.  - PPM interrogation shows patient in permanent atrial fibrillation. Not a good candidate for AC. Risk/ benefit favors no AC. Ventricular rate remains controlled.  - Has a hx of at least moderate AS. Harsh murmur on exam with preserved S2. Given that her family was not amenable to any intervention in the past, and her advanced dementia, would not be a good candidate for intervention.  - Blood pressure stable.   PLAN:   1. Continue current medication regimen.  2. Activity as tolerated 3. No additional cardiac testing indicated at this time.  4. Routine follow-up with PMD.   Annual follow-up or sooner if needed. PPM check to be done on the same day as OV.

## 2024-02-28 NOTE — ASSESSMENT
[FreeTextEntry1] : EKG: Atrial paced at 61 bpm, nonspecific T wave flattening  PPM INTERROGATION 2/2024: 56.4% AP, 718 mode switches, patient with ongoing atrial fibrillation  PPM INTERROGATION 11/2021: 53% AP, >99 mode switches for PAF, currently in a-fib for approx 40 hours PPM INTERROGATION 6/2021: 57% AP, 30 mode switches for PAF, longest 1hr 55 min, mostly rate controlled PPM INTERROGATION 12/2020: 72.9% atrial paced, 1 mode switch for 38 seconds PAF, 4 episodes PAT PPM INTERROGATION 6/2020: 67% atrial paced, 16 episodes PAF, longest for 2 minutes 45 seconds PPM INTERROGATION 12/2019: 70% atrial paced, no events PPM INTERROGATION 6/2019: 71% atrial paced, episode brief PAT normal function  CAROTID DUPLEX 3/2018: 1. All arteries were clearly visualized. 2. There is 1-49% stenosis of the left proximal ICA. 3. There is 1-49% stenosis of the right proximal ICA. 4. The left and right carotid arteries are tortuous. 5. There is antegrade flow in the right and left vertebral arteries. 6. Recommend clinical correlation with the above findings.  ECHO 9/2018: 1. Left ventricular ejection fraction, by visual estimation, is 65 to 70%. 2. Normal global left ventricular systolic function. 3. Impaired relaxation pattern of LV diastolic filling. 4. Normal left ventricular internal cavity size. 5. Mild concentric left ventricular hypertrophy. 6. Normal right ventricular size and systolic function. 7. Moderately dilated left atrium. 8. Mildly dilated right atrium. 9. Moderate mitral annular calcification. 10. Moderate thickening and calcification of the anterior and posterior mitral valve leaflets. 11. Mild mitral valve regurgitation. 12. Mild to moderate aortic valve stenosis. 13. Mild aortic regurgitation. 14. Mild tricuspid regurgitation. 15. There is moderate to severe dilatation of the ascending aorta (5cm). 16. Severely dilated pulmonary artery. 17. There is no evidence of pericardial effusion. 18. In comparison to the previous echocardiogram mitral and aortic regurgitation appear less severe. 19. Recommend clinical correlation with the above findings.

## 2024-02-28 NOTE — HISTORY OF PRESENT ILLNESS
[FreeTextEntry1] : Patient appears confused and unable to provide history. She does not appear to be in distress. Patient is accompanied by aide from nursing center [facility] resides ([facility]   PPM interrogation today shows permanent atrial fibrillation.

## 2024-05-08 ENCOUNTER — APPOINTMENT (OUTPATIENT)
Dept: NEUROLOGY | Facility: CLINIC | Age: 89
End: 2024-05-08

## 2024-10-26 NOTE — PATIENT PROFILE ADULT - NSFALLSECTIONLABEL_GEN_A_CORE
MRN: [id number], Ita Ward is a 89 year old female     Subjective   Denies chest pain   INR 2.7  Complains of leg pain     I/O's    Intake/Output Summary (Last 24 hours) at 10/26/2024 0725  Last data filed at 10/26/2024 0608  Gross per 24 hour   Intake 560 ml   Output 300 ml   Net 260 ml       Last Recorded Vitals  Blood pressure 125/52, pulse (!) 58, temperature 97.9 °F (36.6 °C), temperature source Oral, resp. rate 16, height 5' 3\" (1.6 m), weight 78.6 kg (173 lb 4.5 oz), SpO2 94%.  Body mass index is 30.7 kg/m².    Physical Exam  Vitals and nursing note reviewed.   Constitutional:       Appearance: Normal appearance. She is well-developed. She is obese.   HENT:      Head: Normocephalic and atraumatic.      Right Ear: External ear normal.      Left Ear: External ear normal.      Nose: Nose normal.      Mouth/Throat:      Mouth: Mucous membranes are dry.   Eyes:      General:         Right eye: No discharge.         Left eye: No discharge.   Cardiovascular:      Rate and Rhythm: Normal rate and regular rhythm.      Heart sounds: Normal heart sounds.   Pulmonary:      Effort: Pulmonary effort is normal.      Breath sounds: Normal breath sounds.      Comments: Post BLL diminished  Abdominal:      Palpations: Abdomen is soft.   Genitourinary:     Comments: Exam Deferred  Musculoskeletal:         General: No swelling.      Cervical back: Neck supple.   Skin:     General: Skin is warm and dry.   Neurological:      General: No focal deficit present.      Mental Status: She is alert. Mental status is at baseline.   Psychiatric:         Mood and Affect: Mood normal.         Behavior: Behavior normal.            Meds  Current Facility-Administered Medications   Medication    lidocaine (LIDOCARE) 4 % patch 2 patch    docusate sodium-sennosides (SENOKOT S) 50-8.6 MG 2 tablet    losartan (COZAAR) tablet 100 mg    WARFARIN - PHARMACIST MONITORED Misc    amLODIPine (NORVASC) tablet 5 mg    nitroGLYCERIN (NITRODUR) 0.1 MG/HR  patch 1 patch    hydrALAZINE (APRESOLINE) injection 10 mg    technetium Tc 99m macroaggregated albumin injection 3 millicurie    sodium chloride 0.9 % injection 10 mL    sodium chloride 0.9 % injection 2 mL    acetaminophen (TYLENOL) tablet 650 mg    Or    acetaminophen (TYLENOL) suppository 650 mg    ondansetron (ZOFRAN ODT) disintegrating tablet 4 mg    Or    ondansetron (ZOFRAN) injection 4 mg    polyethylene glycol (MIRALAX) packet 17 g    docusate sodium-sennosides (SENOKOT S) 50-8.6 MG 2 tablet    bisacodyl (DULCOLAX) suppository 10 mg    magnesium hydroxide (MILK OF MAGNESIA) 400 MG/5ML suspension 30 mL    pantoprazole (PROTONIX) EC tablet 40 mg    dextrose 50 % injection 25 g    dextrose 50 % injection 12.5 g    glucagon (GLUCAGEN) injection 1 mg    dextrose (GLUTOSE) 40 % gel 15 g    dextrose (GLUTOSE) 40 % gel 30 g    insulin lispro (ADMELOG,HumaLOG) - Correction Dose    insulin lispro (ADMELOG,HumaLOG) - Correction Dose    metoPROLOL (LOPRESSOR) injection 5 mg    traMADol (ULTRAM) tablet 25 mg    aspirin (ECOTRIN) enteric coated tablet 81 mg    carvedilol (COREG) tablet 25 mg          Labs     Recent Labs   Lab 10/26/24  0631 10/25/24  0600 10/24/24  0659   SODIUM 134* 139 138   POTASSIUM 4.3 4.5 4.4   CHLORIDE 106 109 110   CO2 21 25 23   BUN 44* 38* 38*   CREATININE 1.64* 1.72* 1.57*   GLUCOSE 220* 210* 193*   CALCIUM 9.7 9.2 9.6      Recent Labs   Lab 10/26/24  0631 10/25/24  0600 10/24/24  0659 10/23/24  0752 10/22/24  0819   SODIUM 134* 139 138   < > 137   CHLORIDE 106 109 110   < > 110   CO2 21 25 23   < > 25   BUN 44* 38* 38*   < > 53*   CREATININE 1.64* 1.72* 1.57*   < > 1.96*   CALCIUM 9.7 9.2 9.6   < > 9.1   ALBUMIN  --   --  3.6  --  3.6   BILIRUBIN  --   --  0.3  --  0.2   ALKPT  --   --  89  --  82   GPT  --   --  77*  --  35   AST  --   --  61*  --  37   GLUCOSE 220* 210* 193*   < > 163*    < > = values in this interval not displayed.      Recent Labs   Lab 10/26/24  0631   WBC 11.6*   RBC  3.43*   HGB 10.2*   HCT 31.8*         Lab Results   Component Value Date    HTROPI 15 10/19/2024    HTROPI 15 10/19/2024    HTROPI 16 10/18/2024         Imaging      LAST EKG:    Encounter Date: 10/18/24   Electrocardiogram 12-Lead   Result Value    Ventricular Rate EKG/Min (BPM) 75    Atrial Rate (BPM) 75    CT-Interval (MSEC) 206    QRS-Interval (MSEC) 78    QT-Interval (MSEC) 394    QTc 440    P Axis (Degrees) 87    R Axis (Degrees) -65    T Axis (Degrees) 37    REPORT TEXT      Normal sinus rhythm  with sinus arrhythmia  Left axis deviation  Pulmonary disease pattern  Septal infarct  , age undetermined  Abnormal ECG  When compared with ECG of  19-OCT-2024 04:08,  Septal infarct  is now  present  Confirmed by ALEXIS PORRAS DO ([id number]) on 10/20/2024 12:16:06 PM         Assessment & Plan   Chest pain, troponin negative, EKG showed normal sinus rhythm with sinus arrhythmia left axis deviation, low voltage QRS inferior infarct age undetermined. CXR No acute cardiopulmonary disease .   D-dimer elevated--VQ scan negative for PE  Syncope 2 weeks prior to arrival; orthostatics negative per primary care provider  HTN  HLD  DM T2--10/18/24 A1C 7.8  CKD     REC  Monitor on telemetry to rule out significat cardiac arrhyhtmia  Recent Echo at Dr Coker's office showed LVEF 55-60%  Serial troponins negative  TSH WNL  Coreg 25 BID  Amlodipine 2.5 QD  Hydrochlorothiazide 12.5 QD   Losartan 100 QD   May benefit from extended holter on discharge--ordered 10/20  CTOH 10/18 without acute changes  Replace electrolytes PRN  VQ scan negative for PE  Continue medical management for CAD    Ntg patch 0.1 mg/hr daily ; on 12 hours, off 12 hours  US + DVT right popliteal vein; IV heparin with warfarin bridge   INR therapeutic  Discharge planning    Stable for discharge from cardiology perspective.   Follow up with Dr Coker's office as need   Will sign off Please re consult as needed        Discussed with Dr John Paul Fairbanks,  APNP  Cardiology Nurse Practitioner        .

## 2025-01-16 NOTE — PHYSICAL THERAPY INITIAL EVALUATION ADULT - LEVEL OF INDEPENDENCE: SIT/SUPINE, REHAB EVAL
1/16/2025     Social Work Note     received referral from Ragini Platt, RN for assistance with handicap placard.   spoke with patient, she has upcoming virtual visit with PCP.  She is currently staying in [facility] with her grandson (21 y.o.).  She has another grandson who lives close by as well.  Together, they help her get to appts, to the store, etc.     will email form [email or web address] to patient at [email or web address].   instructed patient to upload form through [employer] as she has done with her ADA paperwork.  SDOH food insecurity addressed- patient can have difficulty with healthy food access.   will include resource for [place] in email as well.    Patient denies any additional questions or concerns at this time.  Sw encouraged f/u prn.      THEO Gannon, LSW  [phone]      
dependent (less than 25% patients effort)

## 2025-02-26 NOTE — INPATIENT CERTIFICATION FOR MEDICARE PATIENTS - THE SEVERITY OF SIGNS/SYMPTOMS. (SEE ED/ADMIT DOCUMENTS)
1. The severity of signs/symptoms.(See ED/admit documents) Change Daily Dosage Administered Mid Treatment?: No Add X Modifier?: PLEITEZ - Unusual Non-Overlapping Service Treatment Documentation: This patient has been treated today with image-guided superficial radiation therapy for non-melanoma skin cancer. Written informed consent has been previously obtained from this patient for this treatment. This consent is documented in the patient's chart. The patient gave verbal consent to continue treatment today. The patient was treated with a specific radiation dose and setup as prescribed by the provider listed on this visit note. A Radiation Therapist performed administration of radiation under the supervision of a provider. The treatment parameters and cumulative dose are indicated above. Prior to administering the radiation, the patient underwent a verification therapeutic radiology simulation-aided field setting defining relevant normal and abnormal target anatomy and acquiring images with separate and distinct diagnostic high-frequency ultrasound to delineate tissues and determine whether to proceed with delivery of therapeutic, in addition to retrieve data necessary to develop an optimal radiation treatment process for the patient. The field placement simulation documents any change seen in overall tumor volume documented in the patient’s record, allows the clinician to indicate any needed changes in the treatment plan and/or prescription, provides diagnostic evaluation as the basis for performing the therapeutic procedure, and clearly identifies the information needed to decide to proceed with the therapeutic procedure. This process includes verification of the treatment port(s) and proper treatment positioning. All treatment ports were photographed within electronic medical records. The patient's lead blocking along with gross tumor volume and margin was confirmed. Considering superficial radiotherapy is clinical in setup, this requires the physician and radiation therapist to clarify the location interest being treated against initial images, ultrasound, pathology, and patient anatomy. Care was taken to ensure mojica treated were geometrically accurate and properly positioned using therapeutic radiology simulation-aided field setting verification per fraction. This process is also utilized to determine if any prescription or setup changes are necessary. These steps are therefore medically necessary to ensure safe and effective administration of radiation. Ongoing therapeutic radiology simulation-aided field setting verification is ordered throughout the course of therapy.\\n\\nA high-frequency ultrasound image was acquired today for a two-dimensional evaluation of the tumor volume, depth, width, breadth, review of prior response to treatment, provide geometric accuracy of field placement, and determine whether to proceed with therapeutic delivery.\\nThe field placement and ultrasound imaging, per fraction, is separate and distinct from the initial simulation and is an important task in providing safe administration of superficial radiation therapy. Physician evaluation of the ultrasound information will be ongoing throughout the course of treatment and is deemed medically necessary to ensure the efficacy of treatment, whether to proceed with therapeutic delivery, and determine any necessary changes. Today's images were evaluated for determination of continuation of treatment with the current plan or with necessary changes as appropriate. Additionally, the use of ultrasound visualization and targeted assessment allows the patient to be able to see their cancer(s) progress, encouraging the patient to complete and maintain compliance through the full course of prescribed radiotherapy. \\nPer Dr. Barros, continued ultrasound guidance and therapeutic radiology simulation-aided field setting verification per fraction is required for field placement, measurement of tumor depth, tissue evaluation, progress, acute effect monitoring, and determination for therapeutic treatment delivery is appropriate. Additional Comments (Add Customization Of Note Here): No notable change from previous day and treatment will continue as planned. Show Ultrasound In Note?: Yes Prescription Used: 1 Calculate Total Cumulative Dose Automatically Or Manually: Manually Fraction Number: 3 Energy (Kv): 70 Bill For Simulation (Per Medicare, Typical Course Of Radiation Therapy Will Require Between One To Three Simulations): Yes- (Simple- 1 Site: 43292) Ultrasound Used Text: High frequency ultrasound depth is 1.62 mm, which is 0.06 mm in difference from previous imaging. Daily Dosage (Cgy): 275.40 Ultrasound Not Used Text: Ultrasound was not performed today due to Total Cumulative Dose (Cgy): 826.2

## (undated) DEVICE — DRSG CURITY GAUZE SPONGE 4 X 4" 12-PLY NON-STERILE

## (undated) DEVICE — DRSG 2X2

## (undated) DEVICE — TUBING ALARIS PUMP MODULE NON-DEHP

## (undated) DEVICE — UNDERPAD LINEN SAVER 23 X 36"

## (undated) DEVICE — SOL BAG NS 0.9% 1000ML

## (undated) DEVICE — BITE BLOCK ADULT 20 X 27MM (GREEN)

## (undated) DEVICE — PACK IV START WITH CHG

## (undated) DEVICE — SENSOR O2 FINGER ADULT

## (undated) DEVICE — SYR IV FLUSH SALINE 10ML 30/TY

## (undated) DEVICE — FORCEP RADIAL JAW 4 W NDL 2.4MM 2.8MM 240CM ORANGE DISP

## (undated) DEVICE — WARMING BLANKET FULL ADULT

## (undated) DEVICE — CATH IV SAFE BC 22G X 1" (BLUE)

## (undated) DEVICE — SOL IRR BAG NS 0.9% 1000ML

## (undated) DEVICE — VENODYNE/SCD SLEEVE CALF MEDIUM

## (undated) DEVICE — SYR SLIP 10CC

## (undated) DEVICE — MASK PROC EAR LOOP

## (undated) DEVICE — SYR LUER SLIP TIP 50CC

## (undated) DEVICE — TUBING IV EXTENSION MACRO W CLAVE 7"

## (undated) DEVICE — GOWN IMPERV XL

## (undated) DEVICE — DENTURE CUP PINK

## (undated) DEVICE — SOL IRR BAG H2O 1000ML